# Patient Record
Sex: FEMALE | Race: WHITE | NOT HISPANIC OR LATINO | Employment: OTHER | URBAN - METROPOLITAN AREA
[De-identification: names, ages, dates, MRNs, and addresses within clinical notes are randomized per-mention and may not be internally consistent; named-entity substitution may affect disease eponyms.]

---

## 2016-12-01 LAB
ALT SERPL-CCNC: 13 U/L (ref 6–29)
AST SERPL-CCNC: 24 U/L (ref 10–35)
CHOLEST SERPL-MCNC: 237 MG/DL (ref 125–200)
CREAT SERPL-MCNC: 0.88 MG/DL (ref 0.5–0.99)
GFR SERPL CREATININE-BSD FRML MDRD: 68 ML/MIN/1.73M2
GLUCOSE SERPL-MCNC: 88 MG/DL (ref 65–99)
HDLC SERPL-MCNC: 55 MG/DL
LDLC SERPL CALC-MCNC: 164 MG/DL
NONHDLC SERPL-MCNC: 182 MG/DL
POTASSIUM SERPL-SCNC: 4.3 MMOL/L (ref 3.5–5.3)
TRIGL SERPL-MCNC: 89 MG/DL
TSH SERPL-ACNC: 1.63 MIU/L (ref 0.4–4.5)

## 2017-01-27 ENCOUNTER — TRANSFERRED RECORDS (OUTPATIENT)
Dept: HEALTH INFORMATION MANAGEMENT | Facility: CLINIC | Age: 67
End: 2017-01-27

## 2017-02-09 ENCOUNTER — TRANSFERRED RECORDS (OUTPATIENT)
Dept: HEALTH INFORMATION MANAGEMENT | Facility: CLINIC | Age: 67
End: 2017-02-09

## 2017-02-28 LAB
ALT SERPL-CCNC: 16 U/L (ref 6–29)
AST SERPL-CCNC: 23 U/L (ref 10–35)
CHOLEST SERPL-MCNC: 129 MG/DL (ref 125–200)
CREAT SERPL-MCNC: 0.9 MG/DL (ref 0.5–0.99)
GFR SERPL CREATININE-BSD FRML MDRD: 67 ML/MIN/1.73M2
GLUCOSE SERPL-MCNC: 90 MG/DL (ref 65–99)
HDLC SERPL-MCNC: 50 MG/DL
LDLC SERPL CALC-MCNC: 66 MG/DL
NONHDLC SERPL-MCNC: 79 MG/DL
POTASSIUM SERPL-SCNC: 4.4 MMOL/L (ref 3.5–5.3)
TRIGL SERPL-MCNC: 67 MG/DL

## 2017-10-19 ENCOUNTER — TRANSFERRED RECORDS (OUTPATIENT)
Dept: HEALTH INFORMATION MANAGEMENT | Facility: CLINIC | Age: 67
End: 2017-10-19

## 2017-11-01 ENCOUNTER — TRANSFERRED RECORDS (OUTPATIENT)
Dept: HEALTH INFORMATION MANAGEMENT | Facility: CLINIC | Age: 67
End: 2017-11-01

## 2017-12-14 LAB
ALT SERPL-CCNC: 14 U/L (ref 6–29)
AST SERPL-CCNC: 23 U/L (ref 10–35)
CHOLEST SERPL-MCNC: 149 MG/DL
CREAT SERPL-MCNC: 1.04 MG/DL (ref 0.5–0.99)
GFR SERPL CREATININE-BSD FRML MDRD: 56 ML/MIN/1.73M2
GLUCOSE SERPL-MCNC: 93 MG/DL (ref 65–99)
HDLC SERPL-MCNC: 49 MG/DL
LDLC SERPL CALC-MCNC: 83 MG/DL
NONHDLC SERPL-MCNC: 100 MG/DL
POTASSIUM SERPL-SCNC: 4.3 MMOL/L (ref 3.5–5.3)
TRIGL SERPL-MCNC: 82 MG/DL
TSH SERPL-ACNC: 1.67 MIU/L (ref 0.4–4.5)

## 2017-12-29 ENCOUNTER — TRANSFERRED RECORDS (OUTPATIENT)
Dept: HEALTH INFORMATION MANAGEMENT | Facility: CLINIC | Age: 67
End: 2017-12-29

## 2018-01-05 ENCOUNTER — TRANSFERRED RECORDS (OUTPATIENT)
Dept: HEALTH INFORMATION MANAGEMENT | Facility: CLINIC | Age: 68
End: 2018-01-05

## 2018-01-19 LAB
ALT SERPL-CCNC: 14 U/L (ref 6–29)
AST SERPL-CCNC: 22 U/L (ref 10–35)
CREAT SERPL-MCNC: 0.9 MG/DL (ref 0.5–0.99)
GFR SERPL CREATININE-BSD FRML MDRD: 66 ML/MIN/1.73M2
GLUCOSE SERPL-MCNC: 93 MG/DL (ref 65–99)
POTASSIUM SERPL-SCNC: 4.4 MMOL/L (ref 3.5–5.3)

## 2018-02-22 ENCOUNTER — TRANSFERRED RECORDS (OUTPATIENT)
Dept: HEALTH INFORMATION MANAGEMENT | Facility: CLINIC | Age: 68
End: 2018-02-22

## 2018-07-17 LAB
ALT SERPL-CCNC: 15 U/L (ref 6–29)
AST SERPL-CCNC: 25 U/L (ref 10–35)
CHOLEST SERPL-MCNC: 138 MG/DL
CREAT SERPL-MCNC: 0.84 MG/DL (ref 0.5–0.99)
GFR SERPL CREATININE-BSD FRML MDRD: 71 ML/MIN/1.73M2
GLUCOSE SERPL-MCNC: 90 MG/DL (ref 65–99)
HDLC SERPL-MCNC: 45 MG/DL
LDLC SERPL CALC-MCNC: 78 MG/DL
NONHDLC SERPL-MCNC: 93 MG/DL
POTASSIUM SERPL-SCNC: 4.5 MMOL/L (ref 3.5–5.3)
TRIGL SERPL-MCNC: 72 MG/DL

## 2018-09-04 ENCOUNTER — TRANSFERRED RECORDS (OUTPATIENT)
Dept: HEALTH INFORMATION MANAGEMENT | Facility: CLINIC | Age: 68
End: 2018-09-04

## 2019-01-08 ENCOUNTER — TRANSFERRED RECORDS (OUTPATIENT)
Dept: HEALTH INFORMATION MANAGEMENT | Facility: CLINIC | Age: 69
End: 2019-01-08

## 2019-04-18 ENCOUNTER — TRANSFERRED RECORDS (OUTPATIENT)
Dept: HEALTH INFORMATION MANAGEMENT | Facility: CLINIC | Age: 69
End: 2019-04-18

## 2019-05-14 ENCOUNTER — TRANSFERRED RECORDS (OUTPATIENT)
Dept: HEALTH INFORMATION MANAGEMENT | Facility: CLINIC | Age: 69
End: 2019-05-14

## 2019-12-20 LAB
ALT SERPL-CCNC: 18 U/L (ref 6–29)
AST SERPL-CCNC: 23 U/L (ref 10–35)
CHOLEST SERPL-MCNC: 128 MG/DL
CREAT SERPL-MCNC: 0.98 MG/DL (ref 0.5–0.99)
GFR SERPL CREATININE-BSD FRML MDRD: 59 ML/MIN/1.73M2
GLUCOSE SERPL-MCNC: 101 MG/DL (ref 65–99)
HDLC SERPL-MCNC: 48 MG/DL
LDLC SERPL CALC-MCNC: 66 MG/DL
NONHDLC SERPL-MCNC: 80 MG/DL
POTASSIUM SERPL-SCNC: 4 MMOL/L (ref 3.5–5.3)
TRIGL SERPL-MCNC: 67 MG/DL
TSH SERPL-ACNC: 1.4 MIU/L (ref 0.4–4.5)

## 2020-01-15 ENCOUNTER — TRANSFERRED RECORDS (OUTPATIENT)
Dept: HEALTH INFORMATION MANAGEMENT | Facility: CLINIC | Age: 70
End: 2020-01-15

## 2020-01-23 ENCOUNTER — TRANSFERRED RECORDS (OUTPATIENT)
Dept: HEALTH INFORMATION MANAGEMENT | Facility: CLINIC | Age: 70
End: 2020-01-23

## 2020-02-17 ENCOUNTER — TRANSFERRED RECORDS (OUTPATIENT)
Dept: HEALTH INFORMATION MANAGEMENT | Facility: CLINIC | Age: 70
End: 2020-02-17

## 2020-05-19 ENCOUNTER — TRANSFERRED RECORDS (OUTPATIENT)
Dept: HEALTH INFORMATION MANAGEMENT | Facility: CLINIC | Age: 70
End: 2020-05-19

## 2020-05-19 LAB
ALT SERPL-CCNC: 26 U/L (ref 6–60)
AST SERPL-CCNC: 27 U/L (ref 15–37)
CREAT SERPL-MCNC: 1.11 MG/DL (ref 0.5–1.4)
GFR SERPL CREATININE-BSD FRML MDRD: 50 ML/MIN/1.73M2
GLUCOSE SERPL-MCNC: 93 MG/DL (ref 60–99)
POTASSIUM SERPL-SCNC: 4.4 MMOL/L (ref 3.5–5.1)
TSH SERPL-ACNC: 1.87 MIU/L (ref 0.36–3.74)

## 2020-06-10 ENCOUNTER — TRANSFERRED RECORDS (OUTPATIENT)
Dept: HEALTH INFORMATION MANAGEMENT | Facility: CLINIC | Age: 70
End: 2020-06-10

## 2020-06-10 LAB — EJECTION FRACTION: >70 %

## 2020-09-17 ENCOUNTER — TRANSFERRED RECORDS (OUTPATIENT)
Dept: HEALTH INFORMATION MANAGEMENT | Facility: CLINIC | Age: 70
End: 2020-09-17

## 2020-10-15 ENCOUNTER — TRANSFERRED RECORDS (OUTPATIENT)
Dept: HEALTH INFORMATION MANAGEMENT | Facility: CLINIC | Age: 70
End: 2020-10-15

## 2020-10-20 ENCOUNTER — TRANSFERRED RECORDS (OUTPATIENT)
Dept: HEALTH INFORMATION MANAGEMENT | Facility: CLINIC | Age: 70
End: 2020-10-20

## 2020-12-18 ENCOUNTER — TRANSFERRED RECORDS (OUTPATIENT)
Dept: HEALTH INFORMATION MANAGEMENT | Facility: CLINIC | Age: 70
End: 2020-12-18

## 2020-12-28 ENCOUNTER — MEDICAL CORRESPONDENCE (OUTPATIENT)
Dept: HEALTH INFORMATION MANAGEMENT | Facility: CLINIC | Age: 70
End: 2020-12-28

## 2020-12-28 ENCOUNTER — TRANSFERRED RECORDS (OUTPATIENT)
Dept: HEALTH INFORMATION MANAGEMENT | Facility: CLINIC | Age: 70
End: 2020-12-28

## 2020-12-29 ENCOUNTER — TRANSCRIBE ORDERS (OUTPATIENT)
Dept: OTHER | Age: 70
End: 2020-12-29

## 2020-12-29 DIAGNOSIS — R49.0 HOARSENESS: Primary | ICD-10-CM

## 2020-12-30 ENCOUNTER — PATIENT OUTREACH (OUTPATIENT)
Dept: PULMONOLOGY | Facility: CLINIC | Age: 70
End: 2020-12-30

## 2020-12-30 NOTE — PROGRESS NOTES
"ILD New Patient Referral: Pre visit communication    Patient: Dionrah Thompson  Reason for Referral: Sarcoidosis  Referring Physician: Dr. Susan Davis  Referring Clinic/Contact: Phone#: [facility] in [place], [place]    Chest CT scan: YES. Date-several years ago. Location-[facility]  Biopsy: Unknown  PFT's:YES. Date-Jan2020. Location-[facility]  Additional testing:   Current symptoms: None  Current related prescriptions: YES. Qvar    Supplemental oxygen? No    In review of apparent records and conversation with patient; recommend first visit with ILD provider be conducted :  Testing needed: Full PFT's and walk only    Additional notes:   Patient was referred to Dr. Richmond or Dr. Perlman from [facility].  She has had a longstanding history of sarcoidosis that flares intermittently.  She has been on prednisone and methotrexate in the past but has not needed medication for \"about 5 years\".  Sarcoidosis was pulmonary sarcoid, typically followed with annual PFT's and CXR.  She was told to establish care in [place] (moved here two weeks ago).    "

## 2020-12-31 NOTE — TELEPHONE ENCOUNTER
FUTURE VISIT INFORMATION      FUTURE VISIT INFORMATION:    Date: 3/22/2021    Time: 7:30 AM    [place] [facility]  REFERRAL INFORMATION:    Referring provider:  Dr. Carlos A Bower    Referring providers clinic:  [facility]    Reason for visit/diagnosis: Hoarseness    RECORDS REQUESTED FROM:       Clinic name Comments Records Status Imaging Status   [facility] ([facility] 1/6/21, 12/18/20, 12/2/20 - SPEECH OV with TASIA Caberra  11/5/20 - ENT OV with Dr. Bower  10/15/18 - ENT OV with ARNOLDO Rodríguez Care Everywhere    [facility] 4/5/19 - OBGYN OV with Dr. Damico Care Everywhere    [facility] 6/18/18 - OP Note for BILATERAL MAXILLARY ANTROSTOMY, ETHMOIDECTOMY, SPHENOIDOTOMY, FRONTAL SINUSOTOMY, IMAGE GUIDANCE GENERAL ANES with Dr. Pond Care Everywhere    [facility]  Fax: [phone]  Phone: [phone] 10/15/20 - ALLERGY OV with Dr. Mchugh  10/14/20 - OV with Dr. Campbell 2/11 Sent to Scan            * 12/31/20 8:31 AM Faxed req to [facility] for recs to be faxed to the clinic. - Alem  * 1/14/21 7:29 AM Faxed 2nd Req to [facility] for records. - Alem  * 2/11/21 8:29 AM Faxed req to [facility] to actual fax number after calling. - Alem  * 2/11/21 11:23 AM Records received from [facility] and sent to HIM to be scanned into the chart. - Alem

## 2021-01-04 DIAGNOSIS — J84.9 ILD (INTERSTITIAL LUNG DISEASE) (H): Primary | ICD-10-CM

## 2021-01-15 ENCOUNTER — HEALTH MAINTENANCE LETTER (OUTPATIENT)
Age: 71
End: 2021-01-15

## 2021-01-18 ENCOUNTER — OFFICE VISIT (OUTPATIENT)
Dept: FAMILY MEDICINE | Facility: CLINIC | Age: 71
End: 2021-01-18
Payer: MEDICARE

## 2021-01-18 VITALS
HEART RATE: 68 BPM | SYSTOLIC BLOOD PRESSURE: 124 MMHG | HEIGHT: 62 IN | DIASTOLIC BLOOD PRESSURE: 75 MMHG | TEMPERATURE: 98.3 F | BODY MASS INDEX: 26.22 KG/M2 | OXYGEN SATURATION: 96 % | WEIGHT: 142.5 LBS

## 2021-01-18 DIAGNOSIS — M54.16 LUMBAR RADICULOPATHY, CHRONIC: ICD-10-CM

## 2021-01-18 DIAGNOSIS — M54.12 CERVICAL RADICULOPATHY, CHRONIC: ICD-10-CM

## 2021-01-18 DIAGNOSIS — D49.9 PRECANCEROUS LESION: ICD-10-CM

## 2021-01-18 DIAGNOSIS — Z76.89 ENCOUNTER TO ESTABLISH CARE WITH NEW DOCTOR: Primary | ICD-10-CM

## 2021-01-18 DIAGNOSIS — Z86.69 HISTORY OF RETINAL DETACHMENT: ICD-10-CM

## 2021-01-18 PROBLEM — N81.10 FEMALE BLADDER PROLAPSE: Status: ACTIVE | Noted: 2019-09-03

## 2021-01-18 PROBLEM — M47.816 SPONDYLOSIS WITHOUT MYELOPATHY OR RADICULOPATHY, LUMBAR REGION: Status: ACTIVE | Noted: 2020-07-08

## 2021-01-18 PROBLEM — M50.322 OTHER CERVICAL DISC DEGENERATION AT C5-C6 LEVEL: Status: ACTIVE | Noted: 2021-01-18

## 2021-01-18 PROBLEM — J38.7 LARYNGEAL DISORDER: Status: ACTIVE | Noted: 2021-01-18

## 2021-01-18 PROBLEM — M19.049 OSTEOARTHROSIS, HAND: Status: ACTIVE | Noted: 2021-01-18

## 2021-01-18 PROBLEM — N81.10 FEMALE BLADDER PROLAPSE: Status: RESOLVED | Noted: 2019-09-03 | Resolved: 2021-01-18

## 2021-01-18 PROBLEM — E78.5 HYPERLIPIDEMIA: Status: ACTIVE | Noted: 2018-06-13

## 2021-01-18 PROBLEM — M17.0 PRIMARY LOCALIZED OSTEOARTHRITIS OF KNEES, BILATERAL: Status: ACTIVE | Noted: 2020-07-08

## 2021-01-18 PROBLEM — G47.33 OSA TREATED WITH BIPAP: Status: ACTIVE | Noted: 2018-06-13

## 2021-01-18 PROBLEM — R49.0 HOARSENESS: Status: ACTIVE | Noted: 2020-12-18

## 2021-01-18 PROBLEM — G89.4 CHRONIC PAIN DISORDER: Status: ACTIVE | Noted: 2020-11-03

## 2021-01-18 PROBLEM — N81.9 PROLAPSE OF FEMALE PELVIC ORGANS: Status: ACTIVE | Noted: 2019-08-19

## 2021-01-18 PROBLEM — M51.16 INTERVERTEBRAL DISC DISORDERS WITH RADICULOPATHY, LUMBAR REGION: Status: ACTIVE | Noted: 2020-07-08

## 2021-01-18 PROBLEM — N81.9 PROLAPSE OF FEMALE PELVIC ORGANS: Status: RESOLVED | Noted: 2019-08-19 | Resolved: 2021-01-18

## 2021-01-18 PROCEDURE — 99214 OFFICE O/P EST MOD 30 MIN: CPT | Performed by: FAMILY MEDICINE

## 2021-01-18 RX ORDER — ALBUTEROL SULFATE 90 UG/1
2 AEROSOL, METERED RESPIRATORY (INHALATION) EVERY 4 HOURS PRN
COMMUNITY
End: 2021-11-30

## 2021-01-18 RX ORDER — ACETAMINOPHEN 500 MG
1000 TABLET ORAL 3 TIMES DAILY PRN
Status: ON HOLD | COMMUNITY
End: 2021-07-21

## 2021-01-18 RX ORDER — IBUPROFEN 200 MG
1 CAPSULE ORAL DAILY
Status: ON HOLD | COMMUNITY
End: 2021-07-21

## 2021-01-18 RX ORDER — MULTIVIT-MIN/IRON FUM/FOLIC AC 7.5 MG-4
1 TABLET ORAL
COMMUNITY
End: 2021-01-18

## 2021-01-18 RX ORDER — CELECOXIB 200 MG/1
200 CAPSULE ORAL 2 TIMES DAILY
COMMUNITY
Start: 2021-01-15 | End: 2022-01-18

## 2021-01-18 RX ORDER — ROSUVASTATIN CALCIUM 5 MG/1
5 TABLET, COATED ORAL EVERY OTHER DAY
COMMUNITY
End: 2022-01-18

## 2021-01-18 RX ORDER — DULOXETIN HYDROCHLORIDE 60 MG/1
60 CAPSULE, DELAYED RELEASE ORAL DAILY
COMMUNITY
End: 2021-09-13

## 2021-01-18 RX ORDER — TRAZODONE HYDROCHLORIDE 100 MG/1
100 TABLET ORAL
COMMUNITY
End: 2022-08-24

## 2021-01-18 SDOH — HEALTH STABILITY: MENTAL HEALTH: HOW OFTEN DO YOU HAVE 6 OR MORE DRINKS ON ONE OCCASION?: NOT ASKED

## 2021-01-18 SDOH — HEALTH STABILITY: MENTAL HEALTH: HOW OFTEN DO YOU HAVE A DRINK CONTAINING ALCOHOL?: MONTHLY OR LESS

## 2021-01-18 SDOH — HEALTH STABILITY: MENTAL HEALTH: HOW MANY STANDARD DRINKS CONTAINING ALCOHOL DO YOU HAVE ON A TYPICAL DAY?: 1 OR 2

## 2021-01-18 ASSESSMENT — MIFFLIN-ST. JEOR: SCORE: 1116.63

## 2021-01-18 NOTE — PROGRESS NOTES
Assessment & Plan     Encounter to establish care with new doctor  Reviewed problem list, medications, allergies, PMH, SH, Surgical history, FH.  Recommended patient schedule Preventative visit at convenience.    Cervical radiculopathy, chronic  Referral placed for evaluation, follow up as needed.  - PAIN MANAGEMENT REFERRAL; Future    Lumbar radiculopathy, chronic  Referral placed for evaluation, follow up as needed.  - PAIN MANAGEMENT REFERRAL; Future    Precancerous lesion  No concerns today, would like skin check, referral placed, follow up as needed.  - DERMATOLOGY ADULT REFERRAL; Future    History of retinal detachment  No concerns today, referral placed, follow up as needed.  - EYE ADULT REFERRAL; Future      28 minutes spent on the date of the encounter doing chart review, history and exam, documentation and further activities as noted above    See Patient Instructions    Return in about 3 months (around 4/18/2021) for Preventative Care Visit.    Jayna Aviles MD  [facility] [place]    Subjective     Dot is a 70 year old who presents to clinic today for the following health issues     History of Present Illness       She eats 2-3 servings of fruits and vegetables daily.She consumes 0 sweetened beverage(s) daily.She exercises with enough effort to increase her heart rate 10 to 19 minutes per day.  She exercises with enough effort to increase her heart rate 6 days per week.   She is taking medications regularly.         New Patient/Transfer of Care  Patient is here to establish care for medication management and she would like to discuss some specialist referrals.     Would like to get refills of medications as needed.  1.  Needs referrals for Dermatology (history of precancerous skin lesion), Ophthalmology (History of retinal detachment, ocular shingles).  2.  Bipap Supplies- will need in future  3.  Has Cervical and Lumbar spinal stenosis, epidural injections: Pain clinic  "Referral needed.      Review of Systems   Constitutional, HEENT, cardiovascular, pulmonary, gi and gu systems are negative, except as otherwise noted.      Objective    /75 (BP Location: Right arm, Patient Position: Chair, Cuff Size: Adult Regular)   Pulse 68   Temp 98.3  F (36.8  C) (Oral)   Ht 1.57 m (5' 1.81\")   Wt 64.6 kg (142 lb 8 oz)   SpO2 96%   BMI 26.22 kg/m    Body mass index is 26.22 kg/m .  Physical Exam   GENERAL: healthy, alert and no distress  EYES: Eyes grossly normal to inspection, PERRL and conjunctivae and sclerae normal  RESP: lungs clear to auscultation - no rales, rhonchi or wheezes  CV: regular rate and rhythm, normal S1 S2, no S3 or S4, no murmur, click or rub, no peripheral edema and peripheral pulses strong  SKIN: no suspicious lesions or rashes            "

## 2021-01-28 ENCOUNTER — VIRTUAL VISIT (OUTPATIENT)
Dept: FAMILY MEDICINE | Facility: CLINIC | Age: 71
End: 2021-01-28
Payer: MEDICARE

## 2021-01-28 DIAGNOSIS — Z13.220 SCREENING FOR HYPERLIPIDEMIA: ICD-10-CM

## 2021-01-28 DIAGNOSIS — Z11.59 NEED FOR HEPATITIS C SCREENING TEST: ICD-10-CM

## 2021-01-28 DIAGNOSIS — N30.00 ACUTE CYSTITIS WITHOUT HEMATURIA: Primary | ICD-10-CM

## 2021-01-28 PROCEDURE — 99442 PR PHYSICIAN TELEPHONE EVALUATION 11-20 MIN: CPT | Mod: 95 | Performed by: FAMILY MEDICINE

## 2021-01-28 RX ORDER — SULFAMETHOXAZOLE/TRIMETHOPRIM 800-160 MG
1 TABLET ORAL 2 TIMES DAILY
Qty: 6 TABLET | Refills: 1 | Status: SHIPPED | OUTPATIENT
Start: 2021-01-28 | End: 2021-01-31

## 2021-01-28 SDOH — ECONOMIC STABILITY: FOOD INSECURITY: WITHIN THE PAST 12 MONTHS, THE FOOD YOU BOUGHT JUST DIDN'T LAST AND YOU DIDN'T HAVE MONEY TO GET MORE.: NOT ASKED

## 2021-01-28 SDOH — ECONOMIC STABILITY: FOOD INSECURITY: WITHIN THE PAST 12 MONTHS, YOU WORRIED THAT YOUR FOOD WOULD RUN OUT BEFORE YOU GOT MONEY TO BUY MORE.: NOT ASKED

## 2021-01-28 SDOH — ECONOMIC STABILITY: TRANSPORTATION INSECURITY
IN THE PAST 12 MONTHS, HAS LACK OF TRANSPORTATION KEPT YOU FROM MEETINGS, WORK, OR FROM GETTING THINGS NEEDED FOR DAILY LIVING?: NOT ASKED

## 2021-01-28 SDOH — ECONOMIC STABILITY: INCOME INSECURITY: HOW HARD IS IT FOR YOU TO PAY FOR THE VERY BASICS LIKE FOOD, HOUSING, MEDICAL CARE, AND HEATING?: NOT ASKED

## 2021-01-28 SDOH — ECONOMIC STABILITY: TRANSPORTATION INSECURITY
IN THE PAST 12 MONTHS, HAS THE LACK OF TRANSPORTATION KEPT YOU FROM MEDICAL APPOINTMENTS OR FROM GETTING MEDICATIONS?: NOT ASKED

## 2021-01-28 NOTE — PROGRESS NOTES
"-Dot is a 70 year old who is being evaluated via a billable telephone visit.  -    What phone number would you like to be contacted at? See demographics  How would you like to obtain your AVS? MyChart  Assessment & Plan     Need for hepatitis C screening test    - Hepatitis C Screen Reflex to HCV RNA Quant and Genotype; Future    Screening for hyperlipidemia    - Lipid panel reflex to direct LDL Fasting; Future    Acute cystitis without hematuria  Handout on the above diagnosis was given to the patient and discussed    - sulfamethoxazole-trimethoprim (BACTRIM DS) 800-160 MG tablet; Take 1 tablet by mouth 2 times daily for 3 days        14 minutes spent on the date of the encounter doing chart review, review of outside records, patient visit and documentation          BMI:   Estimated body mass index is 26.22 kg/m  as calculated from the following:    Height as of 1/18/21: 1.57 m (5' 1.81\").    Weight as of 1/18/21: 64.6 kg (142 lb 8 oz).         She has appt in 2 weeks with Dr. Liang    Return in about 2 months (around 3/28/2021) for Physical Exam.    Terri Gamble MD  [facility]    Maya Chris is a 70 year old who presents to clinic today for the following health issues - she is pretty sure she has a UTI.   She does not have pain with urination.   She got dehydrated.   She does have some low back pain which is from moving and chronic spinal stenosis.  It has been several days.   She has urinary urgency and body aches.   She also feels like she has to go when she doesn't.   Her urine is cloudy and smells.     History of Present Illness       She eats 2-3 servings of fruits and vegetables daily.She consumes 0 sweetened beverage(s) daily.She exercises with enough effort to increase her heart rate 10 to 19 minutes per day.  She exercises with enough effort to increase her heart rate 6 days per week.   She is taking medications regularly.         Genitourinary - " Female  Onset/Duration: several days   Description:   Painful urination (Dysuria): no           Frequency: YES  Blood in urine (Hematuria): no  Delay in urine (Hesitency): no  Intensity: severe  Progression of Symptoms:  same  Accompanying Signs & Symptoms:  Fever/chills: no  Flank pain: no  Nausea and vomiting: no  Vaginal symptoms: odor  Abdominal/Pelvic Pain: YES  History:   History of frequent UTI s: no  History of kidney stones: no  Sexually Active: no  Possibility of pregnancy: No  Precipitating or alleviating factors: None  Therapies tried and outcome: Increase fluid intake      Past Medical History:   Diagnosis Date     Chronic osteoarthritis      Depressive disorder      Female bladder prolapse 9/3/2019     Prolapse of female pelvic organs 8/19/2019       Past Surgical History:   Procedure Laterality Date     APPENDECTOMY       diskectomy in toe       HYSTERECTOMY  08/2017    and bladder surgery     left rotator cuff surgery       TONSILLECTOMY  1955       MEDICATIONS:  Current Outpatient Medications   Medication     sulfamethoxazole-trimethoprim (BACTRIM DS) 800-160 MG tablet     acetaminophen (TYLENOL) 500 MG tablet     albuterol (PROAIR HFA/PROVENTIL HFA/VENTOLIN HFA) 108 (90 Base) MCG/ACT inhaler     calcium carbonate 500 mg, elemental, (OSCAL 500) 1250 (500 Ca) MG TABS tablet     Carboxymethylcellul-Glycerin 1-0.9 % GEL     celecoxib (CELEBREX) 200 MG capsule     DULoxetine (CYMBALTA) 60 MG capsule     NONFORMULARY     rosuvastatin (CRESTOR) 5 MG tablet     traZODone (DESYREL) 100 MG tablet     No current facility-administered medications for this visit.        SOCIAL HISTORY:  Social History     Tobacco Use     Smoking status: Never Smoker     Smokeless tobacco: Never Used   Substance Use Topics     Alcohol use: Yes     Frequency: Monthly or less     Drinks per session: 1 or 2       Family History   Problem Relation Age of Onset     Myocardial Infarction Father         late 50s     Ovarian Cancer  Sister      Cervical Cancer Sister      Lung Cancer Sister      Myocardial Infarction Paternal Uncle         late 50s           Review of Systems   Constitutional, HEENT, cardiovascular, pulmonary, gi and gu systems are negative, except as otherwise noted.      Objective           Vitals:  No vitals were obtained today due to virtual visit.    Physical Exam   healthy, alert and no distress  PSYCH: Alert and oriented times 3; coherent speech, normal   rate and volume, able to articulate logical thoughts, able   to abstract reason, no tangential thoughts, no hallucinations   or delusions  Her affect is normal, pleasant and full  RESP: No cough, no audible wheezing, able to talk in full sentences  Remainder of exam unable to be completed due to telephone visits    No results found for any previous visit.               Phone call duration: 13 minutes

## 2021-01-28 NOTE — PATIENT INSTRUCTIONS

## 2021-01-31 ENCOUNTER — MYC MEDICAL ADVICE (OUTPATIENT)
Dept: FAMILY MEDICINE | Facility: CLINIC | Age: 71
End: 2021-01-31

## 2021-02-09 ENCOUNTER — ANCILLARY PROCEDURE (OUTPATIENT)
Dept: GENERAL RADIOLOGY | Facility: CLINIC | Age: 71
End: 2021-02-09
Attending: FAMILY MEDICINE
Payer: MEDICARE

## 2021-02-09 ENCOUNTER — OFFICE VISIT (OUTPATIENT)
Dept: FAMILY MEDICINE | Facility: CLINIC | Age: 71
End: 2021-02-09
Payer: MEDICARE

## 2021-02-09 VITALS
BODY MASS INDEX: 26.5 KG/M2 | DIASTOLIC BLOOD PRESSURE: 70 MMHG | WEIGHT: 144 LBS | HEART RATE: 87 BPM | SYSTOLIC BLOOD PRESSURE: 115 MMHG | TEMPERATURE: 98.1 F | OXYGEN SATURATION: 99 %

## 2021-02-09 DIAGNOSIS — B00.1 COLD SORE: ICD-10-CM

## 2021-02-09 DIAGNOSIS — M25.552 HIP PAIN, LEFT: Primary | ICD-10-CM

## 2021-02-09 PROCEDURE — 99213 OFFICE O/P EST LOW 20 MIN: CPT | Performed by: FAMILY MEDICINE

## 2021-02-09 PROCEDURE — 73502 X-RAY EXAM HIP UNI 2-3 VIEWS: CPT | Mod: LT | Performed by: RADIOLOGY

## 2021-02-09 RX ORDER — CLONAZEPAM 0.5 MG/1
0.5 TABLET ORAL
COMMUNITY
Start: 2020-11-14 | End: 2022-10-21

## 2021-02-09 RX ORDER — VALACYCLOVIR HYDROCHLORIDE 1 G/1
TABLET, FILM COATED ORAL
COMMUNITY
Start: 2020-04-29 | End: 2021-02-09

## 2021-02-09 RX ORDER — DEXAMETHASONE 4 MG/1
TABLET ORAL
COMMUNITY
Start: 2020-04-30 | End: 2021-04-09

## 2021-02-09 RX ORDER — PROPRANOLOL HYDROCHLORIDE 10 MG/1
10 TABLET ORAL DAILY PRN
COMMUNITY
Start: 2020-11-24 | End: 2022-07-26

## 2021-02-09 RX ORDER — VALACYCLOVIR HYDROCHLORIDE 1 G/1
TABLET, FILM COATED ORAL
Qty: 30 TABLET | Refills: 1 | Status: SHIPPED | OUTPATIENT
Start: 2021-02-09 | End: 2022-12-01

## 2021-02-09 RX ORDER — BACLOFEN 10 MG/1
10 TABLET ORAL DAILY PRN
COMMUNITY
Start: 2020-11-14 | End: 2022-08-24

## 2021-02-09 ASSESSMENT — PATIENT HEALTH QUESTIONNAIRE - PHQ9
SUM OF ALL RESPONSES TO PHQ QUESTIONS 1-9: 5
10. IF YOU CHECKED OFF ANY PROBLEMS, HOW DIFFICULT HAVE THESE PROBLEMS MADE IT FOR YOU TO DO YOUR WORK, TAKE CARE OF THINGS AT HOME, OR GET ALONG WITH OTHER PEOPLE: SOMEWHAT DIFFICULT
SUM OF ALL RESPONSES TO PHQ QUESTIONS 1-9: 5

## 2021-02-09 ASSESSMENT — ANXIETY QUESTIONNAIRES
7. FEELING AFRAID AS IF SOMETHING AWFUL MIGHT HAPPEN: NOT AT ALL
1. FEELING NERVOUS, ANXIOUS, OR ON EDGE: SEVERAL DAYS
3. WORRYING TOO MUCH ABOUT DIFFERENT THINGS: SEVERAL DAYS
2. NOT BEING ABLE TO STOP OR CONTROL WORRYING: NOT AT ALL
4. TROUBLE RELAXING: NOT AT ALL
7. FEELING AFRAID AS IF SOMETHING AWFUL MIGHT HAPPEN: NOT AT ALL
GAD7 TOTAL SCORE: 3
GAD7 TOTAL SCORE: 3
6. BECOMING EASILY ANNOYED OR IRRITABLE: SEVERAL DAYS
5. BEING SO RESTLESS THAT IT IS HARD TO SIT STILL: NOT AT ALL
GAD7 TOTAL SCORE: 3

## 2021-02-09 NOTE — PROGRESS NOTES
Assessment & Plan     Hip pain, left  Left hip pain likely trochanteric bursitis.  Will order imaging given tenderness and history of fall, but suspicion for fracture minimal.  Will place ortho referral for injection, declined PT referral today.  Follow up as needed after evaluation.  Pt is to continue her celebrex and use ice application PRN.  Discussed activity modification to the best of her ability given recent move, etc.  - XR Pelvis and Hip Left 1 View  - Orthopedic & Spine  Referral; Future    Cold sore  Refill medicaiton.  - valACYclovir (VALTREX) 1000 mg tablet; Take two tablets twice per day for one day for flare ups.      18 minutes spent on the date of the encounter doing chart review, history and exam, documentation and further activities as noted above       See Patient Instructions    Return in about 3 months (around 5/9/2021), or if symptoms worsen or fail to improve, for Preventative Care Visit.    Jayna Aviles MD  [facility] [place]    Subjective   Dot is a 70 year old who presents for the following health issues     History of Present Illness       She eats 2-3 servings of fruits and vegetables daily.She consumes 0 sweetened beverage(s) daily.She exercises with enough effort to increase her heart rate 10 to 19 minutes per day.  She exercises with enough effort to increase her heart rate 5 days per week.   She is taking medications regularly.         Musculoskeletal problem/pain  Onset/Duration: Fell Sep 2020  Description  Location: hip - left  Joint Swelling: no  Redness: no  Pain: YES  Warmth: no  Intensity:  mild, moderate  Progression of Symptoms:  improving  Accompanying signs and symptoms:   Fevers: no  Numbness/tingling/weakness: no  History  Trauma to the area: YES- Fell  Recent illness:  no  Previous similar problem: no   Previous evaluation:  no  Precipitating or alleviating factors:  Aggravating factors include: standing, climbing stairs and  overuse  Therapies tried and outcome: heat, ice, massage and topical gel with short term help if any    Left hip pain, started in September, carrying some things and tripped over a box, landed on left hip with her foot and leg underneath her. Did not initially notice the pain right away because her knee was worse.  Over the last few months the hip has not improved.  Gets aggravated with climbing stairs, sleeping on left side.  Frequently tosses and turns due to discomfort.  Had tried ice, heat, voltaren gel, etc.  Thinks she just constantly aggravates it.  No numbness or tingling.  Does have chronic radiculopathy on right side but nothing on left.  On Celebrex, notices some improvement.  Has not had injections for bursitis at all.    Has also had two cold sores in the last month or so, thinks its stress.  Would like refill of Valtrex.    Review of Systems   Constitutional, HEENT, cardiovascular, pulmonary, gi and gu systems are negative, except as otherwise noted.      Objective    /70   Pulse 87   Temp 98.1  F (36.7  C) (Oral)   Wt 65.3 kg (144 lb)   SpO2 99%   BMI 26.50 kg/m    Body mass index is 26.5 kg/m .  Physical Exam   GENERAL: healthy, alert and no distress  EYES: Eyes grossly normal to inspection, PERRL and conjunctivae and sclerae normal  HENT: normal cephalic/atraumatic and nose and mouth without ulcers or lesions  MS: no gross musculoskeletal defects noted, no edema.  Tenderness to palpation of left greater trochanter, full ROM for flexion, extension, and abduction of left hip (to her abilities given her back pain)  SKIN: no suspicious lesions or rashes  PSYCH: mentation appears normal, affect normal/bright

## 2021-02-10 ASSESSMENT — ANXIETY QUESTIONNAIRES: GAD7 TOTAL SCORE: 3

## 2021-02-10 ASSESSMENT — PATIENT HEALTH QUESTIONNAIRE - PHQ9: SUM OF ALL RESPONSES TO PHQ QUESTIONS 1-9: 5

## 2021-02-15 ENCOUNTER — VIRTUAL VISIT (OUTPATIENT)
Dept: ANESTHESIOLOGY | Facility: CLINIC | Age: 71
End: 2021-02-15
Attending: FAMILY MEDICINE
Payer: MEDICARE

## 2021-02-15 DIAGNOSIS — M54.12 CERVICAL RADICULOPATHY: ICD-10-CM

## 2021-02-15 DIAGNOSIS — M54.12 CERVICAL RADICULOPATHY, CHRONIC: Primary | ICD-10-CM

## 2021-02-15 DIAGNOSIS — M54.16 LUMBAR RADICULOPATHY, CHRONIC: ICD-10-CM

## 2021-02-15 PROCEDURE — 99204 OFFICE O/P NEW MOD 45 MIN: CPT | Mod: 95 | Performed by: ANESTHESIOLOGY

## 2021-02-15 ASSESSMENT — PAIN SCALES - GENERAL: PAINLEVEL: MILD PAIN (3)

## 2021-02-15 NOTE — PATIENT INSTRUCTIONS
Referrals:    Acupuncture referral placed with the [facility]-   Call to schedule [phone].  -Please call your insurance provider to find out about acupuncture coverage, being that not all policies cover acupuncture services.    Treatment planning:    Call when you are interested in scheduling the recommended procedure:  Cervical and Lumbar Epidural Steroid Injection.       Recommended Follow up:      As needed for procedures with Dr. Santana.        Please call [phone], option #1 to schedule your clinic appointment if you don't already have an appointment scheduled.    To speak with a nurse, schedule/reschedule/cancel a clinic appointment, or request a medication refill call: [phone], option #1.    You can also reach us by [facility]: [email or web address]
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
## 2021-02-15 NOTE — PROGRESS NOTES
Dot is a 70 year old who is being evaluated via a billable video visit.      How would you like to obtain your AVS? MyChart  If the video visit is dropped, the invitation should be resent by: Send to e-mail at: [email or web address]  Will anyone else be joining your video visit? Carolina White CMA
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
## 2021-02-16 ENCOUNTER — MYC MEDICAL ADVICE (OUTPATIENT)
Dept: ANESTHESIOLOGY | Facility: CLINIC | Age: 71
End: 2021-02-16

## 2021-02-17 ENCOUNTER — OFFICE VISIT (OUTPATIENT)
Dept: ORTHOPEDICS | Facility: CLINIC | Age: 71
End: 2021-02-17
Payer: MEDICARE

## 2021-02-17 VITALS
HEIGHT: 62 IN | WEIGHT: 144 LBS | SYSTOLIC BLOOD PRESSURE: 128 MMHG | DIASTOLIC BLOOD PRESSURE: 60 MMHG | BODY MASS INDEX: 26.5 KG/M2

## 2021-02-17 DIAGNOSIS — M25.552 HIP PAIN, LEFT: ICD-10-CM

## 2021-02-17 DIAGNOSIS — M70.62 TROCHANTERIC BURSITIS OF LEFT HIP: Primary | ICD-10-CM

## 2021-02-17 PROCEDURE — 99203 OFFICE O/P NEW LOW 30 MIN: CPT | Performed by: FAMILY MEDICINE

## 2021-02-17 ASSESSMENT — MIFFLIN-ST. JEOR: SCORE: 1123.41

## 2021-02-17 NOTE — PATIENT INSTRUCTIONS
1. Trochanteric bursitis of left hip    2. Hip pain, left      -Patient has chronic left hip pain due to significant bursitis  -Patient was interested in a cortisone injection today but she is receiving the Covid vaccine later this week.  I do not recommend a cortisone injection so close to her receiving her vaccine due to risk of immunosuppression.  Patient may reschedule an appointment at least 2 weeks after her second vaccine for a left trochanteric bursa cortisone injection  -Patient may continue with Voltaren gel and over-the-counter pain medications as needed  -Call direct clinic number [[phone]] at any time with questions or concerns.    Albert Yeo MD [facility]  [facility] [place]

## 2021-02-17 NOTE — LETTER
2/17/2021         RE: Dinorah Thompson  [address] [address] [address]        Dear Colleague,    Thank you for referring your patient, Dinorah Thompson, to the [facility] [place]. Please see a copy of my visit note below.    ASSESSMENT & PLAN  Patient Instructions     1. Trochanteric bursitis of left hip    2. Hip pain, left      -Patient has chronic left hip pain due to significant bursitis  -Patient was interested in a cortisone injection today but she is receiving the Covid vaccine later this week.  I do not recommend a cortisone injection so close to her receiving her vaccine due to risk of immunosuppression.  Patient may reschedule an appointment at least 2 weeks after her second vaccine for a left trochanteric bursa cortisone injection  -Patient may continue with Voltaren gel and over-the-counter pain medications as needed  -Call direct clinic number [[phone]] at any time with questions or concerns.    Albert Yeo MD [facility]  [facility]          -----    SUBJECTIVE  Dinorah Thompson is a/an 70 year old female who is seen in consultation at the request of  Jayna Aviles M.D. for evaluation of left hip pain. The patient is seen by themselves.    Onset: 6 month(s) ago. Patient describes injury as fell in September. States was carrying things and tripped and landed on the left sided. States landed on the left knee and left hip. The knee pain has now gone away, but still having some left hip.   Location of Pain: left lateral hip pain  Rating of Pain at worst: 7/10  Rating of Pain Currently: 3/10  Worsened by: sitting, walking, stairs, anything that involves the joint. Sleeping.   Better with: rest,   Treatments tried: ice, heat, other medications: Voltaren gel and previous imaging (xray 2/09/21)  Quality: aching, sharp  Associated symptoms: has a very point tender spot on lateral hip.    Orthopedic history: YES - Date: lumbar and cervical stenosis, does get epidural injections into her spine, last one was about 6 months ago  Relevant surgical history: NO  Social history: social history: works as care taker for     Past Medical History:   Diagnosis Date     Chronic osteoarthritis      Depressive disorder      Female bladder prolapse 9/3/2019     Prolapse of female pelvic organs 8/19/2019     Social History     Socioeconomic History     Marital status:      Spouse name: Wilbert     Number of children: Not on file     Years of education: Not on file     Highest education level: Not on file   Occupational History     Not on file   Social Needs     Financial resource strain: Not on file     Food insecurity     Worry: Not on file     Inability: Not on file     Transportation needs     Medical: Not on file     Non-medical: Not on file   Tobacco Use     Smoking status: Never Smoker     Smokeless tobacco: Never Used   Substance and Sexual Activity     Alcohol use: Yes     Frequency: Monthly or less     Drinks per session: 1 or 2     Drug use: Never     Sexual activity: Not on file   Lifestyle     Physical activity     Days per week: Not on file     Minutes per session: Not on file     Stress: Not on file   Relationships     Social connections     Talks on phone: Not on file     Gets together: Not on file     Attends [employer] service: Not on file     Active member of club or organization: Not on file     Attends meetings of clubs or organizations: Not on file     Relationship status: Not on file     Intimate partner violence     Fear of current or ex partner: Not on file     Emotionally abused: Not on file     Physically abused: Not on file     Forced sexual activity: Not on file   Other Topics Concern     Not on file   Social History Narrative     Not on file         Patient's past medical, surgical, social, and family histories were reviewed today and no changes are noted.    REVIEW OF  "SYSTEMS:  10 point ROS is negative other than symptoms noted above in HPI, Past Medical History or as stated below  Constitutional: NEGATIVE for fever, chills, change in weight  Skin: NEGATIVE for worrisome rashes, moles or lesions  GI/: NEGATIVE for bowel or bladder changes  Neuro: NEGATIVE for weakness, dizziness or paresthesias    OBJECTIVE:  /60   Ht 1.57 m (5' 1.81\")   Wt 65.3 kg (144 lb)   BMI 26.50 kg/m     General: healthy, alert and in no distress  HEENT: no scleral icterus or conjunctival erythema  Skin: no suspicious lesions or rash. No jaundice.  CV: no pedal edema  Resp: normal respiratory effort without conversational dyspnea   Psych: normal mood and affect  Gait: normal steady gait with appropriate coordination and balance  Neuro: Normal light sensory exam of lower extremity  MSK:  LEFT HIP  Inspection:    No obvious deformity or asymmetry, level pelvis  Palpation:    Tender about the greater trochanteric region. Otherwise all other landmarks are nontender.  Active Range of Motion:     Flexion within normal limits, IR within normal limits, ER  within normal limits  Strength:    Flexion grossly intact, adduction grossly intact, abduction grossly intact  Special Tests:    Positive: none    Negative: Logroll, resisted gluteus medius provocation, JOHN, anterior impingement (FADIR), posterior impingement (EX/AB/ER)    Independent visualization of the below image:  No results found for this or any previous visit (from the past 24 hour(s)).   Examination:  XR PELVIS AND HIP LEFT 1 VIEW     Date:  2/9/2021 1:59 PM      Clinical Information: Fall left hip pain after a fall.     Comparison: none.                                                                      Impression:     1.  Pelvis and left hip negative for fracture or bone lesion. Normal  bilateral hip joint spacing and alignment. The pelvic ring is intact.  Mild degenerative arthritic changes at the SI joints. Moderate  degenerative disc " and facet changes in the lower lumbar spine.     JONATHAN P WILLIAMS, MD Albert Yeo MD [facility]        Again, thank you for allowing me to participate in the care of your patient.        Sincerely,        Albert Yeo, MD

## 2021-03-03 ENCOUNTER — TELEPHONE (OUTPATIENT)
Dept: ANESTHESIOLOGY | Facility: CLINIC | Age: 71
End: 2021-03-03

## 2021-03-03 ENCOUNTER — TELEPHONE (OUTPATIENT)
Dept: PULMONOLOGY | Facility: CLINIC | Age: 71
End: 2021-03-03

## 2021-03-03 ENCOUNTER — OFFICE VISIT (OUTPATIENT)
Dept: PULMONOLOGY | Facility: CLINIC | Age: 71
End: 2021-03-03
Attending: INTERNAL MEDICINE
Payer: MEDICARE

## 2021-03-03 ENCOUNTER — APPOINTMENT (OUTPATIENT)
Dept: LAB | Facility: CLINIC | Age: 71
End: 2021-03-03
Payer: MEDICARE

## 2021-03-03 ENCOUNTER — ANCILLARY PROCEDURE (OUTPATIENT)
Dept: CT IMAGING | Facility: CLINIC | Age: 71
End: 2021-03-03
Attending: INTERNAL MEDICINE
Payer: MEDICARE

## 2021-03-03 VITALS
SYSTOLIC BLOOD PRESSURE: 163 MMHG | WEIGHT: 138 LBS | RESPIRATION RATE: 18 BRPM | DIASTOLIC BLOOD PRESSURE: 83 MMHG | BODY MASS INDEX: 25.4 KG/M2 | HEART RATE: 64 BPM | OXYGEN SATURATION: 96 %

## 2021-03-03 DIAGNOSIS — D86.9 SARCOIDOSIS: ICD-10-CM

## 2021-03-03 DIAGNOSIS — Z13.220 SCREENING FOR HYPERLIPIDEMIA: ICD-10-CM

## 2021-03-03 DIAGNOSIS — E55.9 VITAMIN D DEFICIENCY, UNSPECIFIED: ICD-10-CM

## 2021-03-03 DIAGNOSIS — E83.50 UNSPECIFIED DISORDER OF CALCIUM METABOLISM: ICD-10-CM

## 2021-03-03 DIAGNOSIS — M54.16 LUMBAR RADICULOPATHY: ICD-10-CM

## 2021-03-03 DIAGNOSIS — D86.9 SARCOIDOSIS: Primary | ICD-10-CM

## 2021-03-03 DIAGNOSIS — J84.9 ILD (INTERSTITIAL LUNG DISEASE) (H): ICD-10-CM

## 2021-03-03 DIAGNOSIS — M54.12 CERVICAL RADICULOPATHY: Primary | ICD-10-CM

## 2021-03-03 DIAGNOSIS — R06.09 DYSPNEA ON EXERTION: ICD-10-CM

## 2021-03-03 DIAGNOSIS — G47.33 OBSTRUCTIVE SLEEP APNEA: ICD-10-CM

## 2021-03-03 DIAGNOSIS — Z11.59 NEED FOR HEPATITIS C SCREENING TEST: ICD-10-CM

## 2021-03-03 LAB
6 MIN WALK (FT): 1400 FT
6 MIN WALK (M): 427 M
ALBUMIN SERPL-MCNC: 3.9 G/DL (ref 3.4–5)
ALP SERPL-CCNC: 77 U/L (ref 40–150)
ALT SERPL W P-5'-P-CCNC: 26 U/L (ref 0–50)
ANION GAP SERPL CALCULATED.3IONS-SCNC: 4 MMOL/L (ref 3–14)
AST SERPL W P-5'-P-CCNC: 20 U/L (ref 0–45)
BASOPHILS # BLD AUTO: 0 10E9/L (ref 0–0.2)
BASOPHILS NFR BLD AUTO: 0.7 %
BILIRUB DIRECT SERPL-MCNC: 0.1 MG/DL (ref 0–0.2)
BILIRUB SERPL-MCNC: 0.5 MG/DL (ref 0.2–1.3)
BUN SERPL-MCNC: 20 MG/DL (ref 7–30)
CALCIUM SERPL-MCNC: 9.5 MG/DL (ref 8.5–10.1)
CHLORIDE SERPL-SCNC: 107 MMOL/L (ref 94–109)
CHOLEST SERPL-MCNC: 165 MG/DL
CO2 SERPL-SCNC: 27 MMOL/L (ref 20–32)
CREAT SERPL-MCNC: 0.84 MG/DL (ref 0.52–1.04)
DEPRECATED CALCIDIOL+CALCIFEROL SERPL-MC: 45 UG/L (ref 20–75)
DIFFERENTIAL METHOD BLD: NORMAL
DLCOUNC-%PRED-PRE: 106 %
DLCOUNC-PRE: 19.43 ML/MIN/MMHG
DLCOUNC-PRED: 18.17 ML/MIN/MMHG
EOSINOPHIL # BLD AUTO: 0.2 10E9/L (ref 0–0.7)
EOSINOPHIL NFR BLD AUTO: 3.3 %
ERV-%PRED-PRE: 64 %
ERV-PRE: 0.38 L
ERV-PRED: 0.59 L
ERYTHROCYTE [DISTWIDTH] IN BLOOD BY AUTOMATED COUNT: 13.2 % (ref 10–15)
EXPTIME-PRE: 7.3 SEC
FEF2575-%PRED-PRE: 145 %
FEF2575-PRE: 2.61 L/SEC
FEF2575-PRED: 1.79 L/SEC
FEFMAX-%PRED-PRE: 112 %
FEFMAX-PRE: 6 L/SEC
FEFMAX-PRED: 5.34 L/SEC
FEV1-%PRED-PRE: 132 %
FEV1-PRE: 2.73 L
FEV1FEV6-PRE: 80 %
FEV1FEV6-PRED: 79 %
FEV1FVC-PRE: 80 %
FEV1FVC-PRED: 78 %
FEV1SVC-PRE: 76 %
FEV1SVC-PRED: 74 %
FIFMAX-PRE: 5.83 L/SEC
FRCPLETH-%PRED-PRE: 80 %
FRCPLETH-PRE: 2.1 L
FRCPLETH-PRED: 2.6 L
FVC-%PRED-PRE: 129 %
FVC-PRE: 3.42 L
FVC-PRED: 2.65 L
GFR SERPL CREATININE-BSD FRML MDRD: 70 ML/MIN/{1.73_M2}
GLUCOSE SERPL-MCNC: 91 MG/DL (ref 70–99)
HCT VFR BLD AUTO: 42.6 % (ref 35–47)
HCV AB SERPL QL IA: NONREACTIVE
HDLC SERPL-MCNC: 60 MG/DL
HGB BLD-MCNC: 13.8 G/DL (ref 11.7–15.7)
IC-%PRED-PRE: 145 %
IC-PRE: 3.22 L
IC-PRED: 2.22 L
IMM GRANULOCYTES # BLD: 0 10E9/L (ref 0–0.4)
IMM GRANULOCYTES NFR BLD: 0.2 %
LDLC SERPL CALC-MCNC: 93 MG/DL
LYMPHOCYTES # BLD AUTO: 1.6 10E9/L (ref 0.8–5.3)
LYMPHOCYTES NFR BLD AUTO: 34.9 %
MCH RBC QN AUTO: 29.9 PG (ref 26.5–33)
MCHC RBC AUTO-ENTMCNC: 32.4 G/DL (ref 31.5–36.5)
MCV RBC AUTO: 92 FL (ref 78–100)
MONOCYTES # BLD AUTO: 0.5 10E9/L (ref 0–1.3)
MONOCYTES NFR BLD AUTO: 11.5 %
NEUTROPHILS # BLD AUTO: 2.3 10E9/L (ref 1.6–8.3)
NEUTROPHILS NFR BLD AUTO: 49.4 %
NONHDLC SERPL-MCNC: 104 MG/DL
NRBC # BLD AUTO: 0 10*3/UL
NRBC BLD AUTO-RTO: 0 /100
NT-PROBNP SERPL-MCNC: 23 PG/ML (ref 0–125)
PLATELET # BLD AUTO: 280 10E9/L (ref 150–450)
POTASSIUM SERPL-SCNC: 3.8 MMOL/L (ref 3.4–5.3)
PROT SERPL-MCNC: 7.1 G/DL (ref 6.8–8.8)
PTH-INTACT SERPL-MCNC: 109 PG/ML (ref 18–80)
RBC # BLD AUTO: 4.62 10E12/L (ref 3.8–5.2)
RVPLETH-%PRED-PRE: 87 %
RVPLETH-PRE: 1.72 L
RVPLETH-PRED: 1.97 L
SODIUM SERPL-SCNC: 138 MMOL/L (ref 133–144)
TLCPLETH-%PRED-PRE: 115 %
TLCPLETH-PRE: 5.31 L
TLCPLETH-PRED: 4.6 L
TRIGL SERPL-MCNC: 55 MG/DL
VA-%PRED-PRE: 113 %
VA-PRE: 4.91 L
VC-%PRED-PRE: 128 %
VC-PRE: 3.6 L
VC-PRED: 2.81 L
WBC # BLD AUTO: 4.6 10E9/L (ref 4–11)

## 2021-03-03 PROCEDURE — 86635 COCCIDIOIDES ANTIBODY: CPT | Mod: 90 | Performed by: PATHOLOGY

## 2021-03-03 PROCEDURE — 86803 HEPATITIS C AB TEST: CPT | Performed by: INTERNAL MEDICINE

## 2021-03-03 PROCEDURE — 71250 CT THORAX DX C-: CPT | Mod: MG | Performed by: RADIOLOGY

## 2021-03-03 PROCEDURE — 83970 ASSAY OF PARATHORMONE: CPT | Performed by: INTERNAL MEDICINE

## 2021-03-03 PROCEDURE — G1004 CDSM NDSC: HCPCS | Performed by: RADIOLOGY

## 2021-03-03 PROCEDURE — 80061 LIPID PANEL: CPT | Performed by: PATHOLOGY

## 2021-03-03 PROCEDURE — 94729 DIFFUSING CAPACITY: CPT | Performed by: INTERNAL MEDICINE

## 2021-03-03 PROCEDURE — 82784 ASSAY IGA/IGD/IGG/IGM EACH: CPT | Performed by: INTERNAL MEDICINE

## 2021-03-03 PROCEDURE — 82306 VITAMIN D 25 HYDROXY: CPT | Performed by: INTERNAL MEDICINE

## 2021-03-03 PROCEDURE — 99204 OFFICE O/P NEW MOD 45 MIN: CPT | Mod: 25 | Performed by: INTERNAL MEDICINE

## 2021-03-03 PROCEDURE — 86606 ASPERGILLUS ANTIBODY: CPT | Mod: 90 | Performed by: PATHOLOGY

## 2021-03-03 PROCEDURE — 86612 BLASTOMYCES ANTIBODY: CPT | Mod: 90 | Performed by: PATHOLOGY

## 2021-03-03 PROCEDURE — 86481 TB AG RESPONSE T-CELL SUSP: CPT | Performed by: INTERNAL MEDICINE

## 2021-03-03 PROCEDURE — 82164 ANGIOTENSIN I ENZYME TEST: CPT | Mod: 90 | Performed by: PATHOLOGY

## 2021-03-03 PROCEDURE — 36415 COLL VENOUS BLD VENIPUNCTURE: CPT | Performed by: PATHOLOGY

## 2021-03-03 PROCEDURE — 99000 SPECIMEN HANDLING OFFICE-LAB: CPT | Performed by: PATHOLOGY

## 2021-03-03 PROCEDURE — 82652 VIT D 1 25-DIHYDROXY: CPT | Performed by: INTERNAL MEDICINE

## 2021-03-03 PROCEDURE — 94726 PLETHYSMOGRAPHY LUNG VOLUMES: CPT | Performed by: INTERNAL MEDICINE

## 2021-03-03 PROCEDURE — G0463 HOSPITAL OUTPT CLINIC VISIT: HCPCS | Mod: 25

## 2021-03-03 PROCEDURE — 94618 PULMONARY STRESS TESTING: CPT | Performed by: INTERNAL MEDICINE

## 2021-03-03 PROCEDURE — 94375 RESPIRATORY FLOW VOLUME LOOP: CPT | Performed by: INTERNAL MEDICINE

## 2021-03-03 PROCEDURE — 86698 HISTOPLASMA ANTIBODY: CPT | Mod: 90 | Performed by: PATHOLOGY

## 2021-03-03 PROCEDURE — 82787 IGG 1 2 3 OR 4 EACH: CPT | Performed by: INTERNAL MEDICINE

## 2021-03-03 PROCEDURE — 80076 HEPATIC FUNCTION PANEL: CPT | Performed by: PATHOLOGY

## 2021-03-03 RX ORDER — LIDOCAINE 40 MG/G
CREAM TOPICAL
Status: CANCELLED | OUTPATIENT
Start: 2021-03-03

## 2021-03-03 ASSESSMENT — ENCOUNTER SYMPTOMS
POLYPHAGIA: 0
CHILLS: 0
DOUBLE VISION: 0
ALTERED TEMPERATURE REGULATION: 0
EYE WATERING: 0
DECREASED APPETITE: 0
FEVER: 0
FATIGUE: 1
NIGHT SWEATS: 0
EYE IRRITATION: 0
EYE PAIN: 0
INCREASED ENERGY: 0
WEIGHT GAIN: 0
WEIGHT LOSS: 0
HALLUCINATIONS: 0
EYE REDNESS: 0
POLYDIPSIA: 0

## 2021-03-03 ASSESSMENT — PAIN SCALES - GENERAL: PAINLEVEL: MODERATE PAIN (5)

## 2021-03-03 NOTE — PROGRESS NOTES
Reason for Visit  Dinorah Thompson is a 70 year old year old female who is being seen for sarcoidosis.  Pulmonary HPI    The patient was seen and examined by Kushal Richmond MD       Ms. Thompson comes to the clinic today for initial evaluation for sarcoidosis.  She is moving to [place] from [place] area and is establishing care.    She tells me that she was diagnosed with sarcoidosis in 2008 via transbronchial lung biopsy.  At that time, she had cardiac evaluation done for tachycardia.  There was a question of pericardial effusion.  A chest CT demonstrated abnormal lung findings which resulted in a transbronchial lung biopsy.  We have the results of this biopsy which was completed on 2/27/2008 where a transcranial needle biopsy, transbronchial lung biopsy and endobronchial biopsies were obtained. The transbronchial biopsies demonstrated nonnecrotizing granulomas. The lymph node demonstrated anthracotic pigment with calcification but no granulomas although lymphocytes were present.  At that time she was given prednisone for around 8 months.  She needed systemic anti-inflammatory therapy in 2013 and can for sarcoidosis where she was quite intolerant to prednisone.  Imuran was ineffective and she was treated with methotrexate.  Methotrexate was stopped after 3 to 6 months.  Since then she has not needed any systemic anti-inflammatory medications.  She was also on inhalers up until around a year or so.     Overall she reports no major symptoms related to sarcoidosis.  She does however have bradycardia and also describes abnormal EKG findings.  She had an MRI done in 2008 with no LGE was present but RV dilatation was noted.    In addition to sarcoidosis she has significant degenerative disc disease with neuropathies for which she is currently undergoing treatment.  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    Current  "medications were reviewed.  At present she is not on any inhaled medications.  She is on nasal steroids which she takes on a as needed basis.    She has already received the first dose of Moderna SARS-CoV-2 vaccine.  Due to concerns for limited protection if she gets epidural steroid injections she is deferring treatment of her degenerative disc disease.    She was also found to have laryngeal dysfunction for which she will see Dr. Altamirano.    The patient is a lifelong non-smoker.  She does have birds and has had birds intermittently.  She also has 2 dogs.  The patient cleans the cages of the birds herself.  In the past the patient enjoyed hiking and gardening but this has become more and more difficult over time.    She is northern  descent but there is no history of sarcoidosis and other family members.    Current Outpatient Medications   Medication     acetaminophen (TYLENOL) 500 MG tablet     albuterol (PROAIR HFA/PROVENTIL HFA/VENTOLIN HFA) 108 (90 Base) MCG/ACT inhaler     baclofen (LIORESAL) 10 MG tablet     calcium carbonate 500 mg, elemental, (OSCAL 500) 1250 (500 Ca) MG TABS tablet     Carboxymethylcellul-Glycerin 1-0.9 % GEL     celecoxib (CELEBREX) 200 MG capsule     clonazePAM (KLONOPIN) 0.5 MG tablet     DULoxetine (CYMBALTA) 60 MG capsule     FLOVENT  MCG/ACT inhaler     NONFORMULARY     propranolol (INDERAL) 10 MG tablet     rosuvastatin (CRESTOR) 5 MG tablet     traZODone (DESYREL) 100 MG tablet     valACYclovir (VALTREX) 1000 mg tablet     No current facility-administered medications for this visit.      Allergies   Allergen Reactions     Propoxyphene Other (See Comments)     Clarithromycin Other (See Comments)     Pt states, \"ototoxicity\". Balance problems  Pt states, \"ototoxicity\".       Past Medical History:   Diagnosis Date     Chronic osteoarthritis      Depressive disorder      Female bladder prolapse 9/3/2019     Prolapse of female pelvic organs 8/19/2019       Past Surgical " History:   Procedure Laterality Date     APPENDECTOMY       diskectomy in toe       HYSTERECTOMY  08/2017    and bladder surgery     left rotator cuff surgery       TONSILLECTOMY  1955       Social History     Socioeconomic History     Marital status:      Spouse name: Wilbert     Number of children: Not on file     Years of education: Not on file     Highest education level: Not on file   Occupational History     Not on file   Social Needs     Financial resource strain: Not on file     Food insecurity     Worry: Not on file     Inability: Not on file     Transportation needs     Medical: Not on file     Non-medical: Not on file   Tobacco Use     Smoking status: Never Smoker     Smokeless tobacco: Never Used   Substance and Sexual Activity     Alcohol use: Yes     Frequency: Monthly or less     Drinks per session: 1 or 2     Drug use: Never     Sexual activity: Not on file   Lifestyle     Physical activity     Days per week: Not on file     Minutes per session: Not on file     Stress: Not on file   Relationships     Social connections     Talks on phone: Not on file     Gets together: Not on file     Attends Mandaeism service: Not on file     Active member of club or organization: Not on file     Attends meetings of clubs or organizations: Not on file     Relationship status: Not on file     Intimate partner violence     Fear of current or ex partner: Not on file     Emotionally abused: Not on file     Physically abused: Not on file     Forced sexual activity: Not on file   Other Topics Concern     Not on file   Social History Narrative     Not on file       ROS Pulmonary    A complete ROS was otherwise negative except as noted in the HPI.  BP (!) 163/83   Pulse 64   Resp 18   Wt 62.6 kg (138 lb)   SpO2 96%   BMI 25.40 kg/m    Exam:   GENERAL APPEARANCE: Alert, and in no apparent distress.  EYES: PERRL, EOMI  MOUTH: Oral mucosa is moist, without any lesions, no tonsillar enlargement, no oropharyngeal  exudate.  NECK: supple, no masses, no thyromegaly.  LYMPHATICS: No significant axillary, cervical, or supraclavicular nodes.  RESP: normal percussion, good air flow throughout.  No crackles. No rhonchi. No wheezes.  CV: Normal S1, S2, regular rhythm, normal rate. No murmur.  No rub. No gallop. No LE edema.   MS: extremities normal. No clubbing. No cyanosis.  SKIN: no rash on limited exam  NEURO: Mentation intact, speech normal, normal strength and tone, normal gait and stance  Results:    CT chest 3/3/21: Calcified mediastinal lymph nodes. Few foci of right upper lobe micronodules which may indicate foci of inflammation/infection. Minimal air trapping.    PFTs done today were reviewed by me with the patient.  She also had a 6-minute walk test done with a 6-minute walk distance was in the normal range and no desaturation was noted.  FVC is 3.42 L (129%), FEV1 2.73 L (132%) and the ratio is 80.  Total lung capacity is 5.31 (115%).  Diffusion capacity not corrected for hemoglobin is 106% of predicted.  My depression is that spirometry, lung volumes and diffusion are all in the high normal range.    Assessment and plan: Ms. Thompson is a pleasant 70-year-old female of northern  descent with degenerative disc disease with concern for impingement.  She has sarcoidosis previously treated with ~6 months x 2 of anti-inflammatory medications. She has a  history of pericardial effusion.  She also has sleep disordered breathing likely mixed obstructive and central disorder.  1.  Pulmonary: Normal PFTs.  Chest CT also unremarkable except for mediastinal calcification.  We will continue to monitor.  2.  Extrapulmonary sarcoidosis: We will check labs on her today.  She follows with ophthalmology in the past and does not have any ocular involvement.  She reports abnormal EKG.  We will get MRI for evaluation of cardiac disease.  We will also check vitamin D, 125 hydroxy vitamin D and PTH.  We will also obtain LFTs and CBC.  3.   The patient has intermittently had  birds at her home.  There is a possibility that she could have histoplasmosis.  Rechecking fungal antibodies and histo urine antigen.  4.  Sleep disordered breathing: She will need evaluation in the sleep clinic.    Follow-up 3 months.  I will review lab findings with the patient via phone or my chart.    Addendum: Labs reviewed. 1,25 di OH Vit D is elevated but Ca is WNL. CMR pneding.    Answers for HPI/ROS submitted by the patient on 3/3/2021   General Symptoms: Yes  Skin Symptoms: No  HENT Symptoms: No  EYE SYMPTOMS: Yes  HEART SYMPTOMS: No  LUNG SYMPTOMS: Yes  INTESTINAL SYMPTOMS: No  URINARY SYMPTOMS: No  GYNECOLOGIC SYMPTOMS: No  BREAST SYMPTOMS: No  SKELETAL SYMPTOMS: Yes  BLOOD SYMPTOMS: No  NERVOUS SYSTEM SYMPTOMS: No  MENTAL HEALTH SYMPTOMS: No  Fever: No  Loss of appetite: No  Weight loss: No  Weight gain: No  Fatigue: Yes  Night sweats: No  Chills: No  Increased stress: Yes  Excessive hunger: No  Excessive thirst: No  Feeling hot or cold when others believe the temperature is normal: No  Loss of height: Yes  Post-operative complications: No  Surgical site pain: No  Hallucinations: No  Change in or Loss of Energy: No  Hyperactivity: No  Confusion: No  Eye pain: No  Vision loss: No  Dry eyes: Yes  Watery eyes: No  Eye bulging: No  Double vision: No  Flashing of lights: No  Spots: No  Floaters: Yes  Redness: No  Crossed eyes: No  Tunnel Vision: No  Yellowing of eyes: No  Eye irritation: No

## 2021-03-03 NOTE — TELEPHONE ENCOUNTER
I called and spoke with the pt and informed her that she should call her insurance about if accupuncture is covered and what locations she can go to.    Pt was also curious about if she has to wait 2 weeks after her Covid vaccine to get her procedure?    I informed her that it is our clinic policy to wait 2 weeks after her Covid vaccine before she can have her procedure on her low back.    If you have any questions or concerns you can call [phone].    Johanna White, CMA

## 2021-03-03 NOTE — TELEPHONE ENCOUNTER
LVM that FPFT which was initially scheduled on 6/2 would be cancelled as Dr. Richmond mentioned it was not needed. Informed her that the appt with Dr. Richmond would still occur that day, but she did not need to do testing. Requested she give me a call back to confirm she received my message (direct number provided)

## 2021-03-03 NOTE — LETTER
3/3/2021         RE: Dinorah Thompson  [address] [address]        Dear Colleague,    Thank you for referring your patient, Dinorah Thompson, to the [facility] [place]. Please see a copy of my visit note below.    Reason for Visit  Dinorah Thompson is a 70 year old year old female who is being seen for sarcoidosis.  Pulmonary HPI    The patient was seen and examined by Kushal Richmond MD       Ms. Thompson comes to the clinic today for initial evaluation for sarcoidosis.  She is moving to [place] from [place] area and is establishing care.    She tells me that she was diagnosed with sarcoidosis in 2008 via transbronchial lung biopsy.  At that time, she had cardiac evaluation done for tachycardia.  There was a question of pericardial effusion.  A chest CT demonstrated abnormal lung findings which resulted in a transbronchial lung biopsy.  We have the results of this biopsy which was completed on 2/27/2008 where a transcranial needle biopsy, transbronchial lung biopsy and endobronchial biopsies were obtained. The transbronchial biopsies demonstrated nonnecrotizing granulomas. The lymph node demonstrated anthracotic pigment with calcification but no granulomas although lymphocytes were present.  At that time she was given prednisone for around 8 months.  She needed systemic anti-inflammatory therapy in 2013 and can for sarcoidosis where she was quite intolerant to prednisone.  Imuran was ineffective and she was treated with methotrexate.  Methotrexate was stopped after 3 to 6 months.  Since then she has not needed any systemic anti-inflammatory medications.  She was also on inhalers up until around a year or so.     Overall she reports no major symptoms related to sarcoidosis.  She does however have bradycardia and also describes abnormal EKG findings.  She had an MRI done in 2008 with no LGE was present but RV dilatation was  noted.    In addition to sarcoidosis she has significant degenerative disc disease with neuropathies for which she is currently undergoing treatment.  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    Current medications were reviewed.  At present she is not on any inhaled medications.  She is on nasal steroids which she takes on a as needed basis.    She has already received the first dose of Moderna SARS-CoV-2 vaccine.  Due to concerns for limited protection if she gets epidural steroid injections she is deferring treatment of her degenerative disc disease.    She was also found to have laryngeal dysfunction for which she will see Dr. Altamirano.    The patient is a lifelong non-smoker.  She does have birds and has had birds intermittently.  She also has 2 dogs.  The patient cleans the cages of the birds herself.  In the past the patient enjoyed hiking and gardening but this has become more and more difficult over time.    She is northern  descent but there is no history of sarcoidosis and other family members.    Current Outpatient Medications   Medication     acetaminophen (TYLENOL) 500 MG tablet     albuterol (PROAIR HFA/PROVENTIL HFA/VENTOLIN HFA) 108 (90 Base) MCG/ACT inhaler     baclofen (LIORESAL) 10 MG tablet     calcium carbonate 500 mg, elemental, (OSCAL 500) 1250 (500 Ca) MG TABS tablet     Carboxymethylcellul-Glycerin 1-0.9 % GEL     celecoxib (CELEBREX) 200 MG capsule     clonazePAM (KLONOPIN) 0.5 MG tablet     DULoxetine (CYMBALTA) 60 MG capsule     FLOVENT  MCG/ACT inhaler     NONFORMULARY     propranolol (INDERAL) 10 MG tablet     rosuvastatin (CRESTOR) 5 MG tablet     traZODone (DESYREL) 100 MG tablet     valACYclovir (VALTREX) 1000 mg tablet     No current facility-administered medications for this visit.      Allergies   Allergen Reactions     Propoxyphene Other (See Comments)      "Clarithromycin Other (See Comments)     Pt states, \"ototoxicity\". Balance problems  Pt states, \"ototoxicity\".       Past Medical History:   Diagnosis Date     Chronic osteoarthritis      Depressive disorder      Female bladder prolapse 9/3/2019     Prolapse of female pelvic organs 8/19/2019       Past Surgical History:   Procedure Laterality Date     APPENDECTOMY       diskectomy in toe       HYSTERECTOMY  08/2017    and bladder surgery     left rotator cuff surgery       TONSILLECTOMY  1955       Social History     Socioeconomic History     Marital status:      Spouse name: Wilbert     Number of children: Not on file     Years of education: Not on file     Highest education level: Not on file   Occupational History     Not on file   Social Needs     Financial resource strain: Not on file     Food insecurity     Worry: Not on file     Inability: Not on file     Transportation needs     Medical: Not on file     Non-medical: Not on file   Tobacco Use     Smoking status: Never Smoker     Smokeless tobacco: Never Used   Substance and Sexual Activity     Alcohol use: Yes     Frequency: Monthly or less     Drinks per session: 1 or 2     Drug use: Never     Sexual activity: Not on file   Lifestyle     Physical activity     Days per week: Not on file     Minutes per session: Not on file     Stress: Not on file   Relationships     Social connections     Talks on phone: Not on file     Gets together: Not on file     Attends Jainism service: Not on file     Active member of club or organization: Not on file     Attends meetings of clubs or organizations: Not on file     Relationship status: Not on file     Intimate partner violence     Fear of current or ex partner: Not on file     Emotionally abused: Not on file     Physically abused: Not on file     Forced sexual activity: Not on file   Other Topics Concern     Not on file   Social History Narrative     Not on file       ROS Pulmonary    A complete ROS was otherwise " negative except as noted in the HPI.  BP (!) 163/83   Pulse 64   Resp 18   Wt 62.6 kg (138 lb)   SpO2 96%   BMI 25.40 kg/m    Exam:   GENERAL APPEARANCE: Alert, and in no apparent distress.  EYES: PERRL, EOMI  MOUTH: Oral mucosa is moist, without any lesions, no tonsillar enlargement, no oropharyngeal exudate.  NECK: supple, no masses, no thyromegaly.  LYMPHATICS: No significant axillary, cervical, or supraclavicular nodes.  RESP: normal percussion, good air flow throughout.  No crackles. No rhonchi. No wheezes.  CV: Normal S1, S2, regular rhythm, normal rate. No murmur.  No rub. No gallop. No LE edema.   MS: extremities normal. No clubbing. No cyanosis.  SKIN: no rash on limited exam  NEURO: Mentation intact, speech normal, normal strength and tone, normal gait and stance  Results:    CT chest 3/3/21: Calcified mediastinal lymph nodes. Few foci of right upper lobe micronodules which may indicate foci of inflammation/infection. Minimal air trapping.    PFTs done today were reviewed by me with the patient.  She also had a 6-minute walk test done with a 6-minute walk distance was in the normal range and no desaturation was noted.  FVC is 3.42 L (129%), FEV1 2.73 L (132%) and the ratio is 80.  Total lung capacity is 5.31 (115%).  Diffusion capacity not corrected for hemoglobin is 106% of predicted.  My depression is that spirometry, lung volumes and diffusion are all in the high normal range.    Assessment and plan: Ms. Thompson is a pleasant 70-year-old female of northern  descent with degenerative disc disease with concern for impingement.  She has sarcoidosis previously treated with ~6 months x 2 of anti-inflammatory medications. She has a  history of pericardial effusion.  She also has sleep disordered breathing likely mixed obstructive and central disorder.  1.  Pulmonary: Normal PFTs.  Chest CT also unremarkable except for mediastinal calcification.  We will continue to monitor.  2.  Extrapulmonary  sarcoidosis: We will check labs on her today.  She follows with ophthalmology in the past and does not have any ocular involvement.  She reports abnormal EKG.  We will get MRI for evaluation of cardiac disease.  We will also check vitamin D, 125 hydroxy vitamin D and PTH.  We will also obtain LFTs and CBC.  3.  The patient has intermittently had  birds at her home.  There is a possibility that she could have histoplasmosis.  Rechecking fungal antibodies and histo urine antigen.  4.  Sleep disordered breathing: She will need evaluation in the sleep clinic.    Follow-up 3 months.  I will review lab findings with the patient via phone or my chart.    Addendum: Labs reviewed. 1,25 di OH Vit D is elevated but Ca is WNL. CMR pneding.    Answers for HPI/ROS submitted by the patient on 3/3/2021   General Symptoms: Yes  Skin Symptoms: No  HENT Symptoms: No  EYE SYMPTOMS: Yes  HEART SYMPTOMS: No  LUNG SYMPTOMS: Yes  INTESTINAL SYMPTOMS: No  URINARY SYMPTOMS: No  GYNECOLOGIC SYMPTOMS: No  BREAST SYMPTOMS: No  SKELETAL SYMPTOMS: Yes  BLOOD SYMPTOMS: No  NERVOUS SYSTEM SYMPTOMS: No  MENTAL HEALTH SYMPTOMS: No  Fever: No  Loss of appetite: No  Weight loss: No  Weight gain: No  Fatigue: Yes  Night sweats: No  Chills: No  Increased stress: Yes  Excessive hunger: No  Excessive thirst: No  Feeling hot or cold when others believe the temperature is normal: No  Loss of height: Yes  Post-operative complications: No  Surgical site pain: No  Hallucinations: No  Change in or Loss of Energy: No  Hyperactivity: No  Confusion: No  Eye pain: No  Vision loss: No  Dry eyes: Yes  Watery eyes: No  Eye bulging: No  Double vision: No  Flashing of lights: No  Spots: No  Floaters: Yes  Redness: No  Crossed eyes: No  Tunnel Vision: No  Yellowing of eyes: No  Eye irritation: No        Again, thank you for allowing me to participate in the care of your patient.        Sincerely,        Kushal Richmond MD

## 2021-03-03 NOTE — TELEPHONE ENCOUNTER
[facility] Call Center    Phone Message    May a detailed message be left on voicemail: yes     Reason for Call: Other: Pt requesting call back, she stated that Dr. Santana recommended she get acupuncture, but she is not sure where she should go.      Action Taken: Message routed to:  [facility] ([facility]): pain

## 2021-03-03 NOTE — NURSING NOTE
Chief Complaint   Patient presents with     Interstitial Lung Disease (ILD)     Sarcoids      Medications reviewed and updated.  Vitals taken  Tressa Martinez CMA

## 2021-03-04 LAB
1,25(OH)2D SERPL-MCNC: 94.1 PG/ML (ref 19.9–79.3)
ACE SERPL-CCNC: 31 U/L (ref 9–67)
GAMMA INTERFERON BACKGROUND BLD IA-ACNC: 0.11 IU/ML
INTERPRETATION ECG - MUSE: NORMAL
M TB IFN-G CD4+ BCKGRND COR BLD-ACNC: 9.9 IU/ML
M TB TUBERC IFN-G BLD QL: NEGATIVE
MITOGEN IGNF BCKGRD COR BLD-ACNC: 0.05 IU/ML
MITOGEN IGNF BCKGRD COR BLD-ACNC: 0.11 IU/ML

## 2021-03-05 ENCOUNTER — OFFICE VISIT (OUTPATIENT)
Dept: DERMATOLOGY | Facility: CLINIC | Age: 71
End: 2021-03-05
Payer: MEDICARE

## 2021-03-05 ENCOUNTER — PATIENT OUTREACH (OUTPATIENT)
Dept: PULMONOLOGY | Facility: CLINIC | Age: 71
End: 2021-03-05

## 2021-03-05 VITALS — OXYGEN SATURATION: 96 % | HEART RATE: 72 BPM | SYSTOLIC BLOOD PRESSURE: 128 MMHG | DIASTOLIC BLOOD PRESSURE: 78 MMHG

## 2021-03-05 DIAGNOSIS — L81.4 LENTIGO: ICD-10-CM

## 2021-03-05 DIAGNOSIS — L71.0 PERIORAL DERMATITIS: Primary | ICD-10-CM

## 2021-03-05 DIAGNOSIS — D22.9 NEVUS: ICD-10-CM

## 2021-03-05 DIAGNOSIS — D18.01 ANGIOMA OF SKIN: ICD-10-CM

## 2021-03-05 DIAGNOSIS — L82.1 SEBORRHEIC KERATOSIS: ICD-10-CM

## 2021-03-05 DIAGNOSIS — L57.0 ACTINIC KERATOSIS: ICD-10-CM

## 2021-03-05 LAB
IGG SERPL-MCNC: 720 MG/DL (ref 610–1616)
IGG1 SER-MCNC: 456 MG/DL (ref 382–929)
IGG2 SER-MCNC: 193 MG/DL (ref 242–700)
IGG3 SER-MCNC: 50 MG/DL (ref 22–176)
IGG4 SER-MCNC: 12 MG/DL (ref 4–86)
LAB SCANNED RESULT: NORMAL
LAB SCANNED RESULT: NORMAL

## 2021-03-05 PROCEDURE — 99204 OFFICE O/P NEW MOD 45 MIN: CPT | Mod: 25 | Performed by: PHYSICIAN ASSISTANT

## 2021-03-05 PROCEDURE — 17000 DESTRUCT PREMALG LESION: CPT | Performed by: PHYSICIAN ASSISTANT

## 2021-03-05 PROCEDURE — 17003 DESTRUCT PREMALG LES 2-14: CPT | Performed by: PHYSICIAN ASSISTANT

## 2021-03-05 RX ORDER — TACROLIMUS 1 MG/G
OINTMENT TOPICAL
Qty: 30 G | Refills: 5 | Status: SHIPPED | OUTPATIENT
Start: 2021-03-05

## 2021-03-05 NOTE — PROGRESS NOTES
Contacted patient per Dr. Richmond's request regarding abnormal lab results.     Her PTH and 1,25 OH D2 are high. I dont think this is related to her history of sarcoidosis. She will need to follow this with her internist     Kushal     Patient states that she will contact pcp next week to schedule appointment.

## 2021-03-05 NOTE — LETTER
3/5/2021         RE: Dinorah Thompson  [id number] [facility] [address]        Dear Colleague,    Thank you for referring your patient, Dinorah Thompson, to the [facility]. Please see a copy of my visit note below.    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 Her  requires care due to a brain surgery for an AVM.   On review of systems, there are no further skin complaints, patient is feeling otherwise well.  See patient intake sheet.  ROS of the following were done and are negative: Constitutional, Eyes, Ears, Nose,   Mouth, Throat, Cardiovascular, Respiratory, GI, Genitourinary, Musculoskeletal,   Psychiatric, Endocrine, Allergic/Immunologic.    PHYSICAL EXAM:   /78   Pulse 72   SpO2 96%   Skin exam performed as follows: Type 2 skin. Mood appropriate  Alert and Oriented X 3. Well developed, well nourished in no distress.  General appearance: Normal  Head including face: Normal  Eyes: conjunctiva and lids: Normal  Mouth: Lips, teeth, gums: Normal  Neck: Normal  Chest-breast/axillae: Normal  Back: Normal  Spleen and liver: Normal  Cardiovascular: Exam of peripheral vascular system by observation for swelling, varicosities, edema: Normal  Genitalia: groin, buttocks: Normal  Extremities: digits/nails (clubbing): Normal  Eccrine and Apocrine glands: Normal  Right upper extremity: Normal  Left upper extremity:  Normal  Right lower extremity: Normal  Left lower extremity: Normal  Skin: Scalp and body hair: See below    Pt deferred exam of breasts, groin, buttocks: No    Other physical findings:  1. Multiple pigmented macules on extremities and trunk  2. Multiple pigmented macules on face, trunk and extremities  3. Multiple vascular papules on trunk, arms and legs  4. Multiple scattered keratotic plaques  5. Pink gritty papule on the left nasal bridge x 1, left lateral brow x 1       Except as noted above, no other signs of skin cancer or melanoma.     ASSESSMENT/PLAN:   Benign Full skin cancer screening today. . Patient with history of actinic keratoses, no skin CA  Advised on monthly self exams and 1 year  Patient Education: Appropriate brochures given.    1. Multiple benign appearing nevi on arms, legs and trunk. Discussed ABCDEs of melanoma and sunscreen.   2. Multiple lentigos on arms, legs and trunk. Advised benign, no treatment needed.  3. Multiple scattered angiomas. Advised benign, no treatment needed.   4. Seborrheic keratosis on arms, legs and trunk. Advised benign, no treatment needed.  5. Actinic keratosis on the left nasal bridge x 1, left lateral brow x 1. As precancerous, cryosurgery performed. Advised on blistering and post-op care. Advised if not resolved in 1-2 months to return for evaluation  6. History of recurrent perioral dermatitis - she uses Elidel for this as needed but is expensive for her. Discussed trial of tacrolimus or metrogel and she is amenable to this  --Start tacrolimus BID PRN            Follow-up: yearly FSE/PRN sooner    1.) Patient was asked about new and changing moles. YES  2.) Patient received a complete physical skin examination: YES  3.) Patient was counseled to perform a monthly self skin examination: YES  Scribed By: Gregoria Olmos, MS, PALETI          Again, thank you for allowing me to participate in the care of your patient.        Sincerely,        rGegoria Olmos PA-C

## 2021-03-05 NOTE — PROGRESS NOTES
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 Her  requires care due to a brain surgery for an AVM.   On review of systems, there are no further skin complaints, patient is feeling otherwise well.  See patient intake sheet.  ROS of the following were done and are negative: Constitutional, Eyes, Ears, Nose,   Mouth, Throat, Cardiovascular, Respiratory, GI, Genitourinary, Musculoskeletal,   Psychiatric, Endocrine, Allergic/Immunologic.    PHYSICAL EXAM:   /78   Pulse 72   SpO2 96%   Skin exam performed as follows: Type 2 skin. Mood appropriate  Alert and Oriented X 3. Well developed, well nourished in no distress.  General appearance: Normal  Head including face: Normal  Eyes: conjunctiva and lids: Normal  Mouth: Lips, teeth, gums: Normal  Neck: Normal  Chest-breast/axillae: Normal  Back: Normal  Spleen and liver: Normal  Cardiovascular: Exam of peripheral vascular system by observation for swelling, varicosities, edema: Normal  Genitalia: groin, buttocks: Normal  Extremities: digits/nails (clubbing): Normal  Eccrine and Apocrine glands: Normal  Right upper extremity: Normal  Left upper extremity: Normal  Right lower extremity: Normal  Left lower extremity: Normal  Skin: Scalp and body hair: See below    Pt deferred exam of breasts, groin, buttocks: No    Other physical findings:  1. Multiple pigmented macules on extremities and trunk  2. Multiple pigmented macules on  face, trunk and extremities  3. Multiple vascular papules on trunk, arms and legs  4. Multiple scattered keratotic plaques  5. Pink gritty papule on the left nasal bridge x 1, left lateral brow x 1       Except as noted above, no other signs of skin cancer or melanoma.     ASSESSMENT/PLAN:   Benign Full skin cancer screening today. . Patient with history of actinic keratoses, no skin CA  Advised on monthly self exams and 1 year  Patient Education: Appropriate brochures given.    1. Multiple benign appearing nevi on arms, legs and trunk. Discussed ABCDEs of melanoma and sunscreen.   2. Multiple lentigos on arms, legs and trunk. Advised benign, no treatment needed.  3. Multiple scattered angiomas. Advised benign, no treatment needed.   4. Seborrheic keratosis on arms, legs and trunk. Advised benign, no treatment needed.  5. Actinic keratosis on the left nasal bridge x 1, left lateral brow x 1. As precancerous, cryosurgery performed. Advised on blistering and post-op care. Advised if not resolved in 1-2 months to return for evaluation  6. History of recurrent perioral dermatitis - she uses Elidel for this as needed but is expensive for her. Discussed trial of tacrolimus or metrogel and she is amenable to this  --Start tacrolimus BID PRN            Follow-up: yearly FSE/PRN sooner    1.) Patient was asked about new and changing moles. YES  2.) Patient received a complete physical skin examination: YES  3.) Patient was counseled to perform a monthly self skin examination: YES  Scribed By: Gregoria Olmos, MS, PA-C

## 2021-03-06 LAB
ASPERGILLUS AB TITR SER CF: NORMAL {TITER}
B DERMAT AB SER-ACNC: 0.1 IV
COCCIDIOIDES AB TITR SER CF: NORMAL {TITER}
H CAPSUL MYC AB TITR SER CF: NORMAL {TITER}
H CAPSUL YST AB TITR SER CF: NORMAL {TITER}

## 2021-03-08 ENCOUNTER — TELEPHONE (OUTPATIENT)
Dept: FAMILY MEDICINE | Facility: CLINIC | Age: 71
End: 2021-03-08

## 2021-03-08 ENCOUNTER — VIRTUAL VISIT (OUTPATIENT)
Dept: FAMILY MEDICINE | Facility: CLINIC | Age: 71
End: 2021-03-08
Payer: MEDICARE

## 2021-03-08 DIAGNOSIS — R79.89 ELEVATED PARATHYROID HORMONE: Primary | ICD-10-CM

## 2021-03-08 DIAGNOSIS — Z53.9 ERRONEOUS ENCOUNTER--DISREGARD: ICD-10-CM

## 2021-03-08 NOTE — PROGRESS NOTES
"Dot is a 70 year old who is being evaluated via a billable video visit.      How would you like to obtain your AVS? MyChart  If the video visit is dropped, the invitation should be resent by: Text to cell phone: [phone]  Will anyone else be joining your video visit? No  {If patient encounters technical issues they should call [phone] :369458}    Video Start Time: {video visit start/end time for provider to select:240091}    {PROVIDER CHARTING PREFERENCE:977123}    Subjective   Dot is a 70 year old who presents for the following health issues {ACCOMPANIED BY STATEMENT (Optional):058889}    HPI       Concern - hyperparathyroidism     Saw pulmonologist and they said that she needed to be worked up for hyperparathyroidism  Onset: Many years  Accompanying Signs & Symptoms: fatigue, pain in joints, excessive thirst      {additonal problems for provider to add (Optional):266243}    Review of Systems   {ROS COMP (Optional):768656}      Objective           Vitals:  No vitals were obtained today due to virtual visit.    Physical Exam   {video visit exam brief selected:252544::\"GENERAL: Healthy, alert and no distress\",\"EYES: Eyes grossly normal to inspection.  No discharge or erythema, or obvious scleral/conjunctival abnormalities.\",\"RESP: No audible wheeze, cough, or visible cyanosis.  No visible retractions or increased work of breathing.  \",\"SKIN: Visible skin clear. No significant rash, abnormal pigmentation or lesions.\",\"NEURO: Cranial nerves grossly intact.  Mentation and speech appropriate for age.\",\"PSYCH: Mentation appears normal, affect normal/bright, judgement and insight intact, normal speech and appearance well-groomed.\"}    {Diagnostic Test Results (Optional):614513}    {AMBULATORY ATTESTATION (Optional):121052}        Video-Visit Details    Type of service:  Video Visit    Video End Time:{video visit start/end time for provider to select:800532}    Originating Location (pt. Location): {video visit " "patient location:755683::\"Home\"}    Distant Location (provider location):  [facility]     Platform used for Video Visit: {Virtual Visit Platforms:830013::\"GoTunes\"}  "

## 2021-03-08 NOTE — TELEPHONE ENCOUNTER
RN huddle with Haase, Susan Rachele APRN CNP     Patient on schedule this afternoon for treatment of elevated parathyroid hormone - this was originally ordered by pulmonology   Patient will need to see endocrinology for further work up   Haase, Susan Rachele APRN CNP placed referral for endocrinology My[facility]     RN placed call to patient and explained that further work up is needed by endocrinology and patient does not there fore need the visit with Haase, Susan Rachele APRN CNP today     Referral information below given to patient   [facility]:  [facility] [phone]    Nolvia Florence Registered Nurse, PAL (Patient Advocate Liason)   [facility]   [phone]

## 2021-03-10 ENCOUNTER — VIRTUAL VISIT (OUTPATIENT)
Dept: ENDOCRINOLOGY | Facility: CLINIC | Age: 71
End: 2021-03-10
Attending: NURSE PRACTITIONER
Payer: MEDICARE

## 2021-03-10 DIAGNOSIS — E21.3 HPTH (HYPERPARATHYROIDISM) (H): Primary | ICD-10-CM

## 2021-03-10 PROCEDURE — 99204 OFFICE O/P NEW MOD 45 MIN: CPT | Mod: 95 | Performed by: INTERNAL MEDICINE

## 2021-03-10 NOTE — LETTER
3/10/2021         RE: Dinorah Thompson  [address] [facility] [address]        Dear Colleague,    Thank you for referring your patient, Dinorah Thompson, to the [facility]. Please see a copy of my visit note below.    Dot is a 70 year old who is being evaluated via a billable video visit.      How would you like to obtain your AVS? MyChart  If the video visit is dropped, the invitation should be resent by:   Will anyone else be joining your video visit? No      Video Start Time: 1:00 PM    CC: HPTH.     HPI:   Patient presents for management of HPTH.   Reports being told in 2013 that she had an elevated PTH level.  Saw an Endocrinologist in [place] and that person moved and thus she did not follow up on this issue.     She was diagnosed with sarcoidosis in 2008.   Initially treated with prednisone.   Then imuran and methotrexate.   She has been on medication since 2014.     Prior finger fracture after jamming it.   No kidney stones.     Taking calcium carbonate 500 mg BID.     Chronic thirst. Not waking at night to urinate.   Chronic constipation.     Diagnosed with osteopenia in 2016.   DEXA in 2/2020 showed osteopenia with FRAX below threshold to treat.   The distal radius was not assessed.     Remarks she had early menopause, age 45. She did not have formal osteoporosis but was treated due to her age.   She was on fosamax and boniva at stopped around 2013.   In part stopped 2/2 GERD which has since resolved.     Chronic muscle pains which are worse since moving to [place].     ROS: 10 point ROS neg other than the symptoms noted above in the HPI.    PMH:   Patient Active Problem List   Diagnosis     Cervical radiculopathy, chronic     Chronic pain disorder     Intervertebral disc disorders with radiculopathy, lumbar region     Lumbar radiculopathy, chronic     FAHAD treated with BiPAP     Other cervical disc degeneration at C5-C6 level     Spondylosis without myelopathy or  radiculopathy, lumbar region     Osteoarthrosis, hand     Primary localized osteoarthritis of knees, bilateral     Hyperlipidemia     Depression     Sarcoidosis of lung (H)     Hoarseness     Laryngeal disorder     Precancerous lesion     History of retinal detachment     Meds:  Current Outpatient Medications   Medication     acetaminophen (TYLENOL) 500 MG tablet     albuterol (PROAIR HFA/PROVENTIL HFA/VENTOLIN HFA) 108 (90 Base) MCG/ACT inhaler     baclofen (LIORESAL) 10 MG tablet     calcium carbonate 500 mg, elemental, (OSCAL 500) 1250 (500 Ca) MG TABS tablet     Carboxymethylcellul-Glycerin 1-0.9 % GEL     celecoxib (CELEBREX) 200 MG capsule     clonazePAM (KLONOPIN) 0.5 MG tablet     DULoxetine (CYMBALTA) 60 MG capsule     FLOVENT  MCG/ACT inhaler     NONFORMULARY     propranolol (INDERAL) 10 MG tablet     rosuvastatin (CRESTOR) 5 MG tablet     tacrolimus (PROTOPIC) 0.1 % external ointment     traZODone (DESYREL) 100 MG tablet     valACYclovir (VALTREX) 1000 mg tablet     No current facility-administered medications for this visit.       FHX:   No known osteoporosis.     SHX:  Living at home with her . He was living in a memory care unit prior to [facility].     Exam:   GENERAL: Healthy, alert and no distress  EYES: Eyes grossly normal to inspection.  No discharge or erythema, or obvious scleral/conjunctival abnormalities.  HENT: Normal cephalic/atraumatic.  External ears, nose and mouth without ulcers or lesions.  No nasal drainage visible.  RESP: No audible wheeze, cough, or visible cyanosis.  No visible retractions or increased work of breathing.    MS: No gross musculoskeletal defects noted.  Normal range of motion.  No visible edema.  SKIN: Visible skin clear. No significant rash, abnormal pigmentation or lesions.  NEURO: Cranial nerves grossly intact.  Mentation and speech appropriate for age.  PSYCH: Mentation appears normal, affect normal/bright, judgement and insight intact, normal speech and  appearance well-groomed.     A/P:   HPTH - in the setting of a normal Ca, Cr, and vitamin D. She reports high calcium levels in the past but this was when her sarcoid was active. DEXA in 2/2020 showed osteopenia with FRAX below threshold to treat. The distal radius was not assessed.   - jug for urine collection.   If consistent with [facility], DEXA in 2022.    If consistent with primary HPTH, will send for DEXA including the distal radius.     James Toribio M.D    Video-Visit Details    Type of service:  Video Visit    Video End Time:1:24 PM    Originating Location (pt. Location): Home    Distant Location (provider location):  [facility]     Platform used for Video Visit: [facility]        Again, thank you for allowing me to participate in the care of your patient.        Sincerely,        James Toribio MD

## 2021-03-10 NOTE — PROGRESS NOTES
Dot is a 70 year old who is being evaluated via a billable video visit.      How would you like to obtain your AVS? MyChart  If the video visit is dropped, the invitation should be resent by:   Will anyone else be joining your video visit? No      Video Start Time: 1:00 PM    CC: HPTH.     HPI:   Patient presents for management of HPTH.   Reports being told in 2013 that she had an elevated PTH level.  Saw an Endocrinologist in [place] and that person moved and thus she did not follow up on this issue.     She was diagnosed with sarcoidosis in 2008.   Initially treated with prednisone.   Then imuran and methotrexate.   She has been on medication since 2014.     Prior finger fracture after jamming it.   No kidney stones.     Taking calcium carbonate 500 mg BID.     Chronic thirst. Not waking at night to urinate.   Chronic constipation.     Diagnosed with osteopenia in 2016.   DEXA in 2/2020 showed osteopenia with FRAX below threshold to treat.   The distal radius was not assessed.     Remarks she had early menopause, age 45. She did not have formal osteoporosis but was treated due to her age.   She was on fosamax and boniva at stopped around 2013.   In part stopped 2/2 GERD which has since resolved.     Chronic muscle pains which are worse since moving to [place].     ROS: 10 point ROS neg other than the symptoms noted above in the HPI.    PMH:   Patient Active Problem List   Diagnosis     Cervical radiculopathy, chronic     Chronic pain disorder     Intervertebral disc disorders with radiculopathy, lumbar region     Lumbar radiculopathy, chronic     FAHAD treated with BiPAP     Other cervical disc degeneration at C5-C6 level     Spondylosis without myelopathy or radiculopathy, lumbar region     Osteoarthrosis, hand     Primary localized osteoarthritis of knees, bilateral     Hyperlipidemia     Depression     Sarcoidosis of lung (H)     Hoarseness     Laryngeal disorder     Precancerous lesion     History of retinal  detachment     Meds:  Current Outpatient Medications   Medication     acetaminophen (TYLENOL) 500 MG tablet     albuterol (PROAIR HFA/PROVENTIL HFA/VENTOLIN HFA) 108 (90 Base) MCG/ACT inhaler     baclofen (LIORESAL) 10 MG tablet     calcium carbonate 500 mg, elemental, (OSCAL 500) 1250 (500 Ca) MG TABS tablet     Carboxymethylcellul-Glycerin 1-0.9 % GEL     celecoxib (CELEBREX) 200 MG capsule     clonazePAM (KLONOPIN) 0.5 MG tablet     DULoxetine (CYMBALTA) 60 MG capsule     FLOVENT  MCG/ACT inhaler     NONFORMULARY     propranolol (INDERAL) 10 MG tablet     rosuvastatin (CRESTOR) 5 MG tablet     tacrolimus (PROTOPIC) 0.1 % external ointment     traZODone (DESYREL) 100 MG tablet     valACYclovir (VALTREX) 1000 mg tablet     No current facility-administered medications for this visit.       FHX:   No known osteoporosis.     SHX:  Living at home with her . He was living in a memory care unit prior to [facility].     Exam:   GENERAL: Healthy, alert and no distress  EYES: Eyes grossly normal to inspection.  No discharge or erythema, or obvious scleral/conjunctival abnormalities.  HENT: Normal cephalic/atraumatic.  External ears, nose and mouth without ulcers or lesions.  No nasal drainage visible.  RESP: No audible wheeze, cough, or visible cyanosis.  No visible retractions or increased work of breathing.    MS: No gross musculoskeletal defects noted.  Normal range of motion.  No visible edema.  SKIN: Visible skin clear. No significant rash, abnormal pigmentation or lesions.  NEURO: Cranial nerves grossly intact.  Mentation and speech appropriate for age.  PSYCH: Mentation appears normal, affect normal/bright, judgement and insight intact, normal speech and appearance well-groomed.     A/P:   HPTH - in the setting of a normal Ca, Cr, and vitamin D. She reports high calcium levels in the past but this was when her sarcoid was active. DEXA in 2/2020 showed osteopenia with FRAX below threshold to treat. The distal  radius was not assessed.   - jug for urine collection.   If consistent with [facility], DEXA in 2022.    If consistent with primary [facility], will send for DEXA including the distal radius.     James Toribio M.D    Video-Visit Details    Type of service:  Video Visit    Video End Time:1:24 PM    Originating Location (pt. Location): Home    Distant Location (provider location):  [facility]     Platform used for Video Visit: [employer]

## 2021-03-18 DIAGNOSIS — E21.3 HPTH (HYPERPARATHYROIDISM) (H): ICD-10-CM

## 2021-03-18 LAB
CALCIUM 24H UR-MRATE: 0.34 G/24 H (ref 0.1–0.3)
CALCIUM UR-MCNC: 12.6 MG/DL
CALCIUM/CREAT UR: 0.33 G/G CR
COLLECT DURATION TIME UR: 24 H
CREAT 24H UR-MRATE: 1.03 G/(24.H) (ref 0.8–1.8)
CREAT UR-MCNC: 38 MG/DL
SPECIMEN VOL UR: 2710 ML

## 2021-03-18 PROCEDURE — 81050 URINALYSIS VOLUME MEASURE: CPT | Performed by: INTERNAL MEDICINE

## 2021-03-18 PROCEDURE — 82340 ASSAY OF CALCIUM IN URINE: CPT | Performed by: INTERNAL MEDICINE

## 2021-03-19 ENCOUNTER — TELEPHONE (OUTPATIENT)
Dept: OTOLARYNGOLOGY | Facility: CLINIC | Age: 71
End: 2021-03-19

## 2021-03-19 DIAGNOSIS — E21.3 HPTH (HYPERPARATHYROIDISM) (H): Primary | ICD-10-CM

## 2021-03-19 NOTE — TELEPHONE ENCOUNTER
Left vm for patient to switch Monday's appointment to an in person visit with Dr. Altamirano. Provided direct call back number to confirm.    Marilia Mehta RN on 3/19/2021 at 9:41 AM

## 2021-03-22 ENCOUNTER — OFFICE VISIT (OUTPATIENT)
Dept: OTOLARYNGOLOGY | Facility: CLINIC | Age: 71
End: 2021-03-22
Payer: MEDICARE

## 2021-03-22 ENCOUNTER — VIRTUAL VISIT (OUTPATIENT)
Dept: OTOLARYNGOLOGY | Facility: CLINIC | Age: 71
End: 2021-03-22
Payer: MEDICARE

## 2021-03-22 ENCOUNTER — PRE VISIT (OUTPATIENT)
Dept: OTOLARYNGOLOGY | Facility: CLINIC | Age: 71
End: 2021-03-22

## 2021-03-22 VITALS
WEIGHT: 146 LBS | TEMPERATURE: 96.6 F | BODY MASS INDEX: 25.87 KG/M2 | HEART RATE: 70 BPM | HEIGHT: 63 IN | OXYGEN SATURATION: 100 %

## 2021-03-22 DIAGNOSIS — D86.0 SARCOIDOSIS OF LUNG (H): Primary | ICD-10-CM

## 2021-03-22 DIAGNOSIS — J38.7 IRRITABLE LARYNX: ICD-10-CM

## 2021-03-22 DIAGNOSIS — R49.0 HOARSENESS: ICD-10-CM

## 2021-03-22 DIAGNOSIS — R49.0 MUSCLE TENSION DYSPHONIA: Primary | ICD-10-CM

## 2021-03-22 DIAGNOSIS — J31.0 CHRONIC RHINITIS: ICD-10-CM

## 2021-03-22 DIAGNOSIS — R05.9 COUGH: ICD-10-CM

## 2021-03-22 PROCEDURE — 99207 PR NO CHARGE LOS: CPT | Performed by: SPEECH-LANGUAGE PATHOLOGIST

## 2021-03-22 PROCEDURE — 99203 OFFICE O/P NEW LOW 30 MIN: CPT | Mod: 25 | Performed by: OTOLARYNGOLOGY

## 2021-03-22 PROCEDURE — 92524 BEHAVRAL QUALIT ANALYS VOICE: CPT | Mod: GN | Performed by: SPEECH-LANGUAGE PATHOLOGIST

## 2021-03-22 PROCEDURE — 31579 LARYNGOSCOPY TELESCOPIC: CPT | Performed by: OTOLARYNGOLOGY

## 2021-03-22 ASSESSMENT — PAIN SCALES - GENERAL: PAINLEVEL: NO PAIN (0)

## 2021-03-22 ASSESSMENT — MIFFLIN-ST. JEOR: SCORE: 1143.44
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
## 2021-03-22 NOTE — PATIENT INSTRUCTIONS
1.  You were seen in the ENT Clinic today by . If you have any questions or concerns after your appointment, please call [phone]. Press option #1 for scheduling related needs. Press option #3 for Nurse advice.    2.   has recommended the following:   -  Speech therapy    3.  Plan is to return to clinic as needed.      Kerrie Garcia LPN  [phone]  [facility] - Otolaryngology
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
## 2021-03-22 NOTE — NURSING NOTE
"Chief Complaint   Patient presents with     Consult     Hoarseness       Pulse 70, temperature 96.6  F (35.9  C), temperature source Temporal, height 1.588 m (5' 2.5\"), weight 66.2 kg (146 lb), SpO2 100 %.    Chely Stone, EMT  "
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
## 2021-03-22 NOTE — PATIENT INSTRUCTIONS
Roxana Lee M.M. (voice), M.A., CCC/SLP  Speech-Language Pathologist  [facility] Trained Vocologist  [facility]  [email or web address]  Pronouns: she/her          You have been referred for functional voice therapy    Why?    Therapy is very useful in treating all voice disorders, regardless of the type of disorder.  If you have:    ? Muscle tension dysphonia: Your voice disorder is caused by abnormal use of the muscles of the vocal mechanism, and functional voice therapy will teach your muscles how to work properly.  ? A growth on your vocal folds (such as nodules, polyps, or cysts): Your lesion may be caused by inappropriate use of the muscles, and therefore can only be cured by learning techniques for better muscle use.  Or, your lesion may cause the muscles to be used incorrectly, and relearning better technique is an important part of overall treatment.  If your lesion needs to be surgically removed, learning to use good muscle technique helps ensure an optimal surgical result.  ? A vocal fold paralysis: Learning to use your muscles to compensate can be very helpful, and may even eliminate the need for surgery.   About muscle tension dysphonia      One of the most common voice disorders we treat is muscle tension dysphonia (MTD).  Dysphonia simply means that the sound of the voice is insufficient for its intended purpose. MTD, however, may also refer to a voice that sounds normal, but causes pain, discomfort, or fatigue to the voice user.  MTD is considered a functional disorder; that is, the muscles do not function properly, causing poor sound, discomfort, or a sensation of increased effort.      There are many possible reasons why use of the vocal mechanism becomes abnormal.  Some general causes are very common:  ? prolonged illness  ? prolonged overuse  ? prolonged underuse (such as after a surgery)  ? trauma, such as an injury, chemical exposure, or emotionally traumatic event        These can lead to an  abnormal muscle response, causing a person to use more effort while talking.  Moreover, the pushing and squeezing can be very subtle, making the individual unaware of the extra effort.  The result may or may not be a stronger voice, but it usually starts a vicious cycle where more and more effort is required.  This cycle can continue for months or even years before the individual becomes aware that his or her voice is abnormal.    Usually, the only treatment available for muscle tension dysphonia is functional therapy.            Basics of functional voice therapy        There are some general things you should know about functional voice therapy.  Functional voice therapy . . .  ? is also known as voice therapy or behavioral voice therapy.  ? should only be done after a thorough evaluation by an ENT (ear, nose, and throat) physician.  ? should be done with a certified speech-language pathologist who specializes in voice disorders.  ? includes education about the use and care of the voice and how the voice works.  ? uses progressive exercises taught over a series of sessions.  ? varies in length from several sessions to many sessions over a few months, but some relief in symptoms is almost always gained within the first 4-6 sessions.  ? may, in the case of emotional stress, need to be accompanied by counseling or stress management.  Functional voice therapy at the [facility]    At the [facility], your functional therapy is done under the direction of Dr. FADY Ramirez or Roxana Lee.  After a voice evaluation, a treatment plan is discussed with you, and therapy sessions are scheduled.  The plan for therapy varies from person to person, but in general, sessions occur weekly for one hour at first.  Sessions gradually become more spaced apart as you learn more advanced techniques.  After a few sessions, you may need more time to practice and incorporate your techniques into everyday speech.    The first  few sessions generally include a thorough discussion of your vocal lifestyle - voice needs, activities, and habits.  We will teach you how to keep the voice healthy regardless of the level of voice activity.  You will also learn pertinent information about how the voice works, helping you understand the therapeutic process better.  Then, exercises are taught to keep the upper body relaxed while using the voice, and to ensure optimal breathing technique.    At this point, specific voice exercises are taught.  Certain sounds affirm that the muscles are used correctly.  You will learn exercises to achieve these sounds first.  Once these sounds are mastered, you will advance to words, sentences, and finally conversational speech.  During your therapy sessions, exercises will be tailored to your vocal strengths and weaknesses.  During therapy, you will probably find it helpful to record your therapy session on compact disc.  Recording the exercises makes it easier to practice at home.  We encourage you to bring a CD with you to therapy.    Health insurance coverage for functional voice therapy    A normal sounding voice, free from discomfort or fatigue, is considered a normal function.  If it is disordered, restoring it is considered medically necessary.  Most insurance companies recognize this, and therapy is covered under most policies.    Functional voice therapy is considered a form of speech therapy.  If your insurance policy covers rehabilitation services such as physical therapy and speech therapy, therapy for a voice disorder should be covered.  If you have any questions about coverage, or problems with coverage for your voice treatment, please talk to Dr. Ramirez or Ms. Lee.  They can offer you strategies for getting them resolved.    For more information on this topic, visit our web site at  [email or web address]                                                                                                                                                                                                                                         Irritable Larynx Syndrome    What is Irritable Larynx Syndrome?  Irritable Larynx Syndrome (ILS) is a cluster of symptoms not associated with a specific disease process. Individuals with ILS can have any combination of the following complaints:      ; Globus sensation (feeling of lump or some other sensation in the throat)  ; Throat irritation or burning sensation  ; Tightness of throat or neck  ; Chronic cough or throat clearing; sensation of need to clear throat  ; Effort or pain with swallowing  ; Paradoxical vocal fold motion (sensation of difficulty inhaling)  ; Laryngospasm (tightening of throat causing choking or difficulty inhaling)    What causes ILS?  ILS works in the same way as a mosquito bite: We might scratch the bite absentmindedly a couple of times, and suddenly we realize the bite really itches!  So we scratch it vigorously because that feels better for a few moments. In the long run though, scratching actually makes the itch worse.  In the same way, when individuals experience mild irritation in their throats for some reason, they might not realize that they've begun  scratching  the  itch,  (throat clearing) but over time the irritation worsens and becomes more noticeable.  This is how earlier, milder symptoms can be the precursors to more-severe symptoms. Chronic irritation of the mucosa in the laryngeal area can cause changes to the nerve pathways; they can become hyper-excitable, so that it only takes a small irritation to have a large sensory response (like a cough).  It's nice that the nervous system can learn, but in this case it has backfired!    Here are some possible irritants that can start the chain reaction (most individuals have  more than one):  ; Upper respiratory infection with cough  ; Acid Reflux  ; Post Nasal Drainage  ; Allergens (e.g. tree, mold, pollen, pet dander)  ; Cigarette smoke or other kinds of smoke  ; Odors (e.g. perfume, hairspray)  ; Food sensitivities  ; Strong Emotions (e.g. anxiety, stress)  ; Hyperfunction of the muscles of the vocal mechanism  ; Harsh chemicals/  ; Cold Air    Evaluation of ILS  At the [facility], an otolaryngologist and a speech-language pathologist work as a team to determine if ILS is a problem.  Medical evaluation and laryngeal examination rule out any other cause for the symptoms.  Some medical treatment may be advised.  Functional evaluation determines whether there are behavioral or lifestyle factors that are contributing to the symptoms.      Treatment of ILS  Treatment of ILS addresses the cause of irritation. This can include:   ; Treatment of Acid Reflux This may include: Medications (what your MD prescribes), Dietary Precautions (what you eat and what supplements you take), Lifestyle Precautions (when you eat, avoiding environmental irritants), and Mechanical Precautions (how much pressure you put on your lower esophageal sphincter).  ; Functional Speech Therapy with a Speech-Language Pathologist (SLP). Your SLP will educate you about the disorder, help you improve the environment of your mucosa to reduce irritation, improve the movement and function of your larynx, and help reduce muscle tension and restore muscle balance.  ; Further Medical treatment is sometimes used to restore the medical basis for normal function and sensation.  Your MD will discuss these possibilities with you if they are relevant.      Cough and Throat Clearing  The biological purpose of the larynx is to protect the airway (trachea and lungs). Coughing and throat clearing are mechanisms that are meant to assist in the protection of our airway. When we feel something such as liquid, food, saliva,  dust, etc. near our vocal folds, we will clear our throats and cough as a protective reflex. We also cough or throat clear if our airway is irritated.     Why can chronic coughing and throat clearing be harmful?  A cough is produced by squeezing the vocal folds together, building up pressure in the lungs, and then quickly forcing the vocal folds open to clear away whatever might be getting too close to our airway. This can be traumatic to the vocal folds, irritating them in the same way that our hands would be irritated if they were being slapped together over and over. Coughing for a long period of time can cause the mucosa of the larynx and vocal folds to become hypersensitive, making it feel like something is threatening the airway.  The vocal folds need to cough or throat clear even when there isn't an actual physical threat to the airway.  The chronic coughing or throat clearing results in even more coughing or throat clearing.      Strategies to Reduce Irritation  Your speech-language pathologist will teach you techniques to use muscles optimally, in order to reduce irritation.  In the meantime, you can start reducing the irritation, using the following strategies:    ; Identifying your unique trigger; take steps to avoid it  ; Improve both systemic and topical (surface) hydration (dry mucosa is more easily irritated)  ; Drink adequate fluids   ; Use a humidifier, especially in the bedroom at night  ; Guaifenesin (e.g. Mucinex) to thin viscous secretions  ; Sucking on candy, or cough drops without menthol, or chew gum, to increase salivary stimulation  ; Alternative Behaviors to coughing  ; Swallow hard  ; Stimulate your oral cavity:   ; Sip hot, cold, carbonated, or flavored water  ; Suck on ice chips  ; Bite your tongue or cheek (not too hard!)  ; Massage under your chin  ; Gargle  ; Gentle hums or vocal exercises taught to you by your SLP  ; Say  eh eh eh eh  silently (for a gentle, non-irritating  throat-clear)  ; Sniff or pursed lip inhale and exhale on  Shhh  like shushing a baby  ; Sniff or pursed lip inhale and exhale on  zzz  like a buzzing bee  ; Wait. While you are waiting, try the above behaviors instead        Muscle Tension Dysphonia    One of the most common voice disorders we treat at the [facility] is muscle tension dysphonia (MTD).  The root word phon means  sound .  Phonation refers to the sound made by the voice.  The term dysphonia means there is something wrong with the voice.  However, muscle tension dysphonia can also refer to a voice that sounds normal but causes pain, discomfort, or fatigue to the voice user.  MTD is known as a functional disorder; that is, there is nothing structurally wrong with the voice.  Rather, the muscles do not function properly, which causes poor sound, discomfort, or increased effort.    Symptoms of MTD    Different individuals may have very different symptoms of MTD.  Possible voice characteristics include      rough, hoarse, gravelly, raspy      weak, breathy, airy, leaky, backward, hollow      strained, pressed, squeezed, tight, tense, choked, effortful      jerky, shaky, halting,      suddenly cutting out, squeezing shut, breaking off, changing pitch, or fading away      giving out gradually, or becoming weaker or more tense as voice use continues      excessively high or low pitch      inability to produce a loud or clear voice      inability to sing notes that used to be easy    Possible sensations of MTD include      pain or discomfort anywhere in the throat  area associated with voice use      a tight choking sensation with voice use      fatigue or effort that increases with voice use      some area of the neck becoming tender to the touch      feeling a need to clear the throat frequently      a feeling of a lump in the throat    Causes of Muscle Tension Dysphonia  There are many specific, individual reasons why use of the vocal mechanism  becomes abnormal.  Some common causes are prolonged illness, prolonged voice overuse, prolonged voice underuse (such as after a surgery), and trauma, such as an injury, chemical exposure, or an emotionally traumatic event.  These lead to an abnormal vocal response, causing the individual to compensate by using extra effort while talking.    The onset of MTD can be very subtle.  The individual is usually unaware of the extra effort, but this extra effort typically recruits muscles that are not part of the larynx (also known as the voice box).  The result may or may not be a stronger voice, but a vicious cycle usually starts in which more and more effort is required.  This cycle may continue for months or even years before the individual becomes aware that the voice is abnormal.    Treatment of MTD  Functional voice therapy is usually the only treatment available for MTD.  The amount of time required to complete functional therapy varies from only a few sessions to many months, but generally some relief is gained in the first 4 to 6 sessions.  In cases of emotional stress, counseling or stress management may be helpful or even necessary.    Occasionally, medical or surgical treatments may be tried.  Botox injections may be useful in severe cases.  Surgery has been tried in very few cases but is not done at the [facility].  Muscle relaxants are NOT useful for muscle tension dysphonia.  The action of these drugs is not localized to the vocal mechanism, so in order to provide enough relaxation for the vocal mechanism, the individual is often unable to function for day-to-day living.    Types of Muscle Tension Dysphonia  Muscle tension in the vocal mechanism can be exhibited in many ways.  Each individual is different, but a few common patterns are:      Anterior-posterior constriction      Hyperabduction      Hyperadduction      Pharyngeal constriction      Ventricular phonation      Vocal fold bowing (explained  "in another brochure)     back           r  i  g  h  t       front   A cross-section of the larynx (voice box) as seen from above.  This diagram may be helpful for visualizing the descriptions below.    Anterior-Posterior Constriction  In anterior-posterior constriction, the arytenoid cartilages bend forward during voice use, and/or the epiglottis bends backwards, causing the larynx to squeeze from front to back.  As effort increases, squeezing continues until the vocal folds (also called vocal cords) cannot be seen and may be unable to vibrate.  In extreme cases, especially in children, the arytenoids may actually vibrate against the epiglottis. The sound of the voice for someone with anterior-posterior constriction could range from normal to extremely squeezed and tight.  The voice may sound rough if the squeezing causes irregular vibration of the vocal folds.  Some experience a \"froggy\" sound if the arytenoids and epiglottis vibrate.  Someone with anterior-posterior constriction may complain of discomfort, increasing pain during voice use that may remain during rest, or fatigue and decline of voice quality as the voice is used more and more.    Hyperabduction  Abduction is the term for the vocal folds coming apart during breathing.  When a person has hyperabduction, the vocal folds do not sufficiently come together to produce voice.  They may appear to be pulled apart as the person phonates.  An emotional component may be present with this type of MTD.    A person with hyperabduction may have a weak, breathy, airy, very soft, or hollow voice, sometimes with breaks in phonation.  He or she may experience effort and fatigue.  Often times, functional therapy is combined with psychotherapy to treat hyperabduction.  In very severe cases, Botox injections are also a useful treatment.    Hyperadduction  In hyperadduction, the vocal folds adduct (come together) excessively tightly, restricting airflow during phonation.  " The larynx may look normal in an exam, but the sound and sensation are not.    The sound of a voice with hyperadduction ranges from normal to extremely tight, pressed, squeezed, strangled, forced or effortful.  The muscle tension may be irregular, causing a stopping/starting or shaking effect.  A person with hyperadduction may experience pain, discomfort, and effort and fatigue, usually increasing with continued voice use.  In very severe cases, Botox injections are helpful    Pharyngeal Constriction  During pharyngeal constriction, muscles of the pharynx (throat) contract excessively during voice use, making the throat very constricted.  The sound of the voice ranges from normal to very tight or squeezed.  It may be tremulous or throaty sounding.  A person with pharyngeal constriction may experience discomfort, pain, fatigue, or declining voice quality with use.  Pain usually increases with voice use but may remain during rest.  Sometimes emotional stress can provoke pharyngeal constriction.    Ventricular Phonation  This type of MTD is also called plica ventricularis, ventricular dysphonia, or false cord phonation.  The ventricular folds come together and vibrate instead of, or along with, the vocal folds.  The ventricular folds, also known as the false vocal cords, are mounds of fleshy tissue just above the vocal folds.  Though the ventricular folds are not muscular, they can be brought together and vibrated.  However, they were not meant to vibrate, so they cannot produce high pitches or loud sounds.  Pressure from the ventricular folds is usually strong enough to keep the true vocal folds from vibrating.  Most often, ventricular phonation is caused by continued voice use while true vocal folds are impaired, though sometimes extreme strain in response to a trauma is the cause.    Ventricular phonation sounds very rough and strained, sometimes inhuman, limited in pitch and volume.  One who uses ventricular  phonation may experience fatigue (especially with attempts at loud voice use), pain or dryness with phonation.  However, sometimes no discomfort will be sensed at all.  In extreme cases, medical or surgical treatments may be tried to treat ventricular phonation, but only after functional therapy has failed.  In some cases, ventricular phonation is the best alternative for persons whose true vocal folds will always be too impaired to vibrate.
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
## 2021-03-22 NOTE — PROGRESS NOTES
Outpatient Speech Language Therapy Evaluation  PLAN OF TREATMENT FOR OUTPATIENT REHABILITATION  (COMPLETE FOR INITIAL CLAIMS ONLY)  Patient's Last Name, First Name, M.I.  YOB: 1950  Dinorah Thompson                        Provider's Name  Roxana Lee, SLP Medical Record No.  [id number]                               Onset Date:  3/22/21   Start of Care Date: 3/22/21     Type: Speech Language Therapy Medical Diagnosis: Dysphonia (R49.0)                        Therapy Diagnosis:  Dysphonia (R49.0)   Visits from SOC:  1   _________________________________________________________________________________  Plan of Treatment:   Speech therapy    DURATION/FREQUENCY OF TREATMENT  Six weekly, one-hour sessions, with two monthly one-hour follow-up sessions    PROGNOSIS  Good prognosis for voice improvement with speech therapy and regular practice of therapeutic activities.    BARRIERS TO LEARNING/TEACHING AND LEARNING NEEDS  None/Unremarkable    GOALS:  Patient goal:   To improve and maintain a healthy voice quality  To resolve the vocal fold lesions  To understand the problem and fix it as much as possible    Short-term goal(s): Within the first 4 sessions, Ms. Thompson:  will be able to demonstrate provided cough suppression and substitution strategies from memory independently with 90% accuracy  will be able to independently list key factors in maintenance of good vocal hygiene with 80% accuracy, and report on their use outside the therapy room.  will utilize silent inhalation with good low-respiratory engagement 75% of the time during therapy tasks with minimal clinician support  will demonstrate semi-occluded vocal tract (SOVT) exercises with at least 80% accuracy with no clinician support    Long-term goal(s): In 6 months, Ms. Thompson will:  Report a week with no more than 2 episodes of coughing, that do not last more than 2  seconds  Report resolution of dysphonia, and use of optimal voice quality to meet personal, social, and professional needs, 90% of the time during a typical week of vocal activities  _________________________________________________________________________________    I CERTIFY THE NEED FOR THESE SERVICES FURNISHED UNDER        THIS PLAN OF TREATMENT AND WHILE UNDER MY CARE     (Physician attestation of this document indicates review and certification of the therapy plan).     Certification date from: 3/22/21  Certification date to: 6/20/21    Referring Provider: Dr. Carlos A Bower

## 2021-03-23 NOTE — PROGRESS NOTES
Medicare Certification  Physician Attestation   I agree with the information in this note.    Radha Altamirano MD

## 2021-04-02 ENCOUNTER — OFFICE VISIT (OUTPATIENT)
Dept: OPHTHALMOLOGY | Facility: CLINIC | Age: 71
End: 2021-04-02
Payer: MEDICARE

## 2021-04-02 DIAGNOSIS — H52.222 REGULAR ASTIGMATISM OF LEFT EYE: ICD-10-CM

## 2021-04-02 DIAGNOSIS — Z96.1 PSEUDOPHAKIA: ICD-10-CM

## 2021-04-02 DIAGNOSIS — H04.123 DRY EYE SYNDROME, BILATERAL: ICD-10-CM

## 2021-04-02 DIAGNOSIS — D86.0 SARCOIDOSIS OF LUNG (H): ICD-10-CM

## 2021-04-02 DIAGNOSIS — Z98.890 S/P LASIK SURGERY: ICD-10-CM

## 2021-04-02 DIAGNOSIS — Z01.00 EXAMINATION OF EYES AND VISION: Primary | ICD-10-CM

## 2021-04-02 DIAGNOSIS — H52.4 PRESBYOPIA: ICD-10-CM

## 2021-04-02 DIAGNOSIS — H52.13 MYOPIA OF BOTH EYES: ICD-10-CM

## 2021-04-02 DIAGNOSIS — B02.9 HERPES ZOSTER WITHOUT COMPLICATION: ICD-10-CM

## 2021-04-02 PROCEDURE — 92004 COMPRE OPH EXAM NEW PT 1/>: CPT | Performed by: STUDENT IN AN ORGANIZED HEALTH CARE EDUCATION/TRAINING PROGRAM

## 2021-04-02 PROCEDURE — 92015 DETERMINE REFRACTIVE STATE: CPT | Mod: GY | Performed by: STUDENT IN AN ORGANIZED HEALTH CARE EDUCATION/TRAINING PROGRAM

## 2021-04-02 ASSESSMENT — REFRACTION_WEARINGRX
SPECS_TYPE: SVL
OS_SPHERE: -1.00
OS_AXIS: 165
OD_CYLINDER: +1.00
OD_SPHERE: -1.75
OS_CYLINDER: +1.00
OD_AXIS: 085

## 2021-04-02 ASSESSMENT — VISUAL ACUITY
OS_CC: 20/30+3
OS_SC: 20/30+2
OD_CC: 20/30
METHOD: SNELLEN - LINEAR
OD_PH_SC: J2
OD_SC: 20/40+2

## 2021-04-02 ASSESSMENT — TONOMETRY
OS_IOP_MMHG: 14
IOP_METHOD: APPLANATION
OD_IOP_MMHG: 13

## 2021-04-02 ASSESSMENT — REFRACTION_MANIFEST
OS_CYLINDER: +0.75
OD_SPHERE: -1.00
OD_ADD: +2.50
OS_SPHERE: -0.75
OD_CYLINDER: SPHERE
OS_AXIS: 007
OS_ADD: +2.50

## 2021-04-02 ASSESSMENT — CUP TO DISC RATIO
OD_RATIO: 0.4
OS_RATIO: 0.4

## 2021-04-02 ASSESSMENT — CONF VISUAL FIELD
OS_NORMAL: 1
OD_NORMAL: 1

## 2021-04-02 ASSESSMENT — SLIT LAMP EXAM - LIDS
COMMENTS: NORMAL
COMMENTS: NORMAL

## 2021-04-02 ASSESSMENT — EXTERNAL EXAM - RIGHT EYE: OD_EXAM: NORMAL

## 2021-04-02 ASSESSMENT — EXTERNAL EXAM - LEFT EYE: OS_EXAM: NORMAL

## 2021-04-02 NOTE — PROGRESS NOTES
Current Eye Medications:  Retaine eye every morning and Retaine oint. nightly     Subjective:  Comprehensive eye exam.    Pt states she sees pretty well, however her vision may not be as good as it used to be. She has distance glasses she rarely uses because does not like wearing glasses and otc readers for reading. Pt reports that a couple of weeks ago saw some flashing, followed by a floater the next day in her right eye.     She has hx of bilateral lasik with 2 enhancements. 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 S/p cataract surgery both eyes and again right eye corrected for monovison. Also has hx of shingles in her left eye and PVD she thinks her right eye. Pt also has sarcoidosis x about 13 years.     Objective:  See Ophthalmology Exam.       Assessment:  Dinorah Thompson is a 70 year old female who presents with:   Encounter Diagnoses   Name Primary?     Examination of eyes and vision      Myopia of both eyes      Regular astigmatism of left eye      Presbyopia        Pseudophakia - Both Eyes      S/P LASIK surgery - Both Eyes; enhancement x 2      Herpes zoster ophthalmicus - left side - without complication      Sarcoidosis of lung (H) No signs of ocular involvement at present.      Dry eye syndrome, bilateral        Plan:  Continue artificial tears up to four times a day as needed  Continue eye ointment at bedtime in both eyes     Glasses prescription given - optional to update    Corinne Elliott MD  [phone]

## 2021-04-02 NOTE — PATIENT INSTRUCTIONS
Continue artificial tears up to four times a day as needed  Continue eye ointment at bedtime in both eyes     Glasses prescription given - optional to update    Corinne Elliott MD  [phone]
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
## 2021-04-07 ENCOUNTER — ANCILLARY PROCEDURE (OUTPATIENT)
Dept: BONE DENSITY | Facility: CLINIC | Age: 71
End: 2021-04-07
Attending: INTERNAL MEDICINE
Payer: MEDICARE

## 2021-04-07 DIAGNOSIS — E21.3 HPTH (HYPERPARATHYROIDISM) (H): ICD-10-CM

## 2021-04-07 PROCEDURE — 77085 DXA BONE DENSITY AXL VRT FX: CPT | Performed by: INTERNAL MEDICINE

## 2021-04-09 ENCOUNTER — OFFICE VISIT (OUTPATIENT)
Dept: FAMILY MEDICINE | Facility: CLINIC | Age: 71
End: 2021-04-09
Payer: MEDICARE

## 2021-04-09 VITALS
TEMPERATURE: 98.3 F | WEIGHT: 148.06 LBS | HEIGHT: 62 IN | OXYGEN SATURATION: 98 % | SYSTOLIC BLOOD PRESSURE: 122 MMHG | DIASTOLIC BLOOD PRESSURE: 66 MMHG | HEART RATE: 81 BPM | BODY MASS INDEX: 27.25 KG/M2

## 2021-04-09 DIAGNOSIS — E78.5 HYPERLIPIDEMIA, UNSPECIFIED HYPERLIPIDEMIA TYPE: ICD-10-CM

## 2021-04-09 DIAGNOSIS — Z63.6 CAREGIVER BURDEN: ICD-10-CM

## 2021-04-09 DIAGNOSIS — G89.4 CHRONIC PAIN DISORDER: ICD-10-CM

## 2021-04-09 DIAGNOSIS — Z00.00 ENCOUNTER FOR MEDICARE ANNUAL WELLNESS EXAM: Primary | ICD-10-CM

## 2021-04-09 DIAGNOSIS — F32.89 OTHER DEPRESSION: ICD-10-CM

## 2021-04-09 DIAGNOSIS — R79.89 ELEVATED PARATHYROID HORMONE: ICD-10-CM

## 2021-04-09 PROCEDURE — 99213 OFFICE O/P EST LOW 20 MIN: CPT | Mod: 25 | Performed by: NURSE PRACTITIONER

## 2021-04-09 PROCEDURE — G0439 PPPS, SUBSEQ VISIT: HCPCS | Performed by: NURSE PRACTITIONER

## 2021-04-09 SDOH — SOCIAL STABILITY - SOCIAL INSECURITY: DEPENDENT RELATIVE NEEDING CARE AT HOME: Z63.6

## 2021-04-09 ASSESSMENT — ENCOUNTER SYMPTOMS
MYALGIAS: 1
ABDOMINAL PAIN: 0
WEAKNESS: 1
HEADACHES: 1
ARTHRALGIAS: 1
HEMATOCHEZIA: 0
PARESTHESIAS: 0
COUGH: 0
EYE PAIN: 0
DIARRHEA: 0
FREQUENCY: 0
HEMATURIA: 0
DYSURIA: 0
CHILLS: 0
HEARTBURN: 0
PALPITATIONS: 0
SORE THROAT: 0
CONSTIPATION: 0
FEVER: 0
SHORTNESS OF BREATH: 0
NAUSEA: 0
JOINT SWELLING: 1
NERVOUS/ANXIOUS: 0
DIZZINESS: 0
BREAST MASS: 0

## 2021-04-09 ASSESSMENT — PATIENT HEALTH QUESTIONNAIRE - PHQ9
10. IF YOU CHECKED OFF ANY PROBLEMS, HOW DIFFICULT HAVE THESE PROBLEMS MADE IT FOR YOU TO DO YOUR WORK, TAKE CARE OF THINGS AT HOME, OR GET ALONG WITH OTHER PEOPLE: NOT DIFFICULT AT ALL
SUM OF ALL RESPONSES TO PHQ QUESTIONS 1-9: 4
SUM OF ALL RESPONSES TO PHQ QUESTIONS 1-9: 4

## 2021-04-09 ASSESSMENT — ANXIETY QUESTIONNAIRES
1. FEELING NERVOUS, ANXIOUS, OR ON EDGE: SEVERAL DAYS
7. FEELING AFRAID AS IF SOMETHING AWFUL MIGHT HAPPEN: NOT AT ALL
5. BEING SO RESTLESS THAT IT IS HARD TO SIT STILL: NOT AT ALL
2. NOT BEING ABLE TO STOP OR CONTROL WORRYING: NOT AT ALL
GAD7 TOTAL SCORE: 2
4. TROUBLE RELAXING: NOT AT ALL
3. WORRYING TOO MUCH ABOUT DIFFERENT THINGS: NOT AT ALL
6. BECOMING EASILY ANNOYED OR IRRITABLE: SEVERAL DAYS
7. FEELING AFRAID AS IF SOMETHING AWFUL MIGHT HAPPEN: NOT AT ALL
GAD7 TOTAL SCORE: 2
GAD7 TOTAL SCORE: 2

## 2021-04-09 ASSESSMENT — MIFFLIN-ST. JEOR: SCORE: 1144.86

## 2021-04-09 ASSESSMENT — ACTIVITIES OF DAILY LIVING (ADL): CURRENT_FUNCTION: NO ASSISTANCE NEEDED

## 2021-04-09 NOTE — PATIENT INSTRUCTIONS
Patient Education   Personalized Prevention Plan  You are due for the preventive services outlined below.  Your care team is available to assist you in scheduling these services.  If you have already completed any of these items, please share that information with your care team to update in your medical record.  Health Maintenance Due   Topic Date Due     Discuss Advance Care Planning  Never done     Annual Wellness Visit  Never done     Flu Vaccine (1) 09/01/2020     COVID-19 Vaccine (2 - Moderna 2-dose series) 03/27/2021        Patient Education   Personalized Prevention Plan  You are due for the preventive services outlined below.  Your care team is available to assist you in scheduling these services.  If you have already completed any of these items, please share that information with your care team to update in your medical record.  Health Maintenance Due   Topic Date Due     Discuss Advance Care Planning  Never done     Annual Wellness Visit  Never done     Flu Vaccine (1) 09/01/2020     COVID-19 Vaccine (2 - Moderna 2-dose series) 03/27/2021

## 2021-04-09 NOTE — PROGRESS NOTES
"SUBJECTIVE:   Dinorah Thompson is a 70 year old female who presents for Preventive Visit.      Patient has been advised of split billing requirements and indicates understanding: Yes   Are you in the first 12 months of your Medicare coverage?  No    Healthy Habits:     In general, how would you rate your overall health?  Good    Frequency of exercise:  2-3 days/week    Duration of exercise:  Other    Do you usually eat at least 4 servings of fruit and vegetables a day, include whole grains    & fiber and avoid regularly eating high fat or \"junk\" foods?  Yes    Taking medications regularly:  Yes    Medication side effects:  None    Ability to successfully perform activities of daily living:  No assistance needed    Home Safety:  No safety concerns identified    Hearing Impairment:  Difficulty following a conversation in a noisy restaurant or crowded room, need to ask people to speak up or repeat themselves and difficulty understanding soft or whispered speech    In the past 6 months, have you been bothered by leaking of urine?  No    In general, how would you rate your overall mental or emotional health?  Good      PHQ-2 Total Score: 0    Additional concerns today:  No      Feeling fatigued and having higher stress. Recently moved back to [place] from [place] to be closer to family.  was in Memory Care Unit until COVID-19 when she brought him home. His care does weigh on patient. She is not able to express herself in their joint counseling as her  then feels guilty of his health.   Uses duloxetine for chronic back pain and mental health. Feels this has good benefits.   Son works as an Emergency Department MD and is strict about contact due to COVID-19. She will get to her his family for the first time since moving here this weekend.   Denies SI.       Do you feel safe in your environment? Yes    Have you ever done Advance Care Planning? (For example, a Health Directive, POLST, or a discussion with a medical " provider or your loved ones about your wishes): No, advance care planning information given to patient to review.  Patient plans to discuss their wishes with loved ones or provider.         Fall risk  Fallen 2 or more times in the past year?: Yes  Any fall with injury in the past year?: Yes    Cognitive Screening   1) Repeat 3 items (Leader, Season, Table)    2) Clock draw: NORMAL  3) 3 item recall: Recalls 3 objects  Results: 3 items recalled: COGNITIVE IMPAIRMENT LESS LIKELY    Mini-CogTM Copyright S Michelle. Licensed by the author for use in [facility]; reprinted with permission (odalys@[facility]). All rights reserved.      PHQ-9 score:    PHQ 4/9/2021   PHQ-9 Total Score 4   Q9: Thoughts of better off dead/self-harm past 2 weeks Not at all   PHQ-9 External Data -       ELDON-7 SCORE 2/9/2021 4/9/2021   Total Score 3 (minimal anxiety) 2 (minimal anxiety)   Total Score 3 2         Do you have sleep apnea, excessive snoring or daytime drowsiness?: yes pt has a bypap machine     Reviewed and updated as needed this visit by clinical staff  Tobacco  Allergies  Meds  Problems  Med Hx  Surg Hx  Fam Hx  Soc Hx          Reviewed and updated as needed this visit by Provider  Tobacco  Allergies  Meds  Problems  Med Hx  Surg Hx  Fam Hx         Social History     Tobacco Use     Smoking status: Never Smoker     Smokeless tobacco: Never Used   Substance Use Topics     Alcohol use: Yes     Frequency: Monthly or less     Drinks per session: 1 or 2     If you drink alcohol do you typically have >3 drinks per day or >7 drinks per week? No    Alcohol Use 4/9/2021   Prescreen: >3 drinks/day or >7 drinks/week? No   Prescreen: >3 drinks/day or >7 drinks/week? -       Current providers sharing in care for this patient include:   Patient Care Team:  Juju Chang APRN CNP as PCP - General (Family Medicine)  Jayna Ellis MD as Assigned PCP  Yeo, Albert, MD as Assigned Musculoskeletal  "Provider  Gregoria Olmos PA-C as Assigned Surgical Provider    The following health maintenance items are reviewed in Epic and correct as of today:  Health Maintenance Due   Topic Date Due     COVID-19 Vaccine (2 - Moderna 2-dose series) 03/27/2021     Lab work is in process  Labs reviewed in EPIC        Review of Systems   Constitutional: Negative for chills and fever.   HENT: Negative for congestion, ear pain, hearing loss and sore throat.    Eyes: Negative for pain and visual disturbance.   Respiratory: Negative for cough and shortness of breath.    Cardiovascular: Negative for chest pain, palpitations and peripheral edema.   Gastrointestinal: Negative for abdominal pain, constipation, diarrhea, heartburn, hematochezia and nausea.   Breasts:  Negative for tenderness, breast mass and discharge.   Genitourinary: Negative for dysuria, frequency, genital sores, hematuria, pelvic pain, urgency, vaginal bleeding and vaginal discharge.   Musculoskeletal: Positive for arthralgias, joint swelling and myalgias.   Skin: Negative for rash.   Neurological: Positive for weakness and headaches. Negative for dizziness and paresthesias.   Psychiatric/Behavioral: Negative for mood changes. The patient is not nervous/anxious.          OBJECTIVE:   /66 (BP Location: Right arm, Patient Position: Chair, Cuff Size: Adult Regular)   Pulse 81   Temp 98.3  F (36.8  C) (Oral)   Ht 1.575 m (5' 2\")   Wt 67.2 kg (148 lb 1 oz)   SpO2 98%   BMI 27.08 kg/m   Estimated body mass index is 27.08 kg/m  as calculated from the following:    Height as of this encounter: 1.575 m (5' 2\").    Weight as of this encounter: 67.2 kg (148 lb 1 oz).  Physical Exam  GENERAL: healthy, alert and no distress  EYES: Eyes grossly normal to inspection, PERRL and conjunctivae and sclerae normal  RESP: lungs clear to auscultation - no rales, rhonchi or wheezes  BREAST: normal without masses, tenderness or nipple discharge and no palpable axillary masses " "or adenopathy  CV: regular rate and rhythm, normal S1 S2, no S3 or S4, no murmur, click or rub, no peripheral edema and peripheral pulses strong  MS: no gross musculoskeletal defects noted, no edema  NEURO: Normal strength and tone, mentation intact and speech normal  PSYCH: mentation appears normal, affect normal/bright    Diagnostic Test Results:  Labs reviewed in Epic    ASSESSMENT / PLAN:   Dinorah was seen today for wellness visit.    Diagnoses and all orders for this visit:    Encounter for Medicare annual wellness exam    Caregiver burden  -     CARE COORDINATION REFERRAL  -     MENTAL HEALTH REFERRAL  - Adult; Outpatient Treatment; Individual/Couples/Family/Group Therapy/Health Psychology[place] [facility] [place] [facility] [phone]; We will contact you to schedule the appointment or please call with any questions  Discussed and agreed upon care coordination referral to better understand if patient would qualify for respite services with 's care. Will also plan for individual counseling for patient to express self.   Continue duloxetine.     Hyperlipidemia, unspecified hyperlipidemia type  Controlled. Continue rosuvastatin.     Other depression  As above.     Chronic pain disorder  Working with Pain Clinic. Currently working for better management of lumbar pain.     Elevated parathyroid hormone  Continue care with Endocrinology.       Patient has been advised of split billing requirements and indicates understanding: Yes  COUNSELING:  Reviewed preventive health counseling, as reflected in patient instructions    Estimated body mass index is 27.08 kg/m  as calculated from the following:    Height as of this encounter: 1.575 m (5' 2\").    Weight as of this encounter: 67.2 kg (148 lb 1 oz).    Weight management plan: Discussed healthy diet and exercise guidelines    She reports that she has never smoked. She has never used smokeless tobacco.      Appropriate preventive services were discussed with " this patient, including applicable screening as appropriate for cardiovascular disease, diabetes, osteopenia/osteoporosis, and glaucoma.  As appropriate for age/gender, discussed screening for colorectal cancer, prostate cancer, breast cancer, and cervical cancer. Checklist reviewing preventive services available has been given to the patient.    Reviewed patients plan of care and provided an AVS. The Intermediate Care Plan ( asthma action plan, low back pain action plan, and migraine action plan) for Dinorah meets the Care Plan requirement. This Care Plan has been established and reviewed with the Patient.    Counseling Resources:  ATP IV Guidelines  Pooled Cohorts Equation Calculator  Breast Cancer Risk Calculator  Breast Cancer: Medication to Reduce Risk  FRAX Risk Assessment  ICSI Preventive Guidelines  Dietary Guidelines for Americans, 2010  USDA's MyPlate  ASA Prophylaxis  Lung CA Screening    JASWANT [facility]    Identified Health Risks:

## 2021-04-10 ASSESSMENT — ANXIETY QUESTIONNAIRES: GAD7 TOTAL SCORE: 2

## 2021-04-12 ENCOUNTER — PATIENT OUTREACH (OUTPATIENT)
Dept: CARE COORDINATION | Facility: CLINIC | Age: 71
End: 2021-04-12

## 2021-04-12 ENCOUNTER — APPOINTMENT (OUTPATIENT)
Dept: NURSING | Facility: CLINIC | Age: 71
End: 2021-04-12
Payer: MEDICARE

## 2021-04-12 DIAGNOSIS — Z11.59 ENCOUNTER FOR SCREENING FOR OTHER VIRAL DISEASES: ICD-10-CM

## 2021-04-12 NOTE — PROGRESS NOTES
Clinic Care Coordination Contact  [facility]/Voicemail  Left Voicemail     Clinical Data: Care Coordinator Outreach  Outreach attempted x 1.  Left message on patient's voicemail with call back information and requested return call.    Plan: Care Coordinator will try to reach patient again in 1-2 business days.    RAMY Cid, B.S. [facility] Care Coordination  [facility] Clinics:  [place], [place] and [place]  Phone: [phone]

## 2021-04-12 NOTE — PROGRESS NOTES
Clinic Care Coordination Contact  Community Health Worker Initial Outreach  Spoke with patient     Chart Review: Referral from PCP for support and repiste care.   with memory issues, was in Memory Care. Brought  home during COVID-19. Recent move back to [place].     CHW Initial Information Gathering:  Referral Source: PCP  Preferred Hospital: [facility]  [phone]  Current living arrangement:: I live in a private home with spouse  Type of residence:: Town home  Community Resources: None  Supplies used at home:: None  Equipment Currently Used at Home: none  Informal Support system:: Family  No PCP office visit in Past Year: No  Transportation means:: Accessible car  CHW Additional Questions  If ED/Hospital discharge, follow-up appointment scheduled as recommended?: N/A  Medication changes made following ED/Hospital discharge?: N/A  MyChart active?: Yes  Patient sent Social Determinants of Health questionnaire?: Yes    CHW introduced self and role with CC  Patient states that she doesn't think she would qualify for [facility] assistance due to income.    Patient states that respite would be helpful, she was contacted by the [facility] counseling center today and scheduled for individual counseling.   Patient states that she is also interested in  looking for reasonably priced pool therapy for seniors, she is thinking about talking to her pain doctor at the  about this as well.   Patient explains that she could take  there too. Although this hasn't been [facility] . She states that in the past 6 months, moving from [place] to [place] really overworked body and arthritic areas.   CHW explains that CC can assist with respite care resources and CHW can begin to look into pool therapy options nearby. Patient agrees with plan.     Patient accepts CC: Yes. Patient scheduled for assessment with [facility] on 4/14 at 10:00am. Patient noted desire to discuss respite  care resources and other community resources.     RAMY Cid, B.S. [facility]  Clinic Care Coordination  [facility]:  [place], [place] and [place]  Phone: [phone]

## 2021-04-12 NOTE — PROGRESS NOTES
CHW received incoming call from patient on 4/12 at 3:00pm.     Patient states that she needs to reschedule visit with [facility] on 4/14. [facility]. Patient requests Latia to call at 9:00 on 4/14 instead.     FYI sent to [facility].    RAMY Cid, B.S. [facility] Care Coordination  [facility]:  [place], [place] and [place]  Phone: [phone]

## 2021-04-13 ENCOUNTER — OFFICE VISIT (OUTPATIENT)
Dept: PODIATRY | Facility: CLINIC | Age: 71
End: 2021-04-13
Payer: MEDICARE

## 2021-04-13 ENCOUNTER — ANCILLARY PROCEDURE (OUTPATIENT)
Dept: GENERAL RADIOLOGY | Facility: CLINIC | Age: 71
End: 2021-04-13
Attending: PODIATRIST
Payer: MEDICARE

## 2021-04-13 VITALS
WEIGHT: 148 LBS | DIASTOLIC BLOOD PRESSURE: 86 MMHG | BODY MASS INDEX: 27.23 KG/M2 | HEIGHT: 62 IN | SYSTOLIC BLOOD PRESSURE: 152 MMHG

## 2021-04-13 DIAGNOSIS — M20.5X1 HALLUX LIMITUS, RIGHT: ICD-10-CM

## 2021-04-13 DIAGNOSIS — M79.671 FOOT PAIN, BILATERAL: ICD-10-CM

## 2021-04-13 DIAGNOSIS — M79.672 FOOT PAIN, BILATERAL: Primary | ICD-10-CM

## 2021-04-13 DIAGNOSIS — L60.0 INGROWN NAIL OF SECOND TOE OF LEFT FOOT: ICD-10-CM

## 2021-04-13 DIAGNOSIS — M20.12 HAV (HALLUX ABDUCTO VALGUS), LEFT: ICD-10-CM

## 2021-04-13 DIAGNOSIS — M79.672 FOOT PAIN, BILATERAL: ICD-10-CM

## 2021-04-13 DIAGNOSIS — S93.602A FOOT SPRAIN, LEFT, INITIAL ENCOUNTER: ICD-10-CM

## 2021-04-13 DIAGNOSIS — M79.671 FOOT PAIN, BILATERAL: Primary | ICD-10-CM

## 2021-04-13 PROCEDURE — 73630 X-RAY EXAM OF FOOT: CPT | Mod: RT | Performed by: RADIOLOGY

## 2021-04-13 PROCEDURE — 20600 DRAIN/INJ JOINT/BURSA W/O US: CPT | Mod: T5 | Performed by: PODIATRIST

## 2021-04-13 PROCEDURE — 99203 OFFICE O/P NEW LOW 30 MIN: CPT | Mod: 25 | Performed by: PODIATRIST

## 2021-04-13 RX ORDER — BUPIVACAINE HYDROCHLORIDE 5 MG/ML
2 INJECTION, SOLUTION EPIDURAL; INTRACAUDAL
Status: DISCONTINUED | OUTPATIENT
Start: 2021-04-13 | End: 2021-07-21

## 2021-04-13 RX ORDER — TRIAMCINOLONE ACETONIDE 40 MG/ML
40 INJECTION, SUSPENSION INTRA-ARTICULAR; INTRAMUSCULAR
Status: DISCONTINUED | OUTPATIENT
Start: 2021-04-13 | End: 2021-07-21

## 2021-04-13 RX ADMIN — TRIAMCINOLONE ACETONIDE 40 MG: 40 INJECTION, SUSPENSION INTRA-ARTICULAR; INTRAMUSCULAR at 17:28

## 2021-04-13 RX ADMIN — BUPIVACAINE HYDROCHLORIDE 2 ML: 5 INJECTION, SOLUTION EPIDURAL; INTRACAUDAL at 17:28

## 2021-04-13 ASSESSMENT — MIFFLIN-ST. JEOR: SCORE: 1144.57

## 2021-04-13 NOTE — PROGRESS NOTES
PATIENT HISTORY:  Dinorah Thompson is a 70 year old female who presents to clinic for multiple issues.  She notes that she will usually get injections in her right great toe due to arthritis.  She has been having neck pain for 5 years.  She is wondering about an injection today.  Also notes that her second toe is starting to be sore right at the tip.  Worse in shoes and she is wondering if it is curling over.  She had a repair 14 years ago for hammertoe and had a diffuse and she is wondering if it is curling within.  Also fell 2 weeks ago and landed on the top of her foot.  She notes she is able to walk but she does have pain and is wondering if anything is possibly broke.  Pain is 6 out of 10.  Worse with increased activity or standing.  She has tried icing and supportive shoes as well as oral NSAIDs which has helped some.    Review of Systems:  Patient denies fever, chills, rash, wound,numbness, weakness, heart burn, blood in stool, chest pain with activity, calf pain when walking, shortness of breath with activity, chronic cough, easy bleeding/bruising, swelling of ankles, excessive thirst, fatigue, depression, anxiety.  Patient admits to limping at times, still.     PAST MEDICAL HISTORY:   Past Medical History:   Diagnosis Date     Chronic osteoarthritis      Depressive disorder      Female bladder prolapse 9/3/2019     Prolapse of female pelvic organs 8/19/2019        PAST SURGICAL HISTORY:   Past Surgical History:   Procedure Laterality Date     APPENDECTOMY       diskectomy in toe       HYSTERECTOMY  08/2017    and bladder surgery     left rotator cuff surgery       TONSILLECTOMY  1955        MEDICATIONS:   Current Outpatient Medications:      acetaminophen (TYLENOL) 500 MG tablet, Take 1,000 mg by mouth, Disp: , Rfl:      albuterol (PROAIR HFA/PROVENTIL HFA/VENTOLIN HFA) 108 (90 Base) MCG/ACT inhaler, Inhale 2 puffs into the lungs, Disp: , Rfl:      baclofen (LIORESAL) 10 MG tablet, , Disp: , Rfl:       "calcium carbonate 500 mg, elemental, (OSCAL 500) 1250 (500 Ca) MG TABS tablet, Take 1 tablet by mouth, Disp: , Rfl:      Carboxymethylcellul-Glycerin 1-0.9 % GEL, Apply 1 drop to eye, Disp: , Rfl:      celecoxib (CELEBREX) 200 MG capsule, , Disp: , Rfl:      clonazePAM (KLONOPIN) 0.5 MG tablet, , Disp: , Rfl:      DULoxetine (CYMBALTA) 60 MG capsule, , Disp: , Rfl:      NONFORMULARY, Vitamin Packet from [employer] including Multi-vitamin, Leutin, Calcium, Antioxidant, Fish oil, Glucosamine- Chondroitin: Take 1 packet by mouth daily, Disp: , Rfl:      propranolol (INDERAL) 10 MG tablet, , Disp: , Rfl:      rosuvastatin (CRESTOR) 5 MG tablet, Take 5 mg by mouth, Disp: , Rfl:      tacrolimus (PROTOPIC) 0.1 % external ointment, Apply to AA on the face BID PRN, Disp: 30 g, Rfl: 5     traZODone (DESYREL) 100 MG tablet, Take 100 mg by mouth, Disp: , Rfl:      valACYclovir (VALTREX) 1000 mg tablet, Take two tablets twice per day for one day for flare ups., Disp: 30 tablet, Rfl: 1     ALLERGIES:    Allergies   Allergen Reactions     Propoxyphene Other (See Comments)     Clarithromycin Other (See Comments)     Pt states, \"ototoxicity\". Balance problems  Pt states, \"ototoxicity\".          SOCIAL HISTORY:   Social History     Socioeconomic History     Marital status:      Spouse name: Wilbert     Number of children: Not on file     Years of education: Not on file     Highest education level: Bachelor's degree (e.g., BA, AB, BS)   Occupational History     Not on file   Social Needs     Financial resource strain: Not hard at all     Food insecurity     Worry: Never true     Inability: Never true     Transportation needs     Medical: No     Non-medical: No   Tobacco Use     Smoking status: Never Smoker     Smokeless tobacco: Never Used   Substance and Sexual Activity     Alcohol use: Yes     Frequency: Monthly or less     Drinks per session: 1 or 2     Binge frequency: Never     Drug use: Never     Sexual activity: Not " "Currently     Partners: Male   Lifestyle     Physical activity     Days per week: 2 days     Minutes per session: 10 min     Stress: To some extent   Relationships     Social connections     Talks on phone: More than three times a week     Gets together: Once a week     Attends Cheondo[employer] service: More than 4 times per year     Active member of club or organization: Yes     Attends meetings of clubs or organizations: More than 4 times per year     Relationship status:      Intimate partner violence     Fear of current or ex partner: Not on file     Emotionally abused: Not on file     Physically abused: Not on file     Forced sexual activity: Not on file   Other Topics Concern     Not on file   Social History Narrative     Not on file        FAMILY HISTORY:   Family History   Problem Relation Age of Onset     Myocardial Infarction Father         late 50s     Ovarian Cancer Sister      Cervical Cancer Sister      Lung Cancer Sister      Myocardial Infarction Paternal Uncle         late 50s        EXAM:Vitals: BP (!) 152/86   Ht 1.575 m (5' 2\")   Wt 67.1 kg (148 lb)   BMI 27.07 kg/m    BMI= Body mass index is 27.07 kg/m .    General appearance: Patient is alert and fully cooperative with history & exam.  No sign of distress is noted during the visit.     Psychiatric: Affect is pleasant & appropriate.  Patient appears motivated to improve health.     Respiratory: Breathing is regular & unlabored while sitting.     HEENT: Hearing is intact to spoken word.  Speech is clear.  No gross evidence of visual impairment that would impact ambulation.     Dermatologic: Medial border of left second toenail is incurvated.  Pain on palpation.  No redness or signs of acute infection noted.     Vascular: DP & PT pulses are intact & regular bilaterally.  No significant edema or varicosities noted.  CFT and skin temperature is normal to both lower extremities.     Neurologic: Lower extremity sensation is intact to light touch.  " No evidence of weakness or contracture in the lower extremities.  No evidence of neuropathy.     Musculoskeletal: Patient is ambulatory without assistive device or brace.  Decreased range of motion with pain to the right first metatarsal phalangeal joint.  Left second toe straight and does not bend due to previous fusion.  Minimal pain on palpation over the third and fourth metatarsal base.  Prominent first metatarsal head medially on the left foot.    Radiographs: Bilateral foot x-rays -  I personally reviewed the xrays -left foot shows increase in first and second intermetatarsal angle with tibial sesamoid position of 5.  Arthritic changes to the second metatarsal phalangeal joint with fusion across the proximal inner phalangeal joint.  No fractures are noted.    Right foot shows significant degenerative changes in the first metatarsal phalangeal joint.  No fractures are noted.     ASSESSMENT:    Foot pain, bilateral  Hallux limitus, right  Ingrown nail of second toe of left foot  Hav (hallux abducto valgus), left  Foot sprain, left, initial encounter       Medical Decision Making/Plan:  Review[facility] in [facility].  Reviewed and discussed causes of hallux limitus with patient.  Explained that it is a progressive arthritis meaning that over time, there is decrease in the joint space as well as bony spurring that occurs which leads to pain in the big toe especially with bending motions of the big toe joint.    Discussed treatment options with patient including rigid soled shoes or orthotics that are stiff under the big toe that help to prevent motion at that joint which is leading to pain and inflammation.  We discussed that sometimes cortisone injections can help with the pain or physical therapy treatments such as ultrasound.  Discussed that this is normally a structural issue in the foot and if conservative therapy doesn't work, surgery is considered.    We discussed that depending on the quality of the  cartilage and/or of the joint determines if patient will need a joint sparing or fusion procedure.  Discussed that this can usually not be determined by x-ray and is an intra- op position.  With joint sparing procedure, and implant is placed in the joint to try to help keep the range of motion at that the toe. Patient is normally minimally weight bearing in a cam boot for 3 weeks.  With fusion, patient is normally non weight bearing for 2 weeks, then minimal weight bearing for 4 weeks in boot.     She would like an injection today for this.  Please see procedure note below.    The potential causes and nature of an ingrown toenail were discussed with the patient.  We reviewed the natural history/prognosis of the condition and potential risks if no treatment is provided.      Treatment options discussed included conservative management (oral antibiotics, soaking of foot, adequate width shoes)  as well as surgical management (partial or total nail removal).  The pros and cons of both forms of treatment were reviewed.      After thorough discussion and answering all questions, the patient elected to have the side clipped out.  She is not interested in the procedure today.  This is under no charge.  She noted immediate relief afterwards.  Discussed that if pain continues recommend that she come back in for 30-minute procedure to remove the side of the nail.  All questions were answered to patient's satisfaction she will call further questions or concerns..       Procedure: Small Joint Injection/Arthrocentesis: R great MTP    Date/Time: 4/13/2021 5:28 PM  Performed by: Camelia Weeks DPM, Podiatry/Foot and Ankle Surgery  Authorized by: Camelia Weeks DPM, Podiatry/Foot and Ankle Surgery   Indications:  Pain  Needle Size:  25 G  Guidance: landmark     Approach:  Dorsal  Location:  Great toe    Site:  R great MTP                    Medications:  40 mg triamcinolone 40 MG/ML; 2 mL bupivacaine (PF) 0.5 %        Outcome:   Tolerated well, no immediate complications  Procedure discussed: discussed risks, benefits, and alternatives    Consent Given by:  Patient    Prep: patient was prepped and draped in usual sterile fashion            Patient risk factor: Patient is at medium risk for infection.        Camelia Weeks DPM, Podiatry/Foot and Ankle Surgery    Recommended to Dinorah Thompson to follow up with Primary Care provider regarding elevated blood pressure.

## 2021-04-13 NOTE — PATIENT INSTRUCTIONS
"Thank you for choosing [facility] Podiatry / Foot & Ankle Surgery!    DR. DAVE'S CLINIC:  [facility]   [address]   [address]   [place] [address]   [phone]  [phone]      SCHEDULE SURGERY: [phone]   BILLING QUESTIONS: [phone]   APPOINTMENTS: [phone]   CONSUMER PRICE LINE: [phone]      Follow up: as needed    Use over the counter voltaren gel to feet for pain.     Please read through the following handouts and if you have any questions, please feel free to call us or send a [facility] message!    DEGENERATIVE ARTHRITIS OF THE BIG TOE JOINT   (hallux limitus/hallux rigidus)   Arthritis of the joint at the base of the big toe (metatarsophalangeal joint) has several causes. Usually it results from repetitive trauma to the joint, secondary to abnormal foot mechanics. Often it is hereditary. However, a one-time traumatic event can lead to arthritis. The condition doesn't improve with time, and even with treatment, can worsen. The cartilage wears out, joint surfaces are no longer smooth, bone rubs on bone, inflammation occurs with pain, and eventually bone spurs and loose fragments might develop.   The joint is often painful with activity, worse with flimsy shoes or walking barefoot, and it slowly progresses over time. A person might notice the toe \"locking up\" with walking. There often is an obvious, and irritating, bony bump on top of the foot. Shoes might be uncomfortable. In some people the pain is so bothersome that recreational activities sometimes even normal daily activities are difficult to perform.   The pain from this arthritis is likely a combination of joint jamming, cartilage loss and inflammation, and irritation from shoes rubbing on the bump. Sometimes other parts of the foot, leg, or back hurt from altering one's walk to compensate for the painful joint.     Ways to help a person live with the discomfort include wearing a good, supportive shoe with a " rigid, rocker-type bottom. An example is a hiking boot. A rigid sole minimizes bending of the joint, and therefore, joint motion and pain. Shoes with a high toe box allow for less rubbing on the bump. Avoiding barefoot walking, sandals, flip-flops and slippers usually helps.   Sometimes an insert or orthotic provides symptom relief. This might make shoe fit more difficult. Pads over the bump and occasionally injections into the joint provide relief.   Surgery for this condition is aimed towards alleviating pain. It does not cure the arthritis nor does it guarantee better joint motion. Depending on the condition of the metatarsophalangeal joint, there are several surgiqal options:    1.  Cutting off the bony bump(s) and cleaning the joint    2.  Loosening the joint up by making cuts in the first metatarsal bone or the big toe bone and removing a small section of bone.    3.  Repositioning bone to minimize jamming of the joint.    4.  In severe cases, the joint is fused. By fusing the joint, it will never bend again. This resolves the pain, because it's the movement of a worn out joint that causes pain. Oftentimes the operation involves a combination of these procedures and. requires the use of screws, pins, and/or a small surgical plate.     Healing after surgery requires about six weeks of protection. This allows the bone to heal. Maximum recovery takes about one year. The scar tissue and joint structures require this amount of time to finish the healing process. Expect stiffness, swelling and numbness during that time frame.   Surgery for arthritis of the metatarsophalangeal joint does involve side effects. Some side effects are predictable and others are less common but do occur. A scar will be visible and could be irritated by shoes. The shoe may rub on the screw or internal pin requiring surgical removal of these fixation devices. The screw and pin would likely be left in place for a full year. The first toe may  remain stiff after surgery. The amount of stiffness is variable. Most people never regain normal motion of the first toe. This is due to scar tissue inherent to any surgery, in addition to the cumUlative effects of arthritis. Sometimes the big toe drifts to one side or the other. Joint fusion is one option to correct an unstable, drifting toe. This procedure straightens the toe, however, no motion remains.   All surgical procedures involve risk of infection, numbness, pain, delayed bone healing, osteotomy (bone cut) dislocation, blood clots, continued foot pain, etc. Arthritic joint surgery is quite complex and should not be taken lightly.    Any skin incision can lead to infection. Deep infection might involve the bone and thus repeat surgery and six weeks of IV antibiotics. Scar tissue can cause nerve pain or numbness. his is generally temporary but can be permanent. We do not have treatments that cure nerve problems. Second toe pain could be related to altered mechanics and pressure transferred to the second toe. Delayed bone healing would lengthen the healing time. Some bones simply do not heal. This requires repeat surgery, electronic bone stimulation and/or extended protection. Smokers have an approximate 20% chance of poor bone healing. This is double that of a non-smoker. The bone cut may displace. This may need to be repaired with a second operation. Displacement can cause joint malalignment. Immobility after surgery can cause a blood clot in the legs and lungs. This could result in death.   Foot pain is complex. Most feet hurt for more than one reason. Operating on the arthritic   big toe joint will not necessarily create a pain free foot. Appropriate shoes, healthy body weight, avoidance of bare foot walking and moderation of activity will always be   necessary to enjoy foot comfort. Arthritis is incurable even with surgery.     Surgery for this type of arthritis is nevertheless quite successful. Most  surgical patients are pleased with their foot following surgery. Many of the issues described above can be controlled by taking proper care of your foot during the healing process.   Cosmetic bump surgery is discouraged for the reasons listed above. A bump and joint that is comfortable when wearing appropriate shoes should simply be treated with appropriate shoes.   Your surgeon would be happy to fully describe any of the above issues. You should pursue a full understanding of the operation, recovery process and any potential problems that could develop.     INGROWN TOENAILS  When a toenail is ingrown, it is curved and grows into the skin, usually at the nail borders (the sides of the nail). This  digging in  of the nail irritates the skin, often creating pain, redness, swelling, and warmth in the toe.  If an ingrown nail causes a break in the skin, bacteria may enter and cause an infection in the area, which is often marked by drainage and a foul odor. However, even if the toe isn t painful, red, swollen, or warm, a nail that curves downward into the skin can progress to an infection.  CAUSES:  Heredity: In many people, the tendency for ingrown toenails is inherited.   Trauma: Sometimes an ingrown toenail is the result of trauma, such as stubbing your toe, having an object fall on your toe, or engaging in activities that involve repeated pressure on the toes, such as kicking or running.   Improper Trimming:  The most common cause of ingrown toenails is cutting your nails too short. This encourages the skin next to the nail to fold over the nail.   Improperly Sized Footwear: Ingrown toenails can result from wearing socks and shoes that are tight or short.   Nail Conditions: Ingrown toenails can be caused by nail problems, such as fungal infections or losing a nail due to trauma.   TREATMENT: Sometimes initial treatment for ingrown toenails can be safely performed at home. However, home treatment is strongly  discouraged if an infection is suspected, or for those who have medical conditions that put feet at high risk, such as diabetes, nerve damage in the foot, or poor circulation.  Home care: If you don t have an infection or any of the above medical conditions, you can soak your foot in room-temperature water (adding Epsom s salt may be recommended by your doctor), and gently massage the side of the nail fold to help reduce the inflammation.  Avoid attempting  bathroom surgery.  Repeated cutting of the nail can cause the condition to worsen over time. If your symptoms fail to improve, it s time to see a foot and ankle surgeon.  Physician care: After examining the toe, the foot and ankle surgeon will select the treatment best suited for you. If an infection is present, an oral antibiotic may be prescribed.  Sometimes a minor surgical procedure, often performed in the office, will ease the pain and remove the offending nail. After applying a local anesthetic, the doctor removes part of the nail s side border. Some nails may become ingrown again, requiring removal of the nail root.  Following the nail procedure, a light bandage will be applied. Most people experience very little pain after surgery and may resume normal activity the next day. If your surgeon has prescribed an oral antibiotic, be sure to take all the medication, even if your symptoms have improved.  PREVENTION:  Proper Trimming: Cut toenails in a fairly straight line, and don t cut them too short. You should be able to get your fingernail under the sides and end of the nail.   Well-fitting Footwear: Don t wear shoes that are short or tight in the toe area. Avoid shoes that are loose, because they too cause pressure on the toes, especially when running or walking briskly.     INGROWN TOENAIL REMOVAL AFTERCARE     Go directly home and elevate the affected foot on one or two pillows for the remainder of the day/evening if possible. Your toe may stay numb  anywhere from 2-8 hours.     Take Tylenol, ibuprofen or another anti-inflammatory as needed for pain.     Take antibiotic if that has been prescribed. Finish the entire prescribed antibiotic even if your symptoms have improved.     The evening of the procedure, soak/wash the affected area in warm water (you may add Epsom salt) for 5 to 10 minutes. Do this twice a day for 2-4 weeks (6-8 weeks if you had phenol) (you may count showering/bathing as one soak).  After soaks, pat the area dry and then allow to airdry for a few minutes. Apply antibiotic ointment to the area and cover with 2 X 2 gauze and paper tape or band-aid.    You may pursue everyday activities as tolerated with either an open toe shoe or cut-out shoe as needed or you may wear regular shoes if no pain is noted.    Watch for any signs and symptoms of infection such as: redness, red streaks going up the foot/leg, swelling, pus or foul odor. Those that have had the phenol procedure, the toe will drain longer and will look like it is infected because it is a chemical burn.     Please call with questions.          Dot to follow up with Primary Care provider regarding elevated blood pressure. (if equal or above 140/90)

## 2021-04-13 NOTE — LETTER
4/13/2021         RE: Dinorah Thompson  [address]  [place] [address]        Dear Colleague,    Thank you for referring your patient, Dinorah Thompson, to the [facility]. Please see a copy of my visit note below.    PATIENT HISTORY:  Dinorah Thompson is a 70 year old female who presents to clinic for multiple issues.  She notes that she will usually get injections in her right great toe due to arthritis.  She has been having neck pain for 5 years.  She is wondering about an injection today.  Also notes that her second toe is starting to be sore right at the tip.  Worse in shoes and she is wondering if it is curling over.  She had a repair 14 years ago for hammertoe and had a diffuse and she is wondering if it is curling within.  Also fell 2 weeks ago and landed on the top of her foot.  She notes she is able to walk but she does have pain and is wondering if anything is possibly broke.  Pain is 6 out of 10.  Worse with increased activity or standing.  She has tried icing and supportive shoes as well as oral NSAIDs which has helped some.    Review of Systems:  Patient denies fever, chills, rash, wound,numbness, weakness, heart burn, blood in stool, chest pain with activity, calf pain when walking, shortness of breath with activity, chronic cough, easy bleeding/bruising, swelling of ankles, excessive thirst, fatigue, depression, anxiety.  Patient admits to limping at times, still.     PAST MEDICAL HISTORY:   Past Medical History:   Diagnosis Date     Chronic osteoarthritis      Depressive disorder      Female bladder prolapse 9/3/2019     Prolapse of female pelvic organs 8/19/2019        PAST SURGICAL HISTORY:   Past Surgical History:   Procedure Laterality Date     APPENDECTOMY       diskectomy in toe       HYSTERECTOMY  08/2017    and bladder surgery     left rotator cuff surgery       TONSILLECTOMY  1955        MEDICATIONS:   Current Outpatient Medications:       "acetaminophen (TYLENOL) 500 MG tablet, Take 1,000 mg by mouth, Disp: , Rfl:      albuterol (PROAIR HFA/PROVENTIL HFA/VENTOLIN HFA) 108 (90 Base) MCG/ACT inhaler, Inhale 2 puffs into the lungs, Disp: , Rfl:      baclofen (LIORESAL) 10 MG tablet, , Disp: , Rfl:      calcium carbonate 500 mg, elemental, (OSCAL 500) 1250 (500 Ca) MG TABS tablet, Take 1 tablet by mouth, Disp: , Rfl:      Carboxymethylcellul-Glycerin 1-0.9 % GEL, Apply 1 drop to eye, Disp: , Rfl:      celecoxib (CELEBREX) 200 MG capsule, , Disp: , Rfl:      clonazePAM (KLONOPIN) 0.5 MG tablet, , Disp: , Rfl:      DULoxetine (CYMBALTA) 60 MG capsule, , Disp: , Rfl:      NONFORMULARY, Vitamin Packet from [employer] including Multi-vitamin, Leutin, Calcium, Antioxidant, Fish oil, Glucosamine- Chondroitin: Take 1 packet by mouth daily, Disp: , Rfl:      propranolol (INDERAL) 10 MG tablet, , Disp: , Rfl:      rosuvastatin (CRESTOR) 5 MG tablet, Take 5 mg by mouth, Disp: , Rfl:      tacrolimus (PROTOPIC) 0.1 % external ointment, Apply to AA on the face BID PRN, Disp: 30 g, Rfl: 5     traZODone (DESYREL) 100 MG tablet, Take 100 mg by mouth, Disp: , Rfl:      valACYclovir (VALTREX) 1000 mg tablet, Take two tablets twice per day for one day for flare ups., Disp: 30 tablet, Rfl: 1     ALLERGIES:    Allergies   Allergen Reactions     Propoxyphene Other (See Comments)     Clarithromycin Other (See Comments)     Pt states, \"ototoxicity\". Balance problems  Pt states, \"ototoxicity\".          SOCIAL HISTORY:   Social History     Socioeconomic History     Marital status:      Spouse name: Wilbert     Number of children: Not on file     Years of education: Not on file     Highest education level: Bachelor's degree (e.g., BA, AB, BS)   Occupational History     Not on file   Social Needs     Financial resource strain: Not hard at all     Food insecurity     Worry: Never true     Inability: Never true     Transportation needs     Medical: No     Non-medical: No   Tobacco " "Use     Smoking status: Never Smoker     Smokeless tobacco: Never Used   Substance and Sexual Activity     Alcohol use: Yes     Frequency: Monthly or less     Drinks per session: 1 or 2     Binge frequency: Never     Drug use: Never     Sexual activity: Not Currently     Partners: Male   Lifestyle     Physical activity     Days per week: 2 days     Minutes per session: 10 min     Stress: To some extent   Relationships     Social connections     Talks on phone: More than three times a week     Gets together: Once a week     Attends Yazidi service: More than 4 times per year     Active member of club or organization: Yes     Attends meetings of clubs or organizations: More than 4 times per year     Relationship status:      Intimate partner violence     Fear of current or ex partner: Not on file     Emotionally abused: Not on file     Physically abused: Not on file     Forced sexual activity: Not on file   Other Topics Concern     Not on file   Social History Narrative     Not on file        FAMILY HISTORY:   Family History   Problem Relation Age of Onset     Myocardial Infarction Father         late 50s     Ovarian Cancer Sister      Cervical Cancer Sister      Lung Cancer Sister      Myocardial Infarction Paternal Uncle         late 50s        EXAM:Vitals: BP (!) 152/86   Ht 1.575 m (5' 2\")   Wt 67.1 kg (148 lb)   BMI 27.07 kg/m    BMI= Body mass index is 27.07 kg/m .    General appearance: Patient is alert and fully cooperative with history & exam.  No sign of distress is noted during the visit.     Psychiatric: Affect is pleasant & appropriate.  Patient appears motivated to improve health.     Respiratory: Breathing is regular & unlabored while sitting.     HEENT: Hearing is intact to spoken word.  Speech is clear.  No gross evidence of visual impairment that would impact ambulation.     Dermatologic: Medial border of left second toenail is incurvated.  Pain on palpation.  No redness or signs of acute " infection noted.     Vascular: DP & PT pulses are intact & regular bilaterally.  No significant edema or varicosities noted.  CFT and skin temperature is normal to both lower extremities.     Neurologic: Lower extremity sensation is intact to light touch.  No evidence of weakness or contracture in the lower extremities.  No evidence of neuropathy.     Musculoskeletal: Patient is ambulatory without assistive device or brace.  Decreased range of motion with pain to the right first metatarsal phalangeal joint.  Left second toe straight and does not bend due to previous fusion.  Minimal pain on palpation over the third and fourth metatarsal base.  Prominent first metatarsal head medially on the left foot.    Radiographs: Bilateral foot x-rays -  I personally reviewed the xrays -left foot shows increase in first and second intermetatarsal angle with tibial sesamoid position of 5.  Arthritic changes to the second metatarsal phalangeal joint with fusion across the proximal inner phalangeal joint.  No fractures are noted.    Right foot shows significant degenerative changes in the first metatarsal phalangeal joint.  No fractures are noted.     ASSESSMENT:    Foot pain, bilateral  Hallux limitus, right  Ingrown nail of second toe of left foot  Hav (hallux abducto valgus), left  Foot sprain, left, initial encounter       Medical Decision Making/Plan:  Review[facility] in [facility].  Reviewed and discussed causes of hallux limitus with patient.  Explained that it is a progressive arthritis meaning that over time, there is decrease in the joint space as well as bony spurring that occurs which leads to pain in the big toe especially with bending motions of the big toe joint.    Discussed treatment options with patient including rigid soled shoes or orthotics that are stiff under the big toe that help to prevent motion at that joint which is leading to pain and inflammation.  We discussed that sometimes cortisone injections  can help with the pain or physical therapy treatments such as ultrasound.  Discussed that this is normally a structural issue in the foot and if conservative therapy doesn't work, surgery is considered.    We discussed that depending on the quality of the cartilage and/or of the joint determines if patient will need a joint sparing or fusion procedure.  Discussed that this can usually not be determined by x-ray and is an intra- op position.  With joint sparing procedure, and implant is placed in the joint to try to help keep the range of motion at that the toe. Patient is normally minimally weight bearing in a cam boot for 3 weeks.  With fusion, patient is normally non weight bearing for 2 weeks, then minimal weight bearing for 4 weeks in boot.     She would like an injection today for this.  Please see procedure note below.    The potential causes and nature of an ingrown toenail were discussed with the patient.  We reviewed the natural history/prognosis of the condition and potential risks if no treatment is provided.      Treatment options discussed included conservative management (oral antibiotics, soaking of foot, adequate width shoes)  as well as surgical management (partial or total nail removal).  The pros and cons of both forms of treatment were reviewed.      After thorough discussion and answering all questions, the patient elected to have the side clipped out.  She is not interested in the procedure today.  This is under no charge.  She noted immediate relief afterwards.  Discussed that if pain continues recommend that she come back in for 30-minute procedure to remove the side of the nail.  All questions were answered to patient's satisfaction she will call further questions or concerns..       Procedure: Small Joint Injection/Arthrocentesis: R great MTP    Date/Time: 4/13/2021 5:28 PM  Performed by: Camelia Weeks DPM, Podiatry/Foot and Ankle Surgery  Authorized by: Camelia Weeks DPM, Podiatry/Foot  and Ankle Surgery   Indications:  Pain  Needle Size:  25 G  Guidance: landmark     Approach:  Dorsal  Location:  Great toe    Site:  R great MTP                    Medications:  40 mg triamcinolone 40 MG/ML; 2 mL bupivacaine (PF) 0.5 %        Outcome:  Tolerated well, no immediate complications  Procedure discussed: discussed risks, benefits, and alternatives    Consent Given by:  Patient    Prep: patient was prepped and draped in usual sterile fashion            Patient risk factor: Patient is at medium risk for infection.        Camelia Weeks DPM, Podiatry/Foot and Ankle Surgery    Recommended to Dinorah Thompson to follow up with Primary Care provider regarding elevated blood pressure.          Again, thank you for allowing me to participate in the care of your patient.        Sincerely,        Camelia Weeks DPM, Podiatry/Foot and Ankle Surgery

## 2021-04-14 ENCOUNTER — PATIENT OUTREACH (OUTPATIENT)
Dept: NURSING | Facility: CLINIC | Age: 71
End: 2021-04-14
Attending: NURSE PRACTITIONER
Payer: MEDICARE

## 2021-04-14 ASSESSMENT — ACTIVITIES OF DAILY LIVING (ADL): DEPENDENT_IADLS:: INDEPENDENT

## 2021-04-14 NOTE — PROGRESS NOTES
"Dot is a 70 year old who is being evaluated via a billable video visit.      How would you like to obtain your AVS? MyChart  If the video visit is dropped, the invitation should be resent by: Send to e-mail at: [email or web address]  Will anyone else be joining your video visit? Carolina Coon MA    Video Start Time: 3:09PM     [facility]   Outpatient Sleep Medicine Consultation  Apr 15, 2021       Name: Dinorah Thompson MRN# [id number]   Age: 70 year old YOB: 1950     Date of Consultation: Apr 15, 2021   Consultation is requested by: Kushal Richmond MD  [address]  [place] [address] Kushal Richmond  Primary care provider: Juju Chang         Assessment and Plan:   1. Complex sleep apnea syndrome  Patient presents to clinic today to establish care of her previously diagnosed complex sleep apnea treated with BiPAP.  Her sleep apnea is very well treated on current BiPAP settings 14/9 cm H2O with residual AHI 0.5 events per hour.  Her compliance is excellent.  No indication to change pressure settings today.  Prescription sent to [facility] for new mask/supplies.  She has been previously using [facility] for DME but would like to transfer care to [facility].   - Comprehensive DME    Discussed that given her history of apnea diagnosis at ~9000 feet elevation, now that she is living in [place] may be a good idea to update sleep study and test for severity at our elevation, as she may no longer require BiPAP/central apneas may be improved/resolved.  Patient agrees though is not interested in completing this at this time.  Reports \"I'm exhausted with the move and taking care of my  and all our appointments\".  We have agreed to place orders today for a split-night polysomnogram, but will not plan to pursue this until patient is ready which will be most likely this fall.  Per patient preference, I placed orders for study to be completed in 6 months. " In the meantime will continue nightly BiPAP.   - Comprehensive Sleep Study; Future    Follow-up after repeat sleep study to discuss results, or sooner as needed.        Chief Complaint / Reason for Sleep Consult:     Chief Complaint   Patient presents with     Consult     Establish care, using bipap             History of Present Illness:     Dinorah Thompson is a 70 year old female who presents to the clinic to establish care of her mixed central/obstructive sleep apnea treated with BiPAP. Other past medical history significant for chronic pain with radiculopathy, sarcoidosis of lung, hyperlipidemia, osteoarthritis, depression, hyperparathyroidism.     Patient reports history of 6 previous sleep studies, 3 or 4 polysomnogram and 2 home sleep studies.  Reports originally diagnosed with sleep apnea in approximately 2010.  Repeat sleep studies were completed after various movements, one in [place] and remainder in [place] when moved from different elevations.  We did receive record of one previous sleep study completed at [facility] in [place] dated 11/13/2015 (130lbs) that showed AHI 23.6 events per hour, RDI 25.1, supine AHI 49.9, nonsupine AHI 13.4 (Respiratory events were mixed with 112 central apneas with periodic breathing, 3 obstructive apneas, 44 obstructive hypopneas and 10 RERA).  Average O2 saturation 93.1% with a low of 88%, 0 minutes less than or equal to 88%.  Patient was titrated with CPAP and then switched to BiPAP, BiPAP ST 14/9cmH2O with a backup rate of 12 breaths/min was found to be optimal. Has been on BiPAP ever since this study, was previously on CPAP.     Presents to clinic today to establish care as she recently moved back to [place] in January 2021 and needing local sleep provider. She does not have any large complaints today. Tolerates her BiPAP well and is a faithful user. Patient uses a nasal pillow mask. Mask is comfortable. Does report some skin  "irritation/dermatitis on face from strap at times. No snoring with the mask on.  No gasp arousals. Pressures feel comfortable. Occasional mouth leak resulting in dry mouth/throat but typically not a problem. Reviewed download with patient as follows:  ResMed BiPAP 14/9 cmH2O download (3/14/21-4/12/21):  30 total days of use. 0 nonuse days. 30 days with >4 hours use.  Average use 7 hours 44 minutes per day. Median Leak 1.5 L/min. 95%ile Leak 18.9 L/min. AHI 0.5.     Sleep schedule includes bedtime between 9:30-10:00PM and wakes at 6:00AM. Falls asleep in <30 minutes with trazodone 50mg. Generally does not have nighttime awakenings but may wake for a few seconds to change sleeping positions if in pain but \"not completely wake up really\".     No daytime sleepiness - \"I feel physically worn out but no not sleepy\". She had a total Florence Sleepiness Scale of 3, with scores of 10 or higher indicating abnormal levels of sleepiness. No daytime napping. Denies any history of falling asleep or dozing while driving. No accidents or near accidents.     Patient is a retired nurse practitioner, worked in labor and delivery, OBGYN, and oncology.     History of restless legs syndrome, no current symptoms. Reports restless legs syndrome diagnosis was associated with iron deficiency and very low ferritin, was on iron replacement and symptoms resolved.     Reports clenching/bruxism and has splint that she wears at night for this.     No dream enactment behavior. No somniloquy.  No somnambulism.  No sleep related eating. No recurring/frequent nightmares or night terrors. No kicking/leg movements.     SCALES       INSOMNIA:  Insomnia severity score: 4/28   absence of insomnia (0-7); sub-threshold insomnia (8-14); moderate insomnia (15-21); and severe insomnia (22-28)       SLEEPINESS: Florence sleepiness scale: 3/24 [normal < 11]          Medications:     Current Outpatient Medications   Medication Sig     acetaminophen (TYLENOL) 500 MG " "tablet Take 1,000 mg by mouth     albuterol (PROAIR HFA/PROVENTIL HFA/VENTOLIN HFA) 108 (90 Base) MCG/ACT inhaler Inhale 2 puffs into the lungs     baclofen (LIORESAL) 10 MG tablet      calcium carbonate 500 mg, elemental, (OSCAL 500) 1250 (500 Ca) MG TABS tablet Take 1 tablet by mouth     Carboxymethylcellul-Glycerin 1-0.9 % GEL Apply 1 drop to eye     celecoxib (CELEBREX) 200 MG capsule      clonazePAM (KLONOPIN) 0.5 MG tablet      DULoxetine (CYMBALTA) 60 MG capsule      NONFORMULARY Vitamin Packet from [employer] including Multi-vitamin, Leutin, Calcium, Antioxidant, Fish oil, Glucosamine- Chondroitin: Take 1 packet by mouth daily     propranolol (INDERAL) 10 MG tablet      rosuvastatin (CRESTOR) 5 MG tablet Take 5 mg by mouth     tacrolimus (PROTOPIC) 0.1 % external ointment Apply to AA on the face BID PRN     traZODone (DESYREL) 100 MG tablet Take 100 mg by mouth     valACYclovir (VALTREX) 1000 mg tablet Take two tablets twice per day for one day for flare ups.     Current Facility-Administered Medications   Medication     2 mL bupivacaine (MARCAINE) preservative free injection 0.5% (20 mL vial)     triamcinolone (KENALOG-40) injection 40 mg             Allergies:     Allergies   Allergen Reactions     Propoxyphene Other (See Comments)     Clarithromycin Other (See Comments)     Pt states, \"ototoxicity\". Balance problems  Pt states, \"ototoxicity\".              Past Medical History:     Past Medical History:   Diagnosis Date     Chronic osteoarthritis      Depressive disorder      Female bladder prolapse 9/3/2019     Prolapse of female pelvic organs 8/19/2019             Past Surgical History:    Previous upper airway surgery: tonsillectomy in childhood and sinus x2 in 2013 and 2018  Past Surgical History:   Procedure Laterality Date     APPENDECTOMY       diskectomy in toe       HYSTERECTOMY  08/2017    and bladder surgery     left rotator cuff surgery       SINUS SURGERY      x2 in 2013 and 2018     " "TONSILLECTOMY  1955          Social History:     Social History     Tobacco Use     Smoking status: Never Smoker     Smokeless tobacco: Never Used   Substance Use Topics     Alcohol use: Yes     Frequency: Monthly or less     Drinks per session: 1 or 2     Binge frequency: Never     Chemical History:  Alcohol use: 1-3 drinks per month     Tobacco use: Denies   Illicit substances: Denies   Caffeine intake: Drinks 1 sodas/day and does take caffeine tablet in AM. Last caffeine intake is usually before 2:30PM         Family History:     Family History   Problem Relation Age of Onset     Myocardial Infarction Father         late 50s     Ovarian Cancer Sister      Cervical Cancer Sister      Lung Cancer Sister      Myocardial Infarction Paternal Uncle         late 50s      Sleep Family Hx: Cousin with narcolepsy. Patient denies any known family history of sleep apnea, restless legs syndrome, or other sleep disorders.          Review of Systems:   A complete 10 point review of systems was negative other than HPI or as commented below:   Weight gain, fatigue, night sweats, increased stress, excessive thirst, change in or loss of energy, back pain, muscle aches, neck pain, swollen joints, joint pain, bone pain, muscle cramps, joint stiffness, headache, speech problems, weakness, numbness, nervous or anxious, trouble thinking or concentrating         Physical Examination:   There were no vitals taken for this visit.  Vitals - Patient Reported 4/14/2021   Height (Patient Reported) 5' 2\"   Weight (Patient Reported) 140 lb   BMI (Based on Pt Reported Ht/Wt) 25.61 kg/m2   General appearance: Awake, alert, cooperative. Well groomed. Sitting comfortably in chair. In no apparent distress.  HEENT: Head: Normocephalic, atraumatic. Eyes:Conjunctiva clear. Sclera normal. Nose: External appearance without deformity.   Neck: No visible thyroid enlargement.   Cardiovascular: No JVD  Pulmonary:  Able to speak easily in full sentences. No " cough or wheeze.   Skin:  No rashes or significant lesions on visible skin.   Neurologic: Alert, oriented x3.   Psychiatric: Mood euthymic. Affect congruent with full range and intensity.         Data: All pertinent previous laboratory data reviewed     No results found for: PH, PHARTERIAL, PO2, [id number], SAT, PCO2, HCO3, BASEEXCESS, CASSI, BEB  Lab Results   Component Value Date    TSH 1.870 05/19/2020    TSH 1.40 12/20/2019     Lab Results   Component Value Date    GLC 91 03/03/2021    GLC 93 05/19/2020     Lab Results   Component Value Date    HGB 13.8 03/03/2021     Lab Results   Component Value Date    BUN 20 03/03/2021    CR 0.84 03/03/2021    CR 1.11 05/19/2020     Lab Results   Component Value Date    AST 20 03/03/2021    AST 27 05/19/2020    ALT 26 03/03/2021    ALT 26 05/19/2020    ALKPHOS 77 03/03/2021    BILITOTAL 0.5 03/03/2021     No results found for: UAMP, UBARB, BENZODIAZEUR, UCANN, UCOC, OPIT, UPCP    Chest CT 3/3/21:  Chest CT without contrast high resolution     COMPARISON: None     INDICATION: Interstitial lung disease     FINDINGS: Included thyroid appears normal. Calcified mediastinal lymph  nodes. Mild thoracic aortic calcifications. Thoracic aorta and main  pulmonary artery there are normal sized. Heart size is normal. No  pleural or pericardial effusion. Upper abdomen shows abdominal aortic  and bilateral renal artery origin atherosclerosis. Coronary artery  atherosclerosis. CC view micronodular opacities in the lateral right  upper lobe inferiorly maintained. Foci of inflammation/infection. No  discrete suspicious nodules. Minimal air trapping on expiration.  Bones show surgical changes in the left humeral head. There are  degenerative changes in the thoracic spine.                                                                    IMPRESSION: Calcified mediastinal lymph nodes. Few foci of right upper  lobe micronodules which may indicate foci of inflammation/infection.  Minimal air  trapping.      PFT 3/3/21:   The FVC, FEV1, FEV1/FVC ratio and RZJ78-21% are within normal limits.  The inspiratory flow rates are within normal limits.  Lung volumes are within normal limits.  The diffusing capacity is normal.  However, the diffusing capacity was not corrected for the patient's hemoglobin. The results are within normal limits.   IMPRESSION:   Six minute walk distance is within normal limits.   There is no significant oxygen desaturation during the six minute walk on room air.   Normal Pulmonary Function   No previous studies available for comparison.       Copy to: Juju Chang PA-C  Apr 15, 2021     [facility]  [address] [place] [address]     [facility]  [address] [address]    Chart documentation was completed, in part, with [employer] voice-recognition software. Even though reviewed, some grammatical, spelling, and word errors may remain.      Video-Visit Details    Type of service:  Video Visit    Video End Time:3:53PM    Originating Location (pt. Location): Home    Distant Location (provider location):  [facility]     Platform used for Video Visit: Jagex     64 minutes spent on day of encounter doing chart review, history and exam, documentation, and further activities as noted above

## 2021-04-14 NOTE — LETTER
[facility]  Complex Care Plan  About Me:    Patient Name:  Dinorah Rolon    YOB: 1950  Age:         70 year old   [facility] MRN:    [id number] Telephone Information:  Home Phone [phone]   Mobile [phone]       Address:  [address] [address] [address] Email address:  [email or web address]      Emergency Contact(s)    Name Relationship Lgl Grd Work Phone Home Phone Mobile Phone   1. TEDDY ROLON Son   [phone]    2. REGI ROLON Son   [phone]    3. MAGO ROLON Daughter   [phone]            Primary language:  English     needed? No   [facility] [employer]:  [phone] op. 1  Other communication barriers: None  Preferred Method of Communication:     Current living arrangement: I live in a private home with spouse  Mobility Status/ Medical Equipment: Independent    Health Maintenance  Health Maintenance Reviewed: Due/Overdue   Health Maintenance Due   Topic Date Due     COVID-19 Vaccine (2 - Moderna 2-dose series) 03/27/2021       My Access Plan  Medical Emergency 911   Primary Clinic Line [facility] - [phone]   24 Hour Appointment Line [phone] or  [phone]) (toll-free)   24 Hour Nurse Line [phone] (toll-free)   Preferred Urgent Care     Preferred Hospital [facility]  [phone]   Preferred Pharmacy [id number] [address] AT [place] [address]     Behavioral Health Crisis Line The National Suicide Prevention Lifeline at 1-718.373.6214 or 911             My Care Team Members  Patient Care Team       Relationship Specialty Notifications Start End    Juju Chang APRN CNP PCP - General Family Medicine  4/9/21     Phone: [phone] Fax: [phone] [address] [facility] [address]    Coming Jayna Aviles MD Assigned PCP   2/14/21     Phone: [phone] Fax: [phone] [address]  [address] [facility] [address]    Yeo, Albert, MD Assigned Musculoskeletal Provider   2/21/21     Phone: [phone] Fax: [phone] [address] [place] [facility] [address]    Gregoria Olmos PA-C Assigned Surgical Provider   3/17/21     Phone: [phone] Fax: [phone] [address] [facility] [address]    Latia Mesa LSW Lead Care Coordinator Primary Care - CC  4/14/21     Phone: [phone]         Gudelia Abdi MA Clinic Care Coordinator   4/15/21             My Care Plans  Self Management and Treatment Plan  Goals and (Comments)  Goals        General    Mental Health Management (pt-stated)     Notes - Note created  4/15/2021  8:46 AM by Latia Mesa LSW    Goal Statement: In the next five months I will receive caregiver support.  Date Goal set: 4/15/21  Barriers: COVID 19 has impacted what is available.  Strengths: I currently receive [facility]  Date to Achieve By: 8/30/21  Patient expressed understanding of goal: Yes  Action steps to achieve this goal:  1. I will begin seeing a therapist for emotional support.  2. I will look in to private home care options for additional support and respite.  Care coordinator will email me a list of resources.  3. Care Coordination team and I will check in monthly to see how I am doing with this goal and will offer added resources and support as needed.                 Action Plans on File:                       Advance Care Plans/Directives Type:   Honoring Choices    Advance Care Planning and Health Care Directives  When it comes to decisions about your health care, it s important that your voice is heard. You may not always be able to speak for yourself.    We encourage you to have discussions with your loved ones, cultural or spiritual leaders and health care providers about your goals, values, beliefs and choices.    We are a part of [employer] [place] , supporting and promoting the benefits  of advance care planning conversations.    Our goals are to:    Help you make informed decisions about your healthcare choices and ensure that those choices are honored    Offer advance care planning discussions with trained staff    Ensure your choices are clearly defined, documented and available in your medical record    Translate your choices into medical orders as needed    Why is Advance Care Planning important?    Know what your health care choices are and decide what is right for you    An unexpected illness or injury could leave you unable to participate in important treatment decisions    When choices are left to others to decide that responsibility can be difficult and stressful    By discussing and outlining your choices, your voice is heard in the care you want to receive    How can I learn more?  We offer free classes at multiple locations, days and times. Our trained facilitators will provide information and guide you through a Health Care Directive document. They can also review, notarize and add your document to your medical record.    Call [facility] at [phone] or toll free at [phone] for assistance.       My Medical and Care Information  Problem List   Patient Active Problem List   Diagnosis     Cervical radiculopathy, chronic     Chronic pain disorder     Intervertebral disc disorders with radiculopathy, lumbar region     Lumbar radiculopathy, chronic     FAHAD treated with BiPAP     Other cervical disc degeneration at C5-C6 level     Spondylosis without myelopathy or radiculopathy, lumbar region     Osteoarthrosis, hand     Primary localized osteoarthritis of knees, bilateral     Hyperlipidemia     Depression     Sarcoidosis of lung (H)     Hoarseness     Laryngeal disorder     Precancerous lesion     History of retinal detachment     Lumbar radiculopathy      Current Medications and Allergies:  See printed Medication Report.    Care Coordination Start Date: 4/14/2021    Frequency of Care Coordination: monthly   Form Last Updated: 04/15/2021

## 2021-04-14 NOTE — LETTER
[facility] CARE COORDINATION  [facility]   April 15, 2021    Dinorah Thompson  [address] [address] [address]      Dear Dinorah,    I am a clinic care coordinator who works with JASWANT Johnson CNP at [facility]   I wanted to thank you for spending the time to talk with me.  Below is a description of clinic care coordination and how I can further assist you.      The clinic care coordination team is made up of a registered nurse,  and community health worker who understand the health care system. The goal of clinic care coordination is to help you manage your health and improve access to the health care system in the most efficient manner. The team can assist you in meeting your health care goals by providing education, coordinating services, strengthening the communication among your providers and supporting you with any resource needs.    Please feel free to contact me at [phone] with any questions or concerns. We are focused on providing you with the highest-quality healthcare experience possible and that all starts with you.     Sincerely,     CIARA Vaughn   Care Coordination Team  [phone]      Enclosed: I have enclosed a copy of the Complex Care Plan. This has helpful information and goals that we have talked about. Please keep this in an easy to access place to use as needed.

## 2021-04-15 ENCOUNTER — VIRTUAL VISIT (OUTPATIENT)
Dept: SLEEP MEDICINE | Facility: CLINIC | Age: 71
End: 2021-04-15
Attending: INTERNAL MEDICINE
Payer: MEDICARE

## 2021-04-15 DIAGNOSIS — Z20.822 COVID-19 RULED OUT: ICD-10-CM

## 2021-04-15 DIAGNOSIS — G47.39 COMPLEX SLEEP APNEA SYNDROME: Primary | ICD-10-CM

## 2021-04-15 PROCEDURE — 99215 OFFICE O/P EST HI 40 MIN: CPT | Mod: 95 | Performed by: PHYSICIAN ASSISTANT

## 2021-04-15 NOTE — PATIENT INSTRUCTIONS

## 2021-04-15 NOTE — PROGRESS NOTES
Clinic Care Coordination Contact    Clinic Care Coordination Contact  OUTREACH    Referral Information:  Referral Source: PCP    Primary Diagnosis: Psychosocial    Chief Complaint   Patient presents with     Clinic Care Coordination - Initial     Caregiving support        Universal Utilization: 2% Admission or ED Risk  Clinic Utilization  Difficulty keeping appointments:: No  Compliance Concerns: No  No-Show Concerns: No  No PCP office visit in Past Year: No  Utilization    Last refreshed: 4/15/2021  8:42 AM: Hospital Admissions 0           Last refreshed: 4/15/2021  8:42 AM: ED Visits 0           Last refreshed: 4/15/2021  8:42 AM: No Show Count (past year) 0              Current as of: 4/15/2021  8:42 AM              Clinical Concerns:  Current Medical Concerns:   Patient Active Problem List   Diagnosis     Cervical radiculopathy, chronic     Chronic pain disorder     Intervertebral disc disorders with radiculopathy, lumbar region     Lumbar radiculopathy, chronic     FAHAD treated with BiPAP     Other cervical disc degeneration at C5-C6 level     Spondylosis without myelopathy or radiculopathy, lumbar region     Osteoarthrosis, hand     Primary localized osteoarthritis of knees, bilateral     Hyperlipidemia     Depression     Sarcoidosis of lung (H)     Hoarseness     Laryngeal disorder     Precancerous lesion     History of retinal detachment     Lumbar radiculopathy     CC referral made to offer caregiver support and respite care.  with memory issues, was in Memory Care. Brought  home during COVID-19. Recent move back to [place].       SHAYY CHAUHAN spoke by phone with Dot.  She and her spouse live in a town home. [facility] three hours each day.  They assist with showering and other daily care needs. Dot is looking for respite as she is his main caregiver.  Discussed many resources including private pay home care agencies and caregiver respite and  support resources. She requested that this information be emailed to her.  Writer communicated the risks of unencrypted electronic communication and the patient and/or patient representative has agreed to accept the risks and receive unencrypted communication related to the information or resources we have discussed. We reviewed that no PHI will be included.  Email address verified with the patient.  Will include electronic communication form for patient/caregiver to complete.          Current Behavioral Concerns: NA    Education Provided to patient: CC role.  Discussed and emailed the following information to her:  Here are some caregiver resources:    [facility]  https://darts[employer]as[employer].org/caregiving/    [facility]  https://www.[facility].org/services        Below is information on four different Home Health Care agencies.   I ve also included links to their web-sites if you want to do some additional internet research.        [facility]       Home Health Aide - $37 per hour weekdays (3 hour minimum per shift);  $40 per hour weekends (4 hour minimum per shift)    Minimum 2 visits per week, at least 2 weeks of service    RN Med Management (includes 2 visits per month) - $300 per month     https://www.[employer]./senior-home-care-in-[facility]/        [facility]       Personal Care Services - $35+ per hour (3 hour minimum per shift)    Minimum of 9 hours per week    Medication Management - $600 per month ($150 one-time set up fee)    24/7 Live-in Care - starts at $444 per day     [email or web address]://[email or web address]/kai/home        [facility]       Home Care Services - $34 per hour weekdays (4 hour minimum per shift); $38 per hour weekends.      Specialized Services - $38 per hour weekdays; $40 per hour weekends    Minimum of 8 hours per week     24/7 Care - $624 per day (weekdays); $720 per day (weekends)      [email or web address]://[email or web address].Confer/505/about-us/service-area/senior-home-care-[facility]        [facility]       Home Health Aide - $39 per hour (4 hour minimum per shift)     No weekly minimums.  Only one shift per month required    24/7 Live-in Care - $410 per day    RN Case Manager (oversight and assessment) - $250 one-time fee    Additional RN Case Manager time - $40 per 15-minute increments     [email or web address]://www.[employer]/        Home Delivered Meals    Meals on Wheels  https://meals-onFieldbookwheels.com/    [employer]  https://www.[employer].org/    Moms Meals  https://www.[employer].Confer/      Lifeline Programs    https://www.[facility].org/overarching-care/home-care-and-hospice/lifeline    https://www.[facility].org/[facility]/services/lifeline      Medication Set Up  http://www.[employer]/userguides/Charlie/JIW466%20user%20guide.pdf    https://www.[employer].Confer/best-medication-reminders/      [employer]  https://vo[employer]oroc[employer].Krush/Transportation/Services/Metro-M[employer]-Home/Forms/Met[employer]-M[employer]-Application-Form.aspx           Pain  Pain (GOAL):: No  Health Maintenance Reviewed: Due/Overdue   Health Maintenance Due   Topic Date Due     COVID-19 Vaccine (2 - Moderna 2-dose series) 03/27/2021     Clinical Pathway: None    Medication Management:  Manages independently.     Functional Status:  Dependent ADLs:: Independent  Dependent IADLs:: Independent  Bed or wheelchair confined:: No  Mobility Status: Independent  Fallen 2 or more times in the past year?: No    Living Situation:  Current living arrangement:: I live in a private home with spouse  Type of residence:: Town home    Lifestyle & Psychosocial Needs:  Lifestyle     Physical activity     Days per week: 2 days     Minutes per session: 10 min     Stress: To some extent     Social Needs     Financial resource strain: Not hard at all     Food insecurity     Worry: Never true     Inability: Never true     Transportation needs     Medical: No      Non-medical: No     Inadequate nutrition (GOAL):: No  Tube Feeding: No  Inadequate activity/exercise (GOAL):: No  Significant changes in sleep pattern (GOAL): No  Transportation means:: Accessible car     Quaker or spiritual beliefs that impact treatment:: No  Mental health management concern (GOAL):: Yes  Chemical Dependency Status: No Current Concerns  Informal Support system:: Family   Socioeconomic History     Marital status:      Spouse name: Wilbert     Number of children: Not on file     Years of education: Not on file     Highest education level: Bachelor's degree (e.g., BA, AB, BS)   Relationships     Social connections     Talks on phone: More than three times a week     Gets together: Once a week     Attends Christianity service: More than 4 times per year     Active member of club or organization: Yes     Attends meetings of clubs or organizations: More than 4 times per year     Relationship status:      Intimate partner violence     Fear of current or ex partner: Not on file     Emotionally abused: Not on file     Physically abused: Not on file     Forced sexual activity: Not on file     Tobacco Use     Smoking status: Never Smoker     Smokeless tobacco: Never Used   Substance and Sexual Activity     Alcohol use: Yes     Frequency: Monthly or less     Drinks per session: 1 or 2     Binge frequency: Never     Drug use: Never     Sexual activity: Not Currently     Partners: Male             Resources and Interventions:  Current Resources:      Community Resources: None  Supplies used at home:: None  Equipment Currently Used at Home: none  Employment Status: retired)   )         Referrals Placed: Senior Linkage Line     Goals:   Goals        General    Mental Health Management (pt-stated)     Notes - Note created  4/15/2021  8:46 AM by Latia Mesa LSW    Goal Statement: In the next five months I will receive caregiver support.  Date Goal set: 4/15/21  Barriers: COVID 19 has impacted what  is available.  Strengths: [facility]  Date to Achieve By: 8/30/21  Patient expressed understanding of goal: Yes  Action steps to achieve this goal:  1. I will begin seeing a therapist for emotional support.  2. I will look in to private home care options for additional support and respite.  Care coordinator will email me a list of resources.  3. Care Coordination team and I will check in monthly to see how I am doing with this goal and will offer added resources and support as needed.                Patient/Caregiver understanding: Yes    Outreach Frequency: monthly  Future Appointments              Today Sherine Ramon PA-C [facility]    Tomorrow CRMA1 [facility], CR    In 4 days UU CV MR NURSE; UUMR4 [facility]    In 1 week CR PHARMACY LAB [facility], CR    In 2 weeks Roxana Lee, SLP [facility]    In 1 month Roxana Lee, SLP [facility]    In 1 month Kushal Richmond MD [facility]    In 1 month Roxana Lee, SLP [facility]          Plan: Dot will review the resources that were discussed and see what works for her.  CHW  Plans to research pool therapy resources for her.  CHW will outreach again in one month.

## 2021-04-16 ENCOUNTER — ANCILLARY PROCEDURE (OUTPATIENT)
Dept: MAMMOGRAPHY | Facility: CLINIC | Age: 71
End: 2021-04-16
Attending: FAMILY MEDICINE
Payer: MEDICARE

## 2021-04-16 DIAGNOSIS — Z12.31 VISIT FOR SCREENING MAMMOGRAM: ICD-10-CM

## 2021-04-16 PROCEDURE — 77067 SCR MAMMO BI INCL CAD: CPT | Mod: TC | Performed by: RADIOLOGY

## 2021-04-16 PROCEDURE — 77063 BREAST TOMOSYNTHESIS BI: CPT | Mod: TC | Performed by: RADIOLOGY

## 2021-04-19 ENCOUNTER — HOSPITAL ENCOUNTER (OUTPATIENT)
Dept: MRI IMAGING | Facility: CLINIC | Age: 71
Discharge: HOME | End: 2021-04-19
Attending: INTERNAL MEDICINE | Admitting: INTERNAL MEDICINE
Payer: MEDICARE

## 2021-04-19 DIAGNOSIS — D86.9 SARCOIDOSIS: ICD-10-CM

## 2021-04-19 PROCEDURE — G1004 CDSM NDSC: HCPCS | Performed by: RADIOLOGY

## 2021-04-19 PROCEDURE — 75561 CARDIAC MRI FOR MORPH W/DYE: CPT | Mod: ME

## 2021-04-19 PROCEDURE — 75565 CARD MRI VELOC FLOW MAPPING: CPT | Mod: 26 | Performed by: RADIOLOGY

## 2021-04-19 PROCEDURE — 75565 CARD MRI VELOC FLOW MAPPING: CPT | Mod: ME

## 2021-04-19 PROCEDURE — 255N000002 HC RX 255 OP 636: Performed by: INTERNAL MEDICINE

## 2021-04-19 PROCEDURE — A9585 GADOBUTROL INJECTION: HCPCS | Performed by: INTERNAL MEDICINE

## 2021-04-19 PROCEDURE — 75561 CARDIAC MRI FOR MORPH W/DYE: CPT | Mod: 26 | Performed by: RADIOLOGY

## 2021-04-19 RX ORDER — GADOBUTROL 604.72 MG/ML
7.5 INJECTION INTRAVENOUS ONCE
Status: COMPLETED | OUTPATIENT
Start: 2021-04-19 | End: 2021-04-19

## 2021-04-19 RX ADMIN — GADOBUTROL 7.5 ML: 604.72 INJECTION INTRAVENOUS at 09:49

## 2021-04-23 ENCOUNTER — TELEPHONE (OUTPATIENT)
Dept: PULMONOLOGY | Facility: CLINIC | Age: 71
End: 2021-04-23

## 2021-04-23 DIAGNOSIS — Z11.59 ENCOUNTER FOR SCREENING FOR OTHER VIRAL DISEASES: ICD-10-CM

## 2021-04-23 LAB
SARS-COV-2 RNA RESP QL NAA+PROBE: NORMAL
SPECIMEN SOURCE: NORMAL

## 2021-04-23 PROCEDURE — U0005 INFEC AGEN DETEC AMPLI PROBE: HCPCS | Performed by: ANESTHESIOLOGY

## 2021-04-23 PROCEDURE — U0003 INFECTIOUS AGENT DETECTION BY NUCLEIC ACID (DNA OR RNA); SEVERE ACUTE RESPIRATORY SYNDROME CORONAVIRUS 2 (SARS-COV-2) (CORONAVIRUS DISEASE [COVID-19]), AMPLIFIED PROBE TECHNIQUE, MAKING USE OF HIGH THROUGHPUT TECHNOLOGIES AS DESCRIBED BY CMS-2020-01-R: HCPCS | Performed by: ANESTHESIOLOGY

## 2021-04-23 NOTE — TELEPHONE ENCOUNTER
Contacted patient after reviewing cMRI with Dr. Richmond to inform patient that result was normal; no evidence of Cardiac Sarcoid noted on exam.   Patient appreciative of call. Denies further questions at this time. Offered direct RNCC phone number for follow up questions.     Ruth Dee, RN, BSN  ILD Nurse Care Coordinator  (P) [phone]

## 2021-04-24 LAB
LABORATORY COMMENT REPORT: NORMAL
SARS-COV-2 RNA RESP QL NAA+PROBE: NEGATIVE
SPECIMEN SOURCE: NORMAL

## 2021-04-26 ENCOUNTER — TELEPHONE (OUTPATIENT)
Dept: ANESTHESIOLOGY | Facility: CLINIC | Age: 71
End: 2021-04-26

## 2021-04-26 NOTE — TELEPHONE ENCOUNTER
RNCC called back pt. She reports that she developed cold symptoms (congestion and cough), onset 4/20/21. Denies fever, no loss of senses (smell, taste), no GI symptoms, no exposure to anyone positive COVID. COVID test as 4/23/21, results negative. She has been isolating since COVID test, she reports that her symptoms have mostly resolved outside of mild dry cough and mild nasal congestion.     RNCC updated procedure center supervisor, Jodi. It is okay for pt to proceed with injection 4/27/21.     Allison Carson, FAVIANN, RN

## 2021-04-26 NOTE — TELEPHONE ENCOUNTER
[facility] Call Center    Phone Message    May a detailed message be left on voicemail: yes     Reason for Call: Other: Pt is having a procedure done tomorrow with Dr. Santana, pt states she has a cold and a cough, should she still come?      Please call pt today    Action Taken: Message routed to:  [facility] ([facility] Pain    Travel Screening: Not Applicable

## 2021-04-27 ENCOUNTER — HOSPITAL ENCOUNTER (OUTPATIENT)
Facility: AMBULATORY SURGERY CENTER | Age: 71
Discharge: HOME | End: 2021-04-27
Attending: ANESTHESIOLOGY | Admitting: ANESTHESIOLOGY
Payer: MEDICARE

## 2021-04-27 ENCOUNTER — ANCILLARY PROCEDURE (OUTPATIENT)
Dept: RADIOLOGY | Facility: AMBULATORY SURGERY CENTER | Age: 71
End: 2021-04-27
Attending: ANESTHESIOLOGY
Payer: MEDICARE

## 2021-04-27 VITALS
HEIGHT: 62 IN | RESPIRATION RATE: 16 BRPM | BODY MASS INDEX: 27.23 KG/M2 | WEIGHT: 148 LBS | TEMPERATURE: 97 F | HEART RATE: 78 BPM | OXYGEN SATURATION: 98 % | SYSTOLIC BLOOD PRESSURE: 140 MMHG | DIASTOLIC BLOOD PRESSURE: 76 MMHG

## 2021-04-27 DIAGNOSIS — M54.16 LUMBAR RADICULOPATHY: ICD-10-CM

## 2021-04-27 PROCEDURE — 64483 NJX AA&/STRD TFRM EPI L/S 1: CPT | Mod: RT

## 2021-04-27 RX ORDER — LIDOCAINE HYDROCHLORIDE 10 MG/ML
INJECTION, SOLUTION EPIDURAL; INFILTRATION; INTRACAUDAL; PERINEURAL PRN
Status: DISCONTINUED | OUTPATIENT
Start: 2021-04-27 | End: 2021-04-27 | Stop reason: HOSPADM

## 2021-04-27 RX ORDER — DEXAMETHASONE SODIUM PHOSPHATE 10 MG/ML
INJECTION, SOLUTION INTRAMUSCULAR; INTRAVENOUS PRN
Status: DISCONTINUED | OUTPATIENT
Start: 2021-04-27 | End: 2021-04-27 | Stop reason: HOSPADM

## 2021-04-27 RX ORDER — IOPAMIDOL 408 MG/ML
INJECTION, SOLUTION INTRATHECAL PRN
Status: DISCONTINUED | OUTPATIENT
Start: 2021-04-27 | End: 2021-04-27 | Stop reason: HOSPADM

## 2021-04-27 RX ORDER — LIDOCAINE 40 MG/G
CREAM TOPICAL
Status: DISCONTINUED | OUTPATIENT
Start: 2021-04-27 | End: 2021-04-28 | Stop reason: HOSPADM

## 2021-04-27 RX ORDER — BUPIVACAINE HYDROCHLORIDE 2.5 MG/ML
INJECTION, SOLUTION EPIDURAL; INFILTRATION; INTRACAUDAL PRN
Status: DISCONTINUED | OUTPATIENT
Start: 2021-04-27 | End: 2021-04-27 | Stop reason: HOSPADM

## 2021-04-27 ASSESSMENT — MIFFLIN-ST. JEOR: SCORE: 1144.57

## 2021-04-27 NOTE — H&P
"Dinorah Thompson  [id number]  female  70 year old      Reason for procedure/surgery: back pain    Patient Active Problem List   Diagnosis     Cervical radiculopathy, chronic     Chronic pain disorder     Intervertebral disc disorders with radiculopathy, lumbar region     Lumbar radiculopathy, chronic     FAHAD treated with BiPAP     Other cervical disc degeneration at C5-C6 level     Spondylosis without myelopathy or radiculopathy, lumbar region     Osteoarthrosis, hand     Primary localized osteoarthritis of knees, bilateral     Hyperlipidemia     Depression     Sarcoidosis of lung (H)     Hoarseness     Laryngeal disorder     Precancerous lesion     History of retinal detachment     Lumbar radiculopathy       Past Surgical History:    Past Surgical History:   Procedure Laterality Date     APPENDECTOMY       diskectomy in toe       HYSTERECTOMY  08/2017    and bladder surgery     left rotator cuff surgery       SINUS SURGERY      x2 in 2013 and 2018     TONSILLECTOMY  1955       Past Medical History:   Past Medical History:   Diagnosis Date     Chronic osteoarthritis      Depressive disorder      Female bladder prolapse 9/3/2019     Prolapse of female pelvic organs 8/19/2019     Sleep apnea        Social History:   Social History     Tobacco Use     Smoking status: Never Smoker     Smokeless tobacco: Never Used   Substance Use Topics     Alcohol use: Not Currently     Frequency: Monthly or less     Drinks per session: 1 or 2     Binge frequency: Never       Family History:   Family History   Problem Relation Age of Onset     Myocardial Infarction Father         late 50s     Ovarian Cancer Sister      Cervical Cancer Sister      Lung Cancer Sister      Myocardial Infarction Paternal Uncle         late 50s       Allergies:   Allergies   Allergen Reactions     Propoxyphene Other (See Comments)     Clarithromycin Other (See Comments)     Pt states, \"ototoxicity\". Balance problems  Pt states, \"ototoxicity\".   " "      Active Medications:   Current Outpatient Medications   Medication Sig Dispense Refill     acetaminophen (TYLENOL) 500 MG tablet Take 1,000 mg by mouth       baclofen (LIORESAL) 10 MG tablet        calcium carbonate 500 mg, elemental, (OSCAL 500) 1250 (500 Ca) MG TABS tablet Take 1 tablet by mouth       celecoxib (CELEBREX) 200 MG capsule        DULoxetine (CYMBALTA) 60 MG capsule        NONFORMULARY Vitamin Packet from [employer] including Multi-vitamin, Leutin, Calcium, Antioxidant, Fish oil, Glucosamine- Chondroitin: Take 1 packet by mouth daily       rosuvastatin (CRESTOR) 5 MG tablet Take 5 mg by mouth       traZODone (DESYREL) 100 MG tablet Take 100 mg by mouth       albuterol (PROAIR HFA/PROVENTIL HFA/VENTOLIN HFA) 108 (90 Base) MCG/ACT inhaler Inhale 2 puffs into the lungs       Carboxymethylcellul-Glycerin 1-0.9 % GEL Apply 1 drop to eye       clonazePAM (KLONOPIN) 0.5 MG tablet        propranolol (INDERAL) 10 MG tablet        tacrolimus (PROTOPIC) 0.1 % external ointment Apply to AA on the face BID PRN 30 g 5     valACYclovir (VALTREX) 1000 mg tablet Take two tablets twice per day for one day for flare ups. 30 tablet 1       Systemic Review:   CONSTITUTIONAL: NEGATIVE for fever, chills, change in weight  ENT/MOUTH: NEGATIVE for ear, mouth and throat problems  RESP: NEGATIVE for significant cough or SOB  CV: NEGATIVE for chest pain, palpitations or peripheral edema    Physical Examination:   Vital Signs: /76   Pulse 77   Temp 96.8  F (36  C) (Temporal)   Resp 12   Ht 1.575 m (5' 2\")   Wt 67.1 kg (148 lb)   SpO2 99%   BMI 27.07 kg/m    GENERAL: healthy, alert and no distress  NECK: no adenopathy, no asymmetry, masses, or scars  RESP: lungs clear to auscultation - no rales, rhonchi or wheezes  CV: regular rate and rhythm, normal S1 S2, no S3 or S4, no murmur, click or rub, no peripheral edema and peripheral pulses strong  ABDOMEN: soft, nontender, no hepatosplenomegaly, no masses and bowel " sounds normal  MS: no gross musculoskeletal defects noted, no edema    Plan: Appropriate to proceed as scheduled.      Tiera Santana MD  4/27/2021

## 2021-04-27 NOTE — PROCEDURES
Transforaminal Lumbar Epidural Steroid Injection    Procedure:  1. Lumbar epidural steroid injection via the transforaminal approach at the right L5-S1  2. Fluoroscopy for needle guidance    Pre-operative Diagnosis:  1. Intervertebral disc disorders w radiculopathy, lumbar region  2. Other intervertebral disc degeneration, lumbar region    Post-operative Diagnosis:  1. Intervertebral disc disorders w radiculopathy, lumbar region  2. Other intervertebral disc degeneration, lumbar region    Anesthesia: Local    Medical Indications:  The patient presents to the clinic today for the treatment of persistent pain. We believe that the pain is spinally mediated. The patient has been exhibiting symptoms consistent with lumbar intraspinal inflammation and radiculopathy. Symptoms have been persistent, disabling and intermittently severe. The patient has been evaluated in the pain clinic and scheduled today for a spinal injection to aid in the diagnosis and treatment of presumed spinal pain. The risks, benefits, potential complications, and alternatives were discussed with the patient as per the signed consent form, and informed consent was obtained. The patient has received information about the procedure and its risks. The physician personally confirmed the procedure, side, and site to be injected with the patient and marked the site in the pre-procedure area.    Description of Procedure:  After informed consent, the patient was brought to the procedure room and placed onto the x-ray table in the prone position. The lumbar region was prepped and draped in the usual sterile fashion. I used AP fluoroscopy to visualize the lumbar spine and identify the right L5-S1 level. I then rotated the fluoroscope oblique to identify the right L5-S1 neural foramen and the right L5 pedicle. I then anesthetized the skin and subcutaneous tissue overlying the target foramen with lidocaine 1%. I then passed a 22g, 5 in. needle through the  anesthetized track and down the base of the L5 pedicle using AP and lateral views. I aspirated the needle and it was negative for blood and CSF. I next injected 3 mL Isovue 200 mg/mL using static and continuous fluoroscopy to obtain an epidurogram and a right L5 neurogram. I used fluoroscopy to verify that there was no vascular or intrathecal uptake of contrast. I injected a solution containing 10mg Dexamethasone 10 mg/mL mixed with 2mL bupivacaine 0.25%.    All needles were placed with minimal trauma and subsequently removed.    Complications:    No procedural or immediate post-procedural complications occurred and the patient was transferred to PACU in stable condition.    Procedure Images: saved to the chart.?  ?

## 2021-04-27 NOTE — DISCHARGE INSTRUCTIONS
"PAIN INJECTION HOME CARE INSTRUCTIONS  Activity  -You may resume most normal activity levels with the exception of strenuous activity. It may help to move in ways that hurt before the injection, to see if the pain is still there, but do not overdo it. Take it easy for the rest of the day.    -DO NOT remove bandaid for 24 hours  -DO NOT shower for 24 hours      Pain  -You may feel immediate pain relief and numbness for a period of time after the injection. This may indicate the medication has reached the right spot.  -Your pain may return after this short pain-free period, or may even be a little worse for a day or two. It may be caused by needle irritation or by the medication itself. The medications usually take two or three days to start working, but can take as long as a week.    -You may use an ice pack for 20 minutes every 2 hours for the first 24 hours  -You may use a heating pad after the first 24 hours  -You may use Tylenol (acetaminophen) every 4 hours or other pain medicines as directed by your physician      DID YOU RECEIVE STEROIDS TODAY?  {YES / NO:506577::\"Yes\"}    Common side effects of steroids:  Not everyone will experience corticosteroid side effects. If side effects are experienced, they will gradually subside in the 7-10 day period following an injection. Most common side effects include:  -Flushed face and/or chest  -Feeling of warmth, particularly in the face but could be an overall feeling of warmth  -Increased blood sugar in diabetic patients  -Menstrual irregularities my occur. If taking hormone-based birth control an alternate method of birth control is recommended  -Sleep disturbances and/or mood swings are possible  -Leg cramps    PLEASE KEEP TRACK OF YOUR SYMPTOMS AND NOTE ANY CHANGES FOR YOUR DOCTOR.       Please contact us if you have:  -Severe pain  -Fever more than 101.5 degrees Fahrenheit  -Signs of infection at the injection site (redness, swelling, or drainage)    If you have " questions, please contact the Pain Clinic at [phone] Option #1 between the hours of 7:00 am and 3:00 pm Monday through Friday. After office hours you can contact the on call provider by dialing [phone]. If you need immediate attention, we recommend that you go to a hospital emergency room or dial [phone].    For patients seen by the PM and R service, please call [phone].    If you have procedure scheduling questions please call [phone] Option #2

## 2021-05-03 ENCOUNTER — VIRTUAL VISIT (OUTPATIENT)
Dept: OTOLARYNGOLOGY | Facility: CLINIC | Age: 71
End: 2021-05-03
Payer: MEDICARE

## 2021-05-03 DIAGNOSIS — R49.0 MUSCLE TENSION DYSPHONIA: Primary | ICD-10-CM

## 2021-05-03 DIAGNOSIS — R05.9 COUGH: ICD-10-CM

## 2021-05-03 DIAGNOSIS — J38.7 IRRITABLE LARYNX: ICD-10-CM

## 2021-05-03 DIAGNOSIS — R49.0 HOARSENESS: ICD-10-CM

## 2021-05-03 PROCEDURE — 92507 TX SP LANG VOICE COMM INDIV: CPT | Mod: GN | Performed by: SPEECH-LANGUAGE PATHOLOGIST

## 2021-05-03 PROCEDURE — 99207 PR NO CHARGE LOS: CPT | Performed by: SPEECH-LANGUAGE PATHOLOGIST

## 2021-05-03 NOTE — PROGRESS NOTES
"Dinorah Thompson is a 70 year old female who is being cared for via a billable virtual visit.        The patient has been notified and verbally consented to the following statements:     This video visit will be conducted between you and your provider.    Patient has opted to conduct today's video visit vs an in-person appointment, and is not able to attend due to possible exposure to COVID-19.      If during the course of the call the provider feels a video visit is not appropriate, you will not be charged for this service.    Provider has received verbal consent for billable virtual visit from the patient? Yes    Preferred method for receiving information: email    Call initiated at: 8 AM  Platform used to conduct today's virtual appointment: AM Well Video  Location of provider: Residence  Location of patient: [facility]  THERAPY NOTE (CPT 50406)  Patient: Dinorah Thompson  Date of Service: 5/3/2021  Referring physician: Dr. Radha Altamirano  Impressions from most recent evaluation (3/22/2021):  \"IMPRESSIONS: Dinorah Thompson is a 70 year old retired female with a Hx of multiple personal stressors, presenting today with Dysphonia (R49.0), as evidenced by today's evaluation.   Remarkable findings and recommendations of the evaluation included:  1) begin a course of speech therapy\"    SUBJECTIVE:  Since the last appointment, Ms. Thompson reports the following:     Overall she reports that symptoms are somewhat improved    Little trouble on Saturday when her neighbors had a small campfire.  Came up the hill and into her part of the house.  Since her reaction to the wile     Successes: cough has improved.     Hurdles:  Suppressing cough and throat clearing.  Also has some post-nasal drainage left over from a cold.    Primary para-hyperthyroidism.     No allergies that she is aware of, but thought she had them for a long time.  They did not find any that were true allergies.     Certain perfumes are a " bit of a trigger.    Foods not really.     OBJECTIVE:  Ms. Thompson presents today with the following:  VOICE:  ? Roughness: Mild to moderate Intermittent  ? Breathiness: Mild Intermittent  ? Strain: Mild Intermittent  ? Loudness    Conversational speech:  WNL    Projected speech:  WNL  ? Pitch:    Conversational speech:  WNL    Pitch glide: neurologically normal  ? Resonance:    Conversational speech:  laryngeal pharyngeal resonance    COUGH/THROAT CLEARING:  ? Occasional  ? Dry    PATIENT REPORTED MEASURES:  Patient Supplied Answers To SLP QOL Questionnaire  No flowsheet data found.  Cough was 1-2/10.  Had a cold that started a couple weeks ago.  Did not have a lot of coughing with this course of illness    THERAPEUTIC ACTIVITIES  Exercises and techniques for optimal vocal hygiene including:    Systemic hydration, including strategies for increasing daily water intake    Topical hydration - Gargling (saline and plain water), saline nasal irrigation, humidification, steam, guaifenesin to reduce the thickened secretions / laryngeal irritation.    Awareness and reduction of phonotraumatic behaviors    Moderating voice use    Substituting non-voice alternative behaviors    Avoiding cough and throat clearing    Chronic cough / throat clearing reduction therapy    she is most bothered by: cough    Suppression and substitution strategies were instructed including    Swallowing substitution techniques    Breathing suppression techniques to reduce laryngeal tension    Low impact glottic coup and soft cough    Techniques to raise awareness of habitual throat clearing    Counseling and Education:    Asked many questions about the nature of her symptoms, and I answered all of these thoroughly.  o Education irritable larynx syndrome    A revised regimen for home practice was instructed.    I provided an AVS and handouts of today's therapeutic activities to facilitate practice.      ASSESSMENT/PLAN  PROGRESS TOWARD LONG TERM  GOALS:   Adequate progress; please see above    IMPRESSIONS: Dysphonia (R49.0). Ms. Thompson demonstrated good learning of the therapeutic activities for cough suppression today.  She will continue to practice on a regular basis until our next appointment.    PLAN: I will see Ms. Thompson in 3 weeks, at which point we will continue therapy.   For practice goals see AVS.     TOTAL SERVICE TIME:   Call Initiated at: 8 AM  Call Ended at: 9am           CPT Billing Codes:   TREATMENT (15496)  NO CHARGE FACILITY FEE (15748)    Roxana Lee M.M. (voice) MMendezA., CCC/SLP  Speech-Language Pathologist  [facility] Trained Vocologist  [facility]  [phone]  [email or web address]  Pronouns: she/her      *this report was created in part through the use of computerized dictation software, and though reviewed following completion, some typographic errors may persist.  If there is confusion regarding any of this notes contents, please contact me for clarification

## 2021-05-03 NOTE — LETTER
"5/3/2021       RE: Dinorah Thompson  [address]  [place] [address]     Dear Colleague,    Thank you for referring your patient, Dinorah Thompson, to the [facility] VOICE CLINIC [place] at [facility]. Please see a copy of my visit note below.    Dinorah Thompson is a 70 year old female who is being cared for via a billable virtual visit.        The patient has been notified and verbally consented to the following statements:     This video visit will be conducted between you and your provider.    Patient has opted to conduct today's video visit vs an in-person appointment, and is not able to attend due to possible exposure to COVID-19.      If during the course of the call the provider feels a video visit is not appropriate, you will not be charged for this service.    Provider has received verbal consent for billable virtual visit from the patient? Yes    Preferred method for receiving information: email    Call initiated at: 8 AM  Platform used to conduct today's virtual appointment: AM Well Video  Location of provider: Residence  Location of patient: [facility]  THERAPY NOTE (CPT 55945)  Patient: Dinorah Thompson  Date of Service: 5/3/2021  Referring physician: Dr. Radha Altamirano  Impressions from most recent evaluation (3/22/2021):  \"IMPRESSIONS: Dinorah Thompson is a 70 year old retired female with a Hx of multiple personal stressors, presenting today with Dysphonia (R49.0), as evidenced by today's evaluation.   Remarkable findings and recommendations of the evaluation included:  1) begin a course of speech therapy\"    SUBJECTIVE:  Since the last appointment, Ms. Thompson reports the following:     Overall she reports that symptoms are somewhat improved    Little trouble on Saturday when her neighbors had a small campfire.  Came up the hill and into her part of the house.  Since her reaction to the wile     Successes: cough " has improved.     Hurdles:  Suppressing cough and throat clearing.  Also has some post-nasal drainage left over from a cold.    Primary para-hyperthyroidism.     No allergies that she is aware of, but thought she had them for a long time.  They did not find any that were true allergies.     Certain perfumes are a bit of a trigger.    Foods not really.     OBJECTIVE:  Ms. Thompson presents today with the following:  VOICE:  ? Roughness: Mild to moderate Intermittent  ? Breathiness: Mild Intermittent  ? Strain: Mild Intermittent  ? Loudness    Conversational speech:  WNL    Projected speech:  WNL  ? Pitch:    Conversational speech:  WNL    Pitch glide: neurologically normal  ? Resonance:    Conversational speech:  laryngeal pharyngeal resonance    COUGH/THROAT CLEARING:  ? Occasional  ? Dry    PATIENT REPORTED MEASURES:  Patient Supplied Answers To SLP QOL Questionnaire  No flowsheet data found.  Cough was 1-2/10.  Had a cold that started a couple weeks ago.  Did not have a lot of coughing with this course of illness    THERAPEUTIC ACTIVITIES  Exercises and techniques for optimal vocal hygiene including:    Systemic hydration, including strategies for increasing daily water intake    Topical hydration - Gargling (saline and plain water), saline nasal irrigation, humidification, steam, guaifenesin to reduce the thickened secretions / laryngeal irritation.    Awareness and reduction of phonotraumatic behaviors    Moderating voice use    Substituting non-voice alternative behaviors    Avoiding cough and throat clearing    Chronic cough / throat clearing reduction therapy    she is most bothered by: cough    Suppression and substitution strategies were instructed including    Swallowing substitution techniques    Breathing suppression techniques to reduce laryngeal tension    Low impact glottic coup and soft cough    Techniques to raise awareness of habitual throat clearing    Counseling and Education:    Asked many  questions about the nature of her symptoms, and I answered all of these thoroughly.  o Education irritable larynx syndrome    A revised regimen for home practice was instructed.    I provided an AVS and handouts of today's therapeutic activities to facilitate practice.      ASSESSMENT/PLAN  PROGRESS TOWARD LONG TERM GOALS:   Adequate progress; please see above    IMPRESSIONS: Dysphonia (R49.0). Ms. Thompson demonstrated good learning of the therapeutic activities for cough suppression today.  She will continue to practice on a regular basis until our next appointment.    PLAN: I will see Ms. Thompson in 3 weeks, at which point we will continue therapy.   For practice goals see AVS.     TOTAL SERVICE TIME:   Call Initiated at: 8 AM  Call Ended at: 9am           CPT Billing Codes:   TREATMENT (83268)  NO CHARGE FACILITY FEE (10357)    Roxana Lee M.M. (voice), M.A., CCC/SLP  Speech-Language Pathologist  [facility] Trained Vocologist  [facility]  [phone]  [email or web address]  Pronouns: she/her      *this report was created in part through the use of computerized dictation software, and though reviewed following completion, some typographic errors may persist.  If there is confusion regarding any of this notes contents, please contact me for clarification      Again, thank you for allowing me to participate in the care of your patient.      Sincerely,    Roxana Lee, SLP

## 2021-05-03 NOTE — PATIENT INSTRUCTIONS
"After Visit Summary    Patient: Dot Thompson  Date of Visit: 5/3/2021    Order of today's appointment:    Irritable larynx Syndrome education    Cough suppression    Vocal Health Strategies:     Systemic Hydration (internal hydration of the entire body):  - Keep sipping liquids throughout the day, rather than consume a large volume of liquid at one time.      Topical Hydration (hydration for the surface mucosa of the larynx and vocal folds):    Please follow strategies learned on the \"Tips for Topical Hydration\" handout  o Nasal Irrigation/Nasal Spray    Sinus rinse bottle (nasal irrigation) - Morning and night  OR    Saline spray: 3 puffs in each nostril; sniff and then blow your nose  o Gargling    See gargle protocol attachment    Throat Irritation/Cough and Throat Clearing Suppression:     Trigger: Smoke, certain perfumes, certain foods    Keep in mind that it is important to use the strategies to provide preemptive, active, and recovery for your cough symptoms.    We want to reduce the frequency, duration, and severity of your cough.      Sip of water: Try temperature extremes (alternating hot and cold)      Swallow    Hum + swallow      Gargle    With a voice    Tilt your head side to side    [place] chirp -  kakakaaa kakakaaa       Breathe in through rounded lips + out with a repeated  sh   + swallow    Soft throat clear,  k[place]aka , or \"eh, eh, eh\"  +  swallow    Suck on a lozenge with Pectin (avoid mint or menthol) or a sugar-free candy, gum, \"wet\" snacks (apples, pineapple, grapes, etc).  Also consider Xylitol products, like the Spry brand of lozenges, gum, spray    Puppy Sniffs - 2-3 small quick sniffs through the nose and exhale with  sh     massage    Wait \"urge surf\"    * Focus on reducing the hypersensitivity that is ongoing for your throat.     Roxana Lee M.M. (voice), M.A., CCC/SLP  Speech-Language Pathologist  [facility] Trained Vocologist  [facility]  [email or web address]  she/her      "

## 2021-05-17 ENCOUNTER — VIRTUAL VISIT (OUTPATIENT)
Dept: OTOLARYNGOLOGY | Facility: CLINIC | Age: 71
End: 2021-05-17
Payer: MEDICARE

## 2021-05-17 DIAGNOSIS — J38.7 IRRITABLE LARYNX: ICD-10-CM

## 2021-05-17 DIAGNOSIS — R49.0 HOARSENESS: ICD-10-CM

## 2021-05-17 DIAGNOSIS — R49.0 MUSCLE TENSION DYSPHONIA: Primary | ICD-10-CM

## 2021-05-17 DIAGNOSIS — R05.9 COUGH: ICD-10-CM

## 2021-05-17 PROCEDURE — 92507 TX SP LANG VOICE COMM INDIV: CPT | Mod: GN | Performed by: SPEECH-LANGUAGE PATHOLOGIST

## 2021-05-17 PROCEDURE — 99207 PR NO CHARGE LOS: CPT | Performed by: SPEECH-LANGUAGE PATHOLOGIST

## 2021-05-17 NOTE — PATIENT INSTRUCTIONS
"After Visit Summary    Patient: Dot Thompson  Date of Visit: 2021    Order of today's appointment:    Resonant voice exercises    Breathing technique    \"G\" phrases    Instructions for life passage    Breathe in a steaming cup of water/tea while on the airplane  Silicone mask brace for easier breathing  Aim to do exercises 1x per day    Target pitch: Ab4    To Improve Resonance / Forward Focus:       N+ Vowels:  - Feel vibration at front of face with hand      M+ Vowels:  - A4  - Chant with words blended together on one pitch    To Improve Vocal Fold Adduction:    \"G\" phrases    Achieved better vocal fold closure    Ab4    Breathing:     In the morning and evening (twice daily) for 2-5 minutes:   o Breathe with a hand on the chest and a hand on the lower tummy. Take a breath in with rounded lips and exhale with a  f /\"s\"  - Keep steady  o Inhale  = Inflate; exhale = deflate  o 3x each: try breathin in/8 out, 5/10, 3/4  o Throughout the day (2-3x/day for just a couple minutes) check breathing while keeping shoulders relaxed (riding to and from school, etc.)      Breathing Tips:  ? Tongue behind the lower teeth  ? Tongue up when exposed to cold air  ? Keep shoulders down and chest relaxed  ? Don t overextend your neck    Lead from your chest    Keeping head upright     To Improve Coordination Between Breath Flow and Sound Production:         Instructions for Life:  - Sentences of increasing length 1-10  - Chant on one pitch then go back and speak  - Blend words together        Roxana Lee M.M. (voice), M.A., CCC/SLP  Speech-Language Pathologist  [facility] Trained Vocologist  [facility]  [email or web address]  She/her    Massage  Gargle  Blowing bubbles  Cough drops and ordered the spray   Today:   - M, N     "

## 2021-05-17 NOTE — LETTER
"5/17/2021       RE: Dinorah Thompson  [address]  [place] [address]     Dear Colleague,    Thank you for referring your patient, Dinorah Thompson, to the [facility]ICE CLINIC [place] at [facility]. Please see a copy of my visit note below.    Dinorah Thompson is a 71 year old female who is being cared for via a billable virtual visit.        The patient has been notified and verbally consented to the following statements:     This video visit will be conducted between you and your provider.    Patient has opted to conduct today's video visit vs an in-person appointment, and is not able to attend due to possible exposure to COVID-19.      If during the course of the call the provider feels a video visit is not appropriate, you will not be charged for this service.    Provider has received verbal consent for billable virtual visit from the patient? Yes    Preferred method for receiving information: MyChart/E-mail    Call initiated at: 8:11 AM  Platform used to conduct today's virtual appointment: AM Well Video  Location of provider: Residence  Location of patient: [facility]ICE [facility]  THERAPY NOTE (CPT 70503)  Patient: Dinorah Thompson  Date of Service: 5/17/2021  Referring physician: Dr. Radha Altamirano  Impressions from most recent evaluation (3/22/2021):  \"IMPRESSIONS: Dinorah Thompson is a 70 year old retired female with a Hx of multiple personal stressors, presenting today with Dysphonia (R49.0), as evidenced by today's evaluation.   Remarkable findings and recommendations of the evaluation included:  1) begin a course of speech therapy\"      SUBJECTIVE:  Since the last appointment, Ms. Thompson reports the following:     Been using massage, gargle, cough drops (ordered Spry), and blowing bubbles    Cough/throat clear has gotten a \"tiny bit\" worse the past couple of days because had not been doing exercises    Noticed a positive " difference when was doing exercises    Has a low voice that gets sore and strained while singing at [employer]    going on a trip to visit the youngest son - little frazzled today     Both are vaccinated.    Yesterday at [facility] she noticed a new symptom - she has a low voice that is hard on her if she sings or talks too low it becomes sore/ strained. Yesterday - there was a song where she would have to use the lower voice and it was helpful    OBJECTIVE:  Ms. Thompson presents today with the following:  Voice quality:    Moderate to severe breathiness and roughness on sustained vowels    Moderate instability on sustained vowels      COUGH/THROAT CLEARING:  o Observed 40 minutes into the session; resolved in seconds and was mild in severity      PATIENT REPORTED MEASURES:  Patient Supplied Answers To SLP QOL Questionnaire  No flowsheet data found.    THERAPEUTIC ACTIVITIES    Demonstrated previous exercises.  o demonstrated improved technique  o appropriate redirection provided  o instruction provided for increased level of complexity/difficulty      Resonant Voice Therapy (RVT) exercises to promote forward locus of resonance and optimized pattern of laryngeal adduction    Easy sustained pitch (Ab4) and descending glides on /m/ utilized in conjunction with relaxed jaw, tongue, and lightly closed lips to facilitate forward resonant sound    Syllable level using /m/ and /n/ in alternation with cardinal vowels on sustained pitches and speech inflection    Able to recognize improvement in quality and comfort      Exercises to promote optimal respiratory mechanics    I provided explanation of the anatomy and physiology of respiration for speech and singing; she found this to be helpful    She demonstrated excessive upper thoracic engagement during inhalation    she demonstrated clavicular/neck/shoulder involvement in inhalation    Demonstrated difficulty allowing abdominal relaxation for inhalation    Practiced in a seated  "position, with tactile cue of a hand on the lower neck and a hand on the lower abdominal area to facilitate awareness of low respiratory engagement.      With clinician support, patient was able to demonstrate improved abdominal relaxation and engagement on inhalation    Optimal exhalation using inward engagement of the abdominal wall with no corresponding collapse of the upper chest cavity was trained using the \"sh\"/\"s\" task    acceptable improvement in airflow and respiratory mechanics      [place] exercises for improved glottic closure.  o able to produce acceptable glottic coup  o Phrases loaded with /b/ did not improve voice quality  o Instructed with phrases loaded with /g/; this was helpful  o Good learning, but will need practice.      Instructed in techniques to improve length of utterance with reduced effort for optimal carryover.  o Instructed with reading sentences of increasing length    Developed a mental checklist of factors to help trouble shoot moments of difficulty during daily speaking tasks.      Counseling and Education:    Asked many questions about the nature of her symptoms, and I answered all of these thoroughly.    A revised regimen for home practice was instructed.    I provided an AVS and handouts of today's therapeutic activities to facilitate practice.      ASSESSMENT/PLAN  PROGRESS TOWARD LONG TERM GOALS:   Adequate progress; please see above    IMPRESSIONS: Dysphonia (R49.0). Ms. Thompson demonstrated good learning of breathing techniques, resonant voice exercises, and coordination of respiration with phonation.  She will continue to practice on a regular basis until our next appointment.    PLAN: I will see Ms. Thompson in 3 weeks, at which point we will continue therapy.   For practice goals see AVS.     TOTAL SERVICE TIME:   Call Initiated at: 10:00 AM  Call Ended at: 11:00 AM           CPT Billing Codes:   TREATMENT (20309)  NO CHARGE FACILITY FEE (59580)    Roxana Lee M.M. (voice), " M.A., CCC/SLP  Speech-Language Pathologist  [place] Trained Vocologist  [facility]  [phone]  [email or web address]  Pronouns: she/her      *this report was created in part through the use of computerized dictation software, and though reviewed following completion, some typographic errors may persist.  If there is confusion regarding any of this notes contents, please contact me for clarification            Again, thank you for allowing me to participate in the care of your patient.      Sincerely,    Roxana Lee, SLP

## 2021-05-18 ENCOUNTER — VIRTUAL VISIT (OUTPATIENT)
Dept: ENDOCRINOLOGY | Facility: CLINIC | Age: 71
End: 2021-05-18
Payer: MEDICARE

## 2021-05-18 DIAGNOSIS — M85.80 OSTEOPENIA, UNSPECIFIED LOCATION: ICD-10-CM

## 2021-05-18 DIAGNOSIS — E21.3 HPTH (HYPERPARATHYROIDISM) (H): Primary | ICD-10-CM

## 2021-05-18 DIAGNOSIS — R82.994 HYPERCALCIURIA: ICD-10-CM

## 2021-05-18 PROCEDURE — 99214 OFFICE O/P EST MOD 30 MIN: CPT | Mod: 95 | Performed by: INTERNAL MEDICINE

## 2021-05-18 NOTE — PROGRESS NOTES
Dot is a 71 year old who is being evaluated via a billable video visit.      How would you like to obtain your AVS? MyChart  If the video visit is dropped, the invitation should be resent by:   Will anyone else be joining your video visit? No      Video Start Time: 1:42 PM      S:   Patient presents for management of HPTH.   Reports being told in 2013 that she had an elevated PTH level.  Saw an Endocrinologist in [place] and that person moved and thus she did not follow up on this issue.     She was diagnosed with sarcoidosis in 2008.   Initially treated with prednisone.   Then imuran and methotrexate.   She has been on medication since 2014.     Prior finger fracture after jamming it.   No kidney stones.     Taking calcium carbonate 500 mg BID.     Chronic thirst. Not waking at night to urinate.   Chronic constipation.     Diagnosed with osteopenia in 2016.   DEXA in 2/2020 showed osteopenia with FRAX below threshold to treat.   The distal radius was not assessed.     Remarks she had early menopause, age 45. She did not have formal osteoporosis but was treated due to her age.   She was on fosamax and boniva at stopped around 2013.   In part stopped 2/2 GERD which has since resolved.     Chronic muscle pains which are worse since moving to [place].     Presents today to review work up to date.      ROS: 10 point ROS neg other than the symptoms noted above in the HPI.    Exam:   GENERAL: Healthy, alert and no distress  EYES: Eyes grossly normal to inspection.  No discharge or erythema, or obvious scleral/conjunctival abnormalities.  HENT: Normal cephalic/atraumatic.  External ears, nose and mouth without ulcers or lesions.  No nasal drainage visible.  RESP: No audible wheeze, cough, or visible cyanosis.  No visible retractions or increased work of breathing.    MS: No gross musculoskeletal defects noted.  Normal range of motion.  No visible edema.  SKIN: Visible skin clear. No significant rash, abnormal pigmentation or  lesions.  NEURO: Cranial nerves grossly intact.  Mentation and speech appropriate for age.  PSYCH: Mentation appears normal, affect normal/bright, judgement and insight intact, normal speech and appearance well-groomed.     A/P:   HPTH - in the setting of a normal Ca, Cr, and vitamin D. She reports high calcium levels in the past but this was when her sarcoid was active. DEXA in 2/2020 showed osteopenia with FRAX below threshold to treat. The distal radius was not assessed.   Urine calcium elevated in 3/2021.   DEXA from 4/7/2021 showed osteopenia with FRAX above threshold to treat.   Reviewed parathyroid surgery versus bisphosphonate use. Prior GI upset with oral bisphosphonates. Thus reclast reviewed.  She would like to get neck imaging and see if there is a clear target.    -US of the neck and parathyroid scan. If clear target identified, we will send you for a surgical referral.   Call [place] radiology scheduling for your procedure:  For scheduling in the [facility] ([facility]) call [phone]  or  [phone]      Hypercalciuria - as above.     Osteopenia - as above.     Video-Visit Details    Type of service:  Video Visit    Video End Time:2:03 PM    Originating Location (pt. Location): Home    Distant Location (provider location):  [facility]     Platform used for Video Visit: Casper Toribio MD on 5/18/2021 at 2:03 PM

## 2021-05-18 NOTE — LETTER
5/18/2021         RE: Dinorah Thompson  [address]  [place] [address]        Dear Colleague,    Thank you for referring your patient, Dinorah Thompson, to the [facility]. Please see a copy of my visit note below.    Dot is a 71 year old who is being evaluated via a billable video visit.      How would you like to obtain your AVS? MyChart  If the video visit is dropped, the invitation should be resent by:   Will anyone else be joining your video visit? No      Video Start Time: 1:42 PM      S:   Patient presents for management of HPTH.   Reports being told in 2013 that she had an elevated PTH level.  Saw an Endocrinologist in [place] and that person moved and thus she did not follow up on this issue.     She was diagnosed with sarcoidosis in 2008.   Initially treated with prednisone.   Then imuran and methotrexate.   She has been on medication since 2014.     Prior finger fracture after jamming it.   No kidney stones.     Taking calcium carbonate 500 mg BID.     Chronic thirst. Not waking at night to urinate.   Chronic constipation.     Diagnosed with osteopenia in 2016.   DEXA in 2/2020 showed osteopenia with FRAX below threshold to treat.   The distal radius was not assessed.     Remarks she had early menopause, age 45. She did not have formal osteoporosis but was treated due to her age.   She was on fosamax and boniva at stopped around 2013.   In part stopped 2/2 GERD which has since resolved.     Chronic muscle pains which are worse since moving to [place].     Presents today to review work up to date.      ROS: 10 point ROS neg other than the symptoms noted above in the HPI.    Exam:   GENERAL: Healthy, alert and no distress  EYES: Eyes grossly normal to inspection.  No discharge or erythema, or obvious scleral/conjunctival abnormalities.  HENT: Normal cephalic/atraumatic.  External ears, nose and mouth without ulcers or lesions.  No nasal drainage visible.  RESP: No audible  wheeze, cough, or visible cyanosis.  No visible retractions or increased work of breathing.    MS: No gross musculoskeletal defects noted.  Normal range of motion.  No visible edema.  SKIN: Visible skin clear. No significant rash, abnormal pigmentation or lesions.  NEURO: Cranial nerves grossly intact.  Mentation and speech appropriate for age.  PSYCH: Mentation appears normal, affect normal/bright, judgement and insight intact, normal speech and appearance well-groomed.     A/P:   HPTH - in the setting of a normal Ca, Cr, and vitamin D. She reports high calcium levels in the past but this was when her sarcoid was active. DEXA in 2/2020 showed osteopenia with FRAX below threshold to treat. The distal radius was not assessed.   Urine calcium elevated in 3/2021.   DEXA from 4/7/2021 showed osteopenia with FRAX above threshold to treat.   Reviewed parathyroid surgery versus bisphosphonate use. Prior GI upset with oral bisphosphonates. Thus reclast reviewed.  She would like to get neck imaging and see if there is a clear target.    -US of the neck and parathyroid scan. If clear target identified, we will send you for a surgical referral.   Call [place] radiology scheduling for your procedure:  For scheduling in the [facility] ([facility], [facility]) call [phone]  or  [phone]      Hypercalciuria - as above.     Osteopenia - as above.     Video-Visit Details    Type of service:  Video Visit    Video End Time:2:03 PM    Originating Location (pt. Location): Home    Distant Location (provider location):  [facility]     Platform used for Video Visit: [facility]    James Toribio MD on 5/18/2021 at 2:03 PM        Again, thank you for allowing me to participate in the care of your patient.        Sincerely,        James Toribio MD

## 2021-05-25 ENCOUNTER — PATIENT OUTREACH (OUTPATIENT)
Dept: NURSING | Facility: CLINIC | Age: 71
End: 2021-05-25
Payer: MEDICARE

## 2021-05-25 NOTE — PROGRESS NOTES
Clinic Care Coordination Contact  Community Health Worker Follow Up  Spoke with patient     Goals:   Goals Addressed as of 5/25/2021 at 11:54 AM                 Today      Medical (pt-stated)       Added 5/25/21 by Gudelia Abdi MA     Goal Statement: I will complete necessary appointments to manage my physical health and well being.   Date Goal set: 5/25/21  Barriers: I am often busy with many appointments and caring for my .  Strengths: I am motivated and knowledgeable about different specialty providers.   Date to Achieve By: 8/25/21  Patient expressed understanding of goal: Yes  Action steps to achieve this goal:  1. I will receive information from my CHW and schedule with orthopedics and assess my therapy options- PT, OT, I am interested in aquatic therapy. [email or web address]  2. I will attend appointments.   3. I will contact my CHW, Gudelia Abdi with any questions or concerns.         Mental Health Management (pt-stated)   20%    Added 4/15/21 by Latia Mesa LSW     Goal Statement: In the next five months I will receive caregiver support.  Date Goal set: 4/15/21  Barriers: COVID 19 has impacted what is available.  Strengths: [facility]  Date to Achieve By: 8/30/21  Patient expressed understanding of goal: Yes  Action steps to achieve this goal:  1. I will begin seeing a therapist for emotional support.  2. I will look in to private home care options for additional support and respite.  Care coordinator will email me a list of resources.  3. Care Coordination team and I will check in monthly to see how I am doing with this goal and will offer added resources and support as needed.   5/25/21 CHW:    Patient states that she met with a counselor over the phone, Wilbert Baker she also has a follow up scheduled. (CHW does not see in Epic, outside facility?) Patient states that this has helped with her mental health.      Patient states that she has decided to stick [facility]. This is working well and she is not interested in additional support. She still has email from [facility] with resources for future needs.           Discussed the Following: CHW inquired status of pool therapy, patient states that her and  joined the [facility], this is free with their health insurance. Going to the [place] location, they have a heated pool.   CHW found resource through [place] for Aquatic Therapy which is held at her [facility] location, inquires if she is interested in having an instructor to help with exercises. Patient agrees and states that she still needs to see orthopedics and they can recommend therapy and/or make the referral.   CHW reviewed referrals, patient still has ortho and therapy referrals from Feb.   Patient shares that she has been busy with other appts and husbands however these are starting to slow down.   She expresses appreciation for CC  follow up and help.    Intervention and Education during outreach: CC goal, new goal added. Patient will continue to work with therapist, will scheduled ortho and therapy appointment for an eval.     CHW Plan: I will send referral information to patients [place], we discussed next outreach in one month.     RAMY Cid, B.S. [facility] Care Coordination  [facility]:  [place], [place] and [place]  Phone: [phone]

## 2021-06-01 ENCOUNTER — PATIENT OUTREACH (OUTPATIENT)
Dept: CARE COORDINATION | Facility: CLINIC | Age: 71
End: 2021-06-01

## 2021-06-01 ASSESSMENT — ENCOUNTER SYMPTOMS
POLYPHAGIA: 0
MYALGIAS: 1
HALLUCINATIONS: 0
STIFFNESS: 1
ARTHRALGIAS: 1
WEIGHT LOSS: 0
BACK PAIN: 1
JOINT SWELLING: 1
MUSCLE WEAKNESS: 1
MUSCLE CRAMPS: 1
NECK PAIN: 1
FATIGUE: 1
ALTERED TEMPERATURE REGULATION: 0
POLYDIPSIA: 0
FEVER: 0
NIGHT SWEATS: 0
INCREASED ENERGY: 0
CHILLS: 0
WEIGHT GAIN: 1
DECREASED APPETITE: 0

## 2021-06-01 ASSESSMENT — ACTIVITIES OF DAILY LIVING (ADL): DEPENDENT_IADLS:: INDEPENDENT

## 2021-06-01 NOTE — PROGRESS NOTES
Clinic Care Coordination Contact  Care Coordination Clinician Chart Review  Situation: Patient chart reviewed by care coordinator.       Background: Care Coordination initial assessment and enrollment to Care Coordination was 4/14/21.   Patient centered goals were developed with participation from patient.  SHAYY CHAUHAN handed patient off to CHW for continued outreach every 30 days.        Assessment: Per chart review, patient outreach completed by CC CHW on 5/25/21.  Patient is actively working to accomplish goals.  Patient's goals remain appropriate and relevant at this time.   Patient is not due for updated Complex Care Plan.  Annual assessment will be due 4/14/22.      Goals        Patient Stated      Medical (pt-stated)      Goal Statement: I will complete necessary appointments to manage my physical health and well being.   Date Goal set: 5/25/21  Barriers: I am often busy with many appointments and caring for my .  Strengths: I am motivated and knowledgeable about different specialty providers.   Date to Achieve By: 8/25/21  Patient expressed understanding of goal: Yes  Action steps to achieve this goal:  1. I will receive information from my CHW and schedule with orthopedics and assess my therapy options- PT, OT, I am interested in aquatic therapy. [email or web address]  2. I will attend appointments.   3. I will contact my CHW, Gudelia Abdi with any questions or concerns.             Mental Health Management (pt-stated)      Goal Statement: In the next five months I will receive caregiver support.  Date Goal set: 4/15/21  Barriers: COVID 19 has impacted what is available.  Strengths: I [facility]  Date to Achieve By: 8/30/21  Patient expressed understanding of goal: Yes  Action steps to achieve this goal:  1. I will begin seeing a therapist for emotional support.  2. I will look in to private home care options for additional support and  respite.  Care coordinator will email me a list of resources.  3. Care Coordination team and I will check in monthly to see how I am doing with this goal and will offer added resources and support as needed.                    Plan/Recommendations: The patient will continue working with Care Coordination to achieve goals as above.  CHW will involve SW CC as needed or if patient is ready to move to maintenance. SW CC will continue to monitor progress to goals and CHW outreaches every 6 weeks.   Care Plan updated and mailed to patient: CIARA Christensen Care Coordination Team  [phone]

## 2021-06-02 ENCOUNTER — OFFICE VISIT (OUTPATIENT)
Dept: PULMONOLOGY | Facility: CLINIC | Age: 71
End: 2021-06-02
Attending: INTERNAL MEDICINE
Payer: MEDICARE

## 2021-06-02 VITALS
SYSTOLIC BLOOD PRESSURE: 123 MMHG | DIASTOLIC BLOOD PRESSURE: 80 MMHG | HEART RATE: 70 BPM | OXYGEN SATURATION: 98 % | BODY MASS INDEX: 27.33 KG/M2 | WEIGHT: 149.4 LBS

## 2021-06-02 DIAGNOSIS — D86.9 SARCOIDOSIS: Primary | ICD-10-CM

## 2021-06-02 PROCEDURE — 99214 OFFICE O/P EST MOD 30 MIN: CPT | Mod: 25 | Performed by: INTERNAL MEDICINE

## 2021-06-02 PROCEDURE — G0463 HOSPITAL OUTPT CLINIC VISIT: HCPCS

## 2021-06-02 NOTE — NURSING NOTE
Chief Complaint   Patient presents with     Interstitial Lung Disease (ILD)     3mo f/u     Vitals were taken and medications were reconciled.     SANDY Mar

## 2021-06-02 NOTE — LETTER
6/2/2021         RE: Dinorah Thompson  [address]  [place] [address]        Dear Colleague,    Thank you for referring your patient, Dinorah Thompson, to the [facility] [place]. Please see a copy of my visit note below.    Reason for Visit  Dinorah Thompson is a 71 year old year old female who is being seen for Sarcoidosis.  Pulmonary HPI    The patient was seen and examined by Kushal Richmond MD     Ms. Thompson comes in for a follow-up visit.  She was seen for initial evaluation on March 3, 2020.  At that time she was not on any systemic anti-inflammatory therapy, although she has received 6 months of treatment twice in the past.  There is a history of pericardial effusion in the past.  She also reported having birds intermittently.  She also reported abnormal EKG.  A cardiac MRI was done. There is no concern for myocardial involvement in the MRI.  She also was found to have elevated PTH and 125 dihydroxy vitamin D and she is followed up with endocrine and the plan is to pursue imaging to assess her parathyroid, with possibility of ressection.    The patient reports continued problems with back pain.  She had corticosteroid injection with minimal improvement.  She has had this in the past.  For spinal stenosis she is following up with Dr. Santana.    The patient denies any changes in symptoms.  From a pulmonary standpoint she is doing well.    Current Outpatient Medications   Medication     acetaminophen (TYLENOL) 500 MG tablet     albuterol (PROAIR HFA/PROVENTIL HFA/VENTOLIN HFA) 108 (90 Base) MCG/ACT inhaler     baclofen (LIORESAL) 10 MG tablet     calcium carbonate 500 mg, elemental, (OSCAL 500) 1250 (500 Ca) MG TABS tablet     Carboxymethylcellul-Glycerin 1-0.9 % GEL     celecoxib (CELEBREX) 200 MG capsule     clonazePAM (KLONOPIN) 0.5 MG tablet     DULoxetine (CYMBALTA) 60 MG capsule     NONFORMULARY     propranolol (INDERAL) 10 MG tablet      "rosuvastatin (CRESTOR) 5 MG tablet     tacrolimus (PROTOPIC) 0.1 % external ointment     traZODone (DESYREL) 100 MG tablet     valACYclovir (VALTREX) 1000 mg tablet     Current Facility-Administered Medications   Medication     2 mL bupivacaine (MARCAINE) preservative free injection 0.5% (20 mL vial)     triamcinolone (KENALOG-40) injection 40 mg     Allergies   Allergen Reactions     Propoxyphene Other (See Comments)     Clarithromycin Other (See Comments)     Pt states, \"ototoxicity\". Balance problems  Pt states, \"ototoxicity\".       Past Medical History:   Diagnosis Date     Chronic osteoarthritis      Depressive disorder      Female bladder prolapse 9/3/2019     Prolapse of female pelvic organs 8/19/2019     Sleep apnea        Past Surgical History:   Procedure Laterality Date     APPENDECTOMY       diskectomy in toe       HYSTERECTOMY  08/2017    and bladder surgery     INJECT EPIDURAL LUMBAR Right 4/27/2021    Procedure: Right Lumbar 5- Sacral 1 transforaminal epidural steroid injection with fluoroscopy and conscious sedation.;  Surgeon: Tiera Santana MD;  Location: [facility] OR     left rotator cuff surgery       SINUS SURGERY      x2 in 2013 and 2018     TONSILLECTOMY  1955       Social History     Socioeconomic History     Marital status:      Spouse name: Wilbert     Number of children: Not on file     Years of education: Not on file     Highest education level: Bachelor's degree (e.g., BA, AB, BS)   Occupational History     Not on file   Social Needs     Financial resource strain: Not hard at all     Food insecurity     Worry: Never true     Inability: Never true     Transportation needs     Medical: No     Non-medical: No   Tobacco Use     Smoking status: Never Smoker     Smokeless tobacco: Never Used   Substance and Sexual Activity     Alcohol use: Not Currently     Frequency: Monthly or less     Drinks per session: 1 or 2     Binge frequency: Never     Drug use: Never     Sexual activity: Not " Currently     Partners: Male   Lifestyle     Physical activity     Days per week: 2 days     Minutes per session: 10 min     Stress: To some extent   Relationships     Social connections     Talks on phone: More than three times a week     Gets together: Once a week     Attends Rastafarian service: More than 4 times per year     Active member of club or organization: Yes     Attends meetings of clubs or organizations: More than 4 times per year     Relationship status:      Intimate partner violence     Fear of current or ex partner: Not on file     Emotionally abused: Not on file     Physically abused: Not on file     Forced sexual activity: Not on file   Other Topics Concern     Parent/sibling w/ CABG, MI or angioplasty before 65F 55M? Not Asked   Social History Narrative     Not on file     ROS Pulmonary  A complete ROS was otherwise negative except as noted in the HPI.  /80   Pulse 70   Wt 67.8 kg (149 lb 6.4 oz)   SpO2 98%   BMI 27.33 kg/m    Exam:   GENERAL APPEARANCE: Alert, and in no apparent distress.  EYES: PERRL, EOMI  NECK: supple, no masses, no thyromegaly.  LYMPHATICS: No significant axillary, cervical, or supraclavicular nodes.  RESP: normal percussion, good air flow throughout.  No crackles. No rhonchi. No wheezes.  CV: Normal S1, S2, regular rhythm, normal rate. No murmur.  No rub. No gallop. No LE edema.   MS: extremities normal. No clubbing. No cyanosis.  SKIN: no rash on limited exam  NEURO: Mentation intact, speech normal, normal strength and tone, normal gait and stance    Results:  No results found for this or any previous visit (from the past 168 hour(s)).    Assessment and plan: Ms. Thompson is a pleasant 70-year-old female of northern  descent with degenerative disc disease with concern for impingement.  She has sarcoidosis previously treated with ~6 months x 2 of anti-inflammatory medications. 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  1.  Stable pulmonary symptoms.  We will continue to monitor.  2.  Extrapulmonary sarcoidosis: Cardiac MRI in 2021 with no LGE.  We will continue ophthalmology follow-up.  Will repeat electrolytes at the next visit.  3.  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  4.  Following up with endocrine for elevated PTH work-up in progress.  5.  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    This note consists of symbols derived from keyboarding, dictation and/or voice recognition software. As a result, there may be errors in the script that have gone undetected. Please consider this when interpreting information found in this chart            Answers for HPI/ROS submitted by the patient on 6/1/2021   General Symptoms: Yes  Skin Symptoms: No  HENT Symptoms: No  EYE SYMPTOMS: No  HEART SYMPTOMS: No  LUNG SYMPTOMS: No  INTESTINAL SYMPTOMS: No  URINARY SYMPTOMS: No  GYNECOLOGIC SYMPTOMS: No  BREAST SYMPTOMS: No  SKELETAL SYMPTOMS: Yes  BLOOD SYMPTOMS: No  NERVOUS SYSTEM SYMPTOMS: No  MENTAL HEALTH SYMPTOMS: No  Fever: No  Loss of appetite: No  Weight loss: No  Weight gain: Yes  Fatigue: Yes  Night sweats: No  Chills: No  Increased stress: No  Excessive hunger: No  Excessive thirst: No  Feeling hot or cold when others believe the temperature is normal: No  Loss of height: No  Post-operative complications: No  Surgical site pain: No  Hallucinations: No  Change in or Loss of Energy: No  Hyperactivity: No  Confusion: No  Back pain: Yes  Muscle aches: Yes  Neck pain: Yes  Swollen joints: Yes  Joint pain: Yes  Bone pain: No  Muscle cramps: Yes  Muscle weakness: Yes  Joint stiffness: Yes  Bone fracture: No        Again, thank you for allowing me to participate in the care of your patient.        Sincerely,        Kushal Richmond MD

## 2021-06-02 NOTE — PROGRESS NOTES
Reason for Visit  Dinorah Thompson is a 71 year old year old female who is being seen for Sarcoidosis.  Pulmonary HPI    The patient was seen and examined by Kushal Richmond MD     Ms. Thompson comes in for a follow-up visit.  She was seen for initial evaluation on March 3, 2020.  At that time she was not on any systemic anti-inflammatory therapy, although she has received 6 months of treatment twice in the past.  There is a history of pericardial effusion in the past.  She also reported having birds intermittently.  She also reported abnormal EKG.  A cardiac MRI was done. There is no concern for myocardial involvement in the MRI.  She also was found to have elevated PTH and 125 dihydroxy vitamin D and she is followed up with endocrine and the plan is to pursue imaging to assess her parathyroid, with possibility of ressection.    The patient reports continued problems with back pain.  She had corticosteroid injection with minimal improvement.  She has had this in the past.  For spinal stenosis she is following up with Dr. Santana.    The patient denies any changes in symptoms.  From a pulmonary standpoint she is doing well.    Current Outpatient Medications   Medication     acetaminophen (TYLENOL) 500 MG tablet     albuterol (PROAIR HFA/PROVENTIL HFA/VENTOLIN HFA) 108 (90 Base) MCG/ACT inhaler     baclofen (LIORESAL) 10 MG tablet     calcium carbonate 500 mg, elemental, (OSCAL 500) 1250 (500 Ca) MG TABS tablet     Carboxymethylcellul-Glycerin 1-0.9 % GEL     celecoxib (CELEBREX) 200 MG capsule     clonazePAM (KLONOPIN) 0.5 MG tablet     DULoxetine (CYMBALTA) 60 MG capsule     NONFORMULARY     propranolol (INDERAL) 10 MG tablet     rosuvastatin (CRESTOR) 5 MG tablet     tacrolimus (PROTOPIC) 0.1 % external ointment     traZODone (DESYREL) 100 MG tablet     valACYclovir (VALTREX) 1000 mg tablet     Current Facility-Administered Medications   Medication     2 mL bupivacaine (MARCAINE) preservative free injection 0.5% (20  "mL vial)     triamcinolone (KENALOG-40) injection 40 mg     Allergies   Allergen Reactions     Propoxyphene Other (See Comments)     Clarithromycin Other (See Comments)     Pt states, \"ototoxicity\". Balance problems  Pt states, \"ototoxicity\".       Past Medical History:   Diagnosis Date     Chronic osteoarthritis      Depressive disorder      Female bladder prolapse 9/3/2019     Prolapse of female pelvic organs 8/19/2019     Sleep apnea        Past Surgical History:   Procedure Laterality Date     APPENDECTOMY       diskectomy in toe       HYSTERECTOMY  08/2017    and bladder surgery     INJECT EPIDURAL LUMBAR Right 4/27/2021    Procedure: Right Lumbar 5- Sacral 1 transforaminal epidural steroid injection with fluoroscopy and conscious sedation.;  Surgeon: Tiera Santana MD;  Location: [facility] OR     left rotator cuff surgery       SINUS SURGERY      x2 in 2013 and 2018     TONSILLECTOMY  1955       Social History     Socioeconomic History     Marital status:      Spouse name: Wilbert     Number of children: Not on file     Years of education: Not on file     Highest education level: Bachelor's degree (e.g., BA, AB, BS)   Occupational History     Not on file   Social Needs     Financial resource strain: Not hard at all     Food insecurity     Worry: Never true     Inability: Never true     Transportation needs     Medical: No     Non-medical: No   Tobacco Use     Smoking status: Never Smoker     Smokeless tobacco: Never Used   Substance and Sexual Activity     Alcohol use: Not Currently     Frequency: Monthly or less     Drinks per session: 1 or 2     Binge frequency: Never     Drug use: Never     Sexual activity: Not Currently     Partners: Male   Lifestyle     Physical activity     Days per week: 2 days     Minutes per session: 10 min     Stress: To some extent   Relationships     Social connections     Talks on phone: More than three times a week     Gets together: Once a week     Attends Jain service: " More than 4 times per year     Active member of club or organization: Yes     Attends meetings of clubs or organizations: More than 4 times per year     Relationship status:      Intimate partner violence     Fear of current or ex partner: Not on file     Emotionally abused: Not on file     Physically abused: Not on file     Forced sexual activity: Not on file   Other Topics Concern     Parent/sibling w/ CABG, MI or angioplasty before 65F 55M? Not Asked   Social History Narrative     Not on file     ROS Pulmonary  A complete ROS was otherwise negative except as noted in the HPI.  /80   Pulse 70   Wt 67.8 kg (149 lb 6.4 oz)   SpO2 98%   BMI 27.33 kg/m    Exam:   GENERAL APPEARANCE: Alert, and in no apparent distress.  EYES: PERRL, EOMI  NECK: supple, no masses, no thyromegaly.  LYMPHATICS: No significant axillary, cervical, or supraclavicular nodes.  RESP: normal percussion, good air flow throughout.  No crackles. No rhonchi. No wheezes.  CV: Normal S1, S2, regular rhythm, normal rate. No murmur.  No rub. No gallop. No LE edema.   MS: extremities normal. No clubbing. No cyanosis.  SKIN: no rash on limited exam  NEURO: Mentation intact, speech normal, normal strength and tone, normal gait and stance    Results:  No results found for this or any previous visit (from the past 168 hour(s)).    Assessment and plan: Ms. Thompson is a pleasant 70-year-old female of northern  descent with degenerative disc disease with concern for impingement.  She has sarcoidosis previously treated with ~6 months x 2 of anti-inflammatory medications. 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  1.  Stable pulmonary symptoms.  We will continue to monitor.  2.  Extrapulmonary sarcoidosis: Cardiac MRI in 2021 with no LGE.  We will continue ophthalmology follow-up.  Will repeat electrolytes at the next visit.  3.  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  4.  Following up with endocrine for elevated PTH work-up in progress.  5.  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    This note consists of symbols derived from keyboarding, dictation and/or voice recognition software. As a result, there may be errors in the script that have gone undetected. Please consider this when interpreting information found in this chart            Answers for HPI/ROS submitted by the patient on 6/1/2021   General Symptoms: Yes  Skin Symptoms: No  HENT Symptoms: No  EYE SYMPTOMS: No  HEART SYMPTOMS: No  LUNG SYMPTOMS: No  INTESTINAL SYMPTOMS: No  URINARY SYMPTOMS: No  GYNECOLOGIC SYMPTOMS: No  BREAST SYMPTOMS: No  SKELETAL SYMPTOMS: Yes  BLOOD SYMPTOMS: No  NERVOUS SYSTEM SYMPTOMS: No  MENTAL HEALTH SYMPTOMS: No  Fever: No  Loss of appetite: No  Weight loss: No  Weight gain: Yes  Fatigue: Yes  Night sweats: No  Chills: No  Increased stress: No  Excessive hunger: No  Excessive thirst: No  Feeling hot or cold when others believe the temperature is normal: No  Loss of height: No  Post-operative complications: No  Surgical site pain: No  Hallucinations: No  Change in or Loss of Energy: No  Hyperactivity: No  Confusion: No  Back pain: Yes  Muscle aches: Yes  Neck pain: Yes  Swollen joints: Yes  Joint pain: Yes  Bone pain: No  Muscle cramps: Yes  Muscle weakness: Yes  Joint stiffness: Yes  Bone fracture: No

## 2021-06-09 ENCOUNTER — HOSPITAL ENCOUNTER (OUTPATIENT)
Dept: NUCLEAR MEDICINE | Facility: CLINIC | Age: 71
Setting detail: NUCLEAR MEDICINE
End: 2021-06-09
Attending: INTERNAL MEDICINE
Payer: MEDICARE

## 2021-06-09 ENCOUNTER — HOSPITAL ENCOUNTER (OUTPATIENT)
Dept: ULTRASOUND IMAGING | Facility: CLINIC | Age: 71
Discharge: HOME | End: 2021-06-09
Attending: INTERNAL MEDICINE | Admitting: INTERNAL MEDICINE
Payer: MEDICARE

## 2021-06-09 DIAGNOSIS — E21.3 HPTH (HYPERPARATHYROIDISM) (H): ICD-10-CM

## 2021-06-09 PROCEDURE — 343N000001 HC RX 343: Performed by: INTERNAL MEDICINE

## 2021-06-09 PROCEDURE — A9500 TC99M SESTAMIBI: HCPCS | Performed by: INTERNAL MEDICINE

## 2021-06-09 PROCEDURE — 78072 PARATHYRD PLANAR W/SPECT&CT: CPT | Mod: MG

## 2021-06-09 PROCEDURE — 76536 US EXAM OF HEAD AND NECK: CPT

## 2021-06-09 PROCEDURE — A9516 IODINE I-123 SOD IODIDE MIC: HCPCS | Performed by: INTERNAL MEDICINE

## 2021-06-09 RX ADMIN — Medication 27.6 MILLICURIE: at 13:06

## 2021-06-09 RX ADMIN — Medication 743 UCI.: at 11:35

## 2021-06-17 ENCOUNTER — TELEPHONE (OUTPATIENT)
Dept: SURGERY | Facility: CLINIC | Age: 71
End: 2021-06-17

## 2021-06-17 ENCOUNTER — OFFICE VISIT (OUTPATIENT)
Dept: SURGERY | Facility: CLINIC | Age: 71
End: 2021-06-17
Payer: MEDICARE

## 2021-06-17 ENCOUNTER — PREP FOR PROCEDURE (OUTPATIENT)
Dept: SURGERY | Facility: CLINIC | Age: 71
End: 2021-06-17

## 2021-06-17 VITALS
BODY MASS INDEX: 27.42 KG/M2 | HEIGHT: 62 IN | HEART RATE: 66 BPM | DIASTOLIC BLOOD PRESSURE: 76 MMHG | SYSTOLIC BLOOD PRESSURE: 120 MMHG | OXYGEN SATURATION: 99 % | WEIGHT: 149 LBS | RESPIRATION RATE: 16 BRPM

## 2021-06-17 DIAGNOSIS — E21.3 HYPERPARATHYROIDISM (H): Primary | ICD-10-CM

## 2021-06-17 PROCEDURE — 99204 OFFICE O/P NEW MOD 45 MIN: CPT | Performed by: SURGERY

## 2021-06-17 ASSESSMENT — MIFFLIN-ST. JEOR: SCORE: 1144.11

## 2021-06-17 NOTE — LETTER
"2021       Re: Dinorah Thompson - 1950    iDnorah Thompson is a 71 year old female who is sent by James Toribio MD for evaluation of hypercalcemia/hyperparathyroidism. She had an elevated calcium found on screening bloodwork.     She has a calcium level as high as 10.3 (, ), though values are mostly in the normal range. She reports that she had an elevated PTH level in  while living in [place], but did not receive follow up for this. PTH has been as high as 106 at the time when calcium was 9.5.     She has constitutional symptoms of fatigue, chronic thirst, chronic constipation, and muscle aches. She has no history of kidney stones.    Of note, the patient has sarcoidosis, managed with imuran and methotrexate.      There is no family history of parathyroid disease.  Dinorah has no prior neck surgery. She is not on thyroid medications.       She has not had and fragility fractures. She did have a fracture of one of her fingers after a high impact mechanism. A DEXA scan shows osteopenia in the hip and spine. She has taken fosamax, boniva, and prolia in the past, which improved her T-scores, though she has not taken any ostoporosis medications for the past few years due to GERD and insurance no longer covering prolia.      Smoking History:  Dinorah has never smoked.     Review of Systems:  10 point ROS is negative except as stated in the History of the Present Illness.     Physical Exam:  /76   Pulse 66   Resp 16   Ht 1.575 m (5' 2\")   Wt 67.6 kg (149 lb)   SpO2 99%   BMI 27.25 kg/m    Well developed, well nourished female in no apparent distress.  HEENT:  Normocephalic, atraumatic.  Neck:   Normal appearance and suitable neck creases seen.              Range of motion is normal.              No neck masses.  Thyroid:  Palpably normal.  Lymph:  No cervical adenopathy.  Respirations:  Unlabored.  Neurologic:  Alert.  Speech is clear.  Moves all extremities with good " strength.  Skin:  Warm, dry and no rash.  Psychologic:  Alert, appropriate range of emotions.     Labs:  Calcium-9.5  PTH-106  24 hour Urinary calcium-340 mg/24 hours     Imaging:  All imaging personally reviewed with Dinorah   Sestimibi Scan with SPECT-CT:   FINDINGS: Discordant focus of subtraction and washout phase radiotracer activity immediately posterior to the superior left thyroid, with a possible corresponding 0.4 x 0.7 cm nodule on CT in the left tracheoesophageal groove, suggesting site of   suspected parathyroid adenoma. Normal radiotracer uptake elsewhere in the head and neck with physiologic uptake in the thyroid and salivary glands. No additional ectopic focus of uptake. Additional findings on low-dose noncontrast CT: Calcified   mediastinal and hilar lymph nodes. Moderate coronary artery calcifications.     IMPRESSION:  Suspected parathyroid adenoma posterior to the superior left thyroid.     Ultrasound-  FINDINGS:  RIGHT lobe: 4.8 x 1.5 x 1.6 cm. 6 mm benign colloid cyst. Otherwise  normal echotexture.  Isthmus: 3 mm.  LEFT lobe: 3.7 x 0.9 x 1.0 cm. Homogeneous echotexture.     NECK: No cervical lymphadenopathy. No visible parathyroid adenoma.                                                                   IMPRESSION: Normal neck ultrasound. No visible parathyroid adenoma.     Assessment and Plan:  Dinorah has primary (mostly normocalcemic) hyperparathyroidism and is a candidate for surgery because of osteopenia and constitutional symptoms of  fatigue, chronic thirst, chronic constipation, and muscle aches.We discussed the possibility of single adenoma (85%) vs dual adenoma or hyperplasia (15%).  The imaging studies would suggest that there is a good probability of a single adenoma in the left superior location.  I would recommend a focused neck exploration with rapid PTH assay to assess the completeness of the procedure.  Dinorah is aware of the risks to the recurrent laryngeal nerve and  "the risk of hypocalcemia, particularly with treatment of  parathyroid hyperplasia.  She is also aware of the 1-2 % risk of \"failure to cure\".  She is anxious to proceed with surgery in the next few weeks.     Gregoria Wilcox MD    "

## 2021-06-17 NOTE — TELEPHONE ENCOUNTER
Type of surgery: PARATHYROIDECTOMY, POSSIBLE CERVICAL EXPLORATION      Location of surgery: [facility]  Date and time of surgery: 7/21/2021 @ 11:30 AM   Surgeon: Gregoria Wilcox MD   Pre-Op Appt Date: PATIENT TO SCHEDULE    Post-Op Appt Date: PATIENT TO SCHEDULE     Packet sent out: Yes  Pre-cert/Authorization completed:  Not Applicable  Date: 6/16/2021      PARATHYROIDECTOMY, POSSIBLE CERVICAL EXPLORATION    GENERAL PT INST TO HAVE H&P WITH DR GONSALEZ 180 MIN REQ PA ASSIST MGB NMS

## 2021-06-18 DIAGNOSIS — Z11.59 ENCOUNTER FOR SCREENING FOR OTHER VIRAL DISEASES: ICD-10-CM

## 2021-06-18 PROBLEM — E21.3 HYPERPARATHYROIDISM (H): Status: ACTIVE | Noted: 2021-06-18

## 2021-06-24 NOTE — PROGRESS NOTES
History of Present Illness:  Dinorah Thompson is a 71 year old female who is sent by James Toribio MD for evaluation of hypercalcemia/hyperparathyroidism. She had an elevated calcium found on screening bloodwork.    She has a calcium level as high as 10.3 (9/19, 6/18), though values are mostly in the normal range. She reports that she had an elevated PTH level in 2013 while living in [place], but did not receive follow up for this. PTH has been as high as 106 at the time when calcium was 9.5.     She has constitutional symptoms of fatigue, chronic thirst, chronic constipation, and muscle aches.  She has no history of kidney stones.    Of note, the patient has sarcoidosis, managed with imuran and methotrexate.     There is no family history of parathyroid disease.  Dinorah has no prior neck surgery. She is not on thyroid medications.       She has not had and fragility fractures. She did have a fracture of one of her fingers after a high impact mechanism. A DEXA scan shows osteopenia in the hip and spine. She has taken fosamax, boniva, and prolia in the past, which improved her T-scores, though she has not taken any ostoporosis medications for the past few years due to GERD and insurance no longer covering prolia.     Past Medical History:   Diagnosis Date     Chronic osteoarthritis      Depressive disorder      Female bladder prolapse 9/3/2019     Prolapse of female pelvic organs 8/19/2019     Sleep apnea      Past Surgical History:   Procedure Laterality Date     APPENDECTOMY       diskectomy in toe       HYSTERECTOMY  08/2017    and bladder surgery     INJECT EPIDURAL LUMBAR Right 4/27/2021    Procedure: Right Lumbar 5- Sacral 1 transforaminal epidural steroid injection with fluoroscopy and conscious sedation.;  Surgeon: Tiera Santana MD;  Location: [facility] OR     left rotator cuff surgery       SINUS SURGERY      x2 in 2013 and 2018     TONSILLECTOMY  1955     Allergies   Allergen Reactions     Propoxyphene  "Other (See Comments)     Clarithromycin Other (See Comments)     Pt states, \"ototoxicity\". Balance problems  Pt states, \"ototoxicity\".       Smoking History:  Dinorah has never smoked.    Review of Systems:  10 point ROS is negative except as stated in the History of the Present Illness.    Physical Exam:  /76   Pulse 66   Resp 16   Ht 1.575 m (5' 2\")   Wt 67.6 kg (149 lb)   SpO2 99%   BMI 27.25 kg/m    Well developed, well nourished female in no apparent distress.  HEENT:  Normocephalic, atraumatic.  Neck:   Normal appearance and suitable neck creases seen.              Range of motion is normal.              No neck masses.  Thyroid:  Palpably normal.  Lymph:  No cervical adenopathy.  Respirations:  Unlabored.  Neurologic:  Alert.  Speech is clear.  Moves all extremities with good strength.  Skin:  Warm, dry and no rash.  Psychologic:  Alert, appropriate range of emotions.    Labs:  Calcium-9.5  PTH-106  24 hour Urinary calcium-340 mg/24 hours    Imaging:  All imaging personally reviewed with Dinorah   Sestimibi Scan with SPECT-CT:   FINDINGS: Discordant focus of subtraction and washout phase radiotracer activity immediately posterior to the superior left thyroid, with a possible corresponding 0.4 x 0.7 cm nodule on CT in the left tracheoesophageal groove, suggesting site of   suspected parathyroid adenoma. Normal radiotracer uptake elsewhere in the head and neck with physiologic uptake in the thyroid and salivary glands. No additional ectopic focus of uptake. Additional findings on low-dose noncontrast CT: Calcified   mediastinal and hilar lymph nodes. Moderate coronary artery calcifications.     IMPRESSION:  Suspected parathyroid adenoma posterior to the superior left thyroid.    Ultrasound-  FINDINGS:  RIGHT lobe: 4.8 x 1.5 x 1.6 cm. 6 mm benign colloid cyst. Otherwise  normal echotexture.  Isthmus: 3 mm.  LEFT lobe: 3.7 x 0.9 x 1.0 cm. Homogeneous echotexture.     NECK: No cervical " "lymphadenopathy. No visible parathyroid adenoma.                                                                   IMPRESSION: Normal neck ultrasound. No visible parathyroid adenoma.    Assessment and Plan:  Dinorah has primary (mostly normocalcemic) hyperparathyroidism and is a candidate for surgery because of osteopenia and constitutional symptoms of  fatigue, chronic thirst, chronic constipation, and muscle aches.We discussed the possibility of single adenoma (85%) vs dual adenoma or hyperplasia (15%).  The imaging studies would suggest that there is a good probability of a single adenoma in the left superior location.  I would recommend a focused neck exploration with rapid PTH assay to assess the completeness of the procedure.  Dinorah is aware of the risks to the recurrent laryngeal nerve and the risk of hypocalcemia, particularly with treatment of  parathyroid hyperplasia.  She is also aware of the 1-2 % risk of \"failure to cure\".  She is anxious to proceed with surgery in the next few weeks.    Gregoria Wilcox MD    Please route or send letter to:  James Toribio MD    60 minutes spent with the patient, over 50% as counseling.    "

## 2021-06-28 ENCOUNTER — OFFICE VISIT (OUTPATIENT)
Dept: FAMILY MEDICINE | Facility: CLINIC | Age: 71
End: 2021-06-28
Payer: MEDICARE

## 2021-06-28 VITALS
HEART RATE: 86 BPM | OXYGEN SATURATION: 97 % | RESPIRATION RATE: 16 BRPM | WEIGHT: 148.6 LBS | TEMPERATURE: 98.6 F | SYSTOLIC BLOOD PRESSURE: 108 MMHG | BODY MASS INDEX: 27.34 KG/M2 | DIASTOLIC BLOOD PRESSURE: 58 MMHG | HEIGHT: 62 IN

## 2021-06-28 DIAGNOSIS — E21.3 HYPERPARATHYROIDISM (H): ICD-10-CM

## 2021-06-28 DIAGNOSIS — Z01.818 PREOP GENERAL PHYSICAL EXAM: Primary | ICD-10-CM

## 2021-06-28 DIAGNOSIS — D50.8 IRON DEFICIENCY ANEMIA SECONDARY TO INADEQUATE DIETARY IRON INTAKE: ICD-10-CM

## 2021-06-28 LAB
ERYTHROCYTE [DISTWIDTH] IN BLOOD BY AUTOMATED COUNT: 13.4 % (ref 10–15)
HCT VFR BLD AUTO: 40.8 % (ref 35–47)
HGB BLD-MCNC: 13.8 G/DL (ref 11.7–15.7)
MCH RBC QN AUTO: 30.3 PG (ref 26.5–33)
MCHC RBC AUTO-ENTMCNC: 33.8 G/DL (ref 31.5–36.5)
MCV RBC AUTO: 90 FL (ref 78–100)
PLATELET # BLD AUTO: 261 10E9/L (ref 150–450)
RBC # BLD AUTO: 4.56 10E12/L (ref 3.8–5.2)
WBC # BLD AUTO: 5.1 10E9/L (ref 4–11)

## 2021-06-28 PROCEDURE — 85027 COMPLETE CBC AUTOMATED: CPT | Performed by: NURSE PRACTITIONER

## 2021-06-28 PROCEDURE — 36415 COLL VENOUS BLD VENIPUNCTURE: CPT | Performed by: NURSE PRACTITIONER

## 2021-06-28 PROCEDURE — 99214 OFFICE O/P EST MOD 30 MIN: CPT | Performed by: NURSE PRACTITIONER

## 2021-06-28 PROCEDURE — 83540 ASSAY OF IRON: CPT | Performed by: NURSE PRACTITIONER

## 2021-06-28 PROCEDURE — 80053 COMPREHEN METABOLIC PANEL: CPT | Performed by: NURSE PRACTITIONER

## 2021-06-28 PROCEDURE — 82728 ASSAY OF FERRITIN: CPT | Performed by: NURSE PRACTITIONER

## 2021-06-28 PROCEDURE — 93000 ELECTROCARDIOGRAM COMPLETE: CPT | Performed by: NURSE PRACTITIONER

## 2021-06-28 PROCEDURE — 83550 IRON BINDING TEST: CPT | Performed by: NURSE PRACTITIONER

## 2021-06-28 ASSESSMENT — MIFFLIN-ST. JEOR: SCORE: 1139.3

## 2021-06-28 NOTE — PATIENT INSTRUCTIONS
Patient Education     Presurgery Checklist  You are scheduled to have surgery. The healthcare staff will try to make your stay comfortable. Use the guidelines below to remind yourself what to do before surgery. Be sure to follow any specific pre-op instructions from your surgeon or nurse.  Preparing for surgery    If you are having abdominal surgery, ask what you need to do to clear your bowel.    Tell your surgeon if you have allergies to any medicines, latex, or foods.    Ask your surgeon if you might need a blood transfusion during surgery and if so, how to prepare for it. In some cases, you can donate blood before surgery. If needed, this blood can be given back (transfused) to you during or after surgery.    Arrange for an adult family member or friend to drive you home after surgery. If possible, have someone ready to help you at home as you recover.    Call the surgeon if you get a cold, fever, sore throat, diarrhea, or other health problem just before surgery. Your surgeon can decide whether or not to postpone the surgery.  Medicines    Tell your surgeon about all medicines you take, including prescription and over-the-counter products such as herbal remedies and vitamins. Ask if you should continue taking them.    If you take ibuprofen, naproxen, or blood thinners (anticoagulants) such as aspirin, clopidogrel, or warfarin, ask your surgeon whether you should stop taking them and how long before surgery you should stop.    You may be told to take antibiotics just before surgery to prevent infection. If so, follow instructions carefully on how to take them.    If you are told to take blood thinners to help prevent blood clots after surgery, be sure to follow the instructions on how to take them.  Stop smoking  If you smoke, healing may take longer. So at least 2 week(s) before surgery, stop smoking.  Bathing or showering before surgery    If instructed, wash with antibacterial soap. Afterward, do not use  lotions, oils, or powders.    If you are having surgery on the head, you may be asked to shampoo with antibacterial soap. Follow instructions for doing so.  Do not remove hair from the surgery site  Do not shave hair from the incision site, unless you are given specific instructions to do so. Usually, if hair needs to be removed, it will be done at the hospital right before surgery.  Don t eat or drink    Follow any directions you are given for not eating or drinking before surgery. If you don't follow instructions about when to stop eating and drinking, your procedure may be postponed or rescheduled for another day. This is a safety issue.    You can brush your teeth and rinse your mouth, but don t swallow any water.  Day of surgery    Don't wear makeup. Don't use perfume, deodorant, or hairspray. Remove nail polish and artificial nails.    Leave jewelry (including rings), watches, and other valuables at home.    Be sure to bring health insurance cards or forms and a photo ID.    Bring a list of your medicines (include the name, dose, how often you take them, and the time last dose was taken).    Arrive on time at the hospital or surgery facility.  Date Last Reviewed: 12/1/2016 2000-2018 [employer]. [address] [place] [address], [place] [address]. All rights reserved. This information is not intended as a substitute for professional medical care. Always follow your healthcare professional's instructions.

## 2021-06-28 NOTE — PROGRESS NOTES
[facility]  [address] [facility] [address]  Phone: [phone]  Primary Provider: Juju Chang  Pre-op Performing Provider: JUJU CHANG      PREOPERATIVE EVALUATION:  Today's date: 6/28/2021    Dinorah Thompson is a 71 year old female who presents for a preoperative evaluation.    Surgical Information:  Surgery/Procedure: Parathyroidectomy  Surgery Location: [facility]  Surgeon: Gregoria Wilcox  Surgery Date: 7/21/2021  Time of Surgery: 07:30 AM  Where patient plans to recover: At home with family  Fax number for surgical facility: Note does not need to be faxed, will be available electronically in Epic.    Type of Anesthesia Anticipated: General    Assessment & Plan     The proposed surgical procedure is considered INTERMEDIATE risk.    Preop general physical exam  Hyperparathyroidism (H)  Clearance as below  - Comprehensive metabolic panel (BMP + Alb, Alk Phos, ALT, AST, Total. Bili, TP)  - EKG 12-lead complete w/read - Clinics    Iron deficiency anemia secondary to inadequate dietary iron intake  Update levels, no current supplements.   - CBC with platelets  - Ferritin  - Iron and iron binding capacity      Risks and Recommendations:  The patient has the following additional risks and recommendations for perioperative complications:  Obstructive Sleep Apnea:       Medication Instructions:   - celecoxib (Celebrex): HOLD 3 days before surgery. May continue without modification for management of severe pain.     RECOMMENDATION:  APPROVAL GIVEN to proceed with proposed procedure, without further diagnostic evaluation.        Subjective     HPI related to upcoming procedure: Parathyroidectomy for treatment of hyperparathyroidism.     Preop Questions 6/24/2021   1. Have you ever had a heart attack or stroke? No   2. Have you ever had surgery on your heart or blood vessels, such as a stent placement, a coronary artery bypass, or surgery on an artery  in your head, neck, heart, or legs? No   3. Do you have chest pain with activity? No   4. Do you have a history of  heart failure? No   5. Do you currently have a cold, bronchitis or symptoms of other infection? No   6. Do you have a cough, shortness of breath, or wheezing? YES - Stable Sarcoidosis   7. Do you or anyone in your family have previous history of blood clots? No   8. Do you or does anyone in your family have a serious bleeding problem such as prolonged bleeding following surgeries or cuts? YES - prolonged bleeding in appendectomy as a child, postpartum hemorrhage. Work up with no findings.   9. Have you ever had problems with anemia or been told to take iron pills? YES - has not needed iron supplementation x 1 year.   10. Have you had any abnormal blood loss such as black, tarry or bloody stools, or abnormal vaginal bleeding? No   11. Have you ever had a blood transfusion? YES - postpartum transfusion    11a. Have you ever had a transfusion reaction? No   12. Are you willing to have a blood transfusion if it is medically needed before, during, or after your surgery? Yes   13. Have you or any of your relatives ever had problems with anesthesia? YES - patient slow to wake.   14. Do you have sleep apnea, excessive snoring or daytime drowsiness? YES - FAHAD, BiPap/CPAP.   14a. Do you have a CPAP machine? Yes   15. Do you have any artifical heart valves or other implanted medical devices like a pacemaker, defibrillator, or continuous glucose monitor? No   16. Do you have artificial joints? No   17. Are you allergic to latex? No       Health Care Directive:  Patient does not have a Health Care Directive or Living Will: Discussed advance care planning with patient; however, patient declined at this time.    Preoperative Review of :   reviewed - no record of controlled substances prescribed.      Status of Chronic Conditions:  See problem list for active medical problems.  Problems all longstanding and  stable, except as noted/documented.  See ROS for pertinent symptoms related to these conditions.      Review of Systems  CONSTITUTIONAL: NEGATIVE for fever, chills, change in weight  INTEGUMENTARY/SKIN: NEGATIVE for worrisome rashes, moles or lesions  EYES: NEGATIVE for vision changes or irritation  ENT/MOUTH: NEGATIVE for ear, mouth and throat problems  RESP: NEGATIVE for significant cough or SOB  BREAST: NEGATIVE for masses, tenderness or discharge  CV: NEGATIVE for chest pain, palpitations or peripheral edema  GI: NEGATIVE for nausea, abdominal pain, heartburn, or change in bowel habits  : NEGATIVE for frequency, dysuria, or hematuria  MUSCULOSKELETAL: NEGATIVE for significant arthralgias or myalgia  NEURO: NEGATIVE for weakness, dizziness or paresthesias  ENDOCRINE: NEGATIVE for temperature intolerance, skin/hair changes  HEME: NEGATIVE for bleeding problems  PSYCHIATRIC: NEGATIVE for changes in mood or affect    Patient Active Problem List    Diagnosis Date Noted     Hyperparathyroidism (H) 06/18/2021     Priority: Medium     Added automatically from request for surgery [id number]       Lumbar radiculopathy 03/22/2021     Priority: Medium     Added automatically from request for surgery [id number]       Other cervical disc degeneration at C5-C6 level 01/18/2021     Priority: Medium     Osteoarthrosis, hand 01/18/2021     Priority: Medium     Laryngeal disorder 01/18/2021     Priority: Medium     Laryngeal dysfunction       Precancerous lesion 01/18/2021     Priority: Medium     History of retinal detachment 01/18/2021     Priority: Medium     Hoarseness 12/18/2020     Priority: Medium     Cervical radiculopathy, chronic 11/03/2020     Priority: Medium     Chronic pain disorder 11/03/2020     Priority: Medium     Lumbar radiculopathy, chronic 11/03/2020     Priority: Medium     Intervertebral disc disorders with radiculopathy, lumbar region 07/08/2020     Priority: Medium     Spondylosis without myelopathy or  radiculopathy, lumbar region 07/08/2020     Priority: Medium     Primary localized osteoarthritis of knees, bilateral 07/08/2020     Priority: Medium     FAHAD treated with BiPAP 06/13/2018     Priority: Medium     Hyperlipidemia 06/13/2018     Priority: Medium     Sarcoidosis of lung (H) 05/01/2008     Priority: Medium     No problems for 5 years (as of 2021)       Depression 03/01/2008     Priority: Medium      Past Medical History:   Diagnosis Date     Chronic osteoarthritis      Depressive disorder      Female bladder prolapse 9/3/2019     Prolapse of female pelvic organs 8/19/2019     Sleep apnea      Past Surgical History:   Procedure Laterality Date     APPENDECTOMY       diskectomy in toe       HYSTERECTOMY  08/2017    and bladder surgery     INJECT EPIDURAL LUMBAR Right 4/27/2021    Procedure: Right Lumbar 5- Sacral 1 transforaminal epidural steroid injection with fluoroscopy and conscious sedation.;  Surgeon: Tiera Santana MD;  Location: [facility] OR     left rotator cuff surgery       SINUS SURGERY      x2 in 2013 and 2018     TONSILLECTOMY  1955     Current Outpatient Medications   Medication Sig Dispense Refill     acetaminophen (TYLENOL) 500 MG tablet Take 1,000 mg by mouth       albuterol (PROAIR HFA/PROVENTIL HFA/VENTOLIN HFA) 108 (90 Base) MCG/ACT inhaler Inhale 2 puffs into the lungs       baclofen (LIORESAL) 10 MG tablet        calcium carbonate 500 mg, elemental, (OSCAL 500) 1250 (500 Ca) MG TABS tablet Take 1 tablet by mouth       Carboxymethylcellul-Glycerin 1-0.9 % GEL Apply 1 drop to eye       celecoxib (CELEBREX) 200 MG capsule        clonazePAM (KLONOPIN) 0.5 MG tablet        DULoxetine (CYMBALTA) 60 MG capsule        NONFORMULARY Vitamin Packet from [employer] including Multi-vitamin, Leutin, Calcium, Antioxidant, Fish oil, Glucosamine- Chondroitin: Take 1 packet by mouth daily       propranolol (INDERAL) 10 MG tablet        rosuvastatin (CRESTOR) 5 MG tablet Take 5 mg by mouth       tacrolimus  "(PROTOPIC) 0.1 % external ointment Apply to AA on the face BID PRN 30 g 5     traZODone (DESYREL) 100 MG tablet Take 100 mg by mouth       valACYclovir (VALTREX) 1000 mg tablet Take two tablets twice per day for one day for flare ups. 30 tablet 1       Allergies   Allergen Reactions     Propoxyphene Other (See Comments)     Clarithromycin Other (See Comments)     Pt states, \"ototoxicity\". Balance problems  Pt states, \"ototoxicity\".          Social History     Tobacco Use     Smoking status: Never Smoker     Smokeless tobacco: Never Used   Substance Use Topics     Alcohol use: Not Currently     Frequency: Monthly or less     Drinks per session: 1 or 2     Binge frequency: Never     Family History   Problem Relation Age of Onset     Myocardial Infarction Father         late 50s     Ovarian Cancer Sister      Cervical Cancer Sister      Lung Cancer Sister      Myocardial Infarction Paternal Uncle         late 50s     History   Drug Use Unknown         Objective     /58 (BP Location: Right arm, Patient Position: Sitting, Cuff Size: Adult Regular)   Pulse 86   Temp 98.6  F (37  C) (Oral)   Resp 16   Ht 1.57 m (5' 1.81\")   Wt 67.4 kg (148 lb 9.6 oz)   SpO2 97%   BMI 27.35 kg/m      Physical Exam    GENERAL APPEARANCE: healthy, alert and no distress     EYES: EOMI, PERRL     HENT: ear canals and TM's normal and nose and mouth without ulcers or lesions     NECK: no adenopathy, no asymmetry, masses, or scars and thyroid normal to palpation     RESP: lungs clear to auscultation - no rales, rhonchi or wheezes     CV: regular rates and rhythm, normal S1 S2, no S3 or S4 and no murmur, click or rub     ABDOMEN:  soft, nontender, no HSM or masses and bowel sounds normal     MS: extremities normal- no gross deformities noted, no evidence of inflammation in joints, FROM in all extremities.     SKIN: no suspicious lesions or rashes     NEURO: Normal strength and tone, sensory exam grossly normal, mentation intact and " speech normal     PSYCH: mentation appears normal. and affect normal/bright     LYMPHATICS: No cervical adenopathy    Recent Labs   Lab Test 03/03/21  1310 05/19/20   HGB 13.8  --      --      --    POTASSIUM 3.8 4.4   CR 0.84 1.11        Diagnostics:  Labs pending at this time.  Results will be reviewed when available.   EKG: appears normal, NSR, normal axis, normal intervals, no acute ST/T changes c/w ischemia, no LVH by voltage criteria    Revised Cardiac Risk Index (RCRI):  The patient has the following serious cardiovascular risks for perioperative complications:   - No serious cardiac risks = 0 points     RCRI Interpretation: 0 points: Class I (very low risk - 0.4% complication rate)           Signed Electronically by: JASWANT Johnson CNP  Copy of this evaluation report is provided to requesting physician.

## 2021-06-29 LAB
ALBUMIN SERPL-MCNC: 4 G/DL (ref 3.4–5)
ALP SERPL-CCNC: 77 U/L (ref 40–150)
ALT SERPL W P-5'-P-CCNC: 27 U/L (ref 0–50)
ANION GAP SERPL CALCULATED.3IONS-SCNC: 7 MMOL/L (ref 3–14)
AST SERPL W P-5'-P-CCNC: 25 U/L (ref 0–45)
BILIRUB SERPL-MCNC: 0.6 MG/DL (ref 0.2–1.3)
BUN SERPL-MCNC: 16 MG/DL (ref 7–30)
CALCIUM SERPL-MCNC: 10 MG/DL (ref 8.5–10.1)
CHLORIDE SERPL-SCNC: 102 MMOL/L (ref 94–109)
CO2 SERPL-SCNC: 25 MMOL/L (ref 20–32)
CREAT SERPL-MCNC: 0.91 MG/DL (ref 0.52–1.04)
FERRITIN SERPL-MCNC: 125 NG/ML (ref 8–252)
GFR SERPL CREATININE-BSD FRML MDRD: 63 ML/MIN/{1.73_M2}
GLUCOSE SERPL-MCNC: 98 MG/DL (ref 70–99)
IRON SATN MFR SERPL: 24 % (ref 15–46)
IRON SERPL-MCNC: 80 UG/DL (ref 35–180)
POTASSIUM SERPL-SCNC: 4 MMOL/L (ref 3.4–5.3)
PROT SERPL-MCNC: 6.8 G/DL (ref 6.8–8.8)
SODIUM SERPL-SCNC: 134 MMOL/L (ref 133–144)
TIBC SERPL-MCNC: 332 UG/DL (ref 240–430)

## 2021-07-06 ENCOUNTER — PATIENT OUTREACH (OUTPATIENT)
Dept: CARE COORDINATION | Facility: CLINIC | Age: 71
End: 2021-07-06

## 2021-07-06 NOTE — PROGRESS NOTES
Clinic Care Coordination Contact  [facility]/Voicemail  Left Voicemail     Clinical Data: Care Coordinator Outreach  Outreach attempted x 1.  Left message on patient's voicemail with call back information and requested return call.    Plan: Care Coordinator will try to reach patient again in 10 business days.    RAMY Cid, B.S. [facility] Care Coordination  [facility]:  [place], [place]  [phone]  [email or web address]

## 2021-07-14 ENCOUNTER — PATIENT OUTREACH (OUTPATIENT)
Dept: CARE COORDINATION | Facility: CLINIC | Age: 71
End: 2021-07-14

## 2021-07-14 ASSESSMENT — ACTIVITIES OF DAILY LIVING (ADL): DEPENDENT_IADLS:: INDEPENDENT

## 2021-07-14 NOTE — LETTER
[facility] CARE COORDINATION  [address]  [facility] [address]   July 14, 2021        Dinorah Rolon  [address] [address] [address]          Dear Cindy Copeland is an updated Complex Care Plan for your continued enrollment in Care Coordination. Please let us know if you have additional questions, concerns, or goals that we can assist with.    Sincerely,    CIARA Vaughn   Care Coordination Team  [phone]          [facility]  Complex Care Plan  About Me:    Patient Name:  Dinorah Rolon    YOB: 1950  Age:         71 year old   [place] MRN:    [id number] Telephone Information:  Home Phone [phone]   Mobile [phone]       Address:  [address] [address] [address] Email address:  [email or web address]      Emergency Contact(s)    Name Relationship Lgl Grd Work Phone Home Phone Mobile Phone   1. TEDDY ROLON Son   [phone]    2. LIBAN ALBERTS Brother   [phone]            Primary language:  English     needed? No   [place] [employer]:  [phone] op. 1  Other communication barriers: None  Preferred Method of Communication:     Current living arrangement: I live in a private home with spouse  Mobility Status/ Medical Equipment: Independent    Health Maintenance  Health Maintenance Reviewed:  There are no preventive care reminders to display for this patient.      My Access Plan  Medical Emergency 911   Primary Clinic Line [facility] - [phone]   24 Hour Appointment Line [phone] or  [phone]) (toll-free)   24 Hour Nurse Line [phone] (toll-free)   Preferred Urgent Care     Preferred Hospital [facility]  [phone]   Preferred Pharmacy [id number] - [place], [place] - [address] AT [place] [address]     Behavioral Health Crisis Line The National Suicide Prevention Lifeline  at 1-313.607.1371 or 910             My Care Team Members  Patient Care Team       Relationship Specialty Notifications Start End    Juju Chang APRN CNP PCP - General Family Medicine  4/9/21     Phone: [phone] Fax: [phone] [address] [facility] [address]    Latia Mesa, LSW Lead Care Coordinator Primary Care - CC  4/14/21     Phone: [phone]         Gudelia Abdi MA Community Health Worker   4/15/21     Camelia Weeks DPM, Podiatry/Foot and Ankle Surgery Assigned Musculoskeletal Provider   4/18/21     Phone: [phone] Pager: [phone] Fax: [phone]        [address] [facility] [address] [facility] [address]    Sherine Ramon PA-C Assigned Sleep Provider   4/25/21     Phone: [phone] Fax: [phone] [address] [facility] [address]    James Toribio MD Assigned PCP   5/27/21     Phone: [phone] Fax: [phone] [address] [facility] [address]    Gregoria Wilcox MD Assigned Surgical Provider   6/27/21     Phone: [phone] Pager: [phone] Fax: [phone]        SURGICAL CONSULTS, ARNOLDO [address] [facility] [address] [facility] [address]            My Care Plans  Self Management and Treatment Plan  Goals and (Comments)  Goals        General     Medical (pt-stated)      Notes - Note created  5/25/2021 11:54 AM by Gudelia Abdi MA     Goal Statement: I will complete necessary appointments to manage my physical health and well being.   Date Goal set: 5/25/21  Barriers: I am often busy with many appointments and caring for my .  Strengths: I am motivated and knowledgeable about different specialty providers.   Date to Achieve By: 8/25/21  Patient expressed understanding of goal: Yes  Action steps to achieve this goal:  1. I will receive information from my CHW and schedule with orthopedics and assess my therapy options- PT, OT, I am interested in aquatic therapy.  [email or web address]  2. I will attend appointments.   3. I will contact my CHW, Gudelia Abdi with any questions or concerns.            Mental Health Management (pt-stated)      Notes - Note created  4/15/2021  8:46 AM by Latia Mesa LSW     Goal Statement: In the next five months I will receive caregiver support.  Date Goal set: 4/15/21  Barriers: COVID 19 has impacted what is available.  Strengths: I currently receive [facility]  Date to Achieve By: 8/30/21  Patient expressed understanding of goal: Yes  Action steps to achieve this goal:  1. I will begin seeing a therapist for emotional support.  2. I will look in to private home care options for additional support and respite.  Care coordinator will email me a list of resources.  3. Care Coordination team and I will check in monthly to see how I am doing with this goal and will offer added resources and support as needed.                 Action Plans on File:                       Advance Care Plans/Directives Type:   Honoring Choices    Advance Care Planning and Health Care Directives  When it comes to decisions about your health care, it s important that your voice is heard. You may not always be able to speak for yourself.    We encourage you to have discussions with your loved ones, cultural or spiritual leaders and health care providers about your goals, values, beliefs and choices.    We are a part of [employer] [place] , supporting and promoting the benefits of advance care planning conversations.    Our goals are to:    Help you make informed decisions about your healthcare choices and ensure that those choices are honored    Offer advance care planning discussions with trained staff    Ensure your choices are clearly defined, documented and available in your medical record    Translate your choices into medical orders as needed    Why is Advance Care Planning important?    Know what your  health care choices are and decide what is right for you    An unexpected illness or injury could leave you unable to participate in important treatment decisions    When choices are left to others to decide that responsibility can be difficult and stressful    By discussing and outlining your choices, your voice is heard in the care you want to receive    How can I learn more?  We offer free classes at multiple locations, days and times. Our trained facilitators will provide information and guide you through a Health Care Directive document. They can also review, notarize and add your document to your medical record.    Call [employer] at [phone] or toll free at [phone] for assistance.       My Medical and Care Information  Problem List   Patient Active Problem List   Diagnosis     Cervical radiculopathy, chronic     Chronic pain disorder     Intervertebral disc disorders with radiculopathy, lumbar region     Lumbar radiculopathy, chronic     FAHAD treated with BiPAP     Other cervical disc degeneration at C5-C6 level     Spondylosis without myelopathy or radiculopathy, lumbar region     Osteoarthrosis, hand     Primary localized osteoarthritis of knees, bilateral     Hyperlipidemia     Depression     Sarcoidosis of lung (H)     Hoarseness     Laryngeal disorder     Precancerous lesion     History of retinal detachment     Lumbar radiculopathy     Hyperparathyroidism (H)      Current Medications and Allergies:  See printed Medication Report.    Care Coordination Start Date: 4/14/2021   Frequency of Care Coordination: monthly   Form Last Updated: 07/14/2021

## 2021-07-14 NOTE — PROGRESS NOTES
Clinic Care Coordination Contact  Care Coordination Clinician Chart Review  Situation: Patient chart reviewed by care coordinator.       Background: Care Coordination initial assessment and enrollment to Care Coordination was 4/14/21.   Patient centered goals were developed with participation from patient.  SHAYY CHAUHAN handed patient off to CHW for continued outreach every 30 days.        Assessment: Per chart review, patient outreach attempt made  by CC CHW on 7/6/21 and CHW was unable to reach her.  .    Patient is due for updated Complex Care Plan.  Annual assessment will be due 4/12/22.      Goals        Patient Stated       Medical (pt-stated)       Goal Statement: I will complete necessary appointments to manage my physical health and well being.   Date Goal set: 5/25/21  Barriers: I am often busy with many appointments and caring for my .  Strengths: I am motivated and knowledgeable about different specialty providers.   Date to Achieve By: 8/25/21  Patient expressed understanding of goal: Yes  Action steps to achieve this goal:  1. I will receive information from my CHW and schedule with orthopedics and assess my therapy options- PT, OT, I am interested in aquatic therapy. [email or web address]  2. I will attend appointments.   3. I will contact my CHW, Gudelia Abdi with any questions or concerns.              Mental Health Management (pt-stated)       Goal Statement: In the next five months I will receive caregiver support.  Date Goal set: 4/15/21  Barriers: COVID 19 has impacted what is available.  Strengths: I currently receive [facility]  Date to Achieve By: 8/30/21  Patient expressed understanding of goal: Yes  Action steps to achieve this goal:  1. I will begin seeing a therapist for emotional support.  2. I will look in to private home care options for additional support and respite.  Care coordinator will email me a list of resources.  3. Care  Coordination team and I will check in monthly to see how I am doing with this goal and will offer added resources and support as needed.                    Plan/Recommendations: CHW will make one more attempt to reach patient and if unable to reach her will  disenroll patient.  The patient will continue working with Care Coordination to achieve goals as above.  CHW will involve SW CC as needed or if patient is ready to move to maintenance.  SW CC will continue to monitor progress to goals and CHW outreaches every 6 weeks.   Care Plan updated and mailed to patient: Yes,     CIARA Vaughn Care Coordination Team  [phone]

## 2021-07-19 ENCOUNTER — LAB (OUTPATIENT)
Dept: LAB | Facility: CLINIC | Age: 71
End: 2021-07-19
Payer: MEDICARE

## 2021-07-19 DIAGNOSIS — Z11.59 ENCOUNTER FOR SCREENING FOR OTHER VIRAL DISEASES: ICD-10-CM

## 2021-07-19 PROCEDURE — U0003 INFECTIOUS AGENT DETECTION BY NUCLEIC ACID (DNA OR RNA); SEVERE ACUTE RESPIRATORY SYNDROME CORONAVIRUS 2 (SARS-COV-2) (CORONAVIRUS DISEASE [COVID-19]), AMPLIFIED PROBE TECHNIQUE, MAKING USE OF HIGH THROUGHPUT TECHNOLOGIES AS DESCRIBED BY CMS-2020-01-R: HCPCS

## 2021-07-19 PROCEDURE — U0005 INFEC AGEN DETEC AMPLI PROBE: HCPCS

## 2021-07-19 NOTE — PHARMACY-ADMISSION MEDICATION HISTORY
Admission medication history interview status for this patient is complete. See [facility] admission navigator for allergy information, prior to admission medications and immunization status.     PTA meds completed by pre-admitting nurse Mary Mendoza and reviewed by pharmacy       Prior to Admission medications    Medication Sig Last Dose Taking? Auth Provider   acetaminophen (TYLENOL) 500 MG tablet Take 1,000 mg by mouth 3 times daily as needed for pain   Yes Reported, Patient   albuterol (PROAIR HFA/PROVENTIL HFA/VENTOLIN HFA) 108 (90 Base) MCG/ACT inhaler Inhale 2 puffs into the lungs every 4 hours as needed   Yes Reported, Patient   baclofen (LIORESAL) 10 MG tablet Take 10 mg by mouth daily as needed   Yes Reported, Patient   calcium carbonate 500 mg, elemental, (OSCAL 500) 1250 (500 Ca) MG TABS tablet Take 1 tablet by mouth daily   Yes Reported, Patient   Carboxymethylcellul-Glycerin 1-0.9 % GEL Place 1 drop into both eyes daily   Yes Reported, Patient   celecoxib (CELEBREX) 200 MG capsule Take 200 mg by mouth 2 times daily   Yes Reported, Patient   clonazePAM (KLONOPIN) 0.5 MG tablet Take 0.5 mg by mouth nightly as needed   Yes Reported, Patient   DULoxetine (CYMBALTA) 60 MG capsule Take 60 mg by mouth daily   Yes Reported, Patient   NONFORMULARY Vitamin Packet from [employer] including Multi-vitamin, Leutin, Calcium, Antioxidant, Fish oil, Glucosamine- Chondroitin: Take 1 packet by mouth daily  Yes Reported, Patient   propranolol (INDERAL) 10 MG tablet Take 10 mg by mouth daily as needed   Yes Reported, Patient   rosuvastatin (CRESTOR) 5 MG tablet Take 5 mg by mouth every other day   Yes Reported, Patient   tacrolimus (PROTOPIC) 0.1 % external ointment Apply to AA on the face BID PRN  Yes Gregoria Olmos PA-C   valACYclovir (VALTREX) 1000 mg tablet Take two tablets twice per day for one day for flare ups.  Yes Coming Jayna Aviles MD   traZODone (DESYREL) 100 MG tablet Take 100 mg by mouth nightly as  needed    Reported, Patient

## 2021-07-20 LAB — SARS-COV-2 RNA RESP QL NAA+PROBE: NEGATIVE

## 2021-07-21 ENCOUNTER — ANESTHESIA (OUTPATIENT)
Dept: SURGERY | Facility: CLINIC | Age: 71
End: 2021-07-21
Payer: MEDICARE

## 2021-07-21 ENCOUNTER — ANESTHESIA EVENT (OUTPATIENT)
Dept: SURGERY | Facility: CLINIC | Age: 71
End: 2021-07-21
Payer: MEDICARE

## 2021-07-21 ENCOUNTER — HOSPITAL ENCOUNTER (OUTPATIENT)
Facility: CLINIC | Age: 71
Discharge: HOME | End: 2021-07-21
Attending: SURGERY | Admitting: SURGERY
Payer: MEDICARE

## 2021-07-21 ENCOUNTER — APPOINTMENT (OUTPATIENT)
Dept: SURGERY | Facility: PHYSICIAN GROUP | Age: 71
End: 2021-07-21
Payer: MEDICARE

## 2021-07-21 VITALS
HEART RATE: 80 BPM | BODY MASS INDEX: 27.94 KG/M2 | DIASTOLIC BLOOD PRESSURE: 75 MMHG | SYSTOLIC BLOOD PRESSURE: 138 MMHG | WEIGHT: 148 LBS | TEMPERATURE: 97.8 F | OXYGEN SATURATION: 98 % | RESPIRATION RATE: 14 BRPM | HEIGHT: 61 IN

## 2021-07-21 DIAGNOSIS — E21.3 HYPERPARATHYROIDISM (H): ICD-10-CM

## 2021-07-21 LAB
PTH-INTACT SERPL-MCNC: 24 PG/ML (ref 18–80)
PTH-INTACT SERPL-MCNC: 35 PG/ML (ref 18–80)
PTH-INTACT SERPL-MCNC: 82 PG/ML (ref 18–80)

## 2021-07-21 PROCEDURE — 370N000017 HC ANESTHESIA TECHNICAL FEE, PER MIN: Performed by: SURGERY

## 2021-07-21 PROCEDURE — 250N000009 HC RX 250: Performed by: ANESTHESIOLOGY

## 2021-07-21 PROCEDURE — 88331 PATH CONSLTJ SURG 1 BLK 1SPC: CPT | Mod: TC | Performed by: SURGERY

## 2021-07-21 PROCEDURE — 250N000009 HC RX 250: Performed by: NURSE ANESTHETIST, CERTIFIED REGISTERED

## 2021-07-21 PROCEDURE — 710N000009 HC RECOVERY PHASE 1, LEVEL 1, PER MIN: Performed by: SURGERY

## 2021-07-21 PROCEDURE — 250N000011 HC RX IP 250 OP 636: Performed by: NURSE ANESTHETIST, CERTIFIED REGISTERED

## 2021-07-21 PROCEDURE — 60500 EXPLORE PARATHYROID GLANDS: CPT | Mod: AS | Performed by: PHYSICIAN ASSISTANT

## 2021-07-21 PROCEDURE — 36415 COLL VENOUS BLD VENIPUNCTURE: CPT | Performed by: PHYSICIAN ASSISTANT

## 2021-07-21 PROCEDURE — 272N000001 HC OR GENERAL SUPPLY STERILE: Performed by: SURGERY

## 2021-07-21 PROCEDURE — 250N000013 HC RX MED GY IP 250 OP 250 PS 637: Performed by: ANESTHESIOLOGY

## 2021-07-21 PROCEDURE — 258N000003 HC RX IP 258 OP 636: Performed by: ANESTHESIOLOGY

## 2021-07-21 PROCEDURE — 250N000011 HC RX IP 250 OP 636: Performed by: PHYSICIAN ASSISTANT

## 2021-07-21 PROCEDURE — 710N000012 HC RECOVERY PHASE 2, PER MINUTE: Performed by: SURGERY

## 2021-07-21 PROCEDURE — 250N000011 HC RX IP 250 OP 636: Performed by: ANESTHESIOLOGY

## 2021-07-21 PROCEDURE — 360N000076 HC SURGERY LEVEL 3, PER MIN: Performed by: SURGERY

## 2021-07-21 PROCEDURE — 83970 ASSAY OF PARATHORMONE: CPT | Performed by: PHYSICIAN ASSISTANT

## 2021-07-21 PROCEDURE — 258N000003 HC RX IP 258 OP 636: Performed by: NURSE ANESTHETIST, CERTIFIED REGISTERED

## 2021-07-21 PROCEDURE — 83970 ASSAY OF PARATHORMONE: CPT | Mod: 91 | Performed by: SURGERY

## 2021-07-21 PROCEDURE — 60500 EXPLORE PARATHYROID GLANDS: CPT | Performed by: SURGERY

## 2021-07-21 PROCEDURE — 999N000141 HC STATISTIC PRE-PROCEDURE NURSING ASSESSMENT: Performed by: SURGERY

## 2021-07-21 RX ORDER — OXYCODONE HYDROCHLORIDE 5 MG/1
5-10 TABLET ORAL EVERY 4 HOURS PRN
Qty: 10 TABLET | Refills: 0 | Status: SHIPPED | OUTPATIENT
Start: 2021-07-21 | End: 2021-08-03

## 2021-07-21 RX ORDER — ONDANSETRON 4 MG/1
4 TABLET, ORALLY DISINTEGRATING ORAL
Status: DISCONTINUED | OUTPATIENT
Start: 2021-07-21 | End: 2021-07-21 | Stop reason: HOSPADM

## 2021-07-21 RX ORDER — CEFAZOLIN SODIUM 2 G/100ML
2 INJECTION, SOLUTION INTRAVENOUS SEE ADMIN INSTRUCTIONS
Status: DISCONTINUED | OUTPATIENT
Start: 2021-07-21 | End: 2021-07-21 | Stop reason: HOSPADM

## 2021-07-21 RX ORDER — EPHEDRINE SULFATE 50 MG/ML
INJECTION, SOLUTION INTRAMUSCULAR; INTRAVENOUS; SUBCUTANEOUS PRN
Status: DISCONTINUED | OUTPATIENT
Start: 2021-07-21 | End: 2021-07-21

## 2021-07-21 RX ORDER — FENTANYL CITRATE 50 UG/ML
50 INJECTION, SOLUTION INTRAMUSCULAR; INTRAVENOUS EVERY 5 MIN PRN
Status: DISCONTINUED | OUTPATIENT
Start: 2021-07-21 | End: 2021-07-21 | Stop reason: HOSPADM

## 2021-07-21 RX ORDER — ACETAMINOPHEN 325 MG/1
975 TABLET ORAL ONCE
Status: COMPLETED | OUTPATIENT
Start: 2021-07-21 | End: 2021-07-21

## 2021-07-21 RX ORDER — ACETAMINOPHEN 500 MG
1000 TABLET ORAL EVERY 6 HOURS PRN
COMMUNITY
Start: 2021-07-21 | End: 2021-07-30

## 2021-07-21 RX ORDER — OXYCODONE HYDROCHLORIDE 5 MG/1
5 TABLET ORAL EVERY 4 HOURS PRN
Status: DISCONTINUED | OUTPATIENT
Start: 2021-07-21 | End: 2021-07-21 | Stop reason: HOSPADM

## 2021-07-21 RX ORDER — LIDOCAINE 40 MG/G
CREAM TOPICAL
Status: DISCONTINUED | OUTPATIENT
Start: 2021-07-21 | End: 2021-07-21 | Stop reason: HOSPADM

## 2021-07-21 RX ORDER — PROPOFOL 10 MG/ML
INJECTION, EMULSION INTRAVENOUS PRN
Status: DISCONTINUED | OUTPATIENT
Start: 2021-07-21 | End: 2021-07-21

## 2021-07-21 RX ORDER — OXYCODONE HYDROCHLORIDE 5 MG/1
5 TABLET ORAL
Status: DISCONTINUED | OUTPATIENT
Start: 2021-07-21 | End: 2021-07-21 | Stop reason: HOSPADM

## 2021-07-21 RX ORDER — CEFAZOLIN SODIUM 2 G/100ML
2 INJECTION, SOLUTION INTRAVENOUS
Status: COMPLETED | OUTPATIENT
Start: 2021-07-21 | End: 2021-07-21

## 2021-07-21 RX ORDER — ONDANSETRON 4 MG/1
4 TABLET, ORALLY DISINTEGRATING ORAL EVERY 30 MIN PRN
Status: DISCONTINUED | OUTPATIENT
Start: 2021-07-21 | End: 2021-07-21 | Stop reason: HOSPADM

## 2021-07-21 RX ORDER — ACETAMINOPHEN 325 MG/1
650 TABLET ORAL
Status: DISCONTINUED | OUTPATIENT
Start: 2021-07-21 | End: 2021-07-21 | Stop reason: HOSPADM

## 2021-07-21 RX ORDER — ACETAMINOPHEN 500 MG
1000 TABLET ORAL EVERY 6 HOURS PRN
COMMUNITY
Start: 2021-07-21

## 2021-07-21 RX ORDER — ONDANSETRON 2 MG/ML
INJECTION INTRAMUSCULAR; INTRAVENOUS PRN
Status: DISCONTINUED | OUTPATIENT
Start: 2021-07-21 | End: 2021-07-21

## 2021-07-21 RX ORDER — SODIUM CHLORIDE, SODIUM LACTATE, POTASSIUM CHLORIDE, CALCIUM CHLORIDE 600; 310; 30; 20 MG/100ML; MG/100ML; MG/100ML; MG/100ML
INJECTION, SOLUTION INTRAVENOUS CONTINUOUS
Status: DISCONTINUED | OUTPATIENT
Start: 2021-07-21 | End: 2021-07-21 | Stop reason: HOSPADM

## 2021-07-21 RX ORDER — KETOROLAC TROMETHAMINE 15 MG/ML
15 INJECTION, SOLUTION INTRAMUSCULAR; INTRAVENOUS ONCE
Status: COMPLETED | OUTPATIENT
Start: 2021-07-21 | End: 2021-07-21

## 2021-07-21 RX ORDER — DEXAMETHASONE SODIUM PHOSPHATE 4 MG/ML
INJECTION, SOLUTION INTRA-ARTICULAR; INTRALESIONAL; INTRAMUSCULAR; INTRAVENOUS; SOFT TISSUE PRN
Status: DISCONTINUED | OUTPATIENT
Start: 2021-07-21 | End: 2021-07-21

## 2021-07-21 RX ORDER — ONDANSETRON 2 MG/ML
4 INJECTION INTRAMUSCULAR; INTRAVENOUS EVERY 30 MIN PRN
Status: DISCONTINUED | OUTPATIENT
Start: 2021-07-21 | End: 2021-07-21 | Stop reason: HOSPADM

## 2021-07-21 RX ORDER — HYDROMORPHONE HCL IN WATER/PF 6 MG/30 ML
0.2 PATIENT CONTROLLED ANALGESIA SYRINGE INTRAVENOUS EVERY 5 MIN PRN
Status: DISCONTINUED | OUTPATIENT
Start: 2021-07-21 | End: 2021-07-21 | Stop reason: HOSPADM

## 2021-07-21 RX ORDER — FENTANYL CITRATE 50 UG/ML
INJECTION, SOLUTION INTRAMUSCULAR; INTRAVENOUS PRN
Status: DISCONTINUED | OUTPATIENT
Start: 2021-07-21 | End: 2021-07-21

## 2021-07-21 RX ORDER — LABETALOL HYDROCHLORIDE 5 MG/ML
10 INJECTION, SOLUTION INTRAVENOUS EVERY 10 MIN PRN
Status: DISCONTINUED | OUTPATIENT
Start: 2021-07-21 | End: 2021-07-21 | Stop reason: HOSPADM

## 2021-07-21 RX ORDER — CALCIUM CARBONATE 500(1250)
TABLET ORAL
Qty: 42 TABLET | Refills: 0 | Status: SHIPPED | OUTPATIENT
Start: 2021-07-21 | End: 2021-08-04

## 2021-07-21 RX ORDER — MEPERIDINE HYDROCHLORIDE 25 MG/ML
12.5 INJECTION INTRAMUSCULAR; INTRAVENOUS; SUBCUTANEOUS
Status: DISCONTINUED | OUTPATIENT
Start: 2021-07-21 | End: 2021-07-21 | Stop reason: HOSPADM

## 2021-07-21 RX ADMIN — Medication 10 MG: at 08:00

## 2021-07-21 RX ADMIN — LIDOCAINE HYDROCHLORIDE 5 ML: 10 INJECTION, SOLUTION EPIDURAL; INFILTRATION; INTRACAUDAL; PERINEURAL at 07:46

## 2021-07-21 RX ADMIN — Medication 5 MG: at 08:44

## 2021-07-21 RX ADMIN — KETOROLAC TROMETHAMINE 15 MG: 15 INJECTION, SOLUTION INTRAMUSCULAR; INTRAVENOUS at 10:31

## 2021-07-21 RX ADMIN — FENTANYL CITRATE 50 MCG: 50 INJECTION, SOLUTION INTRAMUSCULAR; INTRAVENOUS at 09:42

## 2021-07-21 RX ADMIN — CEFAZOLIN SODIUM 2 G: 2 INJECTION, SOLUTION INTRAVENOUS at 07:35

## 2021-07-21 RX ADMIN — PROPOFOL 20 MG: 10 INJECTION, EMULSION INTRAVENOUS at 09:01

## 2021-07-21 RX ADMIN — FENTANYL CITRATE 100 MCG: 50 INJECTION, SOLUTION INTRAMUSCULAR; INTRAVENOUS at 07:55

## 2021-07-21 RX ADMIN — ACETAMINOPHEN 975 MG: 325 TABLET, FILM COATED ORAL at 10:13

## 2021-07-21 RX ADMIN — Medication 5 MG: at 08:50

## 2021-07-21 RX ADMIN — PHENYLEPHRINE HYDROCHLORIDE 50 MCG: 10 INJECTION INTRAVENOUS at 08:10

## 2021-07-21 RX ADMIN — HYDROMORPHONE HYDROCHLORIDE 0.2 MG: 0.2 INJECTION, SOLUTION INTRAMUSCULAR; INTRAVENOUS; SUBCUTANEOUS at 10:14

## 2021-07-21 RX ADMIN — ROCURONIUM BROMIDE 5 MG: 10 INJECTION INTRAVENOUS at 07:46

## 2021-07-21 RX ADMIN — PHENYLEPHRINE HYDROCHLORIDE 50 MCG: 10 INJECTION INTRAVENOUS at 08:12

## 2021-07-21 RX ADMIN — Medication 10 MG: at 08:03

## 2021-07-21 RX ADMIN — DEXAMETHASONE SODIUM PHOSPHATE 4 MG: 4 INJECTION, SOLUTION INTRA-ARTICULAR; INTRALESIONAL; INTRAMUSCULAR; INTRAVENOUS; SOFT TISSUE at 07:47

## 2021-07-21 RX ADMIN — FENTANYL CITRATE 50 MCG: 50 INJECTION, SOLUTION INTRAMUSCULAR; INTRAVENOUS at 09:53

## 2021-07-21 RX ADMIN — PHENYLEPHRINE HYDROCHLORIDE 50 MCG: 10 INJECTION INTRAVENOUS at 08:56

## 2021-07-21 RX ADMIN — ONDANSETRON 4 MG: 2 INJECTION INTRAMUSCULAR; INTRAVENOUS at 09:48

## 2021-07-21 RX ADMIN — Medication 5 MG: at 08:48

## 2021-07-21 RX ADMIN — PHENYLEPHRINE HYDROCHLORIDE 50 MCG: 10 INJECTION INTRAVENOUS at 08:50

## 2021-07-21 RX ADMIN — FENTANYL CITRATE 50 MCG: 50 INJECTION, SOLUTION INTRAMUSCULAR; INTRAVENOUS at 09:05

## 2021-07-21 RX ADMIN — PHENYLEPHRINE HYDROCHLORIDE 50 MCG: 10 INJECTION INTRAVENOUS at 08:44

## 2021-07-21 RX ADMIN — SODIUM CHLORIDE, POTASSIUM CHLORIDE, SODIUM LACTATE AND CALCIUM CHLORIDE: 600; 310; 30; 20 INJECTION, SOLUTION INTRAVENOUS at 08:50

## 2021-07-21 RX ADMIN — PROPOFOL 150 MG: 10 INJECTION, EMULSION INTRAVENOUS at 07:46

## 2021-07-21 RX ADMIN — PROPOFOL 20 MG: 10 INJECTION, EMULSION INTRAVENOUS at 09:03

## 2021-07-21 RX ADMIN — HYDROMORPHONE HYDROCHLORIDE 0.2 MG: 0.2 INJECTION, SOLUTION INTRAMUSCULAR; INTRAVENOUS; SUBCUTANEOUS at 09:46

## 2021-07-21 RX ADMIN — FENTANYL CITRATE 50 MCG: 50 INJECTION, SOLUTION INTRAMUSCULAR; INTRAVENOUS at 07:46

## 2021-07-21 RX ADMIN — PHENYLEPHRINE HYDROCHLORIDE 100 MCG: 10 INJECTION INTRAVENOUS at 08:39

## 2021-07-21 RX ADMIN — PHENYLEPHRINE HYDROCHLORIDE 50 MCG: 10 INJECTION INTRAVENOUS at 08:06

## 2021-07-21 RX ADMIN — Medication 60 MG: at 07:47

## 2021-07-21 RX ADMIN — SODIUM CHLORIDE, POTASSIUM CHLORIDE, SODIUM LACTATE AND CALCIUM CHLORIDE: 600; 310; 30; 20 INJECTION, SOLUTION INTRAVENOUS at 07:00

## 2021-07-21 RX ADMIN — Medication 5 MG: at 08:53

## 2021-07-21 RX ADMIN — PROPOFOL 10 MG: 10 INJECTION, EMULSION INTRAVENOUS at 08:59

## 2021-07-21 RX ADMIN — ONDANSETRON HYDROCHLORIDE 4 MG: 2 INJECTION, SOLUTION INTRAVENOUS at 09:18

## 2021-07-21 RX ADMIN — PROPOFOL 60 MCG/KG/MIN: 10 INJECTION, EMULSION INTRAVENOUS at 08:04

## 2021-07-21 RX ADMIN — PROPOFOL 10 MG: 10 INJECTION, EMULSION INTRAVENOUS at 09:19

## 2021-07-21 ASSESSMENT — MIFFLIN-ST. JEOR: SCORE: 1123.7

## 2021-07-21 NOTE — ANESTHESIA PROCEDURE NOTES
Airway       Patient location during procedure: OR       Procedure Start/Stop Times: 7/21/2021 7:51 AM  Staff -        CRNA: George Carrasquillo APRN CRNA       Performed By: CRNA  Consent for Airway        Urgency: elective  Indications and Patient Condition       Indications for airway management: jonn-procedural       Induction type:intravenous       Mask difficulty assessment: 1 - vent by mask    Final Airway Details       Final airway type: endotracheal airway       Successful airway: ETT - single, NIM and Oral  Endotracheal Airway Details        ETT size (mm): 7.0       Cuffed: yes       Successful intubation technique: video laryngoscopy       VL Blade Size: Glidescope 3       Grade View of Cords: 1       Adjucts: stylet       Position: Center       Bite block used: Soft    Post intubation assessment        Placement verified by: capnometry, equal breath sounds and chest rise        Number of attempts at approach: 1       Number of other approaches attempted: 0       Secured with: plastic tape       Ease of procedure: easy       Dentition: Intact and Unchanged

## 2021-07-21 NOTE — OP NOTE
General Surgery Operative Note    PREOPERATIVE DIAGNOSIS:  Primary hyperparathyroidism.    POSTOPERATIVE DIAGNOSIS:  Primary hyperparathyroidism.    PROCEDURE: Excision of left superior parathyroid adenoma, Unilateral neck exploration.    ANESTHESIA:  General.    PREOPERATIVE MEDICATIONS:  Ancef 2 gm.    SURGEON:  Gregoria Wilcox MD    ASSISTANT:  Yury Ward PA-C  - the physician assistant was medically necessary in providing adequate exposure in the operating field, maintaining hemostasis, cutting suture, clamping and ligating blood vessels, and visualization of the anatomic structures throughout the surgical procedure.     ESTIMATED BLOOD LOSS:  10 ml    INDICATIONS:  Dinorah Thompson is a 71 year old female who has primary hyperparathyroidism. She has osteoporosis.  She has been found to have an elevated calcium of 10.3.  She has a localizing sestamibi in the left superior neck.  She presents today for neck exploration, excision of parathyroid adenoma.  Her PTH preoperatively is 82.    DESCRIPTION OF PROCEDURE:  The patient was placed supine, head and neck in extension and a bump behind the scapulae.  A suitable transverse cervical neck crease was identified and marked. A time-out was performed verifying correct patient and procedure. A transverse incision was made sharply, then deepened to the level of the platysma with electrocautery. The platysma was divided and superior and inferior subplatysmal flaps were raised.  Midline fascia opened and reflected to the left.  An abnormal parathyroid was identified at the left superior location.  It was meticulously dissected from the surrounding tissue and submitted for frozen section which confirmed a 325 gram hypercellular parathyroid.  We then explored the left inferior neck.  During the exploration, I encountered the second parathyroid. It was not biopsied but appeared to represent normal parathyroid tissue. The site was irrigated and inspected for hemostasis.   The PTH assays were sent at 10 and 20 minutes post-excision and the first value came back at 35, signifying the completeness of the dissection.  The patient's incision site was then closed with interrupted 3-0 Vicryl for the midline fascia, running 3-0 vicryl for platysma and 4-0 subcuticular Monocryl for skin.  The incision was covered with skin glue and a steri-strip. The patient transferred to recovery in good condition.    INTRAOPERATIVE FINDINGS:  1.  A 325 milligram hypercellular left superior parathyroid correlated nicely with sestamibi scan.  2.  Second left-sided parathyroid appeared to be grossly normal.  3.  PTH assay diminished from 82 to 35 at 10 minutes post excision.  4.  Grossly normal thyroid.    Specimens:   ID Type Source Tests Collected by Time Destination   1 : left superior parathyroid Tissue Parathyroid, Superior, Left SURGICAL PATHOLOGY EXAM Gregoria Wilcox MD 7/21/2021  8:31 AM    A : intraoperative parathyroid hormone blood # *[id number] Blood Vein PARATHYROID HORMONE INTACT INTRAOPERATIVE Gregoria Wilcox MD 7/21/2021  8:40 AM    B : intraoperative parathyroid hormone blood Blood Vein PARATHYROID HORMONE INTACT INTRAOPERATIVE Gregoria Wilcox MD 7/21/2021  8:50 AM        Gregoria Wilcox MD

## 2021-07-21 NOTE — ANESTHESIA CARE TRANSFER NOTE
Patient: Dinorah Thompson    Procedure(s):  PARATHYROIDECTOMY    Diagnosis: Hyperparathyroidism (H) [E21.3]  Diagnosis Additional Information: No value filed.    Anesthesia Type:   General     Note:    Oropharynx: oropharynx clear of all foreign objects and spontaneously breathing  Level of Consciousness: awake  Oxygen Supplementation: face mask  Level of Supplemental Oxygen (L/min / FiO2): 8  Independent Airway: airway patency satisfactory and stable  Dentition: dentition unchanged  Vital Signs Stable: post-procedure vital signs reviewed and stable  Report to RN Given: handoff report given  Patient transferred to: PACU    Handoff Report: Identifed the Patient, Identified the Reponsible Provider, Reviewed the pertinent medical history, Discussed the surgical course, Reviewed Intra-OP anesthesia mangement and issues during anesthesia, Set expectations for post-procedure period and Allowed opportunity for questions and acknowledgement of understanding      Vitals:  Vitals Value Taken Time   BP     Temp     Pulse 82 07/21/21 0933   Resp 9 07/21/21 0933   SpO2 100 % 07/21/21 0933   Vitals shown include unvalidated device data.    Electronically Signed By: JASWANT Madden CRNA  July 21, 2021  9:33 AM   - - -

## 2021-07-21 NOTE — ANESTHESIA POSTPROCEDURE EVALUATION
Patient: Dinorah Thompson    Procedure(s):  PARATHYROIDECTOMY    Diagnosis:Hyperparathyroidism (H) [E21.3]  Diagnosis Additional Information: No value filed.    Anesthesia Type:  General    Note:     Postop Pain Control: Uneventful            Sign Out: Well controlled pain   PONV: No   Neuro/Psych: Uneventful            Sign Out: Acceptable/Baseline neuro status   Airway/Respiratory: Uneventful            Sign Out: Acceptable/Baseline resp. status   CV/Hemodynamics: Uneventful            Sign Out: Acceptable CV status   Other NRE: NONE   DID A NON-ROUTINE EVENT OCCUR? No           Last vitals:  Vitals Value Taken Time   /70 07/21/21 0945   Temp     Pulse 79 07/21/21 0946   Resp 9 07/21/21 0946   SpO2 100 % 07/21/21 0946   Vitals shown include unvalidated device data.    Electronically Signed By: Garo Mckeon MD  July 21, 2021  9:47 AM

## 2021-07-21 NOTE — ANESTHESIA PREPROCEDURE EVALUATION
"Anesthesia Pre-Procedure Evaluation    Patient: Dinorah Thompson   MRN: [id number] : 1950        Preoperative Diagnosis: Hyperparathyroidism (H) [E21.3]   Procedure : Procedure(s):  PARATHYROIDECTOMY, POSSIBLE CERVICAL EXPLORATION     Past Medical History:   Diagnosis Date     Chronic osteoarthritis      Complication of anesthesia     DIfficulty waking up     Depressive disorder      Female bladder prolapse 9/3/2019     Parathyroid abnormality (H)      Prolapse of female pelvic organs 2019     Sleep apnea     Uses a Bi-Pap.. Will have son bring it if necessary on DOS      Past Surgical History:   Procedure Laterality Date     APPENDECTOMY       diskectomy in toe       HYSTERECTOMY  2017    and bladder surgery     INJECT EPIDURAL LUMBAR Right 2021    Procedure: Right Lumbar 5- Sacral 1 transforaminal epidural steroid injection with fluoroscopy and conscious sedation.;  Surgeon: Tiera Santana MD;  Location: [facility] OR     left rotator cuff surgery       SINUS SURGERY      x2 in  and      TONSILLECTOMY        Allergies   Allergen Reactions     Propoxyphene Other (See Comments)     Clarithromycin Other (See Comments)     Pt states, \"ototoxicity\". Balance problems  Pt states, \"ototoxicity\".        Social History     Tobacco Use     Smoking status: Never Smoker     Smokeless tobacco: Never Used   Substance Use Topics     Alcohol use: Not Currently      Wt Readings from Last 1 Encounters:   21 67.1 kg (148 lb)        Anesthesia Evaluation   Pt has had prior anesthetic. Type: General.    No history of anesthetic complications       ROS/MED HX  ENT/Pulmonary:     (+) sleep apnea,     Neurologic:  - neg neurologic ROS     Cardiovascular:  - neg cardiovascular ROS     METS/Exercise Tolerance:     Hematologic:  - neg hematologic  ROS     Musculoskeletal:   (+) arthritis,     GI/Hepatic:  - neg GI/hepatic ROS     Renal/Genitourinary:  - neg Renal ROS     Endo: Comment: Parathyroid " pathology      Psychiatric/Substance Use:     (+) psychiatric history depression     Infectious Disease:  - neg infectious disease ROS     Malignancy:  - neg malignancy ROS     Other:  - neg other ROS          Physical Exam    Airway        Mallampati: II       Respiratory Devices and Support         Dental  no notable dental history         Cardiovascular   cardiovascular exam normal          Pulmonary   pulmonary exam normal                OUTSIDE LABS:  CBC:   Lab Results   Component Value Date    WBC 5.1 06/28/2021    WBC 4.6 03/03/2021    HGB 13.8 06/28/2021    HGB 13.8 03/03/2021    HCT 40.8 06/28/2021    HCT 42.6 03/03/2021     06/28/2021     03/03/2021     BMP:   Lab Results   Component Value Date     06/28/2021     03/03/2021    POTASSIUM 4.0 06/28/2021    POTASSIUM 3.8 03/03/2021    CHLORIDE 102 06/28/2021    CHLORIDE 107 03/03/2021    CO2 25 06/28/2021    CO2 27 03/03/2021    BUN 16 06/28/2021    BUN 20 03/03/2021    CR 0.91 06/28/2021    CR 0.84 03/03/2021    GLC 98 06/28/2021    GLC 91 03/03/2021     COAGS: No results found for: PTT, INR, FIBR  POC: No results found for: BGM, HCG, HCGS  HEPATIC:   Lab Results   Component Value Date    ALBUMIN 4.0 06/28/2021    PROTTOTAL 6.8 06/28/2021    ALT 27 06/28/2021    AST 25 06/28/2021    ALKPHOS 77 06/28/2021    BILITOTAL 0.6 06/28/2021     OTHER:   Lab Results   Component Value Date    CARMELLA 10.0 06/28/2021    TSH 1.870 05/19/2020       Anesthesia Plan    ASA Status:  2   NPO Status:  NPO Appropriate    Anesthesia Type: General.     - Airway: ETT   Induction: Intravenous, Propofol.   Maintenance: Balanced.        Consents    Anesthesia Plan(s) and associated risks, benefits, and realistic alternatives discussed. Questions answered and patient/representative(s) expressed understanding.     - Discussed with:  Patient         Postoperative Care    Pain management: IV analgesics, Oral pain medications, Multi-modal analgesia.   PONV  prophylaxis: Ondansetron (or other 5HT-3), Dexamethasone or Solumedrol     Comments:                Garo Mckeon MD

## 2021-07-21 NOTE — DISCHARGE INSTRUCTIONS
GENERAL ANESTHESIA OR SEDATION ADULT DISCHARGE INSTRUCTIONS   SPECIAL PRECAUTIONS FOR 24 HOURS AFTER SURGERY    IT IS NOT UNUSUAL TO FEEL LIGHT-HEADED OR FAINT, UP TO 24 HOURS AFTER SURGERY OR WHILE TAKING PAIN MEDICATION.  IF YOU HAVE THESE SYMPTOMS; SIT FOR A FEW MINUTES BEFORE STANDING AND HAVE SOMEONE ASSIST YOU WHEN YOU GET UP TO WALK OR USE THE BATHROOM.    YOU SHOULD REST AND RELAX FOR THE NEXT 24 HOURS AND YOU MUST MAKE ARRANGEMENTS TO HAVE SOMEONE STAY WITH YOU FOR AT LEAST 24 HOURS AFTER YOUR DISCHARGE.  AVOID HAZARDOUS AND STRENUOUS ACTIVITIES.  DO NOT MAKE IMPORTANT DECISIONS FOR 24 HOURS.    DO NOT DRIVE ANY VEHICLE OR OPERATE MECHANICAL EQUIPMENT FOR 24 HOURS FOLLOWING THE END OF YOUR SURGERY.  EVEN THOUGH YOU MAY FEEL NORMAL, YOUR REACTIONS MAY BE AFFECTED BY THE MEDICATION YOU HAVE RECEIVED.    DO NOT DRINK ALCOHOLIC BEVERAGES FOR 24 HOURS FOLLOWING YOUR SURGERY.    DRINK CLEAR LIQUIDS (APPLE JUICE, GINGER ALE, 7-UP, BROTH, ETC.).  PROGRESS TO YOUR REGULAR DIET AS YOU FEEL ABLE.    YOU MAY HAVE A DRY MOUTH, A SORE THROAT, MUSCLES ACHES OR TROUBLE SLEEPING.  THESE SHOULD GO AWAY AFTER 24 HOURS.    CALL YOUR DOCTOR FOR ANY OF THE FOLLOWING:  SIGNS OF INFECTION (FEVER, GROWING TENDERNESS AT THE SURGERY SITE, A LARGE AMOUNT OF DRAINAGE OR BLEEDING, SEVERE PAIN, FOUL-SMELLING DRAINAGE, REDNESS OR SWELLING.    IT HAS BEEN OVER 8 TO 10 HOURS SINCE SURGERY AND YOU ARE STILL NOT ABLE TO URINATE (PASS WATER).     Maximum acetaminophen (Tylenol) dose from all sources should not exceed 4 grams (4000 mg) per day.  You had 975mg of tylenol at 10:15; do not take tylenol products until after 4:15pm.    You received Toradol, an IV form of ibuprofen (Motrin) at 10:30am.  Do not take any ibuprofen products until after 4:30pm if ok with your physician.        HOME CARE FOLLOWING PARATHYROID SURGERY  NIEVES Nguyen & AYESHA Calvillo    Special instructions for Dinorah Thompson:  --Discharge medications: {DC  med list - thyroid/parathyroid:741244}  {DC Calcium Taper:779326}  --Follow up appointment with Dr. Park in 1-3 weeks, follow up with Endocrinologist in 4-6 weeks.     RESULTS:  Call the office regarding your final pathology report if you have not received your results after 2 full business days.     INCISIONAL CARE:    You may expect a small amount of drainage from your previous drain site.  Cover with bandage as needed.    After removing the dressing, you may shower.  Do not submerse the incision for 1 week.    Sutures will absorb and do not need to be removed.    Leave the steri-strip (white paper tape) in place until it falls off, or remove at 2 weeks after surgery.    A lump/ridge under the incision is normal and will gradually resolve.    ACTIVITY:  Light Activity -- you may immediately be up and about as tolerated.  Driving -- you may drive when comfortable and off narcotic pain medications.  Light Work -- resume when comfortable off pain medications.  (If you can drive, you probably can work.)  Strenuous Work/Activity -- limit lifting to less than 10 pounds for 1 week.  Progressively increase with time.  Active Sports (running, biking, etc.) -- resume when comfortable.    DIET:  No restrictions.  Increased fluid intake is recommended. While taking pain medications, increase dietary fiber or add a fiber supplementation like Metamucil or Citrucel to help prevent constipation - a possible side effect of pain medications.    DISCOMFORT:  Use pain medications as prescribed by your surgeon.  Take the pain medication with some food, when possible, to minimize side effects.  Intermittent use of ice packs may help during the first 48 hours.  Expect gradual improvement.    CALCIUM SUPPLEMENTATION:  Most patients are prescribed to take a calcium supplement after surgery.  Please take as prescribed.  Watch for symptoms of numbness or tingling around the mouth or in the fingers or toes.  This may be a sign of a low  calcium level.  If this happens, take an extra dose of your calcium supplement.  If the symptoms persist, please contact the office.  You may need to have your blood calcium level checked by doing a simple blood test.  We may also need to make further adjustments in your dose of calcium supplementation.  At times, an office visit is required.     RETURN APPOINTMENT:  Schedule a follow-up visit 1-3 weeks post-op.  Office Phone:  [phone]     CONTACT US IF THE FOLLOWING DEVELOPS:   1. A fever that is above 101     2. If there is a large amount of drainage, bleeding, or swelling.   3. Severe pain that is not relieved by your prescription.   4. Drainage that is thick, cloudy, yellow, green or white.   5. Any other questions not answered by  Frequently Asked Questions  sheet.

## 2021-07-23 LAB
PATH REPORT.COMMENTS IMP SPEC: NORMAL
PATH REPORT.FINAL DX SPEC: NORMAL
PATH REPORT.GROSS SPEC: NORMAL
PATH REPORT.INTRAOP OBS SPEC DOC: NORMAL
PATH REPORT.MICROSCOPIC SPEC OTHER STN: NORMAL
PATH REPORT.RELEVANT HX SPEC: NORMAL
PHOTO IMAGE: NORMAL

## 2021-07-23 PROCEDURE — 88305 TISSUE EXAM BY PATHOLOGIST: CPT | Mod: 26 | Performed by: PATHOLOGY

## 2021-07-23 PROCEDURE — 88331 PATH CONSLTJ SURG 1 BLK 1SPC: CPT | Mod: 26 | Performed by: PATHOLOGY

## 2021-07-26 ENCOUNTER — PATIENT OUTREACH (OUTPATIENT)
Dept: CARE COORDINATION | Facility: CLINIC | Age: 71
End: 2021-07-26

## 2021-07-26 NOTE — LETTER
[facility] CARE COORDINATION  [facility]  July 26, 2021    Dinorah Thompson  [address] [address]      Dear Dinorah,    I have been unsuccessful in reaching you since our last contact. At this time the Care Coordination team will make no further attempts to reach you, however this does not change your ability to continue receiving care from your providers at your primary care clinic. If you need additional support from a care coordinator in the future please contact me at [phone].    All of us at the [facility] are invested in your health and are here to assist you in meeting your goals.     Sincerely,    Gudelia Abdi, RAMY, B.S. [facility] Care Coordination  [facility]:  [place], [place] and [place]  [phone]  [email or web address]

## 2021-07-26 NOTE — PROGRESS NOTES
Clinic Care Coordination Contact  [facility]/Voicemail  Left Voicemail     Clinical Data: Care Coordinator Outreach  Outreach attempted x 2.  Left message on patient's voicemail with call back information and requested return call.    Plan: Care Coordinator will send disenrollment letter with care coordinator contact information via [employer]. Care Coordinator will do no further outreaches at this time.    RAMY Cid, B.S. [facility] Care Coordination  [facility] Clinics:  [place], [place] and [place]  [phone]  [email or web address]

## 2021-07-30 ENCOUNTER — OFFICE VISIT (OUTPATIENT)
Dept: URGENT CARE | Facility: URGENT CARE | Age: 71
End: 2021-07-30
Payer: MEDICARE

## 2021-07-30 ENCOUNTER — VIRTUAL VISIT (OUTPATIENT)
Dept: OTOLARYNGOLOGY | Facility: CLINIC | Age: 71
End: 2021-07-30
Payer: MEDICARE

## 2021-07-30 VITALS
TEMPERATURE: 98.7 F | DIASTOLIC BLOOD PRESSURE: 76 MMHG | OXYGEN SATURATION: 98 % | HEART RATE: 63 BPM | SYSTOLIC BLOOD PRESSURE: 142 MMHG

## 2021-07-30 DIAGNOSIS — J38.7 IRRITABLE LARYNX: ICD-10-CM

## 2021-07-30 DIAGNOSIS — R07.0 THROAT PAIN: ICD-10-CM

## 2021-07-30 DIAGNOSIS — G89.18 OTHER ACUTE POSTPROCEDURAL PAIN: Primary | ICD-10-CM

## 2021-07-30 DIAGNOSIS — R49.0 MUSCLE TENSION DYSPHONIA: Primary | ICD-10-CM

## 2021-07-30 DIAGNOSIS — R53.83 OTHER FATIGUE: ICD-10-CM

## 2021-07-30 DIAGNOSIS — R49.0 HOARSENESS: ICD-10-CM

## 2021-07-30 DIAGNOSIS — R05.9 COUGH: ICD-10-CM

## 2021-07-30 LAB
BASOPHILS # BLD AUTO: 0 10E3/UL (ref 0–0.2)
BASOPHILS NFR BLD AUTO: 1 %
DEPRECATED S PYO AG THROAT QL EIA: NEGATIVE
EOSINOPHIL # BLD AUTO: 0.3 10E3/UL (ref 0–0.7)
EOSINOPHIL NFR BLD AUTO: 5 %
ERYTHROCYTE [DISTWIDTH] IN BLOOD BY AUTOMATED COUNT: 13.1 % (ref 10–15)
GROUP A STREP BY PCR: NOT DETECTED
HCT VFR BLD AUTO: 40.7 % (ref 35–47)
HGB BLD-MCNC: 13.8 G/DL (ref 11.7–15.7)
LYMPHOCYTES # BLD AUTO: 1.9 10E3/UL (ref 0.8–5.3)
LYMPHOCYTES NFR BLD AUTO: 33 %
MCH RBC QN AUTO: 30 PG (ref 26.5–33)
MCHC RBC AUTO-ENTMCNC: 33.9 G/DL (ref 31.5–36.5)
MCV RBC AUTO: 89 FL (ref 78–100)
MONOCYTES # BLD AUTO: 0.7 10E3/UL (ref 0–1.3)
MONOCYTES NFR BLD AUTO: 13 %
NEUTROPHILS # BLD AUTO: 2.7 10E3/UL (ref 1.6–8.3)
NEUTROPHILS NFR BLD AUTO: 48 %
PLATELET # BLD AUTO: 284 10E3/UL (ref 150–450)
RBC # BLD AUTO: 4.6 10E6/UL (ref 3.8–5.2)
WBC # BLD AUTO: 5.7 10E3/UL (ref 4–11)

## 2021-07-30 PROCEDURE — 84443 ASSAY THYROID STIM HORMONE: CPT | Performed by: FAMILY MEDICINE

## 2021-07-30 PROCEDURE — 99214 OFFICE O/P EST MOD 30 MIN: CPT | Performed by: FAMILY MEDICINE

## 2021-07-30 PROCEDURE — 87651 STREP A DNA AMP PROBE: CPT | Performed by: FAMILY MEDICINE

## 2021-07-30 PROCEDURE — 80048 BASIC METABOLIC PNL TOTAL CA: CPT | Performed by: FAMILY MEDICINE

## 2021-07-30 PROCEDURE — 85025 COMPLETE CBC W/AUTO DIFF WBC: CPT | Performed by: FAMILY MEDICINE

## 2021-07-30 PROCEDURE — 92507 TX SP LANG VOICE COMM INDIV: CPT | Mod: GN | Performed by: SPEECH-LANGUAGE PATHOLOGIST

## 2021-07-30 PROCEDURE — 36415 COLL VENOUS BLD VENIPUNCTURE: CPT

## 2021-07-30 ASSESSMENT — ENCOUNTER SYMPTOMS
COUGH: 1
WHEEZING: 0
SORE THROAT: 1
SHORTNESS OF BREATH: 1
FATIGUE: 1
FEVER: 0
RHINORRHEA: 0
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
## 2021-07-30 NOTE — PROGRESS NOTES
Assessment & Plan     Other acute postprocedural pain  Patient has sore throat likely related to her recent parathyroidectomy.  Today, negative apid strep, no evidence of peritonsillar abscess, continue Tylenol ibuprofen as needed for pain.    Cough  Negative COVID-19 testing on 7-, had similar symptoms prior to her parathyroidectomy on 7-. No evidence of rhinosinusitis or lower tract infections as a pneumonia.  She has a history of laryngeal dysfunction could be a culprit, alternatively can also consider bronchitis.    Throat pain  Management as per problem #1.  - Streptococcus A Rapid Screen w/Reflex to PCR - Clinic Collect  - Group A Streptococcus PCR Throat Swab    Other fatigue  Given the associated exertional shortness of breath and fatigue I checked a CBC, no evidence of anemia or leukocytosis making systemic infection less likely..  She had a parathyroidectomy, check thyroid levels, TSH test pending.  Was encouraged for her to follows up with PCP for further evaluation and management of fatigue.  - CBC with platelets and differential  - TSH with free T4 reflex  - Basic metabolic panel  (Ca, Cl, CO2, Creat, Gluc, K, Na, BUN)  - CBC with platelets and differential  - TSH with free T4 reflex  - Basic metabolic panel  (Ca, Cl, CO2, Creat, Gluc, K, Na, BUN)      Return in about 1 week (around 8/6/2021) for follow up with PCP.    Olivier Tavares MD  [facility] is a 71 year old who presents for the following health issues     HPI     Nonproductive cough for 2 weeks, started prior to her recent surgery.  States symptoms of sore throat, fatigue, exertional shortness of breath.  No chest pain or fever.  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      Review of Systems   Constitutional: Positive for fatigue. Negative for fever.   HENT: Positive for sore throat.  Negative for rhinorrhea.    Respiratory: Positive for cough and shortness of breath. Negative for wheezing.    Cardiovascular: Negative for chest pain.            Objective    BP (!) 142/76   Pulse 63   Temp 98.7  F (37.1  C) (Tympanic)   SpO2 98%   There is no height or weight on file to calculate BMI.  Physical Exam  HENT:      Mouth/Throat:      Mouth: Mucous membranes are moist.      Pharynx: Oropharynx is clear. No posterior oropharyngeal erythema.   Neck:      Comments: Incision is clean dry and intact, no erythema  Cardiovascular:      Rate and Rhythm: Normal rate and regular rhythm.   Pulmonary:      Effort: Pulmonary effort is normal.      Breath sounds: Normal breath sounds.   Neurological:      Deep Tendon Reflexes: Reflexes normal.            Results for orders placed or performed in visit on 07/30/21 (from the past 24 hour(s))   Streptococcus A Rapid Screen w/Reflex to PCR - Clinic Collect    Specimen: Throat; Swab   Result Value Ref Range    Group A Strep antigen Negative Negative   CBC with platelets and differential    Narrative    The following orders were created for panel order CBC with platelets and differential.  Procedure                               Abnormality         Status                     ---------                               -----------         ------                     CBC with platelets and d...[[id number]]                      Final result                 Please view results for these tests on the individual orders.   CBC with platelets and differential   Result Value Ref Range    WBC Count 5.7 4.0 - 11.0 10e3/uL    RBC Count 4.60 3.80 - 5.20 10e6/uL    Hemoglobin 13.8 11.7 - 15.7 g/dL    Hematocrit 40.7 35.0 - 47.0 %    MCV 89 78 - 100 fL    MCH 30.0 26.5 - 33.0 pg    MCHC 33.9 31.5 - 36.5 g/dL    RDW 13.1 10.0 - 15.0 %    Platelet Count 284 150 - 450 10e3/uL    % Neutrophils 48 %    % Lymphocytes 33 %    % Monocytes 13 %    % Eosinophils 5 %    % Basophils 1 %    Absolute  Neutrophils 2.7 1.6 - 8.3 10e3/uL    Absolute Lymphocytes 1.9 0.8 - 5.3 10e3/uL    Absolute Monocytes 0.7 0.0 - 1.3 10e3/uL    Absolute Eosinophils 0.3 0.0 - 0.7 10e3/uL    Absolute Basophils 0.0 0.0 - 0.2 10e3/uL

## 2021-07-30 NOTE — PROGRESS NOTES
[facility]  David Charles Jr., M.D., F.A.C.S.  Radha Altamirano M.D., M.P.H.  Yanira Diamond M.D.  Renetta Ramirez, Ph.D., CCC-SLP  Chirag Cheung, Ph.D., [facility]-SLP  Roxana Lee M.M. (voice), M.A., CCC-SLP  Dany Bell M.M. (voice), M.A., CCC-SLP    LUZ MARIA Mantilla (voice), M.S., CCC-SLP    Date of Visit: 7/30/21    To: Alem Hidalgo, Clinical Massage Therapist - [employer]    Patient: Dinorah Thompson  Referring provider: Roxana Lee M.M. (voice), M.A., CCC-SLP    Dear Ms. Montez,    This letter is in support of accommodation for my patient, Dinorah Thompson, who  is under the care of  Dr. Altamirano and myself at [facility] in the Department of Otolaryngology.  Dinorah Thompson has been under our care since 3/22/21 for Dysphonia (R49.0) symptoms, and was diagnosed with muscle tension dysphoria.     To appropriately treat this condition I believe it is medically necessary that Dinorah Thompson :  Complete clinical massage therapy, including fascial release work to all muscles/ structures of the upper body and neck that would be involved in speaking, breathing, and irritable larynx symptoms.  Clinical massage therapy should initially include 4-6 (additional as warranted) sessions over the next 6 months.    Continue to receive rehabilitative speech therapy services (CPT Code 43265).  This course of treatment will focus on strategies to:  improve vocal efficiency   decrease laryngeal irritability  Learn strategies to improve coordination of respiratory mechanics and phonation  Learn strategies to resolve severe chronic cough symptoms.    If you have any questions, please do not hesitate to contact us at [phone].    Sincerely,   Roxana Lee M.M. (voice) MMARIA ELENA., CCC/SLP  Speech-Language Pathologist  [facility]  [phone]

## 2021-07-30 NOTE — PATIENT INSTRUCTIONS
"After Visit Summary    Patient: Dot Thompson  Date of Visit: 7/30/2021    These notes are also available in your MyChart. Please take a few moments to find them under \"Past Appointments\" in the [employer] system, as Roxana will start to phase out e-mail communications.    Order of today's appointment:  Vocal Health Strategies:   Systemic Hydration (internal hydration of the entire body):  Keep sipping liquids throughout the day, rather than consume a large volume of liquid at one time.    Topical Hydration (hydration for the surface mucosa of the larynx and vocal folds):  Please follow strategies learned on the \"Tips for Topical Hydration\" handout    Nasal Irrigation/Nasal Spray  Saline spray: 3 puffs in each nostril; sniff and then blow your nose  2-3x/day, and anytime you go out and come back into your home.  Gargling    Plain water      consider Ice and heat, as appropriate for your incision, and neck tension respectively.       5-way neck stretch  Stretches  Frequency of practice:      2nd Stretch:  Five Way Neck Stretch    1) Inhale and exhale bringing chin to chest.  2) Slowly roll to the right side.   3) Take left arm and place behind your back.  4) Relax your shoulder down feeling a stretch in your neck.  5) Inhale, feeling an added pull stretch in your neck and exhale on a 'shhhh', repeat.  6) Bring your nose to your shoulder; right ear by right shoulder, not moving anything else.  7) Inhale and exhale on a 'shhh'.  8) Release arm and bring chin to chest.  9) Slowly roll your neck to the left side.   10) Take right arm and place behind your back.   11) Inhale feeling an added pull stretch in your neck and exhale on a 'shhhh', repeat.  12) Again, bring your nose to your shoulder, left ear by left shoulder.  13) Inhale and exhale on a 'shhh'.  14) Release arm and bring your chin back to your chest.   15) Sit up really nice and tall, bringing your chin as close to your chest as you can.   16) Inhale and exhale with " a 'shhh', repeat.   17) Slowly roll head back up.  18) Do these stretches    per day.    Places we feel stretched:   Neck   Upper back   Sternocleidomastoid Muscle: assists in breathing; lifts rib cage.     Scalene Muscle: assists in breathing.    Laryngeal Massage    Tongue Base Massage             1-2  X per day  1) Using the knuckle of your index finger or thumb, begin massaging in small, gentle circles right under the chin.  You can also hold your chin with your index finger and use your thumb.  2) Work along the sides, middle, and tip of the bottom of the chin with a gentle pressure.    3) It is important to keep the chin level or down to avoid putting strain on the neck muscles.    Laryngeal Massage   19) Using the index finger, find the Dash's / Tianna's apple.  From there, slide the index finger and thumb along the side of the larynx until you find the thyrohyoid space. These are little pockets on each side of the larynx.  20) With a gentle pressure, massage in circles around that area.  21) After a few circles, with your fingers still in the thyrohyoid space, rock your thumb and index finger up and down alternately; much like a see-saw motion.    22) Next use your thumb and index finger to lift gently and hold this position for a moment, and then gently pull down and hold this position for a moment.  23) You may also move the main bulk of the larynx side to side.  You may experience a slight  crunchy  (a.k.a. crepitus) quality to this movement.  This should not be a cause for concern, and unless it is causing discomfort, you should be able to continue this massage.  24) Once things feel loosened up, give a slight squeeze in the thyrohyoid space, and pull down and forward in a V shape towards the front of the trachea.    Sternocleidomastoid Massage  1) Starting directly below the ears, use your hands to massage in circles. Work your way down the sides of the neck, and into the shoulders. Work back up the side  of the neck, and then work down the front of the neck.    2) You may use slightly more pressure for this massage, but stop if there is pain / throbbing.  3) Try turning your head to be better able to find these sternocleidomastoid muscles.  4) Use your hands to pull down along these muscles from below the ear to the shoulders, and from below the ear in a V shape towards the front.  5) Use your fingers to gently massage the  little twiggies  (a.k.a.: sternal origin of the Sternocleidomastoid Muscle that attaches to the medial end of the clavicle/collarbone) one at a time.    Cool/ Warm Compress              1-2x  X per day  After an especially vocally fatiguing day, alternate massages with a compress.      Tongue Exercises    Perform ________ repetitions of the following exercises ________ times per day.    Instructions:  1. Stick your tongue straight out as far as you can.  Hold for a 10 seconds.  2. Raise the tip of your tongue towards your nose.  Hold for a 10 seconds.  3. Try to touch your chin with the tip of your tongue.  Hold for a 10 seconds.  4. Stick your tongue out and move it to touch the right corner of your lips and then the left corner of your lips.  10x side to side. Do not let the jaw follow the tongue motion (use a hand if necessary).  5. Press your entire tongue on the roof of your mouth and hold it there as you slowly open your mouth.  6. Lick your top and bottom upper and lower front gums circular motion, then reverse.  7. Click your tongue on the roof of your mouth (release jaw and then the tongue).  8. Say bt-ax-qa-la-la, yo-nd-ti-ta-ta, and kz-ik-ay-ka-ka as quickly and as accurately as you can (without moving your jaw).  9. Say deborah deborah deborah deborah as quickly and accurately as you can. Cassidy, cassidy benjamin.  10. Speak the months of the year.  11. Read aloud with your tongue out.  At least touching the lower lips    - Skip if a particular exercise does not feel helpful.    Spacious Speech  "Phrases  Frequency of practice:    Start with an open-throated breath (like with a sniff of something wonderful or the beginning of a yawn or a surprise breath).  Let the breath do the work.  Be light, fluid, legato, and spacious.  Exaggerate inflection!  Glide over your entire pitch range.  These should feel effortless in your throat.  This is like a massage for your vocal folds, stretching and gillian the muscles, while vibrating in a spacious throat.    SPACIOUS SPEECH: DESCENDING GLIDES (LIKE M+VOWELS) - tongue touching lower teeth and you use a descending glide for the H+vowels below and alternate with \"bubbles\".   Heeeeeeeee   Haaaaaaaay   Hooooooooh   Huuuuuuuu   Haaaaaaaah     3-5-1 (A3)  Hi there   How are you?   Who are you?   Who is she?   Who is he?   Who are they?   Hello, hello, hello            [employer] & Alem Lee M.M. (voice), M.A., CCC/SLP  Speech-Language Pathologist  [facility] Trained Vocologist  [facility]  [email or web address]  she/her    "

## 2021-07-31 LAB
ANION GAP SERPL CALCULATED.3IONS-SCNC: 6 MMOL/L (ref 3–14)
BUN SERPL-MCNC: 11 MG/DL (ref 7–30)
CALCIUM SERPL-MCNC: 9.3 MG/DL (ref 8.5–10.1)
CHLORIDE BLD-SCNC: 105 MMOL/L (ref 94–109)
CO2 SERPL-SCNC: 26 MMOL/L (ref 20–32)
CREAT SERPL-MCNC: 0.93 MG/DL (ref 0.52–1.04)
GFR SERPL CREATININE-BSD FRML MDRD: 62 ML/MIN/1.73M2
GLUCOSE BLD-MCNC: 94 MG/DL (ref 70–99)
POTASSIUM BLD-SCNC: 3.8 MMOL/L (ref 3.4–5.3)
SODIUM SERPL-SCNC: 137 MMOL/L (ref 133–144)
TSH SERPL DL<=0.005 MIU/L-ACNC: 0.64 MU/L (ref 0.4–4)

## 2021-08-03 ENCOUNTER — OFFICE VISIT (OUTPATIENT)
Dept: SURGERY | Facility: CLINIC | Age: 71
End: 2021-08-03
Payer: MEDICARE

## 2021-08-03 VITALS
OXYGEN SATURATION: 97 % | DIASTOLIC BLOOD PRESSURE: 72 MMHG | HEART RATE: 74 BPM | BODY MASS INDEX: 27.23 KG/M2 | SYSTOLIC BLOOD PRESSURE: 126 MMHG | WEIGHT: 148 LBS | HEIGHT: 62 IN | RESPIRATION RATE: 16 BRPM

## 2021-08-03 DIAGNOSIS — Z09 SURGICAL FOLLOWUP VISIT: Primary | ICD-10-CM

## 2021-08-03 PROCEDURE — 99024 POSTOP FOLLOW-UP VISIT: CPT | Performed by: SURGERY

## 2021-08-03 ASSESSMENT — MIFFLIN-ST. JEOR: SCORE: 1135.6

## 2021-08-03 NOTE — PROGRESS NOTES
"Progress Note/Post-op Follow up:  Dinorah is here for follow up after excision of a parathyroid adenoma 2 weeks ago.  She reports good energy.  Denies numbness or tingling.  Incisional discomfort is nearly resolved.  She has been having an occasional cough, which she attributes to the recent poor air quality we've been experiencing. She has tested negative for Covid.     Physical Exam:  /72   Pulse 74   Resp 16   Ht 1.568 m (5' 1.75\")   Wt 67.1 kg (148 lb)   SpO2 97%   BMI 27.29 kg/m    General:  Appears well, in no acute distress  HEENT:  Chvostek's sign is negative.   Neck:  Incision site healing nicely, Healing ridge is minimal.             Range of motion is normal.  Neuro:  Voice is Normal.    Pathology:  Pathology was reviewed with the patient.   Parathyroid adenoma, 325 mg    Labs were checked last week during cough workup and were notable for a normal calcium of 9.3.     Assessment and Plan:  Dinorah is doing well after left superior parathyroidectomy.  I recommend a follow up with her endocrinologist, Dr. Toribio in the next few months for repeat labs to ensure a durable cure. I would be happy to see her if there are any ongoing issues, but currently, there are no signs of post-operative complications.    Gregoria Wilcox MD  Please route or send letter to:  James Toribio MD          "

## 2021-08-03 NOTE — LETTER
"August 3, 2021    RE: Dinorah Thompson, : 1950      Progress Note/Post-op Follow up:  Dinorah is here for follow up after excision of a parathyroid adenoma 2 weeks ago.  She reports good energy.  Denies numbness or tingling.  Incisional discomfort is nearly resolved.  She has been having an occasional cough, which she attributes to the recent poor air quality we've been experiencing. She has tested negative for Covid.      Physical Exam:  /72   Pulse 74   Resp 16   Ht 1.568 m (5' 1.75\")   Wt 67.1 kg (148 lb)   SpO2 97%   BMI 27.29 kg/m    General:  Appears well, in no acute distress  HEENT:  Chvostek's sign is negative.   Neck:  Incision site healing nicely, Healing ridge is minimal.             Range of motion is normal.  Neuro:  Voice is Normal.     Pathology:  Pathology was reviewed with the patient.   Parathyroid adenoma, 325 mg     Labs were checked last week during cough workup and were notable for a normal calcium of 9.3.      Assessment and Plan:  Dinorah is doing well after left superior parathyroidectomy.  I recommend a follow up with her endocrinologist, Dr. Toribio in the next few months for repeat labs to ensure a durable cure. I would be happy to see her if there are any ongoing issues, but currently, there are no signs of post-operative complications.       Gregoria Wilcox MD    "

## 2021-09-09 ENCOUNTER — TELEPHONE (OUTPATIENT)
Dept: ANESTHESIOLOGY | Facility: CLINIC | Age: 71
End: 2021-09-09

## 2021-09-09 NOTE — TELEPHONE ENCOUNTER
[facility] Call Center    Phone Message    May a detailed message be left on voicemail: yes     Reason for Call: Other: Patient calling requesting a return call to discuss questions she has about her low back pain, Patient states she is not sure if she needs another injection or if she just has to deal with low back pain. Please call to discuss thank you.      Action Taken: Message routed to:  [facility] ([facility]): pain     Travel Screening: Not Applicable

## 2021-09-09 NOTE — TELEPHONE ENCOUNTER
RN returned call to patient to discuss. Patient questioning if she should repeat previous procedure for her low back pain, but also states the pain is also in a different area than previously. Writer recommended patient make a follow up clinic appointment, as she hasn't been evaluated since 2/15/21 with Dr. Santana. Patient in agreement to schedule an appointment. Writer scheduled patient for 10/4 in clinic with Dr. Santana. Date and time verified with patient. Patient had no further questions at this time.    Bertha Weinberg RN

## 2021-09-11 ENCOUNTER — HEALTH MAINTENANCE LETTER (OUTPATIENT)
Age: 71
End: 2021-09-11

## 2021-09-20 ENCOUNTER — VIRTUAL VISIT (OUTPATIENT)
Dept: OTOLARYNGOLOGY | Facility: CLINIC | Age: 71
End: 2021-09-20
Payer: MEDICARE

## 2021-09-20 DIAGNOSIS — R49.0 HOARSENESS: Primary | ICD-10-CM

## 2021-09-20 PROCEDURE — 99207 PR NO CHARGE LOS: CPT | Performed by: SPEECH-LANGUAGE PATHOLOGIST

## 2021-09-20 NOTE — LETTER
9/20/2021       RE: Dinorah Thompson  [address]  [place] [address]     Dear Colleague,    Thank you for referring your patient, Dinorah Thompson, to the [facility] VOICE CLIN[facility] [place] [facility]. Please see a copy of my visit note below.    I was able to reach this patient, but unfortunately, she is in [place] visiting her son.  We agreed to add another appointment after her next two to make up for today.    Roxana Lee M.M. (voice), M.A., CCC/SLP  Speech-Language Pathologist  [place] Trained Vocologist  [facility] Voice [facility]  [email or web address] for fastest response  she/her  [email or web address]            Again, thank you for allowing me to participate in the care of your patient.      Sincerely,    Roxana Lee, SLP

## 2021-09-20 NOTE — PROGRESS NOTES
I was able to reach this patient, but unfortunately, she is in [place] visiting her son.  We agreed to add another appointment after her next two to make up for today.    Roxana Lee M.M. (voice), M.A., CCC/SLP  Speech-Language Pathologist  [facility] Trained Vocologist  [facility] Voice [facility]  [email or web address] for fastest response  she/her  [email or web address]-voice-[facility]

## 2021-09-22 NOTE — PROGRESS NOTES
"Dinorah Thompson is a 71 year old female who is being cared for via a billable virtual visit.        The patient has been notified and verbally consented to the following statements:     This video visit will be conducted between you and your provider.    Patient has opted to conduct today's video visit vs an in-person appointment, and is not able to attend due to possible exposure to COVID-19.      If during the course of the call the provider feels a video visit is not appropriate, you will not be charged for this service.    Provider has received verbal consent for billable virtual visit from the patient? Yes    Preferred method for receiving information: MyChart/E-mail    Call initiated at: 8:11 AM  Platform used to conduct today's virtual appointment: [facility]  Location of provider: Residence  Location of patient: [facility] VOICE CLINIC  THERAPY NOTE (CPT 32321)  Patient: Dinorah Thompson  Date of Service: 5/17/2021  Referring physician: Dr. Radha Altamirano  Impressions from most recent evaluation (3/22/2021):  \"IMPRESSIONS: Dinorah Thompson is a 70 year old retired female with a Hx of multiple personal stressors, presenting today with Dysphonia (R49.0), as evidenced by today's evaluation.   Remarkable findings and recommendations of the evaluation included:  1) begin a course of speech therapy\"      SUBJECTIVE:  Since the last appointment, Ms. Thompson reports the following:     Been using massage, gargle, cough drops (ordered Spry), and blowing bubbles    Cough/throat clear has gotten a \"tiny bit\" worse the past couple of days because had not been doing exercises    Noticed a positive difference when was doing exercises    Has a low voice that gets sore and strained while singing at [employer]    going on a trip to visit the youngest son - little frazzled today     Both are vaccinated.    Yesterday at [employer] she noticed a new symptom - she has a low voice that is hard on her if she sings or talks too " 3 seizure at NOC; pt reports increase of her normal left side weakness and numbness   low it becomes sore/ strained. Yesterday - there was a song where she would have to use the lower voice and it was helpful    OBJECTIVE:  Ms. Thompson presents today with the following:  Voice quality:    Moderate to severe breathiness and roughness on sustained vowels    Moderate instability on sustained vowels      COUGH/THROAT CLEARING:  o Observed 40 minutes into the session; resolved in seconds and was mild in severity      PATIENT REPORTED MEASURES:  Patient Supplied Answers To SLP QOL Questionnaire  No flowsheet data found.    THERAPEUTIC ACTIVITIES    Demonstrated previous exercises.  o demonstrated improved technique  o appropriate redirection provided  o instruction provided for increased level of complexity/difficulty      Resonant Voice Therapy (RVT) exercises to promote forward locus of resonance and optimized pattern of laryngeal adduction    Easy sustained pitch (Ab4) and descending glides on /m/ utilized in conjunction with relaxed jaw, tongue, and lightly closed lips to facilitate forward resonant sound    Syllable level using /m/ and /n/ in alternation with cardinal vowels on sustained pitches and speech inflection    Able to recognize improvement in quality and comfort      Exercises to promote optimal respiratory mechanics    I provided explanation of the anatomy and physiology of respiration for speech and singing; she found this to be helpful    She demonstrated excessive upper thoracic engagement during inhalation    she demonstrated clavicular/neck/shoulder involvement in inhalation    Demonstrated difficulty allowing abdominal relaxation for inhalation    Practiced in a seated position, with tactile cue of a hand on the lower neck and a hand on the lower abdominal area to facilitate awareness of low respiratory engagement.      With clinician support, patient was able to demonstrate improved abdominal relaxation and engagement on inhalation    Optimal exhalation using inward engagement of the  "abdominal wall with no corresponding collapse of the upper chest cavity was trained using the \"sh\"/\"s\" task    acceptable improvement in airflow and respiratory mechanics      [place] exercises for improved glottic closure.  o able to produce acceptable glottic coup  o Phrases loaded with /b/ did not improve voice quality  o Instructed with phrases loaded with /g/; this was helpful  o Good learning, but will need practice.      Instructed in techniques to improve length of utterance with reduced effort for optimal carryover.  o Instructed with reading sentences of increasing length    Developed a mental checklist of factors to help trouble shoot moments of difficulty during daily speaking tasks.      Counseling and Education:    Asked many questions about the nature of her symptoms, and I answered all of these thoroughly.    A revised regimen for home practice was instructed.    I provided an AVS and handouts of today's therapeutic activities to facilitate practice.      ASSESSMENT/PLAN  PROGRESS TOWARD LONG TERM GOALS:   Adequate progress; please see above    IMPRESSIONS: Dysphonia (R49.0). Ms. Thompson demonstrated good learning of breathing techniques, resonant voice exercises, and coordination of respiration with phonation.  She will continue to practice on a regular basis until our next appointment.    PLAN: I will see Ms. Thompson in 3 weeks, at which point we will continue therapy.   For practice goals see AVS.     TOTAL SERVICE TIME:   Call Initiated at: 10:00 AM  Call Ended at: 11:00 AM           CPT Billing Codes:   TREATMENT (82755)  NO CHARGE FACILITY FEE (57770)    Roxana Lee M.M. (voice) [email or web address]., CCC/SLP  Speech-Language Pathologist  [facility] Trained Vocologist  [facility]  [phone]  [email or web address]  Pronouns: she/her      *this report was created in part through the use of computerized dictation software, and though reviewed following completion, some typographic errors may persist. "  If there is confusion regarding any of this notes contents, please contact me for clarification

## 2021-10-01 ASSESSMENT — ENCOUNTER SYMPTOMS
NIGHT SWEATS: 1
EYE PAIN: 0
FATIGUE: 1
EYE REDNESS: 0
CHILLS: 0
MYALGIAS: 1
POLYPHAGIA: 0
WEIGHT LOSS: 0
DOUBLE VISION: 0
DECREASED APPETITE: 0
POLYDIPSIA: 0
EYE IRRITATION: 0
JOINT SWELLING: 1
INCREASED ENERGY: 0
STIFFNESS: 1
MUSCLE CRAMPS: 1
ARTHRALGIAS: 1
NECK PAIN: 1
ALTERED TEMPERATURE REGULATION: 0
FEVER: 0
HALLUCINATIONS: 0
WEIGHT GAIN: 0
MUSCLE WEAKNESS: 0
EYE WATERING: 0
BACK PAIN: 1

## 2021-10-01 ASSESSMENT — ANXIETY QUESTIONNAIRES
GAD7 TOTAL SCORE: 1
GAD7 TOTAL SCORE: 1
5. BEING SO RESTLESS THAT IT IS HARD TO SIT STILL: NOT AT ALL
1. FEELING NERVOUS, ANXIOUS, OR ON EDGE: SEVERAL DAYS
7. FEELING AFRAID AS IF SOMETHING AWFUL MIGHT HAPPEN: NOT AT ALL
3. WORRYING TOO MUCH ABOUT DIFFERENT THINGS: NOT AT ALL
GAD7 TOTAL SCORE: 1
6. BECOMING EASILY ANNOYED OR IRRITABLE: NOT AT ALL
7. FEELING AFRAID AS IF SOMETHING AWFUL MIGHT HAPPEN: NOT AT ALL
2. NOT BEING ABLE TO STOP OR CONTROL WORRYING: NOT AT ALL
4. TROUBLE RELAXING: NOT AT ALL
8. IF YOU CHECKED OFF ANY PROBLEMS, HOW DIFFICULT HAVE THESE MADE IT FOR YOU TO DO YOUR WORK, TAKE CARE OF THINGS AT HOME, OR GET ALONG WITH OTHER PEOPLE?: NOT DIFFICULT AT ALL

## 2021-10-02 ASSESSMENT — ANXIETY QUESTIONNAIRES: GAD7 TOTAL SCORE: 1

## 2021-10-04 ENCOUNTER — TELEPHONE (OUTPATIENT)
Dept: SLEEP MEDICINE | Facility: CLINIC | Age: 71
End: 2021-10-04

## 2021-10-04 ENCOUNTER — OFFICE VISIT (OUTPATIENT)
Dept: ANESTHESIOLOGY | Facility: CLINIC | Age: 71
End: 2021-10-04
Payer: MEDICARE

## 2021-10-04 ENCOUNTER — MYC MEDICAL ADVICE (OUTPATIENT)
Dept: SLEEP MEDICINE | Facility: CLINIC | Age: 71
End: 2021-10-04

## 2021-10-04 VITALS — HEART RATE: 74 BPM | OXYGEN SATURATION: 97 % | DIASTOLIC BLOOD PRESSURE: 67 MMHG | SYSTOLIC BLOOD PRESSURE: 119 MMHG

## 2021-10-04 DIAGNOSIS — M54.16 LUMBAR RADICULOPATHY: Primary | ICD-10-CM

## 2021-10-04 PROCEDURE — 99214 OFFICE O/P EST MOD 30 MIN: CPT | Mod: 24 | Performed by: ANESTHESIOLOGY

## 2021-10-04 ASSESSMENT — ENCOUNTER SYMPTOMS
MYALGIAS: 1
EYE PAIN: 0
FEVER: 0
WEIGHT LOSS: 0
POLYDIPSIA: 0
CHILLS: 0
BACK PAIN: 1
DOUBLE VISION: 0
EYE REDNESS: 0
NECK PAIN: 1
HALLUCINATIONS: 0

## 2021-10-04 ASSESSMENT — PAIN SCALES - GENERAL: PAINLEVEL: MILD PAIN (2)

## 2021-10-04 NOTE — NURSING NOTE
Patient presents with:  RECHECK: P RECHECK, RM 14, patient reports, 3/10 pain in her left lower lumbar      Mild Pain (2)     Pain Medications     Analgesics Other Refills Start End     acetaminophen (TYLENOL) 500 MG tablet     7/21/2021     Sig - Route: Take 2 tablets (1,000 mg) by mouth every 6 hours as needed for pain - Oral    Class: OTC          What medications are you using for pain?   Celebrex, volteren gel        (Return Patients only) What refills are you needing today? N/A      Patient would like to discuss pain managment    Artur Fallon, EMT

## 2021-10-04 NOTE — PROGRESS NOTES
Pain clinic follow up note    HPI:   Dinorah Thompson is a 71 year old year old female  who presents to the pain clinic with cervical pain and left lumbar radiculopathy    Patient Supplied Answers To the  Pain Questionnaire  [facility] -  Patient Entered Questionnaire/Answers 10/1/2021   What number best describes your pain right now:  0 = No pain  to  10 = Worst pain imaginable 2   How would you describe the pain? burning, dull, aching   Which of the following worsen your pain? standing, sitting   Which of the following improve or reduce your pain?  lying down   What number best describes your average pain for the past week:  0 = No pain  to  10 = Worst pain imaginable 4   What number best describes your LOWEST pain in past 24 hours:  0 = No pain  to  10 = Worst pain imaginable 1   What number best describes your WORST pain in past 24 hours:  0 = No pain  to  10 = Worst pain imaginable 5   When is your pain worst? -   What non-medicine treatments have you already had for your pain? pain clinic, physical therapy, acupuncture, TENS (electrical stimulator), spine injections (shots), exercise   Have you tried treating your pain with medication?  Yes   Are you currently taking medications for your pain? Yes             The patient presents today for follow up. She returned from a family reunion trip to [place]. The patient reports that she has intermittent severe pain. She also notices that her left knee medial aspect is painful is a different way sporadically. She describes a sensation of giving out in the left knee.     The patient feels that her neck pain improved drastically on its own. She finds the low back pain manageable at this time.     ROS:  Review of Systems   Constitutional: Negative for chills, fever and weight loss.   Eyes: Negative for double vision, pain and redness.   Musculoskeletal: Positive for back pain, myalgias and neck pain.   Endo/Heme/Allergies: Negative for polydipsia.    Psychiatric/Behavioral: Negative for hallucinations.   All other systems reviewed and are negative.    Answers for HPI/ROS submitted by the patient on 10/1/2021  ELDON 7 TOTAL SCORE: 1  General Symptoms: Yes  Skin Symptoms: No  HENT Symptoms: No  EYE SYMPTOMS: Yes  HEART SYMPTOMS: No  LUNG SYMPTOMS: No  INTESTINAL SYMPTOMS: No  URINARY SYMPTOMS: No  GYNECOLOGIC SYMPTOMS: No  BREAST SYMPTOMS: No  SKELETAL SYMPTOMS: Yes  BLOOD SYMPTOMS: No  NERVOUS SYSTEM SYMPTOMS: No  MENTAL HEALTH SYMPTOMS: No  Loss of appetite: No  Weight gain: No  Fatigue: Yes  Night sweats: Yes  Increased stress: No  Excessive hunger: No  Feeling hot or cold when others believe the temperature is normal: No  Loss of height: No  Post-operative complications: No  Surgical site pain: No  Change in or Loss of Energy: No  Hyperactivity: No  Confusion: No  Vision loss: No  Dry eyes: Yes  Watery eyes: No  Eye bulging: No  Flashing of lights: No  Spots: No  Floaters: Yes  Crossed eyes: No  Tunnel Vision: No  Yellowing of eyes: No  Eye irritation: No  Swollen joints: Yes  Joint pain: Yes  Bone pain: No  Muscle cramps: Yes  Muscle weakness: No  Joint stiffness: Yes  Bone fracture: No        Significant Medical History:   Past Medical History:   Diagnosis Date     Chronic osteoarthritis      Complication of anesthesia 1990's    DIfficulty waking up     Depressive disorder      Female bladder prolapse 9/3/2019     Parathyroid abnormality (H)      Prolapse of female pelvic organs 8/19/2019     Sleep apnea     Uses a Bi-Pap.. Will have son bring it if necessary on DOS          Past Surgical History:  Past Surgical History:   Procedure Laterality Date     APPENDECTOMY       diskectomy in toe       HYSTERECTOMY  08/2017    and bladder surgery     INJECT EPIDURAL LUMBAR Right 4/27/2021    Procedure: Right Lumbar 5- Sacral 1 transforaminal epidural steroid injection with fluoroscopy and conscious sedation.;  Surgeon: Tiera Santana MD;  Location: [facility] OR     left  rotator cuff surgery       PARATHYROIDECTOMY N/A 7/21/2021    Procedure: PARATHYROIDECTOMY;  Surgeon: Gregoria Wilcox MD;  Location: [facility] OR     SINUS SURGERY      x2 in 2013 and 2018     TONSILLECTOMY  1955          Family History:  Family History   Problem Relation Age of Onset     Myocardial Infarction Father         late 50s     Ovarian Cancer Sister      Cervical Cancer Sister      Lung Cancer Sister      Myocardial Infarction Paternal Uncle         late 50s          Social History:  Social History     Socioeconomic History     Marital status:      Spouse name: Wilbert     Number of children: Not on file     Years of education: Not on file     Highest education level: Bachelor's degree (e.g., BA, AB, BS)   Occupational History     Not on file   Tobacco Use     Smoking status: Never Smoker     Smokeless tobacco: Never Used   Substance and Sexual Activity     Alcohol use: Not Currently     Drug use: Never     Sexual activity: Not Currently     Partners: Male   Other Topics Concern     Parent/sibling w/ CABG, MI or angioplasty before 65F 55M? Not Asked   Social History Narrative     Not on file     Social Determinants of Health     Financial Resource Strain: Low Risk      Difficulty of Paying Living Expenses: Not hard at all   Food Insecurity: No Food Insecurity     Worried About Running Out of Food in the Last Year: Never true     Ran Out of Food in the Last Year: Never true   Transportation Needs: No Transportation Needs     Lack of Transportation (Medical): No     Lack of Transportation (Non-Medical): No   Physical Activity: Insufficiently Active     Days of Exercise per Week: 2 days     Minutes of Exercise per Session: 10 min   Stress: Stress Concern Present     Feeling of Stress : To some extent   Social Connections: Socially Integrated     Frequency of Communication with Friends and Family: More than three times a week     Frequency of Social Gatherings with Friends and Family: Once a week      "Attends Alevism Services: More than 4 times per year     Active Member of Clubs or Organizations: Yes     Attends Club or Organization Meetings: More than 4 times per year     Marital Status:    Intimate Partner Violence:      Fear of Current or Ex-Partner:      Emotionally Abused:      Physically Abused:      Sexually Abused:      Social History     Social History Narrative     Not on file          Allergies:  Allergies   Allergen Reactions     Propoxyphene Other (See Comments)     Clarithromycin Other (See Comments)     Pt states, \"ototoxicity\". Balance problems  Pt states, \"ototoxicity\".         Current Medications:   Current Outpatient Medications   Medication Sig Dispense Refill     acetaminophen (TYLENOL) 500 MG tablet Take 2 tablets (1,000 mg) by mouth every 6 hours as needed for pain       albuterol (PROAIR HFA/PROVENTIL HFA/VENTOLIN HFA) 108 (90 Base) MCG/ACT inhaler Inhale 2 puffs into the lungs every 4 hours as needed        baclofen (LIORESAL) 10 MG tablet Take 10 mg by mouth daily as needed        Carboxymethylcellul-Glycerin 1-0.9 % GEL Place 1 drop into both eyes daily        celecoxib (CELEBREX) 200 MG capsule Take 200 mg by mouth 2 times daily        clonazePAM (KLONOPIN) 0.5 MG tablet Take 0.5 mg by mouth nightly as needed        DULoxetine (CYMBALTA) 60 MG capsule Take 1 capsule (60 mg) by mouth daily 30 capsule 0     LYSINE PO        NONFORMULARY Vitamin Packet from [employer] including Multi-vitamin, Leutin, Calcium, Antioxidant, Fish oil, Glucosamine- Chondroitin: Take 1 packet by mouth daily       propranolol (INDERAL) 10 MG tablet Take 10 mg by mouth daily as needed        rosuvastatin (CRESTOR) 5 MG tablet Take 5 mg by mouth every other day        tacrolimus (PROTOPIC) 0.1 % external ointment Apply to AA on the face BID PRN 30 g 5     traZODone (DESYREL) 100 MG tablet Take 100 mg by mouth nightly as needed        valACYclovir (VALTREX) 1000 mg tablet Take two tablets twice per day " for one day for flare ups. 30 tablet 1        Physical Exam:     /67   Pulse 74   SpO2 97%     General Appearance: No distress, seated comfortably  Mood: Euthymic  HE ENT: Non constricted pupils  Respiratory: Non labored breathing  MS: Normal muscle bulk, no left knee joint line tenderness    ASSESSMENT AND PLAN:     Encounter Diagnosis:  Lumbar stenosis without neurogenic claudication  Lumbar radiculopathy  Cervical radiculopathy  Chronic back pain  Left knee mild DJD       Dinorah Thompson is a 71 year old year old female  who presents to the pain clinic with low back pain and left medial knee joint discomfort possible related to left L4 lumbar radicular pain. Xray left knee mild DJD.     RECOMMENDATIONS:     1. Referral placed for chiropractic care and physical therapy for lumbar radiculopathy and left knee pain    2. Procedure: We are recommending the patient for repeat LESI in the procedure suite as needed.       Follow up: as needed.

## 2021-10-04 NOTE — LETTER
10/4/2021       RE: Dinorah Thompson  [address]  [place] [address]     Dear Colleague,    Thank you for referring your patient, Dinorah Thompson, to the [facility] [place] at [facility]. Please see a copy of my visit note below.    Pain clinic follow up note    HPI:   Dinorah Thompson is a 71 year old year old female  who presents to the pain clinic with cervical pain and left lumbar radiculopathy    Patient Supplied Answers To the [facility] Questionnaire  [facility] -  Patient Entered Questionnaire/Answers 10/1/2021   What number best describes your pain right now:  0 = No pain  to  10 = Worst pain imaginable 2   How would you describe the pain? burning, dull, aching   Which of the following worsen your pain? standing, sitting   Which of the following improve or reduce your pain?  lying down   What number best describes your average pain for the past week:  0 = No pain  to  10 = Worst pain imaginable 4   What number best describes your LOWEST pain in past 24 hours:  0 = No pain  to  10 = Worst pain imaginable 1   What number best describes your WORST pain in past 24 hours:  0 = No pain  to  10 = Worst pain imaginable 5   When is your pain worst? -   What non-medicine treatments have you already had for your pain? pain clinic, physical therapy, acupuncture, TENS (electrical stimulator), spine injections (shots), exercise   Have you tried treating your pain with medication?  Yes   Are you currently taking medications for your pain? Yes             The patient presents today for follow up. She returned from a family reunion trip to [place]. The patient reports that she has intermittent severe pain. She also notices that her left knee medial aspect is painful is a different way sporadically. She describes a sensation of giving out in the left knee.     The patient feels that her neck pain improved drastically on  its own. She finds the low back pain manageable at this time.     ROS:  Review of Systems   Constitutional: Negative for chills, fever and weight loss.   Eyes: Negative for double vision, pain and redness.   Musculoskeletal: Positive for back pain, myalgias and neck pain.   Endo/Heme/Allergies: Negative for polydipsia.   Psychiatric/Behavioral: Negative for hallucinations.   All other systems reviewed and are negative.    Answers for HPI/ROS submitted by the patient on 10/1/2021  ELDON 7 TOTAL SCORE: 1  General Symptoms: Yes  Skin Symptoms: No  HENT Symptoms: No  EYE SYMPTOMS: Yes  HEART SYMPTOMS: No  LUNG SYMPTOMS: No  INTESTINAL SYMPTOMS: No  URINARY SYMPTOMS: No  GYNECOLOGIC SYMPTOMS: No  BREAST SYMPTOMS: No  SKELETAL SYMPTOMS: Yes  BLOOD SYMPTOMS: No  NERVOUS SYSTEM SYMPTOMS: No  MENTAL HEALTH SYMPTOMS: No  Loss of appetite: No  Weight gain: No  Fatigue: Yes  Night sweats: Yes  Increased stress: No  Excessive hunger: No  Feeling hot or cold when others believe the temperature is normal: No  Loss of height: No  Post-operative complications: No  Surgical site pain: No  Change in or Loss of Energy: No  Hyperactivity: No  Confusion: No  Vision loss: No  Dry eyes: Yes  Watery eyes: No  Eye bulging: No  Flashing of lights: No  Spots: No  Floaters: Yes  Crossed eyes: No  Tunnel Vision: No  Yellowing of eyes: No  Eye irritation: No  Swollen joints: Yes  Joint pain: Yes  Bone pain: No  Muscle cramps: Yes  Muscle weakness: No  Joint stiffness: Yes  Bone fracture: No        Significant Medical History:   Past Medical History:   Diagnosis Date     Chronic osteoarthritis      Complication of anesthesia 1990's    DIfficulty waking up     Depressive disorder      Female bladder prolapse 9/3/2019     Parathyroid abnormality (H)      Prolapse of female pelvic organs 8/19/2019     Sleep apnea     Uses a Bi-Pap.. Will have son bring it if necessary on DOS          Past Surgical History:  Past Surgical History:   Procedure Laterality  Date     APPENDECTOMY       diskectomy in toe       HYSTERECTOMY  08/2017    and bladder surgery     INJECT EPIDURAL LUMBAR Right 4/27/2021    Procedure: Right Lumbar 5- Sacral 1 transforaminal epidural steroid injection with fluoroscopy and conscious sedation.;  Surgeon: Tiera Santana MD;  Location: [facility] OR     left rotator cuff surgery       PARATHYROIDECTOMY N/A 7/21/2021    Procedure: PARATHYROIDECTOMY;  Surgeon: Gregoria Wilcox MD;  Location: [facility]     SINUS SURGERY      x2 in 2013 and 2018     TONSILLECTOMY  1955          Family History:  Family History   Problem Relation Age of Onset     Myocardial Infarction Father         late 50s     Ovarian Cancer Sister      Cervical Cancer Sister      Lung Cancer Sister      Myocardial Infarction Paternal Uncle         late 50s          Social History:  Social History     Socioeconomic History     Marital status:      Spouse name: Wilbert     Number of children: Not on file     Years of education: Not on file     Highest education level: Bachelor's degree (e.g., BA, AB, BS)   Occupational History     Not on file   Tobacco Use     Smoking status: Never Smoker     Smokeless tobacco: Never Used   Substance and Sexual Activity     Alcohol use: Not Currently     Drug use: Never     Sexual activity: Not Currently     Partners: Male   Other Topics Concern     Parent/sibling w/ CABG, MI or angioplasty before 65F 55M? Not Asked   Social History Narrative     Not on file     Social Determinants of Health     Financial Resource Strain: Low Risk      Difficulty of Paying Living Expenses: Not hard at all   Food Insecurity: No Food Insecurity     Worried About Running Out of Food in the Last Year: Never true     Ran Out of Food in the Last Year: Never true   Transportation Needs: No Transportation Needs     Lack of Transportation (Medical): No     Lack of Transportation (Non-Medical): No   Physical Activity: Insufficiently Active     Days of Exercise per Week: 2 days      "Minutes of Exercise per Session: 10 min   Stress: Stress Concern Present     Feeling of Stress : To some extent   Social Connections: Socially Integrated     Frequency of Communication with Friends and Family: More than three times a week     Frequency of Social Gatherings with Friends and Family: Once a week     Attends Spiritism Services: More than 4 times per year     Active Member of Clubs or Organizations: Yes     Attends Club or Organization Meetings: More than 4 times per year     Marital Status:    Intimate Partner Violence:      Fear of Current or Ex-Partner:      Emotionally Abused:      Physically Abused:      Sexually Abused:      Social History     Social History Narrative     Not on file          Allergies:  Allergies   Allergen Reactions     Propoxyphene Other (See Comments)     Clarithromycin Other (See Comments)     Pt states, \"ototoxicity\". Balance problems  Pt states, \"ototoxicity\".         Current Medications:   Current Outpatient Medications   Medication Sig Dispense Refill     acetaminophen (TYLENOL) 500 MG tablet Take 2 tablets (1,000 mg) by mouth every 6 hours as needed for pain       albuterol (PROAIR HFA/PROVENTIL HFA/VENTOLIN HFA) 108 (90 Base) MCG/ACT inhaler Inhale 2 puffs into the lungs every 4 hours as needed        baclofen (LIORESAL) 10 MG tablet Take 10 mg by mouth daily as needed        Carboxymethylcellul-Glycerin 1-0.9 % GEL Place 1 drop into both eyes daily        celecoxib (CELEBREX) 200 MG capsule Take 200 mg by mouth 2 times daily        clonazePAM (KLONOPIN) 0.5 MG tablet Take 0.5 mg by mouth nightly as needed        DULoxetine (CYMBALTA) 60 MG capsule Take 1 capsule (60 mg) by mouth daily 30 capsule 0     LYSINE PO        NONFORMULARY Vitamin Packet from [employer] including Multi-vitamin, Leutin, Calcium, Antioxidant, Fish oil, Glucosamine- Chondroitin: Take 1 packet by mouth daily       propranolol (INDERAL) 10 MG tablet Take 10 mg by mouth daily as needed        " rosuvastatin (CRESTOR) 5 MG tablet Take 5 mg by mouth every other day        tacrolimus (PROTOPIC) 0.1 % external ointment Apply to AA on the face BID PRN 30 g 5     traZODone (DESYREL) 100 MG tablet Take 100 mg by mouth nightly as needed        valACYclovir (VALTREX) 1000 mg tablet Take two tablets twice per day for one day for flare ups. 30 tablet 1        Physical Exam:     /67   Pulse 74   SpO2 97%     General Appearance: No distress, seated comfortably  Mood: Euthymic  HE ENT: Non constricted pupils  Respiratory: Non labored breathing  MS: Normal muscle bulk, no left knee joint line tenderness    ASSESSMENT AND PLAN:     Encounter Diagnosis:  Lumbar stenosis without neurogenic claudication  Lumbar radiculopathy  Cervical radiculopathy  Chronic back pain  Left knee mild DJD       Dinorah Thompson is a 71 year old year old female  who presents to the pain clinic with low back pain and left medial knee joint discomfort possible related to left L4 lumbar radicular pain. Xray left knee mild DJD.     RECOMMENDATIONS:     1. Referral placed for chiropractic care and physical therapy for lumbar radiculopathy and left knee pain    2. Procedure: We are recommending the patient for repeat LESI in the procedure suite as needed.       Follow up: as needed.       Again, thank you for allowing me to participate in the care of your patient.      Sincerely,    Tiera Santana MD

## 2021-10-04 NOTE — TELEPHONE ENCOUNTER
We do not have the diagnostic sleep study on file. I spoke to the patient and she said she does not know where the most recent diagnostic study was completed or when. She mentioned she was going to complete a new study while in [place] anyway. This study will need to be a split night to have insurance continue to cover BIPAP/CPAP supplies. She is going to contact her provider to get that scheduled.

## 2021-10-04 NOTE — PATIENT INSTRUCTIONS
Referrals:    Physical Therapy Referral placed-   If you have not heard from the scheduling office within 2 business days, please call [phone] for all locations    Chiropractic referral placed.  -Please call your insurance provider to find out about chiropractic coverage, being that not all policies cover acupuncture services.      Recommended Follow up:      As needed with the provider.        To speak with a nurse, schedule/reschedule/cancel a clinic appointment, or request a medication refill call: [phone], option #1.    You can also reach us by [email or web address]: [email or web address]

## 2021-10-08 ENCOUNTER — VIRTUAL VISIT (OUTPATIENT)
Dept: OTOLARYNGOLOGY | Facility: CLINIC | Age: 71
End: 2021-10-08
Payer: MEDICARE

## 2021-10-08 DIAGNOSIS — R05.9 COUGH: ICD-10-CM

## 2021-10-08 DIAGNOSIS — R49.0 HOARSENESS: ICD-10-CM

## 2021-10-08 DIAGNOSIS — R49.0 MUSCLE TENSION DYSPHONIA: Primary | ICD-10-CM

## 2021-10-08 DIAGNOSIS — J38.7 IRRITABLE LARYNX: ICD-10-CM

## 2021-10-08 PROCEDURE — 92507 TX SP LANG VOICE COMM INDIV: CPT | Mod: GN | Performed by: SPEECH-LANGUAGE PATHOLOGIST

## 2021-10-08 NOTE — LETTER
"10/8/2021       RE: Dinorah Thompson  [address]  [place] [address]     Dear Colleague,    Thank you for referring your patient, Dinorah Thompson, to the [facility] VOICE CLINIC [place] at [facility]. Please see a copy of my visit note below.    Dinorah Thompson is a 71 year old female who is being cared for via a billable virtual visit.        The patient has been notified and verbally consented to the following statements:     This video visit will be conducted between you and your provider.    Patient has opted to conduct today's video visit vs an in-person appointment, and is not able to attend due to possible exposure to COVID-19.      If during the course of the call the provider feels a video visit is not appropriate, you will not be charged for this service.    Provider has received verbal consent for billable virtual visit from the patient? Yes    Preferred method for receiving information: [email or web address]     Call initiated at: 9:02 AM  Platform used to conduct today's virtual appointment: AM Well Video  Location of provider: Residence  Location of patient: [facility] VOICE [facility]  THERAPY NOTE (CPT 91304)  Patient: Dinorah Thompson  Date of Service: 10/8/2021  Impressions from most recent evaluation (5/3/21):  \"Referring physician: Dr. Radha Altamirano  Impressions from most recent evaluation (3/22/2021):  \"IMPRESSIONS: Dinorah Thompson is a 70 year old retired female with a Hx of multiple personal stressors, presenting today with Dysphonia (R49.0), as evidenced by today's evaluation.   Remarkable findings and recommendations of the evaluation included:  1) begin a course of speech therapy\"      SUBJECTIVE:  Since the last appointment, Ms. Thompson reports the following:     Overall she reports that symptoms are improving    She is going to visit her aunt wo has some dementia.    Successes: she has been able to sing for the past month with " the [employer] at [facility]    Less exercises and more healing from last year.      Also the more humid climate    She also hydrated a lot     Gargle and bubbles remain helpful, as well as massage and pull down while swallowing to help suppress coughing.     Waiting to hear about whether she needs to keep using the bi-pap a lower elevation.  If not will get a small humidifier.       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    OBJECTIVE:  Ms. Thompson presents today with the following:  Voice quality:    Moderate to severe breathiness and roughness on sustained vowels    Moderate instability on sustained vowels       COUGH/THROAT CLEARING:  ? Observed 40 minutes into the session; resolved in seconds and was mild in severity    PATIENT REPORTED MEASURES:  Patient Supplied Answers To SLP QOL Questionnaire  No flowsheet data found.  Speech follow up as discussed with patient:  Dysponia SLP Goals 5/17/2021 7/30/2021 10/8/2021   How would you rate your speaking voice quality, if 0 is worst voice quality, and 10 is best voice? - - 7   How would you rate your singing voice quality, if 0 is worst voice quality, and 10 is best voice? - - 9   How much effort is it to speak, if 0 is no extra effort and 10 is maximum effort? - - 4   How much effort is it to sing, if 0 is no extra effort and 10 is maximum effort? - - -   How how severe is your [Throat Discomfort - or use patient specific description], if 0 is no [discomfort] at all and 10 is the worst [discomfort]? - - 2   How severe is your cough /throat clearing, if 0 is no cough at all and 10 is the worst cough? - - 1   How would you rate your breathing, if 0 is the worst and 10 is the best? - - 10   How much does your voice problem bother you? - Somewhat Somewhat   How much does your cough/throat-clearing problem bother you?            Quite a bit Quite a bit Not at all   How much does  "your breathing problem bother you?         - Quite a bit Not at all   How much does your throat discomfort bother you?     - A little bit Somewhat       THERAPEUTIC ACTIVITIES    Demonstrated previous exercises.  o demonstrated improved technique  o appropriate redirection provided  o instruction provided for increased level of complexity/difficulty  o I recommended that she use a washcloth and breathing steam from the cough in the morning to help reduce topical dryness on BiPAP use.  o I also encouraged her to pursue purchase a small personal humidifier for the side of her bed if she is tested and found to not need her BiPAP machine any longer.    Semi-Occluded Vocal Tract (SOVT) exercises instructed to reduce laryngeal tension, promote vocal fold pliability, and coordinate respiration and phonation    Both the lips (puffy, blow fish face or \"stress reducer\" )was found to be most facilitating     Sustained phonation, and voice vs. voiceless productions used to promote easy voicing and raise awareness of laryngeal tension    Ascending and descending glides utilized to promote vocal fold pliability    Instructed on the benefits of using these exercises for improved coordination of breath flow with phonation and tissue mobilization.    Instructed on the importance of using these exercises as a warm-up / cool down,  and to re-calibrate the voice throughout the day.    Resonant Voice Therapy (RVT) exercises to promote forward locus of resonance and optimized pattern of laryngeal adduction    Speech material that elicits a high, forward tongue position (/n/) was most facilitating    Easy descending glide on /n/ utilized in conjunction with relaxed jaw, tongue, and lightly closed lips to facilitate forward resonant sound    Syllable level using /n/ in alternation with cardinal vowels on sustained pitches and speech inflection    Word level exercises featuring nasal continuant loaded stimuli    Phrase level exercises " featuring nasal continuants in more complex phonemic contexts were employed    Instructed with and interval of a descending 5th, as well as an arpeggio pattern was facilitating, prior to progressing to comfortable speech using optimal breath flow.    Negative practice was employed and was facilitative    Able to recognize improvement in quality and comfort    Instructed in techniques to improve length of utterance with reduced effort for optimal carryover.  o Instructed with semiscripted conversation tasks, and encouraged to read something familiar aloud as if to an audience of 2-3 people for practice.    Developed a mental checklist of factors to help trouble shoot moments of difficulty during daily speaking tasks.    Counseling and Education:    Asked many questions about the nature of her symptoms, and I answered all of these thoroughly.    A revised regimen for home practice was instructed.    I provided an AVS, audio recording and handouts of today's therapeutic activities to facilitate practice.    ASSESSMENT/PLAN  PROGRESS TOWARD LONG TERM GOALS:   Adequate progress; please see above    IMPRESSIONS: Dysphonia (R49.0). Ms. Thompson demonstrated very good learning today of therapeutic activities to help reduce muscle tension dysphonia when reading aloud.  Throughout assessment we also agreed that she is doing very well with her cough suppression, and that the symptoms are minimally affecting her at this time.  She does continue to work with her cough suppression strategies on a daily basis, and believes that through additional time she will be able to reach a place where her cough is within normal limits.    PLAN: I will see Ms. Thompson in 2 weeks, at which point we will continue therapy.   For practice goals see AVS.     TOTAL SERVICE TIME:   Call Initiated at: 9:02 AM  Call Ended at: 10am           CPT Billing Codes:   TREATMENT (67413)  NO CHARGE FACILITY FEE (47238)    Roxana Lee M.M. (voice), M.A.,  CCC/SLP  Speech-Language Pathologist  [employer] Trained Vocologist  [facility] Voice [facility]  [phone]  [email or web address]  Pronouns: she/her      *this report was created in part through the use of computerized dictation software, and though reviewed following completion, some typographic errors may persist.  If there is confusion regarding any of this notes contents, please contact me for clarification      Again, thank you for allowing me to participate in the care of your patient.      Sincerely,    Roxana Lee, SLP

## 2021-10-08 NOTE — PROGRESS NOTES
Due to uncontrollable factors (clinic/patient availability) too few sessions were able to be completed prior to the end of the certification period to meet previously stated goals.  Given this fact formal reevaluation is deferred at this time so that time and resources can be better spent addressing presenting concerns. Prior goals are re-listed below for convenience.                                                                           Outpatient Speech Language Therapy - MEDICARE RE-CERTIFICATION    PLAN OF TREATMENT FOR OUTPATIENT REHABILITATION  (RE-CERTIFICATION)  Patient's Last Name, First Name, M.I.  YOB: 1950  Dinorah BRANCH Jay                        Provider's Name  RONNI Da Silva, MMendezMMendez (voice), M.A., CCC-SLP Medical Record No.  [id number]                              Onset Date:  03/22/2021   Start of Care Date: 03/22/2021     Type: Speech Language Therapy Medical Diagnosis: Dysphonia (R49.0)                        Therapy Diagnosis:  Dysphonia (R49.0)   Visits from SOC:  5   _________________________________________________________________________________  Plan of Treatment:   Speech therapy    DURATION/FREQUENCY OF TREATMENT  Six weekly, one-hour sessions, with two monthly one-hour follow-up sessions     PROGNOSIS  Good prognosis for voice improvement with speech therapy and regular practice of therapeutic activities.     BARRIERS TO LEARNING/TEACHING AND LEARNING NEEDS  None/Unremarkable     GOALS:  Patient goal:   To improve and maintain a healthy voice quality  To resolve the vocal fold lesions  To understand the problem and fix it as much as possible     Short-term goal(s): Within the first 4 sessions, Ms. Thompson:  will be able to demonstrate provided cough suppression and substitution strategies from memory independently with 90% accuracy  will be able to independently list key factors in maintenance of good vocal hygiene with 80% accuracy, and report on their use  outside the therapy room.  will utilize silent inhalation with good low-respiratory engagement 75% of the time during therapy tasks with minimal clinician support  will demonstrate semi-occluded vocal tract (SOVT) exercises with at least 80% accuracy with no clinician support     Long-term goal(s): In 6 months, Ms. Thompson will:  Report a week with no more than 2 episodes of coughing, that do not last more than 2 seconds  Report resolution of dysphonia, and use of optimal voice quality to meet personal, social, and professional needs, 90% of the time during a typical week of vocal activities.     Goals PROGRESSING. Patient is demonstrating improvement in symptoms and ability to utilize strategies, but ongoing intervention is warranted.       _________________________________________________________________________________    I CERTIFY THE NEED FOR THESE SERVICES FURNISHED UNDER THIS PLAN OF TREATMENT AND WHILE UNDER MY CARE       (Physician attestation of this document indicates review and certification of the therapy plan).     Certification date from: 09/20/2021   Certification date to: 12/19/2021    Referring Provider: Dr. Carlos A Bower        Agree with note  Radha Altamirano MD  Signature requested 7/8/2024     _________________________________________________________________________________  Dinorah Thompson is a 71 year old female who is being cared for via a billable virtual visit.        The patient has been notified and verbally consented to the following statements:   This video visit will be conducted between you and your provider.  Patient has opted to conduct today's video visit vs an in-person appointment, and is not able to attend due to possible exposure to COVID-19.    If during the course of the call the provider feels a video visit is not appropriate, you will not be charged for this service.    Provider has received verbal consent for billable virtual visit from the patient? Yes    Preferred method  "for receiving information: [employer]     Call initiated at: 9:02 AM  Platform used to conduct today's virtual appointment: AM Well Video  Location of provider: Residence  Location of patient: [facility] VOICE CLINIC  THERAPY NOTE (CPT 84515)  Patient: Dinorah Thompson  Date of Service: 10/8/2021  Impressions from most recent evaluation (5/3/21):  \"Referring physician: Dr. Radha Altamirano  Impressions from most recent evaluation (3/22/2021):  \"IMPRESSIONS: Dinorah Thompson is a 70 year old retired female with a Hx of multiple personal stressors, presenting today with Dysphonia (R49.0), as evidenced by today's evaluation.   Remarkable findings and recommendations of the evaluation included:  1) begin a course of speech therapy\"      SUBJECTIVE:  Since the last appointment, Ms. Thompson reports the following:   Overall she reports that symptoms are improving  She is going to visit her aunt wo has some dementia.  Successes: she has been able to sing for the past month with the [employer] [facility]  Less exercises and more healing from last year.    Also the more humid climate  She also hydrated a lot   Gargle and bubbles remain helpful, as well as massage and pull down while swallowing to help suppress coughing.   Waiting to hear about whether she needs to keep using the bi-pap a lower elevation.  If not will get a small humidifier.     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    OBJECTIVE:  Ms. Thompson presents today with the following:  Voice quality:  Moderate to severe breathiness and roughness on sustained vowels  Moderate instability on sustained vowels     COUGH/THROAT CLEARING:  Observed 40 minutes into the session; resolved in seconds and was mild in severity    PATIENT REPORTED MEASURES:  Patient Supplied Answers To SLP QOL Questionnaire  No flowsheet data found.  Speech follow up as discussed with " "patient:  Dysponia SLP Goals 5/17/2021 7/30/2021 10/8/2021   How would you rate your speaking voice quality, if 0 is worst voice quality, and 10 is best voice? - - 7   How would you rate your singing voice quality, if 0 is worst voice quality, and 10 is best voice? - - 9   How much effort is it to speak, if 0 is no extra effort and 10 is maximum effort? - - 4   How much effort is it to sing, if 0 is no extra effort and 10 is maximum effort? - - -   How how severe is your [Throat Discomfort - or use patient specific description], if 0 is no [discomfort] at all and 10 is the worst [discomfort]? - - 2   How severe is your cough /throat clearing, if 0 is no cough at all and 10 is the worst cough? - - 1   How would you rate your breathing, if 0 is the worst and 10 is the best? - - 10   How much does your voice problem bother you? - Somewhat Somewhat   How much does your cough/throat-clearing problem bother you?            Quite a bit Quite a bit Not at all   How much does your breathing problem bother you?         - Quite a bit Not at all   How much does your throat discomfort bother you?     - A little bit Somewhat       THERAPEUTIC ACTIVITIES  Demonstrated previous exercises.  demonstrated improved technique  appropriate redirection provided  instruction provided for increased level of complexity/difficulty  I recommended that she use a washcloth and breathing steam from the cough in the morning to help reduce topical dryness on BiPAP use.  I also encouraged her to pursue purchase a small personal humidifier for the side of her bed if she is tested and found to not need her BiPAP machine any longer.    Semi-Occluded Vocal Tract (SOVT) exercises instructed to reduce laryngeal tension, promote vocal fold pliability, and coordinate respiration and phonation  Both the lips (puffy, blow fish face or \"stress reducer\" )was found to be most facilitating   Sustained phonation, and voice vs. voiceless productions used to promote " easy voicing and raise awareness of laryngeal tension  Ascending and descending glides utilized to promote vocal fold pliability  Instructed on the benefits of using these exercises for improved coordination of breath flow with phonation and tissue mobilization.  Instructed on the importance of using these exercises as a warm-up / cool down,  and to re-calibrate the voice throughout the day.    Resonant Voice Therapy (RVT) exercises to promote forward locus of resonance and optimized pattern of laryngeal adduction  Speech material that elicits a high, forward tongue position (/n/) was most facilitating  Easy descending glide on /n/ utilized in conjunction with relaxed jaw, tongue, and lightly closed lips to facilitate forward resonant sound  Syllable level using /n/ in alternation with cardinal vowels on sustained pitches and speech inflection  Word level exercises featuring nasal continuant loaded stimuli  Phrase level exercises featuring nasal continuants in more complex phonemic contexts were employed  Instructed with and interval of a descending 5th, as well as an arpeggio pattern was facilitating, prior to progressing to comfortable speech using optimal breath flow.  Negative practice was employed and was facilitative  Able to recognize improvement in quality and comfort    Instructed in techniques to improve length of utterance with reduced effort for optimal carryover.  Instructed with semiscripted conversation tasks, and encouraged to read something familiar aloud as if to an audience of 2-3 people for practice.  Developed a mental checklist of factors to help trouble shoot moments of difficulty during daily speaking tasks.    Counseling and Education:  Asked many questions about the nature of her symptoms, and I answered all of these thoroughly.  A revised regimen for home practice was instructed.  I provided an AVS, audio recording and handouts of today's therapeutic activities to facilitate  practice.    ASSESSMENT/PLAN  PROGRESS TOWARD LONG TERM GOALS:   Adequate progress; please see above    IMPRESSIONS: Dysphonia (R49.0). Ms. Thompson demonstrated very good learning today of therapeutic activities to help reduce muscle tension dysphonia when reading aloud.  Throughout assessment we also agreed that she is doing very well with her cough suppression, and that the symptoms are minimally affecting her at this time.  She does continue to work with her cough suppression strategies on a daily basis, and believes that through additional time she will be able to reach a place where her cough is within normal limits.    PLAN: I will see Ms. Thompson in 2 weeks, at which point we will continue therapy.   For practice goals see AVS.     TOTAL SERVICE TIME:   Call Initiated at: 9:02 AM  Call Ended at: 10am           CPT Billing Codes:   TREATMENT (40551)  NO CHARGE FACILITY FEE (27867)    Roxana Lee M.M. (voice) MMARIA ELENA., CCC/SLP  Speech-Language Pathologist  [facility] Trained Vocologist  [facility]  [phone]  [email or web address]  Pronouns: she/her      *this report was created in part through the use of computerized dictation software, and though reviewed following completion, some typographic errors may persist.  If there is confusion regarding any of this notes contents, please contact me for clarification

## 2021-10-08 NOTE — PATIENT INSTRUCTIONS
" After Visit Summary    Patient: Dot Thompson  Date of Visit: 10/8/2021    These notes are also available in your MyChart. Please take a few moments to find them under \"Past Appointments\" in the [facility] system, as Roxana will start to phase out e-mail communications.    Order of today's appointment:    G3 3-5-1 buzzy blow fish (puffy cheeks like holding breath under water, then send sound through it).  o Also a good stress reliever.     \"hunnn\" before all the exercises below to keep it easy and full voiced:    N+vowels              Try reading aloud as if to an audience of 2-3 people 2-3x/day - keep the focus forward/ voice a bit twangy    Consider 6 using a warm washcloth and bringing it out and breathing in steam after BiPAP use in the morning.    Consider a small personal humidifier if you are tested and told that you no longer need to use her BiPAP.    Recording sent      Roxana Lee M.M. (voice), M.A., CCC/SLP  Speech-Language Pathologist  [facility] Certified Vocologist  [facility]  [email or web address]  she/her    "

## 2021-10-12 ENCOUNTER — THERAPY VISIT (OUTPATIENT)
Dept: PHYSICAL THERAPY | Facility: CLINIC | Age: 71
End: 2021-10-12
Attending: ANESTHESIOLOGY
Payer: MEDICARE

## 2021-10-12 DIAGNOSIS — M54.16 LUMBAR RADICULOPATHY: ICD-10-CM

## 2021-10-12 DIAGNOSIS — F32.89 OTHER DEPRESSION: ICD-10-CM

## 2021-10-12 PROCEDURE — 97110 THERAPEUTIC EXERCISES: CPT | Mod: GP | Performed by: PHYSICAL THERAPIST

## 2021-10-12 PROCEDURE — 97161 PT EVAL LOW COMPLEX 20 MIN: CPT | Mod: GP | Performed by: PHYSICAL THERAPIST

## 2021-10-12 PROCEDURE — 97112 NEUROMUSCULAR REEDUCATION: CPT | Mod: GP | Performed by: PHYSICAL THERAPIST

## 2021-10-12 NOTE — LETTER
DEPARTMENT OF HEALTH AND HUMAN SERVICES  CENTERS FOR MEDICARE & MEDICAID SERVICES    PLAN/UPDATED PLAN OF PROGRESS FOR OUTPATIENT REHABILITATION          PATIENTS NAME:  Dinorah Thompson   : 1950  PROVIDER NUMBER:    [id number]  HICN: [id number]  PROVIDER NAME: [facility]  MEDICAL RECORD NUMBER: [id number]   START OF CARE DATE: 10/12/21   TYPE:  PT  PRIMARY/TREATMENT DIAGNOSIS: Lumbar radiculopathy  VISITS FROM START OF CARE:  Rxs Used: 1     Physical Therapy Initial Evaluation  Subjective:  The history is provided by the patient. No  was used.   Patient Health History  Dinorah Thompson being seen for Spinal stenosis and worsening L knee pain..   Problem occurred: Gradual over time, degenerative   Pain is reported as 4/10 on pain scale.  General health as reported by patient is good.  Pertinent medical history includes: depression, hepatitis, migraines/headaches, numbness/tingling, osteoarthritis, osteoporosis, overweight and sleep disorder/apnea.   Red flags:  Progressive neurological deficits.  Medical allergies: other. Other medical allergies details: Darvon, clarithromycin.   Surgeries include:  Orthopedic surgery and other. Other surgery history details: L RCR, tonsils, appendix, hysterectomy,bladder mesh and parathyroidectomy.    Current medications:  Anti-depressants, anti-inflammatory and sleep medication.    Current occupation is Retired.              Therapist Generated HPI Evaluation  Problem details: Pt c/o LBP with radiation into L L/E to knee level.  Irritated L knee today in/out of car adding to L knee pain today.  Pt has appt for ortho consult L knee next week.  Today's treatment focus of LBP/radiculopahty. MD order date 10/4/2021..         Type of problem:  Lumbar.  This is a chronic condition.  Condition occurred with:  Insidious onset.  Where condition occurred: for unknown reasons.  Patient reports pain:  Mid lumbar spine,  lower lumbar spine, central lumbar spine, lumbar spine left, lumbar spine right, thoracic spine right, mid thoracic spine and central thoracic spine.  Pain is described as aching, sharp and stabbing and is constant.  Pain radiates to:  Gluteals left, thigh left, gluteals right and knee left. Pain is the same all the time.  Since onset symptoms are unchanged.  Associated symptoms:  Loss of balance, loss of motion, tingling and numbness. Symptoms are exacerbated by standing, twisting, lying down and lifting  and relieved by NSAID's, activity/movement and rest.  Special tests included:  X-ray (Lx spine DJD/DDD).                   Objective:  Gait:    Gait Type:  Antalgic   Assistive Devices:  None  Deviations:  Knee:  Knee flexion decr LGeneral Deviations:  Stride length decr and stance time decr  PATIENTS NAME:  Dinorah Thompson   : 1950          Lumbar/SI Evaluation  ROM:  Arom wnl lumbar: SKTC NE during increased ROM after.  AROM Lumbar:   Flexion:          Min loss Upper/mid back  Ext:                    Mod loss +   Side Bend:        Left:  Min loss +    Right:  Min loss +/-  Rotation:           Left:     Right:   Side Glide:        Left:     Right:   Strength: Fair core stab recruitment, cuing for TA firing and maintaing contraction  Lumbar Myotomes:  normal  Lumbar Dermtomes:  normal  Neural Tension/Mobility:  Lumbar:  Normal    Functional Tests:    Single Leg Bridge: Left:    Right:   % of Uninvolved:  + with bridge into L knee      Assessment/Plan:    Patient is a 71 year old female with lumbar complaints.    Patient has the following significant findings with corresponding treatment plan.                Diagnosis 1:  Lx radiculopathy  Pain -  hot/cold therapy, US, manual therapy, directional preference exercise and home program  Decreased ROM/flexibility - manual therapy and therapeutic exercise  Decreased strength - therapeutic exercise and therapeutic activities  Decreased proprioception - neuro  "re-education and therapeutic activities  Impaired gait - gait training  Impaired muscle performance - neuro re-education  Decreased function - therapeutic activities  Impaired posture - neuro re-education    Therapy Evaluation Codes:   Cumulative Therapy Evaluation is: Low complexity.    Previous and current functional limitations:  (See Goal Flow Sheet for this information)    Short term and Long term goals: (See Goal Flow Sheet for this information)     Communication ability:  Patient appears to be able to clearly communicate and understand verbal and written communication and follow directions correctly.  Treatment Explanation - The following has been discussed with the patient:   RX ordered/plan of care  Anticipated outcomes  Possible risks and side effects  This patient would benefit from PT intervention to resume normal activities.   Rehab potential is good.    Frequency:  1 X week, once daily  Duration:  for 8 weeks  Discharge Plan:  Achieve all LTG.  Independent in home treatment program.  Reach maximal therapeutic benefit.    Caregiver Signature/Credentials _____________________________ Date ________       Treating Provider:  Baudilio Johnson [facility] [[email or web address]]    PATIENTS NAME:  Dinorah Thompson   : 1950          I have reviewed and certified the need for these services and plan of treatment while under my care.        PHYSICIAN'S SIGNATURE:   _________________________________________  Date___________   Tiera Santana MD    Certification period:  Beginning of Cert date period: 10/12/21 to  End of Cert period date: 22     Functional Level Progress Report: Please see attached \"Goal Flow sheet for Functional level.\"    ____X____ Continue Services or       ________ DC Services                Service dates: From  SOC Date: 10/12/21 date to present                         "

## 2021-10-12 NOTE — PROGRESS NOTES
Physical Therapy Initial Evaluation  Subjective:  The history is provided by the patient. No  was used.   Patient Health History  Dinorah Thompson being seen for Spinal stenosis and worsening L knee pain..       Problem occurred: Gradual over time, degenerative   Pain is reported as 4/10 on pain scale.  General health as reported by patient is good.  Pertinent medical history includes: depression, hepatitis, migraines/headaches, numbness/tingling, osteoarthritis, osteoporosis, overweight and sleep disorder/apnea.   Red flags:  Progressive neurological deficits.  Medical allergies: other. Other medical allergies details: Darvon, clarithromycin.   Surgeries include:  Orthopedic surgery and other. Other surgery history details: L RCR, tonsils, appendix, hysterectomy,bladder mesh and parathyroidectomy.    Current medications:  Anti-depressants, anti-inflammatory and sleep medication.    Current occupation is Retired.                     Therapist Generated HPI Evaluation  Problem details: Pt c/o LBP with radiation into L L/E to knee level.  Irritated L knee today in/out of car adding to L knee pain today.  Pt has appt for ortho consult L knee next week.  Today's treatment focus of LBP/radiculopahty. MD order date 10/4/2021..         Type of problem:  Lumbar.    This is a chronic condition.  Condition occurred with:  Insidious onset.  Where condition occurred: for unknown reasons.  Patient reports pain:  Mid lumbar spine, lower lumbar spine, central lumbar spine, lumbar spine left, lumbar spine right, thoracic spine right, mid thoracic spine and central thoracic spine.  Pain is described as aching, sharp and stabbing and is constant.  Pain radiates to:  Gluteals left, thigh left, gluteals right and knee left. Pain is the same all the time.  Since onset symptoms are unchanged.  Associated symptoms:  Loss of balance, loss of motion, tingling and numbness. Symptoms are exacerbated by standing, twisting,  lying down and lifting  and relieved by NSAID's, activity/movement and rest.  Special tests included:  X-ray (Lx spine DJD/DDD).                            Objective:    Gait:    Gait Type:  Antalgic   Assistive Devices:  None  Deviations:  Knee:  Knee flexion decr LGeneral Deviations:  Stride length decr and stance time decr               Lumbar/SI Evaluation  ROM:  Arom wnl lumbar: SKTC NE during increased ROM after.  AROM Lumbar:   Flexion:          Min loss Upper/mid back  Ext:                    Mod loss +   Side Bend:        Left:  Min loss +    Right:  Min loss +/-  Rotation:           Left:     Right:   Side Glide:        Left:     Right:         Strength: Fair core stab recruitment, cuing for TA firing and maintaing contraction  Lumbar Myotomes:  normal                Lumbar Dermtomes:  normal                Neural Tension/Mobility:  Lumbar:  Normal          Functional Tests:      Single Leg Bridge: Left:    Right:   % of Uninvolved:  + with bridge into L knee                                                     General     ROS    Assessment/Plan:    Patient is a 71 year old female with lumbar complaints.    Patient has the following significant findings with corresponding treatment plan.                Diagnosis 1:  Lx radiculopathy  Pain -  hot/cold therapy, US, manual therapy, directional preference exercise and home program  Decreased ROM/flexibility - manual therapy and therapeutic exercise  Decreased strength - therapeutic exercise and therapeutic activities  Decreased proprioception - neuro re-education and therapeutic activities  Impaired gait - gait training  Impaired muscle performance - neuro re-education  Decreased function - therapeutic activities  Impaired posture - neuro re-education    Therapy Evaluation Codes:   Cumulative Therapy Evaluation is: Low complexity.    Previous and current functional limitations:  (See Goal Flow Sheet for this information)    Short term and Long term goals: (See  Goal Flow Sheet for this information)     Communication ability:  Patient appears to be able to clearly communicate and understand verbal and written communication and follow directions correctly.  Treatment Explanation - The following has been discussed with the patient:   RX ordered/plan of care  Anticipated outcomes  Possible risks and side effects  This patient would benefit from PT intervention to resume normal activities.   Rehab potential is good.    Frequency:  1 X week, once daily  Duration:  for 8 weeks  Discharge Plan:  Achieve all LTG.  Independent in home treatment program.  Reach maximal therapeutic benefit.    Please refer to the daily flowsheet for treatment today, total treatment time and time spent performing 1:1 timed codes.

## 2021-10-13 ENCOUNTER — MYC MEDICAL ADVICE (OUTPATIENT)
Dept: FAMILY MEDICINE | Facility: CLINIC | Age: 71
End: 2021-10-13

## 2021-10-13 RX ORDER — DULOXETIN HYDROCHLORIDE 60 MG/1
60 CAPSULE, DELAYED RELEASE ORAL DAILY
Qty: 90 CAPSULE | Refills: 0 | Status: SHIPPED | OUTPATIENT
Start: 2021-10-13 | End: 2021-12-31

## 2021-10-14 ENCOUNTER — IMMUNIZATION (OUTPATIENT)
Dept: FAMILY MEDICINE | Facility: CLINIC | Age: 71
End: 2021-10-14
Payer: MEDICARE

## 2021-10-14 DIAGNOSIS — M54.16 LUMBAR RADICULOPATHY, CHRONIC: Primary | ICD-10-CM

## 2021-10-18 NOTE — PROGRESS NOTES
ASSESSMENT & PLAN  Patient Instructions     1. Chronic pain of left knee    2. Primary osteoarthritis of left knee      -Patient has chronic knee pain that has recently been worsening due to arthritis and possible acute meniscus tear  -Patient will start formal physical therapy and home exercise program  -Patient may continue with Celebrex as needed.  Patient will start 1000 mg of exercise Tylenol every 6 hours as needed for breakthrough pain  -Patient may continue with compression sleeve brace as needed  -We will follow up in 3 to 4 weeks.  If no improvement in pain, to consider possible cortisone injection.  -We also discussed if all conservative treatment options fail, she would need an MRI to assess potential cartilage and meniscus injuries to determine appropriate surgical options which may include minimally invasive arthroscopic surgery versus total knee replacement  -Call direct clinic number [[phone]] at any time with questions or concerns.    Albert Yeo MD [facility]  [facility]          -----    SUBJECTIVE  Dinorah Thompson is a/an 71 year old female who is seen as a self referral for evaluation of left knee pain. The patient is seen by themselves. States she sees Dr. Santana regarding spinal stenosis, and at first this physician thought her knee pain was related to this. She notes though that she gets sharp shooting pain, but wearing brace and icing has been helping, wants to be evaluated for some DJD.     Onset: 8 years(s) ago waxing and waning pain, but most recently over past 1 month. Reports insidious onset without acute precipitating event.  Location of Pain: right knee, medial knee, now lateral knee as well.   Rating of Pain at worst: 7/10  Rating of Pain Currently: 2/10  Worsened by: twisting and pivoting, stairs, walking for extended periods of time   Better with: rest, activity avoidance, OTC compression sleeve  Treatments tried: rest,  activity avoidance, OTC compression sleeve, physical therapy for spinal stenosis   Associated symptoms: weakness weakness of leg  Orthopedic history: YES - h/o spinal stenosis, 1980's twisted and fell on left knee on concrete.   Relevant surgical history: NO  Social history: social history: retired    Past Medical History:   Diagnosis Date     Chronic osteoarthritis      Complication of anesthesia 1990's    DIfficulty waking up     Depressive disorder      Female bladder prolapse 9/3/2019     Parathyroid abnormality (H)      Prolapse of female pelvic organs 8/19/2019     Sleep apnea     Uses a Bi-Pap.. Will have son bring it if necessary on DOS     Social History     Socioeconomic History     Marital status:      Spouse name: Wilbert     Number of children: Not on file     Years of education: Not on file     Highest education level: Bachelor's degree (e.g., BA, AB, BS)   Occupational History     Not on file   Tobacco Use     Smoking status: Never Smoker     Smokeless tobacco: Never Used   Substance and Sexual Activity     Alcohol use: Not Currently     Drug use: Never     Sexual activity: Not Currently     Partners: Male   Other Topics Concern     Parent/sibling w/ CABG, MI or angioplasty before 65F 55M? Not Asked   Social History Narrative     Not on file     Social Determinants of Health     Financial Resource Strain: Low Risk      Difficulty of Paying Living Expenses: Not hard at all   Food Insecurity: No Food Insecurity     Worried About Running Out of Food in the Last Year: Never true     Ran Out of Food in the Last Year: Never true   Transportation Needs: No Transportation Needs     Lack of Transportation (Medical): No     Lack of Transportation (Non-Medical): No   Physical Activity: Insufficiently Active     Days of Exercise per Week: 2 days     Minutes of Exercise per Session: 10 min   Stress: Stress Concern Present     Feeling of Stress : To some extent   Social Connections: Socially Integrated      "Frequency of Communication with Friends and Family: More than three times a week     Frequency of Social Gatherings with Friends and Family: Once a week     Attends [employer] Services: More than 4 times per year     Active Member of Clubs or Organizations: Yes     Attends Club or Organization Meetings: More than 4 times per year     Marital Status:    Intimate Partner Violence:      Fear of Current or Ex-Partner:      Emotionally Abused:      Physically Abused:      Sexually Abused:          Patient's past medical, surgical, social, and family histories were reviewed today and no changes are noted.    REVIEW OF SYSTEMS:  10 point ROS is negative other than symptoms noted above in HPI, Past Medical History or as stated below  Constitutional: NEGATIVE for fever, chills, change in weight  Skin: NEGATIVE for worrisome rashes, moles or lesions  GI/: NEGATIVE for bowel or bladder changes  Neuro: NEGATIVE for weakness, dizziness or paresthesias    OBJECTIVE:  /77   Ht 1.568 m (5' 1.75\")   Wt 68.1 kg (150 lb 3.2 oz)   BMI 27.69 kg/m     General: healthy, alert and in no distress  HEENT: no scleral icterus or conjunctival erythema  Skin: no suspicious lesions or rash. No jaundice.  CV: no pedal edema  Resp: normal respiratory effort without conversational dyspnea   Psych: normal mood and affect  Gait: normal steady gait with appropriate coordination and balance  Neuro: Normal light sensory exam of lower extremity  MSK:  LEFT KNEE  Inspection:    normal alignment  Palpation:    Tender about the lateral patellar facet, medial patellar facet and medial joint line. Remainder of bony and ligamentous landmarks are nontender.    Trace effusion is present    Patellofemoral crepitus is Present  Range of Motion:     50 extension to 1200 flexion  Strength:    Quadriceps grossly intact    Extensor mechanism intact  Special Tests:    Positive: none    Negative: MCL/valgus stress (0 & 30 deg), LCL/varus stress (0 & 30 " deg), Lachman's, anterior drawer, posterior drawer, Sang's    Independent visualization of the below image:  Recent Results (from the past 24 hour(s))   XR Knee Standing AP Bilat [place] Bilat Lat Left    Narrative    Severe medial patellofemoral joint space narrowing, lateral patellar   osseous loose body.  No acute fracture or dislocation.           Albert Yeo MD [facility]

## 2021-10-21 ENCOUNTER — THERAPY VISIT (OUTPATIENT)
Dept: PHYSICAL THERAPY | Facility: CLINIC | Age: 71
End: 2021-10-21
Payer: MEDICARE

## 2021-10-21 DIAGNOSIS — M54.16 LUMBAR RADICULOPATHY: ICD-10-CM

## 2021-10-21 PROCEDURE — 97110 THERAPEUTIC EXERCISES: CPT | Mod: GP | Performed by: PHYSICAL THERAPIST

## 2021-10-21 PROCEDURE — 97112 NEUROMUSCULAR REEDUCATION: CPT | Mod: GP | Performed by: PHYSICAL THERAPIST

## 2021-10-25 ENCOUNTER — VIRTUAL VISIT (OUTPATIENT)
Dept: OTOLARYNGOLOGY | Facility: CLINIC | Age: 71
End: 2021-10-25
Payer: MEDICARE

## 2021-10-25 ENCOUNTER — ANCILLARY PROCEDURE (OUTPATIENT)
Dept: GENERAL RADIOLOGY | Facility: CLINIC | Age: 71
End: 2021-10-25
Attending: FAMILY MEDICINE
Payer: MEDICARE

## 2021-10-25 ENCOUNTER — OFFICE VISIT (OUTPATIENT)
Dept: ORTHOPEDICS | Facility: CLINIC | Age: 71
End: 2021-10-25
Payer: MEDICARE

## 2021-10-25 VITALS
WEIGHT: 150.2 LBS | BODY MASS INDEX: 27.64 KG/M2 | SYSTOLIC BLOOD PRESSURE: 120 MMHG | HEIGHT: 62 IN | DIASTOLIC BLOOD PRESSURE: 77 MMHG

## 2021-10-25 DIAGNOSIS — J38.7 IRRITABLE LARYNX: ICD-10-CM

## 2021-10-25 DIAGNOSIS — M25.562 LEFT KNEE PAIN: ICD-10-CM

## 2021-10-25 DIAGNOSIS — G89.29 CHRONIC PAIN OF LEFT KNEE: Primary | ICD-10-CM

## 2021-10-25 DIAGNOSIS — R49.0 HOARSENESS: ICD-10-CM

## 2021-10-25 DIAGNOSIS — M25.562 CHRONIC PAIN OF LEFT KNEE: Primary | ICD-10-CM

## 2021-10-25 DIAGNOSIS — R05.9 COUGH: ICD-10-CM

## 2021-10-25 DIAGNOSIS — M17.12 PRIMARY OSTEOARTHRITIS OF LEFT KNEE: ICD-10-CM

## 2021-10-25 DIAGNOSIS — R49.0 MUSCLE TENSION DYSPHONIA: Primary | ICD-10-CM

## 2021-10-25 PROCEDURE — 99214 OFFICE O/P EST MOD 30 MIN: CPT | Performed by: FAMILY MEDICINE

## 2021-10-25 PROCEDURE — 92507 TX SP LANG VOICE COMM INDIV: CPT | Mod: GN | Performed by: SPEECH-LANGUAGE PATHOLOGIST

## 2021-10-25 PROCEDURE — 73562 X-RAY EXAM OF KNEE 3: CPT | Performed by: FAMILY MEDICINE

## 2021-10-25 ASSESSMENT — MIFFLIN-ST. JEOR: SCORE: 1145.58

## 2021-10-25 NOTE — PROGRESS NOTES
"Dinorah Thompson is a 71 year old female who is being cared for via a billable virtual visit.        The patient has been notified and verbally consented to the following statements:     This video visit will be conducted between you and your provider.    Patient has opted to conduct today's video visit vs an in-person appointment, and is not able to attend due to possible exposure to COVID-19.      If during the course of the call the provider feels a video visit is not appropriate, you will not be charged for this service.    Provider has received verbal consent for billable virtual visit from the patient? Yes    Preferred method for receiving information: [email or web address]     Call initiated at: 10:05 AM  Platform used to conduct today's virtual appointment: [employer] Video  Location of provider: Residence  Location of patient: [facility]  THERAPY NOTE (CPT 08426)  Patient: Dinorah Thompson  Date of Service: 10/25/2021  \"Referring physician: Dr. Radha Altamirano  Impressions from most recent evaluation (3/22/2021):  \"IMPRESSIONS: Dinorah Thompson is a 70 year old retired female with a Hx of multiple personal stressors, presenting today with Dysphonia (R49.0), as evidenced by today's evaluation.   Remarkable findings and recommendations of the evaluation included:  1) begin a course of speech therapy    SUBJECTIVE:  Since the last appointment, Ms. Thompson reports the following:     Overall she reports that symptoms are improving    Aunt and uncle are not doing well and are out in the country.  She intermittently knows how to answer her phone.     They were getting better until they turned the furnace on and it make the house dry.     OBJECTIVE:  Ms. Thompson presents today with the following:  Voice quality:    Mild breathiness and roughness on sustained vowels    Mild to moderate instability on sustained vowels       COUGH/THROAT CLEARING:  ? Observed 50 minutes into the session; resolved in seconds and was " "mild in severity      PATIENT REPORTED MEASURES:  Patient Supplied Answers To SLP QOL Questionnaire  No flowsheet data found.  Speech follow up as discussed with patient:  Dysponia SLP Goals 5/17/2021 7/30/2021 10/8/2021   How would you rate your speaking voice quality, if 0 is worst voice quality, and 10 is best voice? - - 7   How would you rate your singing voice quality, if 0 is worst voice quality, and 10 is best voice? - - 9   How much effort is it to speak, if 0 is no extra effort and 10 is maximum effort? - - 4   How much effort is it to sing, if 0 is no extra effort and 10 is maximum effort? - - -   How how severe is your [Throat Discomfort - or use patient specific description], if 0 is no [discomfort] at all and 10 is the worst [discomfort]? - - 2   How severe is your cough /throat clearing, if 0 is no cough at all and 10 is the worst cough? - - 1   How would you rate your breathing, if 0 is the worst and 10 is the best? - - 10   How much does your voice problem bother you? - Somewhat Somewhat   How much does your cough/throat-clearing problem bother you?            Quite a bit Quite a bit Not at all   How much does your breathing problem bother you?         - Quite a bit Not at all   How much does your throat discomfort bother you?     - A little bit Somewhat         THERAPEUTIC ACTIVITIES    Demonstrated previous exercises.  o demonstrated improved technique  o appropriate redirection provided  o instruction provided for increased level of complexity/difficulty    Resonant Voice Therapy (RVT) exercises to promote forward locus of resonance and optimized pattern of laryngeal adduction    Speech material that elicits a high, forward tongue position (/n/) was most facilitating    Easy descending glide on /n/ utilized in conjunction with relaxed jaw, tongue, and lightly closed lips to facilitate forward resonant sound    Use of the carrier phrase \"mmhmm\" instructed to promote generalization to everyday " speech    Syllable level using /n/ in alternation with cardinal vowels on sustained pitches and speech inflection    Counseling and Education:    Asked many questions about the nature of her symptoms, and I answered all of these thoroughly.    A revised regimen for home practice was instructed.    I provided AVS of today's therapeutic activities to facilitate practice.      ASSESSMENT/PLAN  PROGRESS TOWARD LONG TERM GOALS:   Good progress; please see report above for objective measures    IMPRESSIONS: Dysphonia (R49.0). She is confident that she will be able to continue with activities to improve her voice quality and comfort for speaking activities at this time, as well as reduce laryngeal irritation.     PLAN: Ms. Thompson will continue independently at this time. I will see her as needed.  For practice goals see AVS.     TOTAL SERVICE TIME:   Call Initiated at: 10:05 AM  Call Ended at: 11am           CPT Billing Codes:   TREATMENT (51711)  NO CHARGE FACILITY FEE (78757)    Roxana Lee M.M. (voice), M.A., CCC/SLP  Speech-Language Pathologist  [facility] Trained Vocologist  [facility]  [phone]  [email or web address]  Pronouns: she/her      *this report was created in part through the use of computerized dictation software, and though reviewed following completion, some typographic errors may persist.  If there is confusion regarding any of this notes contents, please contact me for clarification

## 2021-10-25 NOTE — PATIENT INSTRUCTIONS
1. Chronic pain of left knee    2. Primary osteoarthritis of left knee      -Patient has chronic knee pain that has recently been worsening due to arthritis and possible acute meniscus tear  -Patient will start formal physical therapy and home exercise program  -Patient may continue with Celebrex as needed.  Patient will start 1000 mg of exercise Tylenol every 6 hours as needed for breakthrough pain  -Patient may continue with compression sleeve brace as needed  -We will follow up in 3 to 4 weeks.  If no improvement in pain, to consider possible cortisone injection.  -We also discussed if all conservative treatment options fail, she would need an MRI to assess potential cartilage and meniscus injuries to determine appropriate surgical options which may include minimally invasive arthroscopic surgery versus total knee replacement  -Call direct clinic number [[phone]] at any time with questions or concerns.    Albert Yeo MD [facility]  [facility] [place]

## 2021-10-25 NOTE — PATIENT INSTRUCTIONS
"After Visit Summary    Patient: Dot Thompson  Date of Visit: 10/25/2021    These notes are also available in your MyChart. Please take a few moments to find them under \"Past Appointments\" in the [employer] system, as Roxana will start to phase out e-mail communications.    Order of today's appointment:    Consider:   Crane humidifier      G3 3-5-1 buzzy blow fish (puffy cheeks like holding breath under water, then send sound through it).  ? Also a good stress reliever.      \"hunnn\" before all the exercises below to keep it easy and full voiced:    N+vowels               Continue to read aloud as if to an audience of 2-3 people 2-3x/day - keep the focus forward/ voice a bit twangy     Roxana Lee M.M. (voice), M.A., CCC/SLP  Speech-Language Pathologist  [facility] Certified Vocologist  [facility]  [email or web address]  she/her            "

## 2021-10-25 NOTE — LETTER
10/25/2021         RE: Dinorah Thompson  [address]  [place] [address]        Dear Colleague,    Thank you for referring your patient, Dinorah Thompson, to the [facility]. Please see a copy of my visit note below.    ASSESSMENT & PLAN  Patient Instructions     1. Chronic pain of left knee    2. Primary osteoarthritis of left knee      -Patient has chronic knee pain that has recently been worsening due to arthritis and possible acute meniscus tear  -Patient will start formal physical therapy and home exercise program  -Patient may continue with Celebrex as needed.  Patient will start 1000 mg of exercise Tylenol every 6 hours as needed for breakthrough pain  -Patient may continue with compression sleeve brace as needed  -We will follow up in 3 to 4 weeks.  If no improvement in pain, to consider possible cortisone injection.  -We also discussed if all conservative treatment options fail, she would need an MRI to assess potential cartilage and meniscus injuries to determine appropriate surgical options which may include minimally invasive arthroscopic surgery versus total knee replacement  -Call direct clinic number [[phone]] at any time with questions or concerns.    Albert Yeo MD [facility]  [facility]          -----    SUBJECTIVE  Dinorah Thompson is a/an 71 year old female who is seen as a self referral for evaluation of left knee pain. The patient is seen by themselves. States she sees Dr. Santana regarding spinal stenosis, and at first this physician thought her knee pain was related to this. She notes though that she gets sharp shooting pain, but wearing brace and icing has been helping, wants to be evaluated for some DJD.     Onset: 8 years(s) ago waxing and waning pain, but most recently over past 1 month. Reports insidious onset without acute precipitating event.  Location of Pain: right  knee, medial knee, now lateral knee as well.   Rating of Pain at worst: 7/10  Rating of Pain Currently: 2/10  Worsened by: twisting and pivoting, stairs, walking for extended periods of time   Better with: rest, activity avoidance, OTC compression sleeve  Treatments tried: rest, activity avoidance, OTC compression sleeve, physical therapy for spinal stenosis   Associated symptoms: weakness weakness of leg  Orthopedic history: YES - h/o spinal stenosis, 1980's twisted and fell on left knee on concrete.   Relevant surgical history: NO  Social history: social history: retired    Past Medical History:   Diagnosis Date     Chronic osteoarthritis      Complication of anesthesia 1990's    DIfficulty waking up     Depressive disorder      Female bladder prolapse 9/3/2019     Parathyroid abnormality (H)      Prolapse of female pelvic organs 8/19/2019     Sleep apnea     Uses a Bi-Pap.. Will have son bring it if necessary on DOS     Social History     Socioeconomic History     Marital status:      Spouse name: Wilbert     Number of children: Not on file     Years of education: Not on file     Highest education level: Bachelor's degree (e.g., BA, AB, BS)   Occupational History     Not on file   Tobacco Use     Smoking status: Never Smoker     Smokeless tobacco: Never Used   Substance and Sexual Activity     Alcohol use: Not Currently     Drug use: Never     Sexual activity: Not Currently     Partners: Male   Other Topics Concern     Parent/sibling w/ CABG, MI or angioplasty before 65F 55M? Not Asked   Social History Narrative     Not on file     Social Determinants of Health     Financial Resource Strain: Low Risk      Difficulty of Paying Living Expenses: Not hard at all   Food Insecurity: No Food Insecurity     Worried About Running Out of Food in the Last Year: Never true     Ran Out of Food in the Last Year: Never true   Transportation Needs: No Transportation Needs     Lack of Transportation (Medical): No     Lack of  "Transportation (Non-Medical): No   Physical Activity: Insufficiently Active     Days of Exercise per Week: 2 days     Minutes of Exercise per Session: 10 min   Stress: Stress Concern Present     Feeling of Stress : To some extent   Social Connections: Socially Integrated     Frequency of Communication with Friends and Family: More than three times a week     Frequency of Social Gatherings with Friends and Family: Once a week     Attends Protestant Services: More than 4 times per year     Active Member of Clubs or Organizations: Yes     Attends Club or Organization Meetings: More than 4 times per year     Marital Status:    Intimate Partner Violence:      Fear of Current or Ex-Partner:      Emotionally Abused:      Physically Abused:      Sexually Abused:          Patient's past medical, surgical, social, and family histories were reviewed today and no changes are noted.    REVIEW OF SYSTEMS:  10 point ROS is negative other than symptoms noted above in HPI, Past Medical History or as stated below  Constitutional: NEGATIVE for fever, chills, change in weight  Skin: NEGATIVE for worrisome rashes, moles or lesions  GI/: NEGATIVE for bowel or bladder changes  Neuro: NEGATIVE for weakness, dizziness or paresthesias    OBJECTIVE:  /77   Ht 1.568 m (5' 1.75\")   Wt 68.1 kg (150 lb 3.2 oz)   BMI 27.69 kg/m     General: healthy, alert and in no distress  HEENT: no scleral icterus or conjunctival erythema  Skin: no suspicious lesions or rash. No jaundice.  CV: no pedal edema  Resp: normal respiratory effort without conversational dyspnea   Psych: normal mood and affect  Gait: normal steady gait with appropriate coordination and balance  Neuro: Normal light sensory exam of lower extremity  MSK:  LEFT KNEE  Inspection:    normal alignment  Palpation:    Tender about the lateral patellar facet, medial patellar facet and medial joint line. Remainder of bony and ligamentous landmarks are nontender.    Trace effusion " is present    Patellofemoral crepitus is Present  Range of Motion:     50 extension to 1200 flexion  Strength:    Quadriceps grossly intact    Extensor mechanism intact  Special Tests:    Positive: none    Negative: MCL/valgus stress (0 & 30 deg), LCL/varus stress (0 & 30 deg), Lachman's, anterior drawer, posterior drawer, Sang's    Independent visualization of the below image:  Recent Results (from the past 24 hour(s))   XR Knee Standing AP Bilat [place] Bilat Lat Left    Narrative    Severe medial patellofemoral joint space narrowing, lateral patellar   osseous loose body.  No acute fracture or dislocation.           Albert Yeo MD [facility]        Again, thank you for allowing me to participate in the care of your patient.        Sincerely,        Albert Yeo, MD

## 2021-10-25 NOTE — LETTER
"10/25/2021       RE: Dinorah Thompson  [address]  [place] [address]     Dear Colleague,    Thank you for referring your patient, Dinorah Thompson, to the [facility]ICE [facility]IN[facility] [place] at [facility]. Please see a copy of my visit note below.    Dinorah Thompson is a 71 year old female who is being cared for via a billable virtual visit.        The patient has been notified and verbally consented to the following statements:     This video visit will be conducted between you and your provider.    Patient has opted to conduct today's video visit vs an in-person appointment, and is not able to attend due to possible exposure to COVID-19.      If during the course of the call the provider feels a video visit is not appropriate, you will not be charged for this service.    Provider has received verbal consent for billable virtual visit from the patient? Yes    Preferred method for receiving information: ZQGamet     Call initiated at: 10:05 AM  Platform used to conduct today's virtual appointment: AM Well Video  Location of provider: Residence  Location of patient: [place] VOICE [facility]  THERAPY NOTE (CPT 25944)  Patient: Dinorah Thompson  Date of Service: 10/25/2021  \"Referring physician: Dr. Radha Altamirano  Impressions from most recent evaluation (3/22/2021):  \"IMPRESSIONS: Dinorah Thompson is a 70 year old retired female with a Hx of multiple personal stressors, presenting today with Dysphonia (R49.0), as evidenced by today's evaluation.   Remarkable findings and recommendations of the evaluation included:  1) begin a course of speech therapy    SUBJECTIVE:  Since the last appointment, Ms. Thompson reports the following:     Overall she reports that symptoms are improving    Aunt and uncle are not doing well and are out in the country.  She intermittently knows how to answer her phone.     They were getting better until they turned " the furnace on and it make the house dry.     OBJECTIVE:  Ms. Thompson presents today with the following:  Voice quality:    Mild breathiness and roughness on sustained vowels    Mild to moderate instability on sustained vowels       COUGH/THROAT CLEARING:  ? Observed 50 minutes into the session; resolved in seconds and was mild in severity      PATIENT REPORTED MEASURES:  Patient Supplied Answers To SLP QOL Questionnaire  No flowsheet data found.  Speech follow up as discussed with patient:  Dysponia SLP Goals 5/17/2021 7/30/2021 10/8/2021   How would you rate your speaking voice quality, if 0 is worst voice quality, and 10 is best voice? - - 7   How would you rate your singing voice quality, if 0 is worst voice quality, and 10 is best voice? - - 9   How much effort is it to speak, if 0 is no extra effort and 10 is maximum effort? - - 4   How much effort is it to sing, if 0 is no extra effort and 10 is maximum effort? - - -   How how severe is your [Throat Discomfort - or use patient specific description], if 0 is no [discomfort] at all and 10 is the worst [discomfort]? - - 2   How severe is your cough /throat clearing, if 0 is no cough at all and 10 is the worst cough? - - 1   How would you rate your breathing, if 0 is the worst and 10 is the best? - - 10   How much does your voice problem bother you? - Somewhat Somewhat   How much does your cough/throat-clearing problem bother you?            Quite a bit Quite a bit Not at all   How much does your breathing problem bother you?         - Quite a bit Not at all   How much does your throat discomfort bother you?     - A little bit Somewhat         THERAPEUTIC ACTIVITIES    Demonstrated previous exercises.  o demonstrated improved technique  o appropriate redirection provided  o instruction provided for increased level of complexity/difficulty    Resonant Voice Therapy (RVT) exercises to promote forward locus of resonance and optimized pattern of laryngeal  "adduction    Speech material that elicits a high, forward tongue position (/n/) was most facilitating    Easy descending glide on /n/ utilized in conjunction with relaxed jaw, tongue, and lightly closed lips to facilitate forward resonant sound    Use of the carrier phrase \"mmhmm\" instructed to promote generalization to everyday speech    Syllable level using /n/ in alternation with cardinal vowels on sustained pitches and speech inflection    Counseling and Education:    Asked many questions about the nature of her symptoms, and I answered all of these thoroughly.    A revised regimen for home practice was instructed.    I provided AVS of today's therapeutic activities to facilitate practice.      ASSESSMENT/PLAN  PROGRESS TOWARD LONG TERM GOALS:   Good progress; please see report above for objective measures    IMPRESSIONS: Dysphonia (R49.0). She is confident that she will be able to continue with activities to improve her voice quality and comfort for speaking activities at this time, as well as reduce laryngeal irritation.     PLAN: Ms. Thompson will continue independently at this time. I will see her as needed.  For practice goals see AVS.     TOTAL SERVICE TIME:   Call Initiated at: 10:05 AM  Call Ended at: 11am           CPT Billing Codes:   TREATMENT (73330)  NO CHARGE FACILITY FEE (21073)    Roxana Lee M.M. (voice) MNATE, CCC/SLP  Speech-Language Pathologist  [facility] Trained Vocologist  [facility]  [phone]  [email or web address]  Pronouns: she/her      *this report was created in part through the use of computerized dictation software, and though reviewed following completion, some typographic errors may persist.  If there is confusion regarding any of this notes contents, please contact me for clarification            Again, thank you for allowing me to participate in the care of your patient.      Sincerely,    Roxana Lee, SLP      "

## 2021-10-26 ENCOUNTER — THERAPY VISIT (OUTPATIENT)
Dept: PHYSICAL THERAPY | Facility: CLINIC | Age: 71
End: 2021-10-26
Payer: MEDICARE

## 2021-10-26 DIAGNOSIS — M54.16 LUMBAR RADICULOPATHY: ICD-10-CM

## 2021-10-26 PROCEDURE — 97035 APP MDLTY 1+ULTRASOUND EA 15: CPT | Mod: GP | Performed by: PHYSICAL THERAPIST

## 2021-10-26 PROCEDURE — 97112 NEUROMUSCULAR REEDUCATION: CPT | Mod: GP | Performed by: PHYSICAL THERAPIST

## 2021-10-26 PROCEDURE — 97110 THERAPEUTIC EXERCISES: CPT | Mod: GP | Performed by: PHYSICAL THERAPIST

## 2021-10-27 DIAGNOSIS — Z11.59 ENCOUNTER FOR SCREENING FOR OTHER VIRAL DISEASES: ICD-10-CM

## 2021-11-08 ENCOUNTER — VIRTUAL VISIT (OUTPATIENT)
Dept: OTOLARYNGOLOGY | Facility: CLINIC | Age: 71
End: 2021-11-08
Payer: MEDICARE

## 2021-11-08 ENCOUNTER — HOSPITAL ENCOUNTER (EMERGENCY)
Facility: CLINIC | Age: 71
Discharge: HOME | End: 2021-11-08
Admitting: EMERGENCY MEDICINE
Payer: MEDICARE

## 2021-11-08 DIAGNOSIS — R49.0 MUSCLE TENSION DYSPHONIA: Primary | ICD-10-CM

## 2021-11-08 DIAGNOSIS — R49.0 HOARSENESS: ICD-10-CM

## 2021-11-08 DIAGNOSIS — J38.7 IRRITABLE LARYNX: ICD-10-CM

## 2021-11-08 DIAGNOSIS — R05.9 COUGH: ICD-10-CM

## 2021-11-08 LAB — SARS-COV-2 RNA RESP QL NAA+PROBE: NEGATIVE

## 2021-11-08 PROCEDURE — 92507 TX SP LANG VOICE COMM INDIV: CPT | Mod: GN | Performed by: SPEECH-LANGUAGE PATHOLOGIST

## 2021-11-08 PROCEDURE — C9803 HOPD COVID-19 SPEC COLLECT: HCPCS | Performed by: EMERGENCY MEDICINE

## 2021-11-08 PROCEDURE — 999N000104 HC STATISTIC NO CHARGE: Performed by: EMERGENCY MEDICINE

## 2021-11-08 PROCEDURE — U0005 INFEC AGEN DETEC AMPLI PROBE: HCPCS | Performed by: EMERGENCY MEDICINE

## 2021-11-08 NOTE — ED TRIAGE NOTES
Patient here for a covid swab for pre-procedure that she was scheduled at 2pm today but was here supporting a patient that needed to be seen in the ED.

## 2021-11-08 NOTE — PROGRESS NOTES
"Dinorah Thompson is a 71 year old female who is being cared for via a billable virtual visit.        The patient has been notified and verbally consented to the following statements:     This video visit will be conducted between you and your provider.    Patient has opted to conduct today's video visit vs an in-person appointment, and is not able to attend due to possible exposure to COVID-19.      If during the course of the call the provider feels a video visit is not appropriate, you will not be charged for this service.    Provider has received verbal consent for billable virtual visit from the patient? Yes    Preferred method for receiving information: [email or web address] (Wayin)hart     Call initiated at: 8:07 AM  Platform used to conduct today's virtual appointment: AM Well Video  Location of provider: Residence  Location of patient: [facility] VOICE CLINIC  THERAPY NOTE (CPT 82008)  Patient: Dinorah Thompson  Date of Service: 11/8/2021  Referring physician: Dr. Radha Altamirano  Impressions from most recent evaluation (3/22/2021):  \"IMPRESSIONS: Dinorah Thompson is a 70 year old retired female with a Hx of multiple personal stressors, presenting today with Dysphonia (R49.0), as evidenced by today's evaluation.   Remarkable findings and recommendations of the evaluation included:  1) begin a course of speech therapy     SUBJECTIVE:  Since the last appointment, Ms. Thompson reports the following:   ? Overall she reports that symptoms are improving  ? Aunt and uncle are not doing well and are out in the country.  She intermittently knows how to answer her phone.   ? They were getting better until they turned the furnace on and it make the house dry.    SUBJECTIVE:  Since the last appointment, Ms. Thompson reports the following:     Overall she reports that symptoms are improving    Now also singing to her birds.     Warm wet washcloth is also helpful for her sinuses.     Overall things are going better.    Her 's cold is " trying to get her.     Procedure on Thursday.     Reading aloud seems to make her throat     OBJECTIVE:  Ms. Thompson presents today with the following:  Voice quality:    Mild breathiness and roughness on sustained vowels    Mild to moderate instability on sustained vowels       COUGH/THROAT CLEARING:  ? Observed 50 minutes into the session; resolved in seconds and was mild in severity     PATIENT REPORTED MEASURES:  Patient Supplied Answers To SLP QOL Questionnaire  No flowsheet data found.  Speech follow up as discussed with patient:  Dysponia SLP Goals 7/30/2021 10/8/2021 11/8/2021   How would you rate your speaking voice quality, if 0 is worst voice quality, and 10 is best voice? - 7 8   How would you rate your singing voice quality, if 0 is worst voice quality, and 10 is best voice? - 9 -   How much effort is it to speak, if 0 is no extra effort and 10 is maximum effort? - 4 -   How much effort is it to sing, if 0 is no extra effort and 10 is maximum effort? - - -   How how severe is your [Throat Discomfort - or use patient specific description], if 0 is no [discomfort] at all and 10 is the worst [discomfort]? - 2 -   How severe is your cough /throat clearing, if 0 is no cough at all and 10 is the worst cough? - 1 -   How would you rate your breathing, if 0 is the worst and 10 is the best? - 10 -   How much does your voice problem bother you? Somewhat Somewhat A little bit   How much does your cough/throat-clearing problem bother you?            Quite a bit Not at all -   How much does your breathing problem bother you?         Quite a bit Not at all -   How much does your throat discomfort bother you?     A little bit Somewhat Somewhat       THERAPEUTIC ACTIVITIES    Demonstrated previous exercises.  o demonstrated improved technique  o appropriate redirection provided  o instruction provided for increased level of complexity/difficulty    Semi-Occluded Vocal Tract (SOVT) exercises instructed to reduce laryngeal  "tension, promote vocal fold pliability, and coordinate respiration and phonation    Sustained /n/ was found to be most facilitating     Sustained phonation, and voice vs. voiceless productions used to promote easy voicing and raise awareness of laryngeal tension    Ascending and descending glides utilized to promote vocal fold pliability    \"Messa di voce\", gradual crescendo and decrescendo to vary medial compression was also utilized to promote vocal fold pliability.    Instructed on the benefits of using these exercises for improved coordination of breath flow with phonation and tissue mobilization.    Instructed on the importance of using these exercises as a warm-up / cool down,  and to re-calibrate the voice throughout the day.    Exercises in techniques for improved airflow during phonation    Progressed to easy onset/ flow and blending phrases    Instructed with a yawn and sigh pattern; this was helpful.    Woodson techniques to reduce glottal easley and improve breath flow; negative practice improved awareness today.    Instructed in techniques to improve length of utterance with reduced effort for optimal carryover.  o Instructed with paragraphs of increasing length, before transitioning to semi-scripted conversation tasks.    Developed a mental checklist of factors to help trouble shoot moments of difficulty during daily speaking tasks.    Counseling and Education:    Asked many questions about the nature of her symptoms, and I answered all of these thoroughly.    A revised regimen for home practice was instructed.    I provided an AVS of today's therapeutic activities to facilitate practice.    ASSESSMENT/PLAN  PROGRESS TOWARD LONG TERM GOALS:   Adequate progress; please see above    IMPRESSIONS: Dysphonia (R49.0). She is confident that she will be able to continue with activities to improve her voice quality and comfort for speaking activities at this time, as well as reduce laryngeal irritation.      PLAN: " Ms. Thompson will continue independently until our next appointment in 4-6 weeks.  For practice goals see AVS.     TOTAL SERVICE TIME:   Call Initiated at: 8:07 AM  Call Ended at: 9am           CPT Billing Codes:   TREATMENT (52040)  NO CHARGE FACILITY FEE (04961)    Roxana Lee M.M. (voice), M.A., CCC/SLP  Speech-Language Pathologist  [place] Trained Vocologist  [facility]  [phone]  [email or web address]  Pronouns: she/her      *this report was created in part through the use of computerized dictation software, and though reviewed following completion, some typographic errors may persist.  If there is confusion regarding any of this notes contents, please contact me for clarification

## 2021-11-08 NOTE — LETTER
"11/8/2021       RE: Dinorah Thompson  [address]  [place] [address]     Dear Colleague,    Thank you for referring your patient, Dinorah Thompson, to the [facility] [place] at [facility]. Please see a copy of my visit note below.    Dinorah Thompson is a 71 year old female who is being cared for via a billable virtual visit.        The patient has been notified and verbally consented to the following statements:     This video visit will be conducted between you and your provider.    Patient has opted to conduct today's video visit vs an in-person appointment, and is not able to attend due to possible exposure to COVID-19.      If during the course of the call the provider feels a video visit is not appropriate, you will not be charged for this service.    Provider has received verbal consent for billable virtual visit from the patient? Yes    Preferred method for receiving information: Shoptagrhart     Call initiated at: 8:07 AM  Platform used to conduct today's virtual appointment: [employer] Well Video  Location of provider: Residence  Location of patient: [facility]  THERAPY NOTE (CPT 65463)  Patient: Dinorah Thompson  Date of Service: 11/8/2021  Referring physician: Dr. Radha Altamirano  Impressions from most recent evaluation (3/22/2021):  \"IMPRESSIONS: Dinorah Thompson is a 70 year old retired female with a Hx of multiple personal stressors, presenting today with Dysphonia (R49.0), as evidenced by today's evaluation.   Remarkable findings and recommendations of the evaluation included:  1) begin a course of speech therapy     SUBJECTIVE:  Since the last appointment, Ms. Thompson reports the following:   ? Overall she reports that symptoms are improving  ? Aunt and uncle are not doing well and are out in the country.  She intermittently knows how to answer her phone.   ? They were getting better until they turned the " furnace on and it make the house dry.    SUBJECTIVE:  Since the last appointment, Ms. Thompson reports the following:     Overall she reports that symptoms are improving    Now also singing to her birds.     Warm wet washcloth is also helpful for her sinuses.     Overall things are going better.    Her 's cold is trying to get her.     Procedure on Thursday.     Reading aloud seems to make her throat     OBJECTIVE:  Ms. Thompson presents today with the following:  Voice quality:    Mild breathiness and roughness on sustained vowels    Mild to moderate instability on sustained vowels       COUGH/THROAT CLEARING:  ? Observed 50 minutes into the session; resolved in seconds and was mild in severity     PATIENT REPORTED MEASURES:  Patient Supplied Answers To SLP QOL Questionnaire  No flowsheet data found.  Speech follow up as discussed with patient:  Dysponia SLP Goals 7/30/2021 10/8/2021 11/8/2021   How would you rate your speaking voice quality, if 0 is worst voice quality, and 10 is best voice? - 7 8   How would you rate your singing voice quality, if 0 is worst voice quality, and 10 is best voice? - 9 -   How much effort is it to speak, if 0 is no extra effort and 10 is maximum effort? - 4 -   How much effort is it to sing, if 0 is no extra effort and 10 is maximum effort? - - -   How how severe is your [Throat Discomfort - or use patient specific description], if 0 is no [discomfort] at all and 10 is the worst [discomfort]? - 2 -   How severe is your cough /throat clearing, if 0 is no cough at all and 10 is the worst cough? - 1 -   How would you rate your breathing, if 0 is the worst and 10 is the best? - 10 -   How much does your voice problem bother you? Somewhat Somewhat A little bit   How much does your cough/throat-clearing problem bother you?            Quite a bit Not at all -   How much does your breathing problem bother you?         Quite a bit Not at all -   How much does your throat discomfort bother  "you?     A little bit Somewhat Somewhat       THERAPEUTIC ACTIVITIES    Demonstrated previous exercises.  o demonstrated improved technique  o appropriate redirection provided  o instruction provided for increased level of complexity/difficulty    Semi-Occluded Vocal Tract (SOVT) exercises instructed to reduce laryngeal tension, promote vocal fold pliability, and coordinate respiration and phonation    Sustained /n/ was found to be most facilitating     Sustained phonation, and voice vs. voiceless productions used to promote easy voicing and raise awareness of laryngeal tension    Ascending and descending glides utilized to promote vocal fold pliability    \"Messa di voce\", gradual crescendo and decrescendo to vary medial compression was also utilized to promote vocal fold pliability.    Instructed on the benefits of using these exercises for improved coordination of breath flow with phonation and tissue mobilization.    Instructed on the importance of using these exercises as a warm-up / cool down,  and to re-calibrate the voice throughout the day.    Exercises in techniques for improved airflow during phonation    Progressed to easy onset/ flow and blending phrases    Instructed with a yawn and sigh pattern; this was helpful.    [place] techniques to reduce glottal easley and improve breath flow; negative practice improved awareness today.    Instructed in techniques to improve length of utterance with reduced effort for optimal carryover.  o Instructed with paragraphs of increasing length, before transitioning to semi-scripted conversation tasks.    Developed a mental checklist of factors to help trouble shoot moments of difficulty during daily speaking tasks.    Counseling and Education:    Asked many questions about the nature of her symptoms, and I answered all of these thoroughly.    A revised regimen for home practice was instructed.    I provided an AVS of today's therapeutic activities to facilitate " practice.    ASSESSMENT/PLAN  PROGRESS TOWARD LONG TERM GOALS:   Adequate progress; please see above    IMPRESSIONS: Dysphonia (R49.0). She is confident that she will be able to continue with activities to improve her voice quality and comfort for speaking activities at this time, as well as reduce laryngeal irritation.      PLAN: Ms. Thompson will continue independently until our next appointment in 4-6 weeks.  For practice goals see AVS.     TOTAL SERVICE TIME:   Call Initiated at: 8:07 AM  Call Ended at: 9am           CPT Billing Codes:   TREATMENT (59077)  NO CHARGE FACILITY FEE (56916)    Roxana Lee M.M. (voice), M.A., CCC/SLP  Speech-Language Pathologist  [facility] Trained Vocologist  [facility] Voice [facility]  [phone]  [email or web address]  Pronouns: she/her      *this report was created in part through the use of computerized dictation software, and though reviewed following completion, some typographic errors may persist.  If there is confusion regarding any of this notes contents, please contact me for clarification

## 2021-11-10 ENCOUNTER — THERAPY VISIT (OUTPATIENT)
Dept: PHYSICAL THERAPY | Facility: CLINIC | Age: 71
End: 2021-11-10
Payer: MEDICARE

## 2021-11-10 DIAGNOSIS — M17.12 PRIMARY OSTEOARTHRITIS OF LEFT KNEE: ICD-10-CM

## 2021-11-10 DIAGNOSIS — G89.29 CHRONIC PAIN OF LEFT KNEE: ICD-10-CM

## 2021-11-10 DIAGNOSIS — M25.562 CHRONIC PAIN OF LEFT KNEE: ICD-10-CM

## 2021-11-10 PROCEDURE — 97110 THERAPEUTIC EXERCISES: CPT | Mod: GP | Performed by: PHYSICAL THERAPIST

## 2021-11-10 PROCEDURE — 97161 PT EVAL LOW COMPLEX 20 MIN: CPT | Mod: GP | Performed by: PHYSICAL THERAPIST

## 2021-11-10 ASSESSMENT — ACTIVITIES OF DAILY LIVING (ADL)
LIMPING: THE SYMPTOM AFFECTS MY ACTIVITY SLIGHTLY
WALK: ACTIVITY IS SOMEWHAT DIFFICULT
KNEEL ON THE FRONT OF YOUR KNEE: ACTIVITY IS FAIRLY DIFFICULT
WEAKNESS: THE SYMPTOM AFFECTS MY ACTIVITY SLIGHTLY
STIFFNESS: THE SYMPTOM AFFECTS MY ACTIVITY SLIGHTLY
GIVING WAY, BUCKLING OR SHIFTING OF KNEE: THE SYMPTOM AFFECTS MY ACTIVITY SLIGHTLY
RISE FROM A CHAIR: ACTIVITY IS SOMEWHAT DIFFICULT
KNEE_ACTIVITY_OF_DAILY_LIVING_SUM: 39
SQUAT: ACTIVITY IS FAIRLY DIFFICULT
STAND: ACTIVITY IS FAIRLY DIFFICULT
GO DOWN STAIRS: ACTIVITY IS FAIRLY DIFFICULT
PAIN: THE SYMPTOM AFFECTS MY ACTIVITY MODERATELY
RAW_SCORE: 39
AS_A_RESULT_OF_YOUR_KNEE_INJURY,_HOW_WOULD_YOU_RATE_YOUR_CURRENT_LEVEL_OF_DAILY_ACTIVITY?: ABNORMAL
SWELLING: I DO NOT HAVE THE SYMPTOM
HOW_WOULD_YOU_RATE_THE_OVERALL_FUNCTION_OF_YOUR_KNEE_DURING_YOUR_USUAL_DAILY_ACTIVITIES?: ABNORMAL
KNEE_ACTIVITY_OF_DAILY_LIVING_SCORE: 55.71
SIT WITH YOUR KNEE BENT: ACTIVITY IS MINIMALLY DIFFICULT
HOW_WOULD_YOU_RATE_THE_CURRENT_FUNCTION_OF_YOUR_KNEE_DURING_YOUR_USUAL_DAILY_ACTIVITIES_ON_A_SCALE_FROM_0_TO_100_WITH_100_BEING_YOUR_LEVEL_OF_KNEE_FUNCTION_PRIOR_TO_YOUR_INJURY_AND_0_BEING_THE_INABILITY_TO_PERFORM_ANY_OF_YOUR_USUAL_DAILY_ACTIVITIES?: 75
GO UP STAIRS: ACTIVITY IS FAIRLY DIFFICULT

## 2021-11-10 NOTE — PROGRESS NOTES
[facility]    OUTPATIENT Physical Therapy ORTHOPEDIC EVALUATION  PLAN OF TREATMENT FOR OUTPATIENT REHABILITATION  (COMPLETE FOR INITIAL CLAIMS ONLY)  Patient's Last Name, First Name, M.I.  YOB: 1950  Dinorah Thompson    Provider s Name:  [facility]   Medical Record No.  [id number]   Start of Care Date:  11/10/21   Onset Date:   10/25/21   Type:     _X__PT   ___OT Medical Diagnosis:    Encounter Diagnoses   Name Primary?     Chronic pain of left knee      Primary osteoarthritis of left knee         Treatment Diagnosis:           Goals:       11/10/21 0500   Body Part   Goals listed below are for L knee pain   Goal #2   Goal #2 squatting/kneeling   Previous Functional Level No restrictions   Current Functional Level Can do a partial squat   Performance Level 6/10 pain   STG Target Performance Do a partial squat   Performance Level 3/10 pain   Rationale for proper body mechanics while performing housework;for proper body mechanics while performing yardwork and home maintenance;for proper body mechanics when lifting, personal hygiene, dressing   Due date 11/24/21   LTG Target Performance Do a full squat   Performance Level 1/10 pain   Rationale for proper body mechanics while performing housework;for proper body mechanics while performing yardwork and home maintenance;for proper body mechanics when lifting, personal hygiene, dressing   Due date 01/05/22     Therapy Frequency:  1x/wk  Predicted Duration of Therapy Intervention:  8 weeks    Baudilio Johnson, PT                 I CERTIFY THE NEED FOR THESE SERVICES FURNISHED UNDER        THIS PLAN OF TREATMENT AND WHILE UNDER MY CARE     (Physician attestation of this document indicates review and certification of the therapy plan).                       Certification Date From:  11/10/21   Certification Date To:   01/08/22    Referring Provider:  Albert Yeo    Initial Assessment        See Epic Evaluation SOC Date: 11/10/21

## 2021-11-10 NOTE — PROGRESS NOTES
Physical Therapy Initial Evaluation  Subjective:  The history is provided by the patient. No  was used.   Patient Health History  Dinorah Thompson being seen for Knee pain.     Date of Onset: chronic x years worse past 1+ month.   Problem occurred: unknown   Pain is reported as 4/10 on pain scale.  General health as reported by patient is fair.  Pertinent medical history includes: depression, menopausal, numbness/tingling, overweight, osteoarthritis, pain at night/rest and sleep disorder/apnea.        Surgeries include:  Orthopedic surgery.    Current medications:  Anti-depressants, anti-inflammatory, sleep medication and other. Other medications details: Cholesterol.    Current occupation is Retired NP.                     Therapist Generated HPI Evaluation  Problem details: Pt c/o L knee pain of chronic nature (years) worsening over past month.  MD order date 10/25/2021.  Pt currently in PT for LBP aslo and feels each area may be effecting/worsening the other.  Pt is scheduled to have injection Lx spine tomorrow.To recheck with MD in 4-6 week with possible L knee injection if not improving..         Type of problem:  Left knee.    This is a chronic condition.  Condition occurred with:  Degenerative joint disease.  Where condition occurred: for unknown reasons.  Patient reports pain:  Anterior, medial and lateral.  Pain is described as aching, sharp, shooting and stabbing and is constant.  Pain is the same all the time.  Since onset symptoms are unchanged.  Associated symptoms:  Loss of motion/stiffness, loss of strength and buckling/giving out. Symptoms are exacerbated by ascending stairs, bending/squatting, transfers, standing, descending stairs, kneeling, sitting and walking  and relieved by rest, bracing/immobilizing and ice.  Special tests included:  X-ray (DJD).    Barriers include:  None as reported by patient.                        Objective:    Gait:    Gait Type:  Antalgic    Assistive Devices:  None  Deviations:  Lumbar:  Trunk flexionKnee:  Knee extension decr L                                                      Knee Evaluation:  ROM:  Strength wnl knee: B glute med 4/5.  AROM      Extension: Left: 4    Right:   Flexion: Left: 120   Right:  PROM      Extension: Left: 1   Right:   Flexion: Left: 130 +   Right:       Strength:     Extension:  Left: 4+/5    Pain:+/-      Right:/5  Strong/pain free  Pain:  Flexion:  Left: 5-/5    Pain:+/-      Right:/5  Strong/pain free  Pain:    Quad Set Left:  Fair and delayed    Pain: +/-   Quad Set Right: WNL    Pain:    Special Tests:   Left knee positive for the following special tests:  Patellar Compression and Patellar Tracking-Abduction Lateral (mild)          Functional Testing:          Quad:    Single Leg Squat:  Left:      Unable   Right:      LBP limited   Bilateral Leg Squat:   Mild loss of control      Proprioception:   Stork Balance Test:  Left:  3sec +  Right:  10sec  % of Uninvolved:           General     ROS    Assessment/Plan:    Patient is a 71 year old female with left side knee complaints.    Patient has the following significant findings with corresponding treatment plan.                Diagnosis 1:  L knee pain  Pain -  hot/cold therapy, US and home program  Decreased ROM/flexibility - manual therapy and therapeutic exercise  Decreased strength - therapeutic exercise and therapeutic activities  Decreased proprioception - neuro re-education and therapeutic activities  Impaired gait - gait training  Impaired muscle performance - neuro re-education  Decreased function - therapeutic activities    Therapy Evaluation Codes:   Cumulative Therapy Evaluation is: Low complexity.    Previous and current functional limitations:  (See Goal Flow Sheet for this information)    Short term and Long term goals: (See Goal Flow Sheet for this information)     Communication ability:  Patient appears to be able to clearly communicate and understand  verbal and written communication and follow directions correctly.  Treatment Explanation - The following has been discussed with the patient:   RX ordered/plan of care  Anticipated outcomes  Possible risks and side effects  This patient would benefit from PT intervention to resume normal activities.   Rehab potential is good.    Frequency:  1 X week, once daily  Duration:  for 8 weeks  Discharge Plan:  Achieve all LTG.  Independent in home treatment program.  Reach maximal therapeutic benefit.    Please refer to the daily flowsheet for treatment today, total treatment time and time spent performing 1:1 timed codes.

## 2021-11-11 ENCOUNTER — HOSPITAL ENCOUNTER (OUTPATIENT)
Facility: AMBULATORY SURGERY CENTER | Age: 71
Discharge: HOME | End: 2021-11-11
Attending: ANESTHESIOLOGY | Admitting: ANESTHESIOLOGY
Payer: MEDICARE

## 2021-11-11 ENCOUNTER — ANCILLARY PROCEDURE (OUTPATIENT)
Dept: RADIOLOGY | Facility: AMBULATORY SURGERY CENTER | Age: 71
End: 2021-11-11
Attending: ANESTHESIOLOGY
Payer: MEDICARE

## 2021-11-11 VITALS
WEIGHT: 145 LBS | RESPIRATION RATE: 16 BRPM | HEART RATE: 78 BPM | OXYGEN SATURATION: 97 % | TEMPERATURE: 97.7 F | SYSTOLIC BLOOD PRESSURE: 130 MMHG | DIASTOLIC BLOOD PRESSURE: 84 MMHG | HEIGHT: 62 IN | BODY MASS INDEX: 26.68 KG/M2

## 2021-11-11 DIAGNOSIS — M54.16 LUMBAR RADICULOPATHY, CHRONIC: ICD-10-CM

## 2021-11-11 PROCEDURE — 62323 NJX INTERLAMINAR LMBR/SAC: CPT

## 2021-11-11 PROCEDURE — 62323 NJX INTERLAMINAR LMBR/SAC: CPT | Mod: GC | Performed by: ANESTHESIOLOGY

## 2021-11-11 RX ORDER — BUPIVACAINE HYDROCHLORIDE 2.5 MG/ML
INJECTION, SOLUTION EPIDURAL; INFILTRATION; INTRACAUDAL PRN
Status: DISCONTINUED | OUTPATIENT
Start: 2021-11-11 | End: 2021-11-11 | Stop reason: HOSPADM

## 2021-11-11 RX ORDER — LIDOCAINE HYDROCHLORIDE 10 MG/ML
INJECTION, SOLUTION EPIDURAL; INFILTRATION; INTRACAUDAL; PERINEURAL PRN
Status: DISCONTINUED | OUTPATIENT
Start: 2021-11-11 | End: 2021-11-11 | Stop reason: HOSPADM

## 2021-11-11 RX ORDER — IOPAMIDOL 408 MG/ML
INJECTION, SOLUTION INTRATHECAL PRN
Status: DISCONTINUED | OUTPATIENT
Start: 2021-11-11 | End: 2021-11-11 | Stop reason: HOSPADM

## 2021-11-11 ASSESSMENT — MIFFLIN-ST. JEOR: SCORE: 1121.72

## 2021-11-11 NOTE — DISCHARGE INSTRUCTIONS
Home Care Instructions after an Epidural Steroid Pain Injection    A lumbar epidural steroid injection delivers steroid medication directly into the area that may be causing your lower back pain and/or leg pain. A cervical or thoracic epidural steroid injection delivers steroids into the epidural space surrounding spinal nerve roots to help relieve pain in the upper spine/neck.    Activity  -Rest today  -Do not work today  -Resume normal activity tomorrow  -DO NOT shower for 24 hours  -DO NOT remove bandaid for 24 hours    Pain  -You may experience soreness at the injection site for one or two days  -You may use an ice pack for 20 minutes every 2 hours for the first 24 hours  -You may use a heating pad after the first 24 hours  -You may use Tylenol (acetaminophen) every 4 hours or other pain medicines as     directed by your physician    You may experience numbness radiating into your legs or arms (depending on the procedure location). This numbness may last several hours. Until sensation returns to normal; please use caution in walking, climbing stairs, and stepping out of your vehicle, etc.      Common side effects of steroids:  Not everyone will experience corticosteroid side effects. If side effects are experienced, they will gradually subside in the 7-10 day period following an injection. Most common side effects include:  -Flushed face and/or chest  -Feeling of warmth, particularly in the face but could be an overall feeling of warmth  -Increased blood sugar in diabetic patients  -Menstrual irregularities my occur. If taking hormone-based birth control an alternate method of birth control is recommended  -Sleep disturbances and/or mood swings are possible  -Leg cramps    Please contact us if you have:  -Severe pain  -Fever more than 101.5 degrees Fahrenheit  -Signs of infection at the injection site (redness, swelling, or drainage)    If you have questions, please contact our office at [phone] between the  hours of 7:00 am and 3:00 pm Monday through Friday. After office hours you can contact the on call provider by dialing [phone]. If you need immediate attention, we recommend that you go to a hospital emergency room or dial [phone].

## 2021-11-11 NOTE — OP NOTE
Lumbar Epidural Steroid Injection    Procedure:  1. Lumbar epidural steroid injection at left L5-S1  2. Fluoroscopy for needle guidance    Pre-operative Diagnosis:  1. Intervertebral disc disorders w radiculopathy, lumbosacral region  2. Other intervertebral disc degeneration, lumbosacral region    Post-Operative Diagnosis:  1. Intervertebral disc disorders w radiculopathy, lumbosacral region  2. Other intervertebral disc degeneration, lumbosacral region    Anesthesia:  Local anesthesia    Staff: NAYE Sandoval  Fellow: Mk Sexton DO    Medical Indications:  The patient presents to the ambulatory surgery center today for the treatment of persistent pain. We believe that the pain is spinally mediated. The patient has been exhibiting symptoms consistent with lumbar intraspinal inflammation and radiculopathy. Symptoms have been persistent, disabling and intermittently severe. The patient has been evaluated in the pain clinic and scheduled today for a spinal injection to aid in the diagnosis and treatment of presumed spinal pain. The risks, benefits, potential complications, and alternatives were discussed with the patient as per the signed consent form, and informed consent was obtained. The patient has received information about the procedure and its risks. The physician personally confirmed the procedure, side, and site to be injected with the patient and marked the site in the pre-procedure area.    Description of Procedure:  An additional intraoperative timeout, specifically to confirm accurate localization, was conducted by the attending physician. The patient remained awake and alert throughout the procedure. The patient was placed in the prone position. The skin was prepped with chlorhexidine and sterile drapes were applied. The skin and soft tissues were anesthetized with lidocaine 1%. A 22 gauge 3.5 inch touhy needle epidural needle was inserted percutaneously and advanced under fluoroscopic  guidance using AP, and lateral projections. Using loss of resistance to air and a left sided interlaminar approach, the L5/S1 posterior epidural space was entered after the lamina was contacted with the epidural needle. No paraesthesias occurred and aspiration was negative. Epidurography and radiographic interpretation: Epidurography was performed by injection of Isovue 200 under live fluoroscopy. Multiple Xray images were obtained. Radiologic examination confirmed proper spread of the contrast medium within the epidural space. There was no evidence of intrathecal or intravascular runoff. The needle was injected with 40mg methylprednisolone mixed with 3 ml of 0.25% bupivacaine. The needle was removed after flushing with 1% lidocaine. A sterile bandage was applied. The patient did not experience any hemodynamic or neurologic sequelae. The patient was transferred to the recovery area. Post operative instructions were explained and given to the patient.    I was present for the entire case and helped the fellow at all times.     Complications:  No procedural or immediate post-procedural complications occurred and the patient was transferred to PACU in stable condition.  Procedure Images:    Post-Operative Plan:  The patient will monitor pain relief from today's procedure. They will call the clinic for any problems related to today's procedure. A copy of our written post-procedure instructions was provided. They will return to clinic as scheduled.

## 2021-11-11 NOTE — H&P
Dinorah Thompson  [id number]  female  71 year old      Reason for procedure/surgery: lumbar radiculopathy      Patient Active Problem List   Diagnosis     Cervical radiculopathy, chronic     Chronic pain disorder     Intervertebral disc disorders with radiculopathy, lumbar region     Lumbar radiculopathy, chronic     FAHAD treated with BiPAP     Other cervical disc degeneration at C5-C6 level     Spondylosis without myelopathy or radiculopathy, lumbar region     Osteoarthrosis, hand     Primary localized osteoarthritis of knees, bilateral     Hyperlipidemia     Depression     Sarcoidosis of lung (H)     Hoarseness     Laryngeal disorder     Precancerous lesion     History of retinal detachment     Lumbar radiculopathy     Hyperparathyroidism (H)     Chronic pain of left knee     Primary osteoarthritis of left knee       Past Surgical History:    Past Surgical History:   Procedure Laterality Date     APPENDECTOMY       diskectomy in toe       HYSTERECTOMY  08/2017    and bladder surgery     INJECT EPIDURAL LUMBAR Right 4/27/2021    Procedure: Right Lumbar 5- Sacral 1 transforaminal epidural steroid injection with fluoroscopy and conscious sedation.;  Surgeon: Tiera Santana MD;  Location: [facility] OR     left rotator cuff surgery       PARATHYROIDECTOMY N/A 7/21/2021    Procedure: PARATHYROIDECTOMY;  Surgeon: Gregoria Wilcox MD;  Location: [facility] OR     SINUS SURGERY      x2 in 2013 and 2018     TONSILLECTOMY  1955       Past Medical History:   Past Medical History:   Diagnosis Date     Chronic osteoarthritis      Complication of anesthesia 1990's    DIfficulty waking up     Depressive disorder      Female bladder prolapse 9/3/2019     Parathyroid abnormality (H)      Prolapse of female pelvic organs 8/19/2019     Sleep apnea     Uses a Bi-Pap.. Will have son bring it if necessary on DOS       Social History:   Social History     Tobacco Use     Smoking status: Never Smoker     Smokeless tobacco: Never Used   Substance  "Use Topics     Alcohol use: Not Currently       Family History:   Family History   Problem Relation Age of Onset     Myocardial Infarction Father         late 50s     Ovarian Cancer Sister      Cervical Cancer Sister      Lung Cancer Sister      Myocardial Infarction Paternal Uncle         late 50s       Allergies:   Allergies   Allergen Reactions     Propoxyphene Other (See Comments)     Clarithromycin Other (See Comments)     Pt states, \"ototoxicity\". Balance problems  Pt states, \"ototoxicity\".         Active Medications:   Current Outpatient Medications   Medication Sig Dispense Refill     acetaminophen (TYLENOL) 500 MG tablet Take 2 tablets (1,000 mg) by mouth every 6 hours as needed for pain       albuterol (PROAIR HFA/PROVENTIL HFA/VENTOLIN HFA) 108 (90 Base) MCG/ACT inhaler Inhale 2 puffs into the lungs every 4 hours as needed        baclofen (LIORESAL) 10 MG tablet Take 10 mg by mouth daily as needed        Carboxymethylcellul-Glycerin 1-0.9 % GEL Place 1 drop into both eyes daily        celecoxib (CELEBREX) 200 MG capsule Take 200 mg by mouth 2 times daily        clonazePAM (KLONOPIN) 0.5 MG tablet Take 0.5 mg by mouth nightly as needed        DULoxetine (CYMBALTA) 60 MG capsule Take 1 capsule (60 mg) by mouth daily 90 capsule 0     LYSINE PO        NONFORMULARY Vitamin Packet from [employer] including Multi-vitamin, Leutin, Calcium, Antioxidant, Fish oil, Glucosamine- Chondroitin: Take 1 packet by mouth daily       propranolol (INDERAL) 10 MG tablet Take 10 mg by mouth daily as needed        rosuvastatin (CRESTOR) 5 MG tablet Take 5 mg by mouth every other day        tacrolimus (PROTOPIC) 0.1 % external ointment Apply to AA on the face BID PRN 30 g 5     traZODone (DESYREL) 100 MG tablet Take 100 mg by mouth nightly as needed        valACYclovir (VALTREX) 1000 mg tablet Take two tablets twice per day for one day for flare ups. 30 tablet 1       Systemic Review:   CONSTITUTIONAL: NEGATIVE for fever, chills, " "change in weight  ENT/MOUTH: NEGATIVE for ear, mouth and throat problems  RESP: NEGATIVE for significant cough or SOB  CV: NEGATIVE for chest pain, palpitations or peripheral edema    Physical Examination:   Vital Signs: /78   Pulse 82   Temp 97.7  F (36.5  C) (Temporal)   Resp 16   Ht 1.568 m (5' 1.73\")   Wt 65.8 kg (145 lb)   SpO2 95%   BMI 26.75 kg/m    GENERAL: healthy, alert and no distress  NECK: no adenopathy, no asymmetry, masses, or scars  RESP: lungs clear to auscultation - no rales, rhonchi or wheezes  CV: regular rate and rhythm, normal S1 S2, no S3 or S4, no murmur, click or rub, no peripheral edema and peripheral pulses strong  ABDOMEN: soft, nontender, no hepatosplenomegaly, no masses and bowel sounds normal  MS: no gross musculoskeletal defects noted, no edema    Plan: Appropriate to proceed as scheduled.      Tiera Santana MD  11/11/2021          "

## 2021-11-15 ENCOUNTER — E-VISIT (OUTPATIENT)
Dept: FAMILY MEDICINE | Facility: CLINIC | Age: 71
End: 2021-11-15

## 2021-11-15 ENCOUNTER — E-VISIT (OUTPATIENT)
Dept: FAMILY MEDICINE | Facility: CLINIC | Age: 71
End: 2021-11-15
Payer: MEDICARE

## 2021-11-15 ENCOUNTER — THERAPY VISIT (OUTPATIENT)
Dept: PHYSICAL THERAPY | Facility: CLINIC | Age: 71
End: 2021-11-15
Payer: MEDICARE

## 2021-11-15 DIAGNOSIS — M54.16 LUMBAR RADICULOPATHY: ICD-10-CM

## 2021-11-15 DIAGNOSIS — M25.562 CHRONIC PAIN OF LEFT KNEE: ICD-10-CM

## 2021-11-15 DIAGNOSIS — Z20.822 CLOSE EXPOSURE TO 2019 NOVEL CORONAVIRUS: Primary | ICD-10-CM

## 2021-11-15 DIAGNOSIS — Z91.199 FAILURE TO ATTEND APPOINTMENT: Primary | ICD-10-CM

## 2021-11-15 DIAGNOSIS — M17.12 PRIMARY OSTEOARTHRITIS OF LEFT KNEE: ICD-10-CM

## 2021-11-15 DIAGNOSIS — G89.29 CHRONIC PAIN OF LEFT KNEE: ICD-10-CM

## 2021-11-15 PROCEDURE — 99421 OL DIG E/M SVC 5-10 MIN: CPT | Performed by: NURSE PRACTITIONER

## 2021-11-15 PROCEDURE — 97112 NEUROMUSCULAR REEDUCATION: CPT | Mod: GP | Performed by: PHYSICAL THERAPIST

## 2021-11-15 PROCEDURE — 97110 THERAPEUTIC EXERCISES: CPT | Mod: GP | Performed by: PHYSICAL THERAPIST

## 2021-11-15 NOTE — PATIENT INSTRUCTIONS
"  Dear Dinorah Thompson,    Based on your exposure to COVID-19 (coronavirus), we would like to test you for this virus. I have placed an order for this test.The best time for testing is 5-7 days after the exposure.    How to schedule:  Go to your [employer] home page and scroll down to the section that says  You have an appointment that needs to be scheduled  and click the large green button that says  Schedule Now  and follow the steps to find the next available opening.     If you are unable to complete these [employer] scheduling steps, please call [phone] to schedule your testing.     Return to work/school/ guidance:   For people with high risk exposures outside the home    Please let your workplace manager and staffing office know when your quarantine ends.     We can not give you an exact date as it depends on the information below. You can calculate this on your own or work with your manager/staffing office to calculate this. (For example if you were exposed on 10/4, you would have to quarantine for 14 full days. That would be through 10/18. You could return on 10/19.)    Quarantine Guidelines:  Patients (\"contacts\") who have been in close prolonged contact of an infected person(s) (within six feet for at least 15 minutes within a 24 hour period), and remain asymptomatic should enter quarantine based on the following options:    14-day quarantine period (this remains the CDC recommendation for the greatest protection against spread of COVID-19) OR    Minimum 7-day quarantine with negative RT-PCR test collected on day 5 or later OR    10-day quarantine with no test  Quarantine Guideline exceptions are as follows:    People who have been fully vaccinated do not need to quarantine if the exposure was at least 2 weeks after the last vaccination. This includes vaccinated health care workers.    Not fully vaccinated and unvaccinated Individuals who work in health care, congregate care, or congregate living " should be off work for 14 days from their last date of exposure. Community activities for this group can be resumed based on options above. Fully vaccinated individuals in this group do not need to quarantine from work after exposure.    Not fully vaccinated and unvaccinated people whose high-risk exposure was a household member should always quarantine for 14 days from their last date of exposure. Fully vaccinated people in this category do not need to quarantine.    Not fully vaccinated or unvaccinated residents of congregate care and congregate living settings should always quarantine for 14 days from their last date of exposure. Fully vaccinated residents do not need to quarantine.  Note: If you have ongoing exposure to the covid positive person, this quarantine period may be more than 14 days. (For example, if you are continued to be exposed to your child who tested positive and cannot isolate from them, then the quarantine of 7-14 days can't start until your child is no longer contagious. This is typically 10 days from onset of the child's symptoms. So the total duration may be 17-24 days in this case.)    You should continue symptom monitoring until day 14 post-exposure. If you develop signs or symptoms of COVID-19, isolate and get tested (even if you have been tested already).    How to quarantine:   Stay home and away from others. Don't go to school or anywhere else. Generally quarantine means staying home from work but there are some exceptions to this. Please contact your workplace.  No hugging, kissing or shaking hands.  Don't let anyone visit.  Cover your mouth and nose with a mask, tissue or washcloth to avoid spreading germs.  Wash your hands and face often. Use soap and water.    What are the symptoms of COVID-19?  The most common symptoms are cough, fever and trouble breathing. Less common symptoms include headache, body aches, fatigue (feeling very tired), chills, sore throat, stuffy or runny nose,  diarrhea (loose poop), loss of taste or smell, belly pain, and nausea or vomiting (feeling sick to your stomach or throwing up).  After 14 days, if you have still don't have symptoms, you likely don't have this virus.  If you develop symptoms, follow these guidelines.  If you're normally healthy: Please start another eVisit.  If you have a serious health problem (like cancer, heart failure, an organ transplant or kidney disease): Call your specialty clinic. Let them know that you might have COVID-19.    Where can I get more information?  [facility] - About COVID-19: [email or web address]  CDC - What to Do If You're Sick: www.cdc.gov/coronavirus/2019-ncov/about/steps-when-sick.html  CDC - Ending Home Isolation: www.cdc.gov/coronavirus/2019-ncov/hcp/disposition-in-home-patients.html  CDC - Caring for Someone: www.cdc.gov/coronavirus/2019-ncov/if-you-are-sick/care-for-someone.html  [facility] clinical trials (COVID-19 research studies): [email or web address]-clinical-trials  Below are the COVID-19 hotlines at the [place] [facility] ([facility]). Interpreters are available.  For health questions: Call 582-644-7965 or 1-678.262.3112 (7 a.m. to 7 p.m.)  For questions about schools and childcare: Call 934-177-6803 or 1-100.225.7128 (7 a.m. to 7 p.m.)

## 2021-11-17 ENCOUNTER — LAB (OUTPATIENT)
Dept: LAB | Facility: CLINIC | Age: 71
End: 2021-11-17
Attending: NURSE PRACTITIONER
Payer: MEDICARE

## 2021-11-17 DIAGNOSIS — Z20.822 CLOSE EXPOSURE TO 2019 NOVEL CORONAVIRUS: ICD-10-CM

## 2021-11-17 PROCEDURE — U0005 INFEC AGEN DETEC AMPLI PROBE: HCPCS

## 2021-11-17 PROCEDURE — U0003 INFECTIOUS AGENT DETECTION BY NUCLEIC ACID (DNA OR RNA); SEVERE ACUTE RESPIRATORY SYNDROME CORONAVIRUS 2 (SARS-COV-2) (CORONAVIRUS DISEASE [COVID-19]), AMPLIFIED PROBE TECHNIQUE, MAKING USE OF HIGH THROUGHPUT TECHNOLOGIES AS DESCRIBED BY CMS-2020-01-R: HCPCS

## 2021-11-18 LAB — SARS-COV-2 RNA RESP QL NAA+PROBE: NEGATIVE

## 2021-11-30 ENCOUNTER — OFFICE VISIT (OUTPATIENT)
Dept: FAMILY MEDICINE | Facility: CLINIC | Age: 71
End: 2021-11-30
Payer: MEDICARE

## 2021-11-30 VITALS
DIASTOLIC BLOOD PRESSURE: 73 MMHG | WEIGHT: 151.3 LBS | BODY MASS INDEX: 27.84 KG/M2 | TEMPERATURE: 99 F | OXYGEN SATURATION: 96 % | SYSTOLIC BLOOD PRESSURE: 126 MMHG | HEART RATE: 83 BPM | RESPIRATION RATE: 16 BRPM | HEIGHT: 62 IN

## 2021-11-30 DIAGNOSIS — F32.89 OTHER DEPRESSION: ICD-10-CM

## 2021-11-30 DIAGNOSIS — R53.83 OTHER FATIGUE: ICD-10-CM

## 2021-11-30 DIAGNOSIS — Z23 HIGH PRIORITY FOR 2019-NCOV VACCINE: ICD-10-CM

## 2021-11-30 DIAGNOSIS — D50.8 IRON DEFICIENCY ANEMIA SECONDARY TO INADEQUATE DIETARY IRON INTAKE: ICD-10-CM

## 2021-11-30 DIAGNOSIS — M79.672 BILATERAL FOOT PAIN: ICD-10-CM

## 2021-11-30 DIAGNOSIS — E21.3 HYPERPARATHYROIDISM (H): Primary | ICD-10-CM

## 2021-11-30 DIAGNOSIS — F43.21 GRIEF: ICD-10-CM

## 2021-11-30 DIAGNOSIS — F33.1 MODERATE EPISODE OF RECURRENT MAJOR DEPRESSIVE DISORDER (H): ICD-10-CM

## 2021-11-30 DIAGNOSIS — M79.671 BILATERAL FOOT PAIN: ICD-10-CM

## 2021-11-30 DIAGNOSIS — E78.5 HYPERLIPIDEMIA, UNSPECIFIED HYPERLIPIDEMIA TYPE: ICD-10-CM

## 2021-11-30 LAB
ERYTHROCYTE [DISTWIDTH] IN BLOOD BY AUTOMATED COUNT: 14 % (ref 10–15)
HCT VFR BLD AUTO: 40.5 % (ref 35–47)
HGB BLD-MCNC: 13.6 G/DL (ref 11.7–15.7)
MCH RBC QN AUTO: 30.3 PG (ref 26.5–33)
MCHC RBC AUTO-ENTMCNC: 33.6 G/DL (ref 31.5–36.5)
MCV RBC AUTO: 90 FL (ref 78–100)
PLATELET # BLD AUTO: 269 10E3/UL (ref 150–450)
RBC # BLD AUTO: 4.49 10E6/UL (ref 3.8–5.2)
WBC # BLD AUTO: 6.2 10E3/UL (ref 4–11)

## 2021-11-30 PROCEDURE — 80048 BASIC METABOLIC PNL TOTAL CA: CPT | Performed by: NURSE PRACTITIONER

## 2021-11-30 PROCEDURE — 91306 COVID-19,PF,MODERNA (18+ YRS BOOSTER .25ML): CPT | Performed by: NURSE PRACTITIONER

## 2021-11-30 PROCEDURE — 99215 OFFICE O/P EST HI 40 MIN: CPT | Performed by: NURSE PRACTITIONER

## 2021-11-30 PROCEDURE — 82728 ASSAY OF FERRITIN: CPT | Performed by: NURSE PRACTITIONER

## 2021-11-30 PROCEDURE — 0064A COVID-19,PF,MODERNA (18+ YRS BOOSTER .25ML): CPT | Performed by: NURSE PRACTITIONER

## 2021-11-30 PROCEDURE — 85027 COMPLETE CBC AUTOMATED: CPT | Performed by: NURSE PRACTITIONER

## 2021-11-30 PROCEDURE — 36415 COLL VENOUS BLD VENIPUNCTURE: CPT | Performed by: NURSE PRACTITIONER

## 2021-11-30 PROCEDURE — 83970 ASSAY OF PARATHORMONE: CPT | Performed by: NURSE PRACTITIONER

## 2021-11-30 PROCEDURE — 80061 LIPID PANEL: CPT | Performed by: NURSE PRACTITIONER

## 2021-11-30 PROCEDURE — 83550 IRON BINDING TEST: CPT | Performed by: NURSE PRACTITIONER

## 2021-11-30 ASSESSMENT — ANXIETY QUESTIONNAIRES
GAD7 TOTAL SCORE: 7
6. BECOMING EASILY ANNOYED OR IRRITABLE: SEVERAL DAYS
GAD7 TOTAL SCORE: 7
7. FEELING AFRAID AS IF SOMETHING AWFUL MIGHT HAPPEN: SEVERAL DAYS
4. TROUBLE RELAXING: SEVERAL DAYS
8. IF YOU CHECKED OFF ANY PROBLEMS, HOW DIFFICULT HAVE THESE MADE IT FOR YOU TO DO YOUR WORK, TAKE CARE OF THINGS AT HOME, OR GET ALONG WITH OTHER PEOPLE?: VERY DIFFICULT
1. FEELING NERVOUS, ANXIOUS, OR ON EDGE: MORE THAN HALF THE DAYS
2. NOT BEING ABLE TO STOP OR CONTROL WORRYING: SEVERAL DAYS
5. BEING SO RESTLESS THAT IT IS HARD TO SIT STILL: NOT AT ALL
3. WORRYING TOO MUCH ABOUT DIFFERENT THINGS: SEVERAL DAYS
GAD7 TOTAL SCORE: 7
7. FEELING AFRAID AS IF SOMETHING AWFUL MIGHT HAPPEN: SEVERAL DAYS

## 2021-11-30 ASSESSMENT — PATIENT HEALTH QUESTIONNAIRE - PHQ9
SUM OF ALL RESPONSES TO PHQ QUESTIONS 1-9: 13
10. IF YOU CHECKED OFF ANY PROBLEMS, HOW DIFFICULT HAVE THESE PROBLEMS MADE IT FOR YOU TO DO YOUR WORK, TAKE CARE OF THINGS AT HOME, OR GET ALONG WITH OTHER PEOPLE: SOMEWHAT DIFFICULT
SUM OF ALL RESPONSES TO PHQ QUESTIONS 1-9: 13

## 2021-11-30 ASSESSMENT — MIFFLIN-ST. JEOR: SCORE: 1154.54

## 2021-11-30 NOTE — PROGRESS NOTES
Assessment & Plan     Hyperparathyroidism (H)  Will update levels.   - Parathyroid Hormone Intact  - Basic metabolic panel  (Ca, Cl, CO2, Creat, Gluc, K, Na, BUN)  - Parathyroid Hormone Intact  - Basic metabolic panel  (Ca, Cl, CO2, Creat, Gluc, K, Na, BUN)    Moderate episode of recurrent major depressive disorder (H)  Other depression  Grief  fatigue  Patient with recent loss of sister who served as patient's closest family member and support system. Passing is relatively recent in past two weeks. Patient feels acute grief and is notably impacting daily life. Offered and discussed a temporary plan for low dose sertraline added to duloxetine. Patient will plan to hold this at this time and reconsider after the holiday's.    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   Will plan to get patient set up with [facility] and counseling.   Discussed suspicion of fatigue 2/2 to stressors as above.   - MENTAL HEALTH REFERRAL  - Adult; Outpatient Treatment; Individual/Couples/Family/Group Therapy/Health Psychology; [facility] [phone]; We will contact you to schedule the appointment or please call with any questions    Iron deficiency anemia secondary to inadequate dietary iron intake  Update leves  - CBC with platelets  - Ferritin  - Iron and iron binding capacity  - CBC with platelets  - Ferritin  - Iron and iron binding capacity    Hyperlipidemia, unspecified hyperlipidemia type  Update levls  - Lipid panel reflex to direct LDL Non-fasting  - Lipid panel reflex to direct LDL Non-fasting    Bilateral foot pain  Update Podiatrist  - Orthopedic  Referral    High priority for 2019-nCoV vaccine    - COVID-19,PF,MODERNA (18+ Yrs BOOSTER .25mL)        I spent a total of 40 minutes on the day of the visit.   Time spent doing chart review, history and exam, documentation and further activities per the note       BMI:       Return in about 2 months (around 1/30/2022) for  Depression/Anxiety.    JASWANT Johnson CNP  [facility]    Subjective   Dot is a 71 year old who presents for the following health issues     History of Present Illness       Mental Health Follow-up:  Patient presents to follow-up on Depression & Anxiety.Patient's depression since last visit has been:  Worse  The patient is not having other symptoms associated with depression.  Patient's anxiety since last visit has been:  Worse  The patient is not having other symptoms associated with anxiety.  Any significant life events: grief or loss  Patient is not feeling anxious or having panic attacks.  Patient has no concerns about alcohol or drug use.     Social History  Tobacco Use    Smoking status: Never Smoker    Smokeless tobacco: Never Used  Alcohol use: Not Currently  Drug use: Never      Today's PHQ-9         PHQ-9 Total Score:     (P) 13   PHQ-9 Q9 Thoughts of better off dead/self-harm past 2 weeks :   (P) Not at all   Thoughts of suicide or self harm:      Self-harm Plan:        Self-harm Action:          Safety concerns for self or others:             Recent passing of sister from cancer and complications of treatment. Was patient's best friend and support.   Patient continues to be care giver for  with Alzheimer.   Increased fatigue.   Denies SI/HI.    Would like to consult new Podiatrist, did not connect with previous.     Has not had PTH check since surgery.     PHQ 2/9/2021 4/9/2021 11/30/2021   PHQ-9 Total Score 5 4 13   Q9: Thoughts of better off dead/self-harm past 2 weeks Not at all Not at all Not at all   PHQ-9 External Data - - -     ELDON-7 SCORE 4/9/2021 10/1/2021 11/30/2021   Total Score 2 (minimal anxiety) 1 (minimal anxiety) 7 (mild anxiety)   Total Score 2 1 7       Review of Systems         Objective    /73 (BP Location: Right arm, Patient Position: Chair, Cuff Size: Adult Large)   Pulse 83   Temp 99  F (37.2  C) (Oral)   Resp 16   Ht 1.575 m (5'  "2\")   Wt 68.6 kg (151 lb 4.8 oz)   SpO2 96%   BMI 27.67 kg/m    Body mass index is 27.67 kg/m .  Physical Exam   GENERAL: healthy, alert and no distress  RESP: regular rate and effort  PSYCH: mentation appears normal, affect normal/bright                "

## 2021-12-01 PROBLEM — F43.21 GRIEF: Status: ACTIVE | Noted: 2021-12-01

## 2021-12-01 PROBLEM — F33.1 MODERATE EPISODE OF RECURRENT MAJOR DEPRESSIVE DISORDER (H): Status: ACTIVE | Noted: 2021-12-01

## 2021-12-01 LAB — PTH-INTACT SERPL-MCNC: 49 PG/ML (ref 18–80)

## 2021-12-01 ASSESSMENT — ANXIETY QUESTIONNAIRES: GAD7 TOTAL SCORE: 7

## 2021-12-01 ASSESSMENT — PATIENT HEALTH QUESTIONNAIRE - PHQ9: SUM OF ALL RESPONSES TO PHQ QUESTIONS 1-9: 13

## 2021-12-02 LAB
ANION GAP SERPL CALCULATED.3IONS-SCNC: 6 MMOL/L (ref 3–14)
BUN SERPL-MCNC: 24 MG/DL (ref 7–30)
CALCIUM SERPL-MCNC: 9.5 MG/DL (ref 8.5–10.1)
CHLORIDE BLD-SCNC: 106 MMOL/L (ref 94–109)
CHOLEST SERPL-MCNC: 157 MG/DL
CO2 SERPL-SCNC: 29 MMOL/L (ref 20–32)
CREAT SERPL-MCNC: 1.03 MG/DL (ref 0.52–1.04)
FASTING STATUS PATIENT QL REPORTED: NO
FERRITIN SERPL-MCNC: 125 NG/ML (ref 8–252)
GFR SERPL CREATININE-BSD FRML MDRD: 55 ML/MIN/1.73M2
GLUCOSE BLD-MCNC: 97 MG/DL (ref 70–99)
HDLC SERPL-MCNC: 46 MG/DL
IRON SATN MFR SERPL: 26 % (ref 15–46)
IRON SERPL-MCNC: 87 UG/DL (ref 35–180)
LDLC SERPL CALC-MCNC: 76 MG/DL
NONHDLC SERPL-MCNC: 111 MG/DL
POTASSIUM BLD-SCNC: 4 MMOL/L (ref 3.4–5.3)
SODIUM SERPL-SCNC: 141 MMOL/L (ref 133–144)
TIBC SERPL-MCNC: 338 UG/DL (ref 240–430)
TRIGL SERPL-MCNC: 175 MG/DL

## 2021-12-03 ENCOUNTER — TELEPHONE (OUTPATIENT)
Dept: SLEEP MEDICINE | Facility: CLINIC | Age: 71
End: 2021-12-03
Payer: MEDICARE

## 2021-12-03 NOTE — TELEPHONE ENCOUNTER
Reason for call:  Other   Patient called regarding (reason for call): call back  Additional comments: Patient is requesting a call back to discuss a sleep study Next steps     Phone number to reach patient:  Cell number on file:    Telephone Information:   Mobile [phone]       Best Time:  Anytime    Can we leave a detailed message on this number?  YES    Travel screening: Not Applicable

## 2021-12-04 ASSESSMENT — ENCOUNTER SYMPTOMS
POLYDIPSIA: 0
DECREASED APPETITE: 0
PANIC: 0
FEVER: 0
CHILLS: 0
INCREASED ENERGY: 1
MUSCLE WEAKNESS: 0
ARTHRALGIAS: 1
NIGHT SWEATS: 1
STIFFNESS: 1
MUSCLE CRAMPS: 1
ALTERED TEMPERATURE REGULATION: 0
DEPRESSION: 1
INSOMNIA: 1
WEIGHT GAIN: 1
POLYPHAGIA: 0
DECREASED CONCENTRATION: 1
MYALGIAS: 1
WEIGHT LOSS: 0
BACK PAIN: 1
FATIGUE: 1
HALLUCINATIONS: 0
NERVOUS/ANXIOUS: 1
JOINT SWELLING: 0
NECK PAIN: 0

## 2021-12-07 ENCOUNTER — VIRTUAL VISIT (OUTPATIENT)
Dept: BEHAVIORAL HEALTH | Facility: CLINIC | Age: 71
End: 2021-12-07
Payer: MEDICARE

## 2021-12-07 DIAGNOSIS — F33.1 MAJOR DEPRESSIVE DISORDER, RECURRENT EPISODE, MODERATE (H): Primary | ICD-10-CM

## 2021-12-07 PROCEDURE — 90791 PSYCH DIAGNOSTIC EVALUATION: CPT | Mod: 95 | Performed by: SOCIAL WORKER

## 2021-12-07 ASSESSMENT — PATIENT HEALTH QUESTIONNAIRE - PHQ9
SUM OF ALL RESPONSES TO PHQ QUESTIONS 1-9: 16
10. IF YOU CHECKED OFF ANY PROBLEMS, HOW DIFFICULT HAVE THESE PROBLEMS MADE IT FOR YOU TO DO YOUR WORK, TAKE CARE OF THINGS AT HOME, OR GET ALONG WITH OTHER PEOPLE: VERY DIFFICULT
SUM OF ALL RESPONSES TO PHQ QUESTIONS 1-9: 16

## 2021-12-07 ASSESSMENT — COLUMBIA-SUICIDE SEVERITY RATING SCALE - C-SSRS
TOTAL  NUMBER OF INTERRUPTED ATTEMPTS LIFETIME: NO
ATTEMPT LIFETIME: NO
TOTAL  NUMBER OF ABORTED OR SELF INTERRUPTED ATTEMPTS PAST LIFETIME: NO
5. HAVE YOU STARTED TO WORK OUT OR WORKED OUT THE DETAILS OF HOW TO KILL YOURSELF? DO YOU INTEND TO CARRY OUT THIS PLAN?: NO
1. IN THE PAST MONTH, HAVE YOU WISHED YOU WERE DEAD OR WISHED YOU COULD GO TO SLEEP AND NOT WAKE UP?: NO
2. HAVE YOU ACTUALLY HAD ANY THOUGHTS OF KILLING YOURSELF?: NO
5. HAVE YOU STARTED TO WORK OUT OR WORKED OUT THE DETAILS OF HOW TO KILL YOURSELF? DO YOU INTEND TO CARRY OUT THIS PLAN?: NO
TOTAL  NUMBER OF ABORTED OR SELF INTERRUPTED ATTEMPTS PAST 3 MONTHS: NO
3. HAVE YOU BEEN THINKING ABOUT HOW YOU MIGHT KILL YOURSELF?: NO
1. IN THE PAST MONTH, HAVE YOU WISHED YOU WERE DEAD OR WISHED YOU COULD GO TO SLEEP AND NOT WAKE UP?: NO
6. HAVE YOU EVER DONE ANYTHING, STARTED TO DO ANYTHING, OR PREPARED TO DO ANYTHING TO END YOUR LIFE?: NO
4. HAVE YOU HAD THESE THOUGHTS AND HAD SOME INTENTION OF ACTING ON THEM?: NO
4. HAVE YOU HAD THESE THOUGHTS AND HAD SOME INTENTION OF ACTING ON THEM?: NO
6. HAVE YOU EVER DONE ANYTHING, STARTED TO DO ANYTHING, OR PREPARED TO DO ANYTHING TO END YOUR LIFE?: NO
ATTEMPT PAST THREE MONTHS: NO
TOTAL  NUMBER OF INTERRUPTED ATTEMPTS PAST 3 MONTHS: NO
2. HAVE YOU ACTUALLY HAD ANY THOUGHTS OF KILLING YOURSELF LIFETIME?: NO

## 2021-12-07 NOTE — Clinical Note
Juju Blount.  I saw this pt for first time today and completed the DA.  Wondering if you agree with a plan of having her utilize CPPS?  If so, it would require you to put an order in her chart.  Let me know your thoughts.  Thanks!  Tiara

## 2021-12-07 NOTE — PROGRESS NOTES
"    [facility] Primary Care: Integrated Behavioral Health  Provider Name:  ABHIJIT Fair, [facility]  Behavioral Health Clinician    PATIENT'S NAME: Dinorah Thompson  PREFERRED NAME: Dot  PRONOUNS: she/her  MRN: [id number]  : 1950  ADDRESS: [address] [address]  ACCT. NUMBER:  [id number]  DATE OF SERVICE: 21  START TIME: 10:01 a.m.  END TIME: 11:00 a.m.  PREFERRED PHONE: [phone]  May we leave a program related message: Yes  SERVICE MODALITY:  Video Visit:      Provider verified identity through the following two step process.  Patient provided:  Patient  and Patient address    Telemedicine Visit: The patient's condition can be safely assessed and treated via synchronous audio and visual telemedicine encounter.      Reason for Telemedicine Visit: Services only offered telehealth    Originating Site (Patient Location): Patient's home    Distant Site (Provider Location): [facility] MENTAL HEALTH & ADDI[facility]    Consent:  The patient/guardian has verbally consented to: the potential risks and benefits of telemedicine (video visit) versus in person care; bill my insurance or make self-payment for services provided; and responsibility for payment of non-covered services.     Patient would like the video invitation sent by:  My Chart    Mode of Communication:  Video Conference via [employer]    As the provider I attest to compliance with applicable laws and regulations related to telemedicine.    UNIVERSAL ADULT Mental Health DIAGNOSTIC ASSESSMENT    Identifying Information:  Patient is a 71 year old,  .  The pronoun use throughout this assessment reflects the patient's chosen pronoun.  Patient was referred for an assessment by primary care clinic.  Patient attended the session alone.    Chief Complaint:   The reason for seeking services at this time is: \"Grief/ stress depression\".   Pt's physical health has " "been a main stressor as of late. Her , who was living in memory care unit due to a brain syndrome after having brain surgery. Pt has been caring for him since Covid began 1.5 years ago.  Because of his medication and injury, pt cares for  Him as he doesn't \"do much of anything.\" The couple moved back to [place] last year after their son moved to [place], where they were all living.  Pt's sister, for whom she was caring, just passed away on Nov. 19; they had a very close relationship. Her sister was the person pt talked to about life. The problem(s) began 10/18/21.    Patient has attempted to resolve these concerns in the past through therapy.    Social/Family History:  Patient reported they grew up in  [place] and [facility] [place]  They were raised by biological parents and had two siblings- her brother (he and wife winter in [place]) and sister who she just lost.  Parents were always together.  Patient reported that their childhood was      FILL IN NEXT SESSION.  Patient described their current relationships with family of origin as not close with brother; his wife is threatened by the closeness of their family and unsupportive of their having a close relationship.       The patient describes their cultural background as .  Cultural influences and impact on patient's life structure, values, norms, and healthcare: Farming, Jain. Strong suzan in [facility]..  Contextual influences on patient's health include: Family Factors she is caretaker in the family.    These factors will be addressed in the Preliminary Treatment plan. Patient identified their preferred language to be English. Patient reported they does not need the assistance of an  or other support involved in therapy.     Patient reported had no significant delays in developmental tasks.   Patient's highest education level was graduate school- Nurse Practitioner program- certificate from [employer]; pt is now retired.  She was " "on disability when she first was ill, but by the time she was doing better, her 's behaviors began and she couldn't go back to work.  Patient identified the following learning problems: none reported.  Modifications will not be used to assist communication in therapy.  Patient reports they are  able to understand written materials.    Patient reported the following relationship history:  She had a boyfriend in high school  Patient's current relationship status is  for 51 years, marrying when she was 19 years old.   Patient identified their sexual orientation as heterosexual.  Patient reported having 3 child(dorie). Her middle son lives in [place]; her eldest son lives here in [place] but not supportive physically or emotionally; she believes he has Autism, and he is an ED physician who she believes he is extremely stressed in his career right now.   A son moved to [place] from [place] for what she believed would be additional family support, which didn't end up being the case.  She has realized her caregiver status in her family has led to not only additional family support, but her caring for two of her siblings.   Patient identified siblings; adult child; friends as part of their support system.  Patient identified the quality of these relationships as inconsistent Pt's  has been in a memory care facility and during Covid, she brought him back to live at home with her.       Patient's current living/housing situation involves staying in own home/apartment.  The immediate members of family and household include Wilbert Thompson, 73, and they report that housing is stable.  Pt reports her marriage has never been open or honest in that her  had always lied.  She reports he has always been dishonest, even before the brain injury.  She describes \"the worst part of his personality got worse over time.\"      Patient is currently retired.  Patient reports their finances are obtained through other. Patient " does not identify finances as a current stressor.      Patient reported that they have not been involved with the legal system. Patient does not report being under probation/ parole/ jurisdiction.     Patient's Strengths and Limitations:  Patient identified the following strengths or resources that will help them succeed in treatment: Zoroastrian / Baptist, commitment to health and well being, suzan / spirituality, friends / good social support, insight, intelligence, motivation, strong social skills and work ethic. Things that may interfere with the patient's success in treatment include: lack of family support, physical health concerns and unsupportive environment.     Personal and Family Medical History:  Patient does report a family history of mental health concerns.  Patient reports family history includes Cervical Cancer in her sister; Lung Cancer in her sister; Myocardial Infarction in her father and paternal uncle; Ovarian Cancer in her sister..     Patient reports a history of therapy in the past in dealing with her 's brain injury resulting from brain surgery.     Patient has had a physical exam to rule out medical causes for current symptoms.  Date of last physical exam was within the past year. Symptoms have developed since last physical exam and client was encouraged to follow up with PCP.  . The patient has a [facility] Provider, who is named Juju Chang. Patient reports several medical concerns- see below.  Patient denies any issues with pain..   There are not significant appetite / nutritional concerns / weight changes.   Patient does not report a history of head injury / trauma / cognitive impairment.      Patient reports current meds as:   Outpatient Medications Marked as Taking for the 12/7/21 encounter (Virtual Visit) with Tiara Klein [facility]   Medication Sig     DULoxetine (CYMBALTA) 60 MG capsule Take 1 capsule (60 mg) by mouth daily     traZODone (DESYREL) 100 MG tablet  "Take 100 mg by mouth nightly as needed    Pt reports her Duloxetine also helps with her pain and PCP wanted her to also take Sertraline but pt would like to hold off on that as she gets additional support in therapy.  Pt takes  mgs for sleep.    Medication Adherence:  Patient reports taking.      Patient Allergies:    Allergies   Allergen Reactions     Propoxyphene Other (See Comments)     Clarithromycin Other (See Comments)     Pt states, \"ototoxicity\". Balance problems  Pt states, \"ototoxicity\".         Medical History:    Past Medical History:   Diagnosis Date     Chronic osteoarthritis      Complication of anesthesia 1990's    DIfficulty waking up     Depressive disorder      Female bladder prolapse 9/3/2019     Parathyroid abnormality (H)      Prolapse of female pelvic organs 8/19/2019     Sleep apnea     Uses a Bi-Pap.. Will have son bring it if necessary on DOS     Pt has an autoimmune disorder for which she takes medication. She has been feeling better since the end of 2014.  She also takes a neuropsychological exam every couple of years because of her mother had dementia.    Pt has spinal stenosis and arthritis    Current Mental Status Exam:   Appearance:  Appropriate    Eye Contact:  Good   Psychomotor:  Normal       Gait / station:  no problem  Attitude / Demeanor: Cooperative   Speech      Rate / Production: Normal/ Responsive      Volume:  Normal  volume      Language:  intact  Mood:   Depressed   Affect:   Blunted  Tearful   Thought Content: Clear   Thought Process: Coherent  Logical       Associations: No loosening of associations  Insight:   Good   Judgment:  Intact   Orientation:  All  Attention/concentration: Good    Rating Scales:    PHQ9:    PHQ-9 SCORE 4/9/2021 11/30/2021 12/7/2021   PHQ-9 Total Score MyChart 4 (Minimal depression) 13 (Moderate depression) 16 (Moderately severe depression)   PHQ-9 Total Score 4 13 16   PHQ-9 External Data - - -       GAD7:    ELDON-7 SCORE 4/9/2021 10/1/2021 " 11/30/2021   Total Score 2 (minimal anxiety) 1 (minimal anxiety) 7 (mild anxiety)   Total Score 2 1 7     CGI:     First:No data recorded    Most recentNo data recorded    Substance Use:  Patient did not report a family history of substance use concerns; see medical history section for details.  Patient has not received chemical dependency treatment in the past.  Patient has not ever been to detox.      Patient is not currently receiving any chemical dependency treatment.         Substance History of use Age of first use Date of last use     Pattern and duration of use (include amounts and frequency)   Alcohol currently use   19 11/25/21 Drinks alcohol 1-2 drinks monthly   Cannabis   never used     REPORTS SUBSTANCE USE: N/A     Amphetamines   never used     REPORTS SUBSTANCE USE: N/A   Cocaine/crack    never used       REPORTS SUBSTANCE USE: N/A   Hallucinogens never used         REPORTS SUBSTANCE USE: N/A   Inhalants never used         REPORTS SUBSTANCE USE: N/A   Heroin never used         REPORTS SUBSTANCE USE: N/A   Other Opiates never used     REPORTS SUBSTANCE USE: N/A   Benzodiazepine   never used     REPORTS SUBSTANCE USE: N/A   Barbiturates never used     REPORTS SUBSTANCE USE: N/A   Over the counter meds never used     REPORTS SUBSTANCE USE: N/A   Caffeine currently use 18   Pt consumes a caffeine tablet twice daily (no doz).  Drinks one coke daily   Nicotine  never used     REPORTS SUBSTANCE USE: N/A   Other substances not listed above:  Identify:  never used     REPORTS SUBSTANCE USE: N/A     Patient reported the following problems as a result of their substance use: no problems, not applicable.     CAGE- AID:    CAGE-AID Total Score 12/7/2021 12/7/2021   Total Score 0 0   Total Score MyChart - 0 (A total score of 2 or greater is considered clinically significant)       Substance Use: No symptoms    Based on the negative CAGE score and clinical interview there  are not indications of drug or alcohol  abuse.      Significant Losses / Trauma / Abuse / Neglect Issues:   Patient did not serve in the .  There are indications or report of significant loss, trauma, abuse or neglect issues related to: death of sister recently and major medical problems  exposed himself, went to snf, sustained a brain injury during brain surgery for brain tumor.  This was before his dx of brain injury so pt had no idea about his mental condition.   was incarcertated multiple times in 2014 through 2016.  Pt was on probation subsequently.  Due to his age after his last offense, they agreed to a locked facility.  Concerns for possible neglect are not present.     Safety Assessment:   Current Safety Concerns:  Bath Suicide Severity Rating Scale (Lifetime/Recent)  Bath Suicide Severity Rating (Lifetime/Recent) 12/7/2021   1. Wish to be Dead (Lifetime) No   1. Wish to be Dead (Recent) No   2. Non-Specific Active Suicidal Thoughts (Lifetime) No   2. Non-Specific Active Suicidal Thoughts (Recent) No   3. Active Suicidal Ideation with any Methods (Not Plan) Without Intent to Act (Lifetime) No   3. Active Suicidal Ideation with any Methods (Not Plan) Without Intent to Act (Recent) No   4. Active Suicidal Ideation with Some Intent to Act, Without Specific Plan (Lifetime) No   4. Active Suicidal Ideation with Some Intent to Act, Without Specific Plan (Recent) No   5. Active Suicidal Ideation with Specific Plan and Intent (Lifetime) No   5. Active Suicidal Ideation with Specific Plan and Intent (Recent) No   Actual Attempt (Lifetime) No   Actual Attempt (Past 3 Months) No   Has subject engaged in non-suicidal self-injurious behavior? (Lifetime) No   Has subject engaged in non-suicidal self-injurious behavior? (Past 3 Months) No   Interrupted Attempts (Lifetime) No   Interrupted Attempts (Past 3 Months) No   Aborted or Self-Interrupted Attempt (Lifetime) No   Aborted or Self-Interrupted Attempt (Past 3 Months) No    Preparatory Acts or Behavior (Lifetime) No   Preparatory Acts or Behavior (Past 3 Months) No   Most Recent Attempt Actual Lethality Code NA   Most Lethal Attempt Actual Lethality Code NA   Initial/First Attempt Actual Lethality Code NA     Patient denies current homicidal ideation and behaviors.  Patient denies current self-injurious ideation and behaviors.    Patient denied risk behaviors associated with substance use.  Patient denies any high risk behaviors associated with mental health symptoms.  Patient reports the following current concerns for their personal safety: None.  Patient reports there are not firearms in the house.        History of Safety Concerns:  Patient denied a history of homicidal ideation.     Patient denied a history of personal safety concerns.    Patient denied a history of assaultive behaviors.    Patient denied a history of sexual assault behaviors.     Patient denied a history of risk behaviors associated with substance use.  Patient denies any history of high risk behaviors associated with mental health symptoms.  Patient reports the following protective factors: dedication to family or friends; safe and stable environment; regular sleep; sense of belonging; purpose; help seeking behaviors when distressed; daily obligations; effective problem solving skills; commitment to well being; sense of meaning; positive social skills; healthy fear of risky behaviors or pain; financial stability; strong sense of self worth or esteem; sense of personal control or determination; other    Risk Plan:  See Recommendations for Safety and Risk Management Plan    Review of Symptoms per patient report:  Depression: Change in sleep, Lack of interest, Change in energy level, Difficulties concentrating, Change in appetite, Low self-worth, Ruminations, Irritability and Feeling sad, down, or depressed  Kinga:  No Symptoms  Psychosis: No Symptoms  Anxiety: Excessive worry, Nervousness, Sleep disturbance,  Ruminations, Poor concentration and Irritability  Panic:  No symptoms  Post Traumatic Stress Disorder:  Experienced traumatic event 's criminal behaviors, recent death of sister, with whom she was close, father  of depression   Eating Disorder: No Symptoms  ADD / ADHD:  No symptoms  Conduct Disorder: No symptoms  Autism Spectrum Disorder: No symptoms  Obsessive Compulsive Disorder: No Symptoms    Patient reports the following compulsive behaviors and treatment history: none.      Diagnostic Criteria:   Major Depressive Disorder  A) Recurrent episode(s) - symptoms have been present during the same 2-week period and represent a change from previous functioning 5 or more symptoms (required for diagnosis)   - Depressed mood. Note: In children and adolescents, can be irritable mood.     - Diminished interest or pleasure in all, or almost all, activities.    - Decreased sleep.    - Fatigue or loss of energy.    - Feelings of worthlessness or inappropriate and excessive guilt.    - Diminished ability to think or concentrate, or indecisiveness.   B) The symptoms cause clinically significant distress or impairment in social, occupational, or other important areas of functioning  C) The episode is not attributable to the physiological effects of a substance or to another medical condition  D) The occurence of major depressive episode is not better explained by other thought / psychotic disorders  E) There has never been a manic episode or hypomanic episode    Functional Status:  Patient reports the following functional impairments: chronic disease management, health maintenance, home life with , management of the household and or completion of tasks and self-care.     WHODAS:   WHODAS 2.0 Total Score 2021   Total Score 28   Total Score MyChart 28     Nonprogrammatic care:  Patient is requesting basic services to address current mental health concerns.    Clinical Summary:  1. Reason for assessment:  depression, grief.  2. Psychosocial, Cultural and Contextual Factors: Takes care of  (brain injury), inadequate social support, relocation back to [place] last year.  3. Principal DSM5 Diagnoses  (Sustained by DSM5 Criteria Listed Above):   296.32 (F33.1) Major Depressive Disorder, Recurrent Episode, Moderate _ and With melancholic features.  4. Other Diagnoses that is relevant to services:   Grief over death of sister, physical limitations due to health, expectation of having a satisfying marriage  5. Provisional Diagnosis:  n/a  6. Prognosis: Expect Improvement, Relieve Acute Symptoms and Maintain Current Status / Prevent Deterioration.  7. Likely consequences of symptoms if not treated: increase in sx, decrease in functioning.  8. Client strengths include:  caring, empathetic, goal-focused, has a previous history of therapy, insightful, intelligent, motivated, open to learning, open to suggestions / feedback, supportive, wants to learn, willing to ask questions and willing to relate to others .     Recommendations:     1. Plan for Safety and Risk Management:   Recommended that patient call 911 or go to the local ED should there be a change in any of these risk factors..   Report to child / adult protection services was NA.     2. Patient's identified suzan / Mormon / spiritual influences will be incorporated into care by taking into consideration her [employer] suzan.     3. Initial Treatment will focus on:    Depressed Mood - long-standing  Grief / Loss - death of sister.     4. Resources/Service Plan:    services are not indicated.   Modifications to assist communication are not indicated.   Additional disability accommodations are not indicated.      5. Collaboration:   Collaboration / coordination of treatment will be initiated with the following  support professionals: PCP and potential referral to [facility].      6.  Referrals:   The following referral(s) will be initiated: Outpatient [facility]. Next Scheduled Appointment: March, 2022     A Release of Information has been obtained for the following: none.    7. MARY:    MARY:  Discussed the general effects of drugs and alcohol on health and well-being. Provider gave patient printed information about the effects of chemical use on their health and well being. Recommendations:  none     8. Records:   These were not available for review at time of assessment.   Information in this assessment was obtained from the medical record and provided by patient who is a good historian.     Patient will have open access to their mental health medical record.      Provider Name/ Credentials:  ABHIJIT Fair, [facility]  Behavioral Health Clinician  December 7, 2021

## 2021-12-08 ENCOUNTER — OFFICE VISIT (OUTPATIENT)
Dept: PULMONOLOGY | Facility: CLINIC | Age: 71
End: 2021-12-08
Attending: INTERNAL MEDICINE
Payer: MEDICARE

## 2021-12-08 VITALS
DIASTOLIC BLOOD PRESSURE: 80 MMHG | SYSTOLIC BLOOD PRESSURE: 116 MMHG | OXYGEN SATURATION: 96 % | HEART RATE: 86 BPM | BODY MASS INDEX: 26.5 KG/M2 | HEIGHT: 62 IN | WEIGHT: 144 LBS

## 2021-12-08 DIAGNOSIS — E55.9 VITAMIN D DEFICIENCY, UNSPECIFIED: ICD-10-CM

## 2021-12-08 DIAGNOSIS — D86.9 SARCOIDOSIS: Primary | ICD-10-CM

## 2021-12-08 DIAGNOSIS — D86.9 SARCOIDOSIS: ICD-10-CM

## 2021-12-08 LAB
6 MIN WALK (FT): 1500 FT
6 MIN WALK (M): 457 M
DLCOCOR-%PRED-PRE: 117 %
DLCOCOR-PRE: 21.29 ML/MIN/MMHG
DLCOUNC-%PRED-PRE: 118 %
DLCOUNC-PRE: 21.42 ML/MIN/MMHG
DLCOUNC-PRED: 18.1 ML/MIN/MMHG
ERV-%PRED-PRE: 70 %
ERV-PRE: 0.41 L
ERV-PRED: 0.58 L
EXPTIME-PRE: 7.5 SEC
FEF2575-%PRED-PRE: 152 %
FEF2575-PRE: 2.68 L/SEC
FEF2575-PRED: 1.76 L/SEC
FEFMAX-%PRED-PRE: 124 %
FEFMAX-PRE: 6.55 L/SEC
FEFMAX-PRED: 5.27 L/SEC
FEV1-%PRED-PRE: 140 %
FEV1-PRE: 2.87 L
FEV1FEV6-PRE: 79 %
FEV1FEV6-PRED: 79 %
FEV1FVC-PRE: 79 %
FEV1FVC-PRED: 78 %
FEV1SVC-PRE: 79 %
FEV1SVC-PRED: 73 %
FIFMAX-PRE: 6.32 L/SEC
FRCPLETH-%PRED-PRE: 89 %
FRCPLETH-PRE: 2.32 L
FRCPLETH-PRED: 2.6 L
FVC-%PRED-PRE: 138 %
FVC-PRE: 3.63 L
FVC-PRED: 2.62 L
IC-%PRED-PRE: 145 %
IC-PRE: 3.21 L
IC-PRED: 2.21 L
RVPLETH-%PRED-PRE: 96 %
RVPLETH-PRE: 1.91 L
RVPLETH-PRED: 1.99 L
TLCPLETH-%PRED-PRE: 120 %
TLCPLETH-PRE: 5.53 L
TLCPLETH-PRED: 4.6 L
VA-%PRED-PRE: 117 %
VA-PRE: 5.08 L
VC-%PRED-PRE: 129 %
VC-PRE: 3.62 L
VC-PRED: 2.79 L

## 2021-12-08 PROCEDURE — 94375 RESPIRATORY FLOW VOLUME LOOP: CPT | Performed by: INTERNAL MEDICINE

## 2021-12-08 PROCEDURE — 94618 PULMONARY STRESS TESTING: CPT | Performed by: INTERNAL MEDICINE

## 2021-12-08 PROCEDURE — 99213 OFFICE O/P EST LOW 20 MIN: CPT | Mod: 25 | Performed by: INTERNAL MEDICINE

## 2021-12-08 PROCEDURE — 94729 DIFFUSING CAPACITY: CPT | Performed by: INTERNAL MEDICINE

## 2021-12-08 PROCEDURE — 94726 PLETHYSMOGRAPHY LUNG VOLUMES: CPT | Performed by: INTERNAL MEDICINE

## 2021-12-08 PROCEDURE — G0463 HOSPITAL OUTPT CLINIC VISIT: HCPCS | Mod: 25

## 2021-12-08 ASSESSMENT — PATIENT HEALTH QUESTIONNAIRE - PHQ9: SUM OF ALL RESPONSES TO PHQ QUESTIONS 1-9: 16

## 2021-12-08 ASSESSMENT — MIFFLIN-ST. JEOR: SCORE: 1121.43

## 2021-12-08 ASSESSMENT — PAIN SCALES - GENERAL: PAINLEVEL: NO PAIN (0)

## 2021-12-08 NOTE — TELEPHONE ENCOUNTER
"[facility] reach out to patient \"We do not have the diagnostic sleep study on file. I spoke to the patient and she said she does not know where the most recent diagnostic study was completed or when. She mentioned she was going to complete a new study while in [place] anyway. This study will need to be a split night to have insurance continue to cover BIPAP/CPAP supplies. She is going to contact her provider to get that scheduled.\"    Called and patient states she is unable to get supplies. Message sent to Sherine Ramon PA-C to further advise.       SANDY Norman  [facility]      "

## 2021-12-08 NOTE — LETTER
12/8/2021         RE: Dinorah Thompson  [address]  [facility] [place] [facility] [address]        Dear Colleague,    Thank you for referring your patient, Dinorah Thompson, to the [facility] [place]. Please see a copy of my visit note below.    Reason for Visit  Dinorah Thompson is a 71 year old year old female who is being seen for sarcoidosis  Pulmonary HPI    The patient was seen and examined by Kushal Richmond MD     Ms. Thompson comes in for followup.  She was last seen in the Pulmonary Clinic for sarcoidosis in 06/2021.  At that time, she was off all systemic anti-inflammatory therapy, although she has a history of needing 6 months of prednisone x2.  She reported a pericardial effusion, which was possibly related to sarcoidosis.  Elevated PTH and vitamin D levels were found, and the patient has followed with Endocrine in the past.  Resection of parathyroid glands has been considered.    Today she comes in for routine followup.  She is grieving the loss of her sister, who she was the primary caretaker for and was very  close to.  She is tearful during the visit today.  She is working with her primary care physician, who has referred her to a psychologist, who is helping.  She is also dealing with her , who has a chronic illness, and several other family members who are admitted to the hospital with COVID-19 disease.    She reports no pulmonary symptoms.        Current Outpatient Medications   Medication     acetaminophen (TYLENOL) 500 MG tablet     baclofen (LIORESAL) 10 MG tablet     Carboxymethylcellul-Glycerin 1-0.9 % GEL     celecoxib (CELEBREX) 200 MG capsule     DULoxetine (CYMBALTA) 60 MG capsule     LYSINE PO     NONFORMULARY     propranolol (INDERAL) 10 MG tablet     rosuvastatin (CRESTOR) 5 MG tablet     tacrolimus (PROTOPIC) 0.1 % external ointment     traZODone (DESYREL) 100 MG tablet     valACYclovir (VALTREX) 1000 mg tablet      "clonazePAM (KLONOPIN) 0.5 MG tablet     No current facility-administered medications for this visit.     Allergies   Allergen Reactions     Propoxyphene Other (See Comments)     Clarithromycin Other (See Comments)     Pt states, \"ototoxicity\". Balance problems  Pt states, \"ototoxicity\".       Past Medical History:   Diagnosis Date     Chronic osteoarthritis      Complication of anesthesia 1990's    DIfficulty waking up     Depressive disorder      Female bladder prolapse 9/3/2019     Parathyroid abnormality (H)      Prolapse of female pelvic organs 8/19/2019     Sleep apnea     Uses a Bi-Pap.. Will have son bring it if necessary on DOS       Past Surgical History:   Procedure Laterality Date     APPENDECTOMY       diskectomy in toe       HYSTERECTOMY  08/2017    and bladder surgery     INJECT EPIDURAL LUMBAR Right 4/27/2021    Procedure: Right Lumbar 5- Sacral 1 transforaminal epidural steroid injection with fluoroscopy and conscious sedation.;  Surgeon: Tiera Santana MD;  Location: [facility] OR     INJECT EPIDURAL LUMBAR Left 11/11/2021    Procedure: Lumbar epidural steroid injection L5- S1 with fluoroscopy;  Surgeon: Tiera Santana MD;  Location: [facility] OR     left rotator cuff surgery       PARATHYROIDECTOMY N/A 7/21/2021    Procedure: PARATHYROIDECTOMY;  Surgeon: Gregoria Wilcox MD;  Location: [facility] OR     SINUS SURGERY      x2 in 2013 and 2018     TONSILLECTOMY  1955       Social History     Socioeconomic History     Marital status:      Spouse name: Wilbert     Number of children: Not on file     Years of education: Not on file     Highest education level: Bachelor's degree (e.g., BA, AB, BS)   Occupational History     Not on file   Tobacco Use     Smoking status: Never Smoker     Smokeless tobacco: Never Used   Vaping Use     Vaping Use: Never used   Substance and Sexual Activity     Alcohol use: Not Currently     Drug use: Never     Sexual activity: Not Currently     Partners: Male   Other Topics Concern " "    Parent/sibling w/ CABG, MI or angioplasty before 65F 55M? Not Asked   Social History Narrative     Not on file     Social Determinants of Health     Financial Resource Strain: Low Risk      Difficulty of Paying Living Expenses: Not hard at all   Food Insecurity: No Food Insecurity     Worried About Running Out of Food in the Last Year: Never true     Ran Out of Food in the Last Year: Never true   Transportation Needs: No Transportation Needs     Lack of Transportation (Medical): No     Lack of Transportation (Non-Medical): No   Physical Activity: Insufficiently Active     Days of Exercise per Week: 2 days     Minutes of Exercise per Session: 10 min   Stress: Stress Concern Present     Feeling of Stress : To some extent   Social Connections: Socially Integrated     Frequency of Communication with Friends and Family: More than three times a week     Frequency of Social Gatherings with Friends and Family: Once a week     Attends [employer] Services: More than 4 times per year     Active Member of Clubs or Organizations: Yes     Attends Club or Organization Meetings: More than 4 times per year     Marital Status:    Intimate Partner Violence: Not on file   Housing Stability: Low Risk      Unable to Pay for Housing in the Last Year: No     Number of Places Lived in the Last Year: 2     Unstable Housing in the Last Year: No       ROS Pulmonary    A complete ROS was otherwise negative except as noted in the HPI.  /80 (BP Location: Right arm, Patient Position: Chair)   Pulse 86   Ht 1.575 m (5' 2\")   Wt 65.3 kg (144 lb)   SpO2 96%   BMI 26.34 kg/m    Exam:   GENERAL APPEARANCE: Alert, and in no apparent distress.  EYES: PERRL, EOMI.  NECK: supple, no masses, no thyromegaly.  LYMPHATICS: No significant axillary, cervical, or supraclavicular nodes.  RESP: normal percussion, good air flow throughout.  No crackles. No rhonchi. No wheezes.  CV: Normal S1, S2, regular rhythm, normal rate. No murmur.  No rub. " No gallop. No LE edema.   MS: extremities normal. No clubbing. No cyanosis.  SKIN: no rash on limited exam  NEURO: Mentation intact, speech normal, normal strength and tone, normal gait and stance    Results:  PFTs done today were reviewed by me with the patient.  A 6-minute walk test also reviewed.  No exercise limitation based on 6-minute walk distance.  No desaturation.  FVC 3.63 liters (103% of predicted), FEV1 2.87 liters (140%) and the ratio is 79.  DLCO not corrected for hemoglobin is 118% of predicted.  My interpretation is that the spirometry and DLCO are all high normal range.  In comparison to prior spirometry, significant improvement is noted.    Assessment and plan: Assessment and plan: Ms. Thompson is a pleasant 70-year-old female of [place]  descent with degenerative disc disease with concern for impingement.  She has sarcoidosis previously treated with ~6 months x 2 of anti-inflammatory medications. 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  1.  Stable pulmonary symptoms.  We will continue to monitor1.  The patient believes that the previous exacerbations of her sarcoid were related to stressors in her life.  She is requesting to be seen promptly if she has worsening symptoms.  I am happy to see her as an add-on in case she calls later on.  2.  Extrapulmonary sarcoidosis.  Cardiac MRI 2020 with no LGE.  She had recent labs with her primary care physician.  We will check LDH.  We will check 125 dihydroxy vitamin D and PTH with inflammatory markers for sarcoidosis in the next 4-6 weeks.    I will see her back in 4 months.        This note consists of symbols derived from keyboarding, dictation and/or voice recognition software. As a result, there may be errors in the script that have gone undetected. Please consider this when interpreting information found in this chart    Again, thank you for allowing me to  participate in the care of your patient.        Sincerely,        Kushal Richmond MD

## 2021-12-08 NOTE — TELEPHONE ENCOUNTER
Attempted to reach patient via phone unsuccessful left message to call back to discuss sleep study and the next step.            SANDY Norman  [facility] [place]

## 2021-12-08 NOTE — TELEPHONE ENCOUNTER
Orders for sleep study placed at her visit back 4/15/21 should still be valid. Please contact her to schedule. Sherine Ramon PA-C on 12/8/2021 at 4:27 PM

## 2021-12-08 NOTE — NURSING NOTE
Chief Complaint   Patient presents with     Interstitial Lung Disease (ILD)     6 month ILD Follow up      Vitals were taken and medications were reconciled.     Rosy Houser RMA  10:55 AM

## 2021-12-08 NOTE — PROGRESS NOTES
"Reason for Visit  Dinorah Thompson is a 71 year old year old female who is being seen for sarcoidosis  Pulmonary HPI    The patient was seen and examined by Kushal Richmond MD     Ms. Thompson comes in for followup.  She was last seen in the Pulmonary Clinic for sarcoidosis in 06/2021.  At that time, she was off all systemic anti-inflammatory therapy, although she has a history of needing 6 months of prednisone x2.  She reported a pericardial effusion, which was possibly related to sarcoidosis.  Elevated PTH and vitamin D levels were found, and the patient has followed with Endocrine in the past.  Resection of parathyroid glands has been considered.    Today she comes in for routine followup.  She is grieving the loss of her sister, who she was the primary caretaker for and was very  close to.  She is tearful during the visit today.  She is working with her primary care physician, who has referred her to a psychologist, who is helping.  She is also dealing with her , who has a chronic illness, and several other family members who are admitted to the hospital with COVID-19 disease.    She reports no pulmonary symptoms.        Current Outpatient Medications   Medication     acetaminophen (TYLENOL) 500 MG tablet     baclofen (LIORESAL) 10 MG tablet     Carboxymethylcellul-Glycerin 1-0.9 % GEL     celecoxib (CELEBREX) 200 MG capsule     DULoxetine (CYMBALTA) 60 MG capsule     LYSINE PO     NONFORMULARY     propranolol (INDERAL) 10 MG tablet     rosuvastatin (CRESTOR) 5 MG tablet     tacrolimus (PROTOPIC) 0.1 % external ointment     traZODone (DESYREL) 100 MG tablet     valACYclovir (VALTREX) 1000 mg tablet     clonazePAM (KLONOPIN) 0.5 MG tablet     No current facility-administered medications for this visit.     Allergies   Allergen Reactions     Propoxyphene Other (See Comments)     Clarithromycin Other (See Comments)     Pt states, \"ototoxicity\". Balance problems  Pt states, \"ototoxicity\".       Past Medical " History:   Diagnosis Date     Chronic osteoarthritis      Complication of anesthesia 1990's    DIfficulty waking up     Depressive disorder      Female bladder prolapse 9/3/2019     Parathyroid abnormality (H)      Prolapse of female pelvic organs 8/19/2019     Sleep apnea     Uses a Bi-Pap.. Will have son bring it if necessary on DOS       Past Surgical History:   Procedure Laterality Date     APPENDECTOMY       diskectomy in toe       HYSTERECTOMY  08/2017    and bladder surgery     INJECT EPIDURAL LUMBAR Right 4/27/2021    Procedure: Right Lumbar 5- Sacral 1 transforaminal epidural steroid injection with fluoroscopy and conscious sedation.;  Surgeon: Tiera Santana MD;  Location: [facility] OR     INJECT EPIDURAL LUMBAR Left 11/11/2021    Procedure: Lumbar epidural steroid injection L5- S1 with fluoroscopy;  Surgeon: Tiera Santana MD;  Location: [facility] OR     left rotator cuff surgery       PARATHYROIDECTOMY N/A 7/21/2021    Procedure: PARATHYROIDECTOMY;  Surgeon: Gregoria Wilcox MD;  Location: [facility] OR     SINUS SURGERY      x2 in 2013 and 2018     TONSILLECTOMY  1955       Social History     Socioeconomic History     Marital status:      Spouse name: Wilbert     Number of children: Not on file     Years of education: Not on file     Highest education level: Bachelor's degree (e.g., BA, AB, BS)   Occupational History     Not on file   Tobacco Use     Smoking status: Never Smoker     Smokeless tobacco: Never Used   Vaping Use     Vaping Use: Never used   Substance and Sexual Activity     Alcohol use: Not Currently     Drug use: Never     Sexual activity: Not Currently     Partners: Male   Other Topics Concern     Parent/sibling w/ CABG, MI or angioplasty before 65F 55M? Not Asked   Social History Narrative     Not on file     Social Determinants of Health     Financial Resource Strain: Low Risk      Difficulty of Paying Living Expenses: Not hard at all   Food Insecurity: No Food Insecurity     Worried About  "Running Out of Food in the Last Year: Never true     Ran Out of Food in the Last Year: Never true   Transportation Needs: No Transportation Needs     Lack of Transportation (Medical): No     Lack of Transportation (Non-Medical): No   Physical Activity: Insufficiently Active     Days of Exercise per Week: 2 days     Minutes of Exercise per Session: 10 min   Stress: Stress Concern Present     Feeling of Stress : To some extent   Social Connections: Socially Integrated     Frequency of Communication with Friends and Family: More than three times a week     Frequency of Social Gatherings with Friends and Family: Once a week     Attends [employer] Services: More than 4 times per year     Active Member of Clubs or Organizations: Yes     Attends Club or Organization Meetings: More than 4 times per year     Marital Status:    Intimate Partner Violence: Not on file   Housing Stability: Low Risk      Unable to Pay for Housing in the Last Year: No     Number of Places Lived in the Last Year: 2     Unstable Housing in the Last Year: No       ROS Pulmonary    A complete ROS was otherwise negative except as noted in the HPI.  /80 (BP Location: Right arm, Patient Position: Chair)   Pulse 86   Ht 1.575 m (5' 2\")   Wt 65.3 kg (144 lb)   SpO2 96%   BMI 26.34 kg/m    Exam:   GENERAL APPEARANCE: Alert, and in no apparent distress.  EYES: PERRL, EOMI.  NECK: supple, no masses, no thyromegaly.  LYMPHATICS: No significant axillary, cervical, or supraclavicular nodes.  RESP: normal percussion, good air flow throughout.  No crackles. No rhonchi. No wheezes.  CV: Normal S1, S2, regular rhythm, normal rate. No murmur.  No rub. No gallop. No LE edema.   MS: extremities normal. No clubbing. No cyanosis.  SKIN: no rash on limited exam  NEURO: Mentation intact, speech normal, normal strength and tone, normal gait and stance    Results:  PFTs done today were reviewed by me with the patient.  A 6-minute walk test also reviewed.  No " exercise limitation based on 6-minute walk distance.  No desaturation.  FVC 3.63 liters (103% of predicted), FEV1 2.87 liters (140%) and the ratio is 79.  DLCO not corrected for hemoglobin is 118% of predicted.  My interpretation is that the spirometry and DLCO are all high normal range.  In comparison to prior spirometry, significant improvement is noted.    Assessment and plan: Assessment and plan: Ms. Thompson is a pleasant 70-year-old female of [place]  descent with degenerative disc disease with concern for impingement.  She has sarcoidosis previously treated with ~6 months x 2 of anti-inflammatory medications. 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  1.  Stable pulmonary symptoms.  We will continue to monitor1.  The patient believes that the previous exacerbations of her sarcoid were related to stressors in her life.  She is requesting to be seen promptly if she has worsening symptoms.  I am happy to see her as an add-on in case she calls later on.  2.  Extrapulmonary sarcoidosis.  Cardiac MRI 2020 with no LGE.  She had recent labs with her primary care physician.  We will check LDH.  We will check 125 dihydroxy vitamin D and PTH with inflammatory markers for sarcoidosis in the next 4-6 weeks.    I will see her back in 4 months.        This note consists of symbols derived from keyboarding, dictation and/or voice recognition software. As a result, there may be errors in the script that have gone undetected. Please consider this when interpreting information found in this chart            Answers for HPI/ROS submitted by the patient on 12/4/2021  General Symptoms: Yes  Skin Symptoms: No  HENT Symptoms: No  EYE SYMPTOMS: No  HEART SYMPTOMS: No  LUNG SYMPTOMS: No  INTESTINAL SYMPTOMS: No  URINARY SYMPTOMS: No  GYNECOLOGIC SYMPTOMS: No  BREAST SYMPTOMS: No  SKELETAL SYMPTOMS: Yes  BLOOD SYMPTOMS: No  NERVOUS SYSTEM SYMPTOMS:  No  MENTAL HEALTH SYMPTOMS: Yes  Fever: No  Loss of appetite: No  Weight loss: No  Weight gain: Yes  Fatigue: Yes  Night sweats: Yes  Chills: No  Increased stress: Yes  Excessive hunger: No  Excessive thirst: No  Feeling hot or cold when others believe the temperature is normal: No  Loss of height: No  Post-operative complications: No  Surgical site pain: No  Hallucinations: No  Change in or Loss of Energy: Yes  Hyperactivity: No  Confusion: No  Back pain: Yes  Muscle aches: Yes  Neck pain: No  Swollen joints: No  Joint pain: Yes  Bone pain: No  Muscle cramps: Yes  Muscle weakness: No  Joint stiffness: Yes  Bone fracture: No  Nervous or Anxious: Yes  Depression: Yes  Trouble sleeping: Yes  Trouble thinking or concentrating: Yes  Mood changes: Yes  Panic attacks: No

## 2021-12-09 ENCOUNTER — OFFICE VISIT (OUTPATIENT)
Dept: PODIATRY | Facility: CLINIC | Age: 71
End: 2021-12-09
Attending: NURSE PRACTITIONER
Payer: MEDICARE

## 2021-12-09 ENCOUNTER — ANCILLARY PROCEDURE (OUTPATIENT)
Dept: GENERAL RADIOLOGY | Facility: CLINIC | Age: 71
End: 2021-12-09
Attending: PODIATRIST
Payer: MEDICARE

## 2021-12-09 ENCOUNTER — TELEPHONE (OUTPATIENT)
Dept: SLEEP MEDICINE | Facility: CLINIC | Age: 71
End: 2021-12-09

## 2021-12-09 VITALS
WEIGHT: 144 LBS | BODY MASS INDEX: 26.5 KG/M2 | HEIGHT: 62 IN | SYSTOLIC BLOOD PRESSURE: 118 MMHG | DIASTOLIC BLOOD PRESSURE: 72 MMHG

## 2021-12-09 DIAGNOSIS — M79.671 BILATERAL FOOT PAIN: ICD-10-CM

## 2021-12-09 DIAGNOSIS — M20.62 DEFORMITY OF TOE OF LEFT FOOT: ICD-10-CM

## 2021-12-09 DIAGNOSIS — M79.672 BILATERAL FOOT PAIN: ICD-10-CM

## 2021-12-09 DIAGNOSIS — M79.672 LEFT FOOT PAIN: Primary | ICD-10-CM

## 2021-12-09 PROCEDURE — 99213 OFFICE O/P EST LOW 20 MIN: CPT | Performed by: PODIATRIST

## 2021-12-09 PROCEDURE — 73630 X-RAY EXAM OF FOOT: CPT | Mod: LT | Performed by: RADIOLOGY

## 2021-12-09 ASSESSMENT — MIFFLIN-ST. JEOR: SCORE: 1121.43

## 2021-12-09 NOTE — TELEPHONE ENCOUNTER
Attempted to call patient. No answer.  Left a VM that her order for a sleep study are still valid, and that we just need to get her study set up.  Gave her the contact information to call clinic back to get sleep study set up. Kenrda Valverde, CMA

## 2021-12-09 NOTE — PROGRESS NOTES
ASSESSMENT:  Encounter Diagnoses   Name Primary?     Bilateral foot pain      Left foot pain Yes     Deformity of toe of left foot      MEDICAL DECISION MAKING:  We talked about her left foot toe deformities and how it contributes to the interdigital corns on the second and third toes.  Her concern is that the moleskin she uses, does not stay in place.  I recommend she try a toe sleeve with the pad between the toes.  This should stay in place.  She likes this option.    I explained that surgical intervention is not recommended.  Even with the hallux held out of the way, when her forefoot is loaded, the second toe deviates laterally and contacts the third.  I explained how transverse plane deformities are difficult to correct surgically and unpredictable as far as outcomes.  Removal of the base of the toe versus the metatarsal head, would likely reduce the rigidity of the lateral deviation.  However, this would result in loss of function of the toe.  She is fine avoiding surgery.    I explained that her lateral foot pain, at the level of the fifth metatarsal base, might be an insertional tendinitis, stress reaction of bone or lateral column overload.    I personally reviewed the x-ray images with Dot.  No fractures or periosteal reaction seen.  Considering a peroneus brevis insertional tendinitis, she was provided a Tri-Lock brace.  She is to use the foot strap laterally.  I also discussed the option of purchasing some accommodative orthotic type devices, over-the-counter.    Follow-up if the condition is not improving.    Disclaimer: This note consists of symbols derived from keyboarding, dictation and/or voice recognition software. As a result, there may be errors in the script that have gone undetected. Please consider this when interpreting information found in this chart.    James Fontenot DPM, FACFAS, MS    [place] [facility]le  Sur[facility]      ____________________________________________________________________    HPI:       I was asked by Juju Chang APRN CNP to evaluate Dinorah Thompson in consultation for left foot pain.       Francie presents today reporting primarily left foot pain.  She shows me hyperkeratotic lesions between the left second and third toes.  She reports a history of left second toe surgery.  She describes how her left foot bunion pushes her toes laterally causing corns and discomfort.    She also reports lateral left foot pain, specifically the base of the fifth metatarsal.  This is existed since September.  There is a history of missing a bottom step and landing hard on the foot.  *  Past Medical History:   Diagnosis Date     Chronic osteoarthritis      Complication of anesthesia 1990's    DIfficulty waking up     Depressive disorder      Female bladder prolapse 9/3/2019     Parathyroid abnormality (H)      Prolapse of female pelvic organs 8/19/2019     Sleep apnea     Uses a Bi-Pap.. Will have son bring it if necessary on DOS   *  *  Past Surgical History:   Procedure Laterality Date     APPENDECTOMY       diskectomy in toe       HYSTERECTOMY  08/2017    and bladder surgery     INJECT EPIDURAL LUMBAR Right 4/27/2021    Procedure: Right Lumbar 5- Sacral 1 transforaminal epidural steroid injection with fluoroscopy and conscious sedation.;  Surgeon: Tiera Santana MD;  Location: [facility] OR     INJECT EPIDURAL LUMBAR Left 11/11/2021    Procedure: Lumbar epidural steroid injection L5- S1 with fluoroscopy;  Surgeon: Tiera Santana MD;  Location: [facility] OR     left rotator cuff surgery       PARATHYROIDECTOMY N/A 7/21/2021    Procedure: PARATHYROIDECTOMY;  Surgeon: Gregoria Wilcox MD;  Location: [facility] OR     SINUS SURGERY      x2 in 2013 and 2018     TONSILLECTOMY  1955   *  *  Current Outpatient Medications   Medication Sig Dispense Refill     acetaminophen (TYLENOL) 500 MG tablet Take 2 tablets (1,000 mg) by mouth every 6  "hours as needed for pain       baclofen (LIORESAL) 10 MG tablet Take 10 mg by mouth daily as needed        Carboxymethylcellul-Glycerin 1-0.9 % GEL Place 1 drop into both eyes daily        celecoxib (CELEBREX) 200 MG capsule Take 200 mg by mouth 2 times daily        clonazePAM (KLONOPIN) 0.5 MG tablet Take 0.5 mg by mouth nightly as needed  (Patient not taking: Reported on 12/7/2021)       DULoxetine (CYMBALTA) 60 MG capsule Take 1 capsule (60 mg) by mouth daily 90 capsule 0     LYSINE PO        NONFORMULARY Vitamin Packet from [employer] including Multi-vitamin, Leutin, Calcium, Antioxidant, Fish oil, Glucosamine- Chondroitin: Take 1 packet by mouth daily       propranolol (INDERAL) 10 MG tablet Take 10 mg by mouth daily as needed        rosuvastatin (CRESTOR) 5 MG tablet Take 5 mg by mouth every other day        tacrolimus (PROTOPIC) 0.1 % external ointment Apply to AA on the face BID PRN 30 g 5     traZODone (DESYREL) 100 MG tablet Take 100 mg by mouth nightly as needed        valACYclovir (VALTREX) 1000 mg tablet Take two tablets twice per day for one day for flare ups. 30 tablet 1         EXAM:    Vitals: /72   Ht 1.575 m (5' 2\")   Wt 65.3 kg (144 lb)   BMI 26.34 kg/m    BMI: Body mass index is 26.34 kg/m .    Constitutional:  Dinorah BRANCH White is in no apparent distress, appears well-nourished.  Cooperative with history and physical exam.    Vascular:  Pedal pulses are palpable for both the DP and PT arteries.  CFT < 3 sec.  No edema.      Neuro: Light touch sensation is intact to the L4, L5, S1 distributions  No evidence of weakness, spasticity, or contracture in the lower extremities.     Derm: Normal texture and turgor.  No erythema, ecchymosis, or cyanosis.  No open lesions.     Is hyperkeratotic lesion on the lateral aspect of the left second toe as well as the medial aspect of the left third toe.      Musculoskeletal:    Lower extremity muscle strength is normal.flatfoot structure.  Hallux " abductovalgus, left foot.  The hallux can be mostly reduced in the transverse plane.  With loading of the foot, the second toe forcefully contacts the third toe.  This is also the case when the hallux is held in a more rectus position.  The second toe appears to be laterally dislocating at the metatarsophalangeal joint.  This toe is rigid, likely secondary to a proximal interphalangeal joint arthrodesis in the past.      LEFT FOOT THREE OR MORE VIEWS   12/9/2021 10:17 AM      HISTORY:  Pain at base of the left fifth metatarsal. Left foot pain.     COMPARISON: Radiographs from 4/13/2021.                                                                      IMPRESSION: Freiberg's infraction and subluxation at the second MTP  joint again noted. Solid bony union across the PIP joint of the second  toe from prior surgery again noted. Mild multifocal degenerative  changes again noted. Small accessory navicular again noted. Hallux  valgus, lateral subluxation of the sesamoids and bunion again noted.  Pes planus again noted. No new findings. The fifth metatarsal appears  normal.

## 2021-12-09 NOTE — TELEPHONE ENCOUNTER
Reason for Call:  Other appointment    Detailed comments: patient called back and would like to schedule [facility]  States we have left her multiple messages to schedule.  Patient had to go again, and wants another callback.      Wants to makes sure that provider will be ordering diagnostic and split sleep study.      Please contact patient.  Thank you.      Phone Number Patient can be reached at: Home number on file [phone] (home)    Best Time: any    Can we leave a detailed message on this number? YES    Call taken on 12/9/2021 at 11:02 AM by Claudia Dixon

## 2021-12-09 NOTE — LETTER
12/9/2021         RE: Dinorah Thompson  [address]  [place] [address]        Dear Colleague,    Thank you for referring your patient, Dinorah Thompson, to the [facility]. Please see a copy of my visit note below.    ASSESSMENT:  Encounter Diagnoses   Name Primary?     Bilateral foot pain      Left foot pain Yes     Deformity of toe of left foot      MEDICAL DECISION MAKING:  We talked about her left foot toe deformities and how it contributes to the interdigital corns on the second and third toes.  Her concern is that the moleskin she uses, does not stay in place.  I recommend she try a toe sleeve with the pad between the toes.  This should stay in place.  She likes this option.    I explained that surgical intervention is not recommended.  Even with the hallux held out of the way, when her forefoot is loaded, the second toe deviates laterally and contacts the third.  I explained how transverse plane deformities are difficult to correct surgically and unpredictable as far as outcomes.  Removal of the base of the toe versus the metatarsal head, would likely reduce the rigidity of the lateral deviation.  However, this would result in loss of function of the toe.  She is fine avoiding surgery.    I explained that her lateral foot pain, at the level of the fifth metatarsal base, might be an insertional tendinitis, stress reaction of bone or lateral column overload.    I personally reviewed the x-ray images with Dot.  No fractures or periosteal reaction seen.  Considering a peroneus brevis insertional tendinitis, she was provided a Tri-Lock brace.  She is to use the foot strap laterally.  I also discussed the option of purchasing some accommodative orthotic type devices, over-the-counter.    Follow-up if the condition is not improving.    Disclaimer: This note consists of symbols derived from keyboarding, dictation and/or voice recognition software. As a result,  there may be errors in the script that have gone undetected. Please consider this when interpreting information found in this chart.    James Fontenot, GERI, FACFAS, MS    [place] [facility] Pod[facility]/Foot & Ankle Surgery      ____________________________________________________________________    HPI:       I was asked by Juju Chang APRN CNP to evaluate Dinorah Thompson in consultation for left foot pain.       Francie presents today reporting primarily left foot pain.  She shows me hyperkeratotic lesions between the left second and third toes.  She reports a history of left second toe surgery.  She describes how her left foot bunion pushes her toes laterally causing corns and discomfort.    She also reports lateral left foot pain, specifically the base of the fifth metatarsal.  This is existed since September.  There is a history of missing a bottom step and landing hard on the foot.  *  Past Medical History:   Diagnosis Date     Chronic osteoarthritis      Complication of anesthesia 1990's    DIfficulty waking up     Depressive disorder      Female bladder prolapse 9/3/2019     Parathyroid abnormality (H)      Prolapse of female pelvic organs 8/19/2019     Sleep apnea     Uses a Bi-Pap.. Will have son bring it if necessary on DOS   *  *  Past Surgical History:   Procedure Laterality Date     APPENDECTOMY       diskectomy in toe       HYSTERECTOMY  08/2017    and bladder surgery     INJECT EPIDURAL LUMBAR Right 4/27/2021    Procedure: Right Lumbar 5- Sacral 1 transforaminal epidural steroid injection with fluoroscopy and conscious sedation.;  Surgeon: Tiera Santana MD;  Location: [facility] OR     INJECT EPIDURAL LUMBAR Left 11/11/2021    Procedure: Lumbar epidural steroid injection L5- S1 with fluoroscopy;  Surgeon: Tiera Santana MD;  Location: [facility] OR     left rotator cuff surgery       PARATHYROIDECTOMY N/A 7/21/2021    Procedure: PARATHYROIDECTOMY;  Surgeon: Gregoria Wilcox MD;  Location: [facility] OR     SINUS  "SURGERY      x2 in 2013 and 2018     TONSILLECTOMY  1955   *  *  Current Outpatient Medications   Medication Sig Dispense Refill     acetaminophen (TYLENOL) 500 MG tablet Take 2 tablets (1,000 mg) by mouth every 6 hours as needed for pain       baclofen (LIORESAL) 10 MG tablet Take 10 mg by mouth daily as needed        Carboxymethylcellul-Glycerin 1-0.9 % GEL Place 1 drop into both eyes daily        celecoxib (CELEBREX) 200 MG capsule Take 200 mg by mouth 2 times daily        clonazePAM (KLONOPIN) 0.5 MG tablet Take 0.5 mg by mouth nightly as needed  (Patient not taking: Reported on 12/7/2021)       DULoxetine (CYMBALTA) 60 MG capsule Take 1 capsule (60 mg) by mouth daily 90 capsule 0     LYSINE PO        NONFORMULARY Vitamin Packet from [employer] including Multi-vitamin, Leutin, Calcium, Antioxidant, Fish oil, Glucosamine- Chondroitin: Take 1 packet by mouth daily       propranolol (INDERAL) 10 MG tablet Take 10 mg by mouth daily as needed        rosuvastatin (CRESTOR) 5 MG tablet Take 5 mg by mouth every other day        tacrolimus (PROTOPIC) 0.1 % external ointment Apply to AA on the face BID PRN 30 g 5     traZODone (DESYREL) 100 MG tablet Take 100 mg by mouth nightly as needed        valACYclovir (VALTREX) 1000 mg tablet Take two tablets twice per day for one day for flare ups. 30 tablet 1         EXAM:    Vitals: /72   Ht 1.575 m (5' 2\")   Wt 65.3 kg (144 lb)   BMI 26.34 kg/m    BMI: Body mass index is 26.34 kg/m .    Constitutional:  Dinorah BRANCH White is in no apparent distress, appears well-nourished.  Cooperative with history and physical exam.    Vascular:  Pedal pulses are palpable for both the DP and PT arteries.  CFT < 3 sec.  No edema.      Neuro: Light touch sensation is intact to the L4, L5, S1 distributions  No evidence of weakness, spasticity, or contracture in the lower extremities.     Derm: Normal texture and turgor.  No erythema, ecchymosis, or cyanosis.  No open lesions.     Is " hyperkeratotic lesion on the lateral aspect of the left second toe as well as the medial aspect of the left third toe.      Musculoskeletal:    Lower extremity muscle strength is normal.flatfoot structure.  Hallux abductovalgus, left foot.  The hallux can be mostly reduced in the transverse plane.  With loading of the foot, the second toe forcefully contacts the third toe.  This is also the case when the hallux is held in a more rectus position.  The second toe appears to be laterally dislocating at the metatarsophalangeal joint.  This toe is rigid, likely secondary to a proximal interphalangeal joint arthrodesis in the past.      LEFT FOOT THREE OR MORE VIEWS   12/9/2021 10:17 AM      HISTORY:  Pain at base of the left fifth metatarsal. Left foot pain.     COMPARISON: Radiographs from 4/13/2021.                                                                      IMPRESSION: Freiberg's infraction and subluxation at the second MTP  joint again noted. Solid bony union across the PIP joint of the second  toe from prior surgery again noted. Mild multifocal degenerative  changes again noted. Small accessory navicular again noted. Hallux  valgus, lateral subluxation of the sesamoids and bunion again noted.  Pes planus again noted. No new findings. The fifth metatarsal appears  normal.          Again, thank you for allowing me to participate in the care of your patient.        Sincerely,        James Fontenot DPM

## 2021-12-13 NOTE — TELEPHONE ENCOUNTER
Spoke with patient and got her scheduled for her PSG with Split night and her follow-up appointment with provider. Mailed PSG hand-out. Kendra Valverde, CMA

## 2021-12-16 ENCOUNTER — VIRTUAL VISIT (OUTPATIENT)
Dept: ANESTHESIOLOGY | Facility: CLINIC | Age: 71
End: 2021-12-16
Payer: MEDICARE

## 2021-12-16 DIAGNOSIS — M79.2 NEUROPATHIC PAIN: Primary | ICD-10-CM

## 2021-12-16 PROCEDURE — 99213 OFFICE O/P EST LOW 20 MIN: CPT | Mod: 95 | Performed by: ANESTHESIOLOGY

## 2021-12-16 ASSESSMENT — PATIENT HEALTH QUESTIONNAIRE - PHQ9
10. IF YOU CHECKED OFF ANY PROBLEMS, HOW DIFFICULT HAVE THESE PROBLEMS MADE IT FOR YOU TO DO YOUR WORK, TAKE CARE OF THINGS AT HOME, OR GET ALONG WITH OTHER PEOPLE: VERY DIFFICULT
SUM OF ALL RESPONSES TO PHQ QUESTIONS 1-9: 10
SUM OF ALL RESPONSES TO PHQ QUESTIONS 1-9: 10

## 2021-12-16 ASSESSMENT — ENCOUNTER SYMPTOMS
POLYPHAGIA: 0
DEPRESSION: 1
POLYDIPSIA: 0
ARTHRALGIAS: 1
NECK PAIN: 0
WEIGHT LOSS: 0
BACK PAIN: 1
MYALGIAS: 1
NIGHT SWEATS: 1
JOINT SWELLING: 0
PANIC: 0
DECREASED CONCENTRATION: 1
ALTERED TEMPERATURE REGULATION: 0
MUSCLE WEAKNESS: 1
INCREASED ENERGY: 1
FEVER: 0
CHILLS: 0
FATIGUE: 1
HALLUCINATIONS: 0
STIFFNESS: 1
WEIGHT GAIN: 0
INSOMNIA: 1
NERVOUS/ANXIOUS: 1
MUSCLE CRAMPS: 0
DECREASED APPETITE: 0

## 2021-12-16 NOTE — NURSING NOTE
Patient presents with:  RECHECK: ump return, 1/10 epidural nerve block       Data Unavailable     Pain Medications     Analgesics Other Refills Start End     acetaminophen (TYLENOL) 500 MG tablet     7/21/2021     Sig - Route: Take 2 tablets (1,000 mg) by mouth every 6 hours as needed for pain - Oral    Class: OTC          What medications are you using for pain?   Acetaminophen,celebrex        AVS mychart    [phone]      Via mychart      Artur Fallon, EMT

## 2021-12-16 NOTE — LETTER
12/16/2021       RE: Dinorah Thompson  [address]  [place] [address]     Dear Colleague,    Thank you for referring your patient, Dinorah Thompson, to the [facility] [place] at [facility]. Please see a copy of my visit note below.    Pain clinic follow up note    HPI: Dinorah Thompson is a 71 year old F  who presents to the pain clinic with low back pain, s/p L5/S1 ILESI on 11/11. Patient has been having significant relief of her pain after her injection. Pain is in low back and legs which are both significantly improved. Denies new weakness, numbness, loss of sensation, bowel or bladder incontinence.    Pain today is 1/10.    She is still taking tylenol and Celebrex for her pain which helps.    ROS:   Pertinent positives are noted in the HPI. Rest of the systems were negative. ROS details were reviewed with the patient.   Answers for HPI/ROS submitted by the patient on 12/16/2021  If you checked off any problems, how difficult have these problems made it for you to do your work, take care of things at home, or get along with other people?: Very difficult  PHQ9 TOTAL SCORE: 10  General Symptoms: Yes  Skin Symptoms: No  HENT Symptoms: No  EYE SYMPTOMS: No  HEART SYMPTOMS: No  LUNG SYMPTOMS: No  INTESTINAL SYMPTOMS: No  URINARY SYMPTOMS: No  GYNECOLOGIC SYMPTOMS: No  BREAST SYMPTOMS: No  SKELETAL SYMPTOMS: Yes  BLOOD SYMPTOMS: No  NERVOUS SYSTEM SYMPTOMS: No  MENTAL HEALTH SYMPTOMS: Yes  Fever: No  Loss of appetite: No  Weight loss: No  Weight gain: No  Fatigue: Yes  Night sweats: Yes  Chills: No  Increased stress: Yes  Excessive hunger: No  Excessive thirst: No  Feeling hot or cold when others believe the temperature is normal: No  Loss of height: No  Post-operative complications: No  Surgical site pain: No  Hallucinations: No  Change in or Loss of Energy: Yes  Hyperactivity: No  Confusion: No  Back  pain: Yes  Muscle aches: Yes  Neck pain: No  Swollen joints: No  Joint pain: Yes  Bone pain: No  Muscle cramps: No  Muscle weakness: Yes  Joint stiffness: Yes  Bone fracture: No  Nervous or Anxious: Yes  Depression: Yes  Trouble sleeping: Yes  Trouble thinking or concentrating: Yes  Mood changes: Yes  Panic attacks: No        Significant Medical History:   Past Medical History:   Diagnosis Date     Chronic osteoarthritis      Complication of anesthesia 1990's    DIfficulty waking up     Depressive disorder      Female bladder prolapse 9/3/2019     Parathyroid abnormality (H)      Prolapse of female pelvic organs 8/19/2019     Sleep apnea     Uses a Bi-Pap.. Will have son bring it if necessary on DOS          Past Surgical History:  Past Surgical History:   Procedure Laterality Date     APPENDECTOMY       diskectomy in toe       HYSTERECTOMY  08/2017    and bladder surgery     INJECT EPIDURAL LUMBAR Right 4/27/2021    Procedure: Right Lumbar 5- Sacral 1 transforaminal epidural steroid injection with fluoroscopy and conscious sedation.;  Surgeon: Tiera Santana MD;  Location: [facility] OR     INJECT EPIDURAL LUMBAR Left 11/11/2021    Procedure: Lumbar epidural steroid injection L5- S1 with fluoroscopy;  Surgeon: Tiera Santana MD;  Location: [facility] OR     left rotator cuff surgery       PARATHYROIDECTOMY N/A 7/21/2021    Procedure: PARATHYROIDECTOMY;  Surgeon: Gregoria Wilcox MD;  Location: [facility] OR     SINUS SURGERY      x2 in 2013 and 2018     TONSILLECTOMY  1955          Family History:  Family History   Problem Relation Age of Onset     Myocardial Infarction Father         late 50s     Ovarian Cancer Sister      Cervical Cancer Sister      Lung Cancer Sister      Myocardial Infarction Paternal Uncle         late 50s          Social History:  Social History     Tobacco Use     Smoking status: Never Smoker     Smokeless tobacco: Never Used   Vaping Use     Vaping Use: Never used   Substance Use Topics     Alcohol use:  "Not Currently     Drug use: Never          Allergies:     Allergies   Allergen Reactions     Propoxyphene Other (See Comments)     Clarithromycin Other (See Comments)     Pt states, \"ototoxicity\". Balance problems  Pt states, \"ototoxicity\".           Current Medications:   Current Outpatient Medications   Medication     acetaminophen (TYLENOL) 500 MG tablet     baclofen (LIORESAL) 10 MG tablet     Carboxymethylcellul-Glycerin 1-0.9 % GEL     celecoxib (CELEBREX) 200 MG capsule     clonazePAM (KLONOPIN) 0.5 MG tablet     DULoxetine (CYMBALTA) 60 MG capsule     LYSINE PO     NONFORMULARY     propranolol (INDERAL) 10 MG tablet     rosuvastatin (CRESTOR) 5 MG tablet     tacrolimus (PROTOPIC) 0.1 % external ointment     traZODone (DESYREL) 100 MG tablet     valACYclovir (VALTREX) 1000 mg tablet     No current facility-administered medications for this visit.        Current Pain Medications:    Baclofen, celebrex and cymbalta    Blood thinner:    none    Physical Exam:     General Appearance: No distress, seated comfortably  Mood: Euthymic  HE ENT: Non constricted pupils  Respiratory: Non labored breathing  CVS: acyanotic  Skin: No rashes over exposed skin      ASSESSMENT AND PLAN:     Encounter Diagnosis:    The patient is a 71 year old F with a PMH of lumbar radiculopathy presents to the pain clinic with low back pain.  Prior to this visit the patient has undergone L5/S1 ILESI.    RECOMMENDATIONS:     1. Procedure: We are scheduling the patient for L5/S1 CESAR (6 months out) in the procedure suite. Risks/benefits/alternatives were discussed.     Follow up: 3-4 weeks after FRANK    ATTENDING ATTESTATION  I saw the patient along with the pain medicine fellow Dr. Mk Sexton. Please see his note above for full details. I have edited his note and agree with it. I was involved in gathering history, physical examination and development of the plan of care.         Again, thank you for allowing me to participate in the care " of your patient.      Sincerely,    Tiera Santana MD

## 2021-12-16 NOTE — PROGRESS NOTES
Type of service:  Video Visit    Video Start Time: 1:28 PM    Video End Time: 1:50 PM    Originating Location (pt. Location): Home    Distant Location (provider location):  [facility] [place]     Platform used for Video Visit: [facility]     Pain clinic follow up note    HPI: Dinorah Thompson is a 71 year old F  who presents to the pain clinic with low back pain, s/p L5/S1 ILESI on 11/11. Patient has been having significant relief of her pain after her injection. Pain is in low back and legs which are both significantly improved. Denies new weakness, numbness, loss of sensation, bowel or bladder incontinence.    Pain today is 1/10.    She is still taking tylenol and Celebrex for her pain which helps.    ROS:   Pertinent positives are noted in the HPI. Rest of the systems were negative. ROS details were reviewed with the patient.   Answers for HPI/ROS submitted by the patient on 12/16/2021  If you checked off any problems, how difficult have these problems made it for you to do your work, take care of things at home, or get along with other people?: Very difficult  PHQ9 TOTAL SCORE: 10  General Symptoms: Yes  Skin Symptoms: No  HENT Symptoms: No  EYE SYMPTOMS: No  HEART SYMPTOMS: No  LUNG SYMPTOMS: No  INTESTINAL SYMPTOMS: No  URINARY SYMPTOMS: No  GYNECOLOGIC SYMPTOMS: No  BREAST SYMPTOMS: No  SKELETAL SYMPTOMS: Yes  BLOOD SYMPTOMS: No  NERVOUS SYSTEM SYMPTOMS: No  MENTAL HEALTH SYMPTOMS: Yes  Fever: No  Loss of appetite: No  Weight loss: No  Weight gain: No  Fatigue: Yes  Night sweats: Yes  Chills: No  Increased stress: Yes  Excessive hunger: No  Excessive thirst: No  Feeling hot or cold when others believe the temperature is normal: No  Loss of height: No  Post-operative complications: No  Surgical site pain: No  Hallucinations: No  Change in or Loss of Energy: Yes  Hyperactivity: No  Confusion: No  Back pain: Yes  Muscle aches: Yes  Neck pain: No  Swollen joints:  No  Joint pain: Yes  Bone pain: No  Muscle cramps: No  Muscle weakness: Yes  Joint stiffness: Yes  Bone fracture: No  Nervous or Anxious: Yes  Depression: Yes  Trouble sleeping: Yes  Trouble thinking or concentrating: Yes  Mood changes: Yes  Panic attacks: No        Significant Medical History:   Past Medical History:   Diagnosis Date     Chronic osteoarthritis      Complication of anesthesia 1990's    DIfficulty waking up     Depressive disorder      Female bladder prolapse 9/3/2019     Parathyroid abnormality (H)      Prolapse of female pelvic organs 8/19/2019     Sleep apnea     Uses a Bi-Pap.. Will have son bring it if necessary on DOS          Past Surgical History:  Past Surgical History:   Procedure Laterality Date     APPENDECTOMY       diskectomy in toe       HYSTERECTOMY  08/2017    and bladder surgery     INJECT EPIDURAL LUMBAR Right 4/27/2021    Procedure: Right Lumbar 5- Sacral 1 transforaminal epidural steroid injection with fluoroscopy and conscious sedation.;  Surgeon: Tiera Santana MD;  Location: [facility] OR     INJECT EPIDURAL LUMBAR Left 11/11/2021    Procedure: Lumbar epidural steroid injection L5- S1 with fluoroscopy;  Surgeon: Tiera Santana MD;  Location: [facility] OR     left rotator cuff surgery       PARATHYROIDECTOMY N/A 7/21/2021    Procedure: PARATHYROIDECTOMY;  Surgeon: Gregoria Wilcox MD;  Location: [facility] OR     SINUS SURGERY      x2 in 2013 and 2018     TONSILLECTOMY  1955          Family History:  Family History   Problem Relation Age of Onset     Myocardial Infarction Father         late 50s     Ovarian Cancer Sister      Cervical Cancer Sister      Lung Cancer Sister      Myocardial Infarction Paternal Uncle         late 50s          Social History:  Social History     Tobacco Use     Smoking status: Never Smoker     Smokeless tobacco: Never Used   Vaping Use     Vaping Use: Never used   Substance Use Topics     Alcohol use: Not Currently     Drug use: Never          Allergies:    "  Allergies   Allergen Reactions     Propoxyphene Other (See Comments)     Clarithromycin Other (See Comments)     Pt states, \"ototoxicity\". Balance problems  Pt states, \"ototoxicity\".           Current Medications:   Current Outpatient Medications   Medication     acetaminophen (TYLENOL) 500 MG tablet     baclofen (LIORESAL) 10 MG tablet     Carboxymethylcellul-Glycerin 1-0.9 % GEL     celecoxib (CELEBREX) 200 MG capsule     clonazePAM (KLONOPIN) 0.5 MG tablet     DULoxetine (CYMBALTA) 60 MG capsule     LYSINE PO     NONFORMULARY     propranolol (INDERAL) 10 MG tablet     rosuvastatin (CRESTOR) 5 MG tablet     tacrolimus (PROTOPIC) 0.1 % external ointment     traZODone (DESYREL) 100 MG tablet     valACYclovir (VALTREX) 1000 mg tablet     No current facility-administered medications for this visit.        Current Pain Medications:    Baclofen, celebrex and cymbalta    Blood thinner:    none    Physical Exam:     General Appearance: No distress, seated comfortably  Mood: Euthymic  HE ENT: Non constricted pupils  Respiratory: Non labored breathing  CVS: acyanotic  Skin: No rashes over exposed skin      ASSESSMENT AND PLAN:     Encounter Diagnosis:    The patient is a 71 year old F with a PMH of lumbar radiculopathy presents to the pain clinic with low back pain.  Prior to this visit the patient has undergone L5/S1 ILESI.    RECOMMENDATIONS:     1. Procedure: We are scheduling the patient for L5/S1 CESAR (6 months out) in the procedure suite. Risks/benefits/alternatives were discussed.     Follow up: 3-4 weeks after FRANK    ATTENDING ATTESTATION  I saw the patient along with the pain medicine fellow Dr. Mk Sexton. Please see his note above for full details. I have edited his note and agree with it. I was involved in gathering history, physical examination and development of the plan of care.     "

## 2021-12-17 ASSESSMENT — PATIENT HEALTH QUESTIONNAIRE - PHQ9: SUM OF ALL RESPONSES TO PHQ QUESTIONS 1-9: 10

## 2021-12-20 NOTE — PROGRESS NOTES
Discharge Note    Progress reporting period is from initial eval to Nov 15, 2021.     Dinorah failed to return for next follow up visit and current status is unknown.  Please see information below for last relevant information on current status.  Patient seen for Rxs Used: 4 visits.  SUBJECTIVE  Subjective changes noted by patient:  Subjective: Had injection 11/11/2021 with good results  .  Current pain level is Current Pain level: 3/10.     Previous pain level was  Initial Pain level: 4/10.   Changes in function:  Yes (See Goal flowsheet attached for changes in current functional level)  Adverse reaction to treatment or activity: None    OBJECTIVE  Changes noted in objective findings:       ASSESSMENT/PLAN  Diagnosis: Lx radiculopathy   DIAGP:  Diagnoses of Chronic pain of left knee, Primary osteoarthritis of left knee, and Lumbar radiculopathy were pertinent to this visit.  Updated problem list and treatment plan:   Pain - HEP  Decreased ROM/flexibility - HEP  Decreased function - HEP  Decreased strength - HEP  Impaired muscle performance - HEP  Decreased proprioception - HEP  Impaired posture - HEP  STG/LTGs have been met or progress has been made towards goals:  Yes, please see goal flowsheet for most current information  Assessment of Progress: current status is unknown.  Last current status:     Self Management Plans:  HEP  I have re-evaluated this patient and find that the nature, scope, duration and intensity of the therapy is appropriate for the medical condition of the patient.  Dinorah continues to require the following intervention to meet STG and LTG's:  HEP.    Recommendations:  Discharge with current home program.  Patient to follow up with MD as needed.    Please refer to the daily flowsheet for treatment today, total treatment time and time spent performing 1:1 timed codes.

## 2021-12-21 ENCOUNTER — MYC MEDICAL ADVICE (OUTPATIENT)
Dept: FAMILY MEDICINE | Facility: CLINIC | Age: 71
End: 2021-12-21
Payer: MEDICARE

## 2021-12-21 DIAGNOSIS — F43.21 GRIEF: Primary | ICD-10-CM

## 2021-12-24 RX ORDER — SERTRALINE HYDROCHLORIDE 25 MG/1
25 TABLET, FILM COATED ORAL DAILY
Qty: 30 TABLET | Refills: 0 | Status: SHIPPED | OUTPATIENT
Start: 2021-12-24 | End: 2022-01-17

## 2021-12-29 ENCOUNTER — TELEPHONE (OUTPATIENT)
Dept: FAMILY MEDICINE | Facility: CLINIC | Age: 71
End: 2021-12-29

## 2021-12-29 DIAGNOSIS — F32.89 OTHER DEPRESSION: ICD-10-CM

## 2021-12-31 RX ORDER — DULOXETIN HYDROCHLORIDE 60 MG/1
60 CAPSULE, DELAYED RELEASE ORAL DAILY
Qty: 90 CAPSULE | Refills: 0 | Status: SHIPPED | OUTPATIENT
Start: 2021-12-31 | End: 2022-01-20

## 2021-12-31 NOTE — TELEPHONE ENCOUNTER
Sent 90 day supply. Patient is due for visit with Juju in the end of January. Please notify and help schedule.    Guilherme Hernadez PA-C on 12/31/2021 at 10:36 AM

## 2021-12-31 NOTE — TELEPHONE ENCOUNTER
Routing refill request to provider for review/approval because:  Serotonin-Norepinephrine Reuptake Inhibitors  Failed 12/29/2021 10:04 AM   Protocol Details  PHQ-9 score of less than 5 in past 6 months     PHQ 11/30/2021 12/7/2021 12/16/2021   PHQ-9 Total Score 13 16 10   Q9: Thoughts of better off dead/self-harm past 2 weeks Not at all Not at all Not at all   PHQ-9 External Data - - -       Solange Carrasquillo RN

## 2022-01-10 ASSESSMENT — PATIENT HEALTH QUESTIONNAIRE - PHQ9
SUM OF ALL RESPONSES TO PHQ QUESTIONS 1-9: 8
10. IF YOU CHECKED OFF ANY PROBLEMS, HOW DIFFICULT HAVE THESE PROBLEMS MADE IT FOR YOU TO DO YOUR WORK, TAKE CARE OF THINGS AT HOME, OR GET ALONG WITH OTHER PEOPLE: SOMEWHAT DIFFICULT
SUM OF ALL RESPONSES TO PHQ QUESTIONS 1-9: 8

## 2022-01-11 ENCOUNTER — VIRTUAL VISIT (OUTPATIENT)
Dept: PSYCHOLOGY | Facility: CLINIC | Age: 72
End: 2022-01-11
Payer: MEDICARE

## 2022-01-11 DIAGNOSIS — F33.1 MODERATE EPISODE OF RECURRENT MAJOR DEPRESSIVE DISORDER (H): Primary | ICD-10-CM

## 2022-01-11 PROCEDURE — 90834 PSYTX W PT 45 MINUTES: CPT | Mod: 95 | Performed by: SOCIAL WORKER

## 2022-01-11 ASSESSMENT — PATIENT HEALTH QUESTIONNAIRE - PHQ9: SUM OF ALL RESPONSES TO PHQ QUESTIONS 1-9: 8

## 2022-01-11 NOTE — PROGRESS NOTES
Progress Note    Patient Name: Dinorah Thompson  Date: 2022         Service Type: Individual      Session Start Time: 1206  Session End Time: 1255     Session Length: 38-52    Session #: 1    Attendees: Client    Service Modality:  Video Visit:      Provider verified identity through the following two step process.  Patient provided:  Patient  and Patient address    Telemedicine Visit: The patient's condition can be safely assessed and treated via synchronous audio and visual telemedicine encounter.      Reason for Telemedicine Visit: Services only offered telehealth    Originating Site (Patient Location): Patient's home    Distant Site (Provider Location): Provider Remote Setting- Home Office    Consent:  The patient/guardian has verbally consented to: the potential risks and benefits of telemedicine (video visit) versus in person care; bill my insurance or make self-payment for services provided; and responsibility for payment of non-covered services.     Patient would like the video invitation sent by:  Send to e-mail at: [email or web address]    Mode of Communication:  Video Conference via [employer]    As the provider I attest to compliance with applicable laws and regulations related to telemedicine.     Treatment Plan Last Reviewed: 2022 due 2022  PHQ-9 / ELDON-7 : 8    DATA  Interactive Complexity: No  Crisis: No       Progress Since Last Session (Related to Symptoms / Goals / Homework):   Symptoms: Improving per patient    Homework: Completed in session      Episode of Care Goals: Minimal progress - CONTEMPLATION (Considering change and yet undecided); Intervened by assessing the negative and positive thinking (ambivalence) about behavior change     Current / Ongoing Stressors and Concerns:   Grief loss, taking care of , transitions     Treatment Objective(s) Addressed in This Session:   Patient will demonstrate/report improved stages of grief  that can be safely managed in a lower level of care.  Patient report of able to express feelings of grief and loss regarding loss of family member and behavior indicating progression of grief process towards acceptance and coping with reality of loss with acknowledgement of permanent change within 6 months.  Patient will demonstrate and report a level of depression that can be managed at a lower level of care.  Absence of persistent depression mood and report of reduced frequency and intensity of low mood and absence of persistent low energy and motivation to acceptable levels, report subjective improved motivation and increased energy for a period of 90 days, within 6 months as clinically observed and by patient self-report     Intervention:   Motivational Interviewing    MI Intervention: Co-Developed Goal: grief and loss, depression, Expressed Empathy/Understanding, Supported Autonomy, Collaboration, Evocation, Permission to raise concern or advise and Open-ended questions     Change Talk Expressed by the Patient: Desire to change Ability to change Reasons to change Need to change Committment to change    Provider Response to Change Talk: E - Evoked more info from patient about behavior change, A - Affirmed patient's thoughts, decisions, or attempts at behavior change, R - Reflected patient's change talk and S - Summarized patient's change talk statements          ASSESSMENT: Current Emotional / Mental Status (status of significant symptoms):   Risk status (Self / Other harm or suicidal ideation)   Patient denies current fears or concerns for personal safety.   Patient denies current or recent suicidal ideation or behaviors.   Patient denies current or recent homicidal ideation or behaviors.   Patient denies current or recent self injurious behavior or ideation.   Patient denies other safety concerns.   Patient reports there has been no change in risk factors since their last session.     Patient reports there has  "been no change in protective factors since their last session.     Recommended that patient call 911 or go to the local ED should there be a change in any of these risk factors.     Appearance:   Appropriate    Eye Contact:   Fair    Psychomotor Behavior: Normal    Attitude:   Cooperative    Orientation:   All   Speech    Rate / Production: Emotional Talkative Normal     Volume:  Normal    Mood:    Anxious  Depressed  Sad  Grieving   Affect:    Worrisome    Thought Content:  Clear    Thought Form:  Coherent    Insight:    Fair      Medication Review:   No changes to current psychiatric medication(s)     Medication Compliance:   Yes     Changes in Health Issues:   Yes: Pain, No Psychological Distress     Chemical Use Review:   Substance Use: Chemical use reviewed, no active concerns identified      Tobacco Use: No current tobacco use.      Diagnosis:  1. Moderate episode of recurrent major depressive disorder (H)        Collateral Reports Completed:   Not Applicable    PLAN: (Patient Tasks / Therapist Tasks / Other)  Read \"No saints around here\"        Kaley JULIA Tan, [facility] 1/11/2022                                                         ______________________________________________________________________    Treatment Plan    Patient's Name: Dinorah Thompson  YOB: 1950    Date:1/11/2022    DSM5 Diagnoses: 296.32 (F33.1) Major Depressive Disorder, Recurrent Episode, Moderate With anxious distress  Psychosocial / Contextual Factors: Grief loss, taking care of , transitions  WHODAS: 28    Referral / Collaboration:  Referral to another professional/service is not indicated at this time..    Anticipated number of session or this episode of care: 10      MeasurableTreatment Goal(s) related to diagnosis / functional impairment(s)  Goal 1: Patient will able to express feelings of grief and loss regarding loss of family member and behavior indicating progression of grief process towards acceptance " "and coping with reality of loss with acknowledgement of permanent change within 6 months    I will know I've met my goal when I remember her and it's laughing and not sad.      Objective #A (Patient Action)    Patient will demonstrate/report improved stages of grief that can be safely managed in a lower level of care.  Patient report of able to express feelings of grief and loss regarding loss of family member and behavior indicating progression of grief process towards acceptance and coping with reality of loss with acknowledgement of permanent change within 6 months.  Status: New - Date: 1/11/2022.     Intervention(s)  Therapist will provide individual therapy to process through grief and loss and to gain effective coping skills. Tx to discuss current stressors and interpersonal conflicts and how to cope with these, coaching, diagnostic testing, referral for medication as indicated, use prescribed medication, cognitive restructuring, interpersonal family therapy, supportive therapy services.        Goal 2: Patient will report absence of persistent depression mood and report of reduced frequency and intensity of low mood and absence of persistent low energy and motivation to acceptable levels, report subjective improved motivation and increased energy for a period of 90 days, within 6 months as clinically observed and by patient self-report    I will know I've met my goal when I can measure my fatigue vs depression \".    Objective #A (Patient Action)    Patient will demonstrate and report a level of depression that can be managed at a lower level of care.  Absence of persistent depression mood and report of reduced frequency and intensity of low mood and absence of persistent low energy and motivation to acceptable levels, report subjective improved motivation and increased energy for a period of 90 days, within 6 months as clinically observed and by patient self-report  Status: New- " date1/11/2022    Intervention(s)  Therapist will provide individual therapy to identify triggers to depression, gain feedback on helpful coping tools and thought-reframing techniques, and identify preferred way of being.  Tx to include discussion of current stressors and interpersonal conflicts and how to cope with these, coaching, diagnostic testing, referral for medication as indicated, use prescribed medication, cognitive restructuring, interpersonal, family therapy, supportive therapy services      Patient has reviewed and agreed to the above plan.      Kaley Tan, [facility]  January 11, 2022      Answers for HPI/ROS submitted by the patient on 1/10/2022  If you checked off any problems, how difficult have these problems made it for you to do your work, take care of things at home, or get along with other people?: Somewhat difficult  PHQ9 TOTAL SCORE: 8
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
## 2022-01-14 NOTE — TELEPHONE ENCOUNTER
Juju- see additional [employer] message below.  Please advise when you want med check.  Bhavana Aguero RN

## 2022-01-17 ENCOUNTER — VIRTUAL VISIT (OUTPATIENT)
Dept: FAMILY MEDICINE | Facility: CLINIC | Age: 72
End: 2022-01-17
Payer: MEDICARE

## 2022-01-17 DIAGNOSIS — E21.3 HYPERPARATHYROIDISM (H): ICD-10-CM

## 2022-01-17 DIAGNOSIS — F43.21 GRIEF: ICD-10-CM

## 2022-01-17 DIAGNOSIS — D86.0 SARCOIDOSIS OF LUNG (H): ICD-10-CM

## 2022-01-17 DIAGNOSIS — N18.30 STAGE 3 CHRONIC KIDNEY DISEASE, UNSPECIFIED WHETHER STAGE 3A OR 3B CKD (H): Primary | ICD-10-CM

## 2022-01-17 DIAGNOSIS — F33.1 MODERATE EPISODE OF RECURRENT MAJOR DEPRESSIVE DISORDER (H): ICD-10-CM

## 2022-01-17 PROCEDURE — 99214 OFFICE O/P EST MOD 30 MIN: CPT | Mod: 95 | Performed by: NURSE PRACTITIONER

## 2022-01-17 RX ORDER — SERTRALINE HYDROCHLORIDE 25 MG/1
25 TABLET, FILM COATED ORAL DAILY
Qty: 90 TABLET | Refills: 1 | Status: SHIPPED | OUTPATIENT
Start: 2022-01-17 | End: 2022-01-31

## 2022-01-17 ASSESSMENT — PATIENT HEALTH QUESTIONNAIRE - PHQ9
10. IF YOU CHECKED OFF ANY PROBLEMS, HOW DIFFICULT HAVE THESE PROBLEMS MADE IT FOR YOU TO DO YOUR WORK, TAKE CARE OF THINGS AT HOME, OR GET ALONG WITH OTHER PEOPLE: SOMEWHAT DIFFICULT
SUM OF ALL RESPONSES TO PHQ QUESTIONS 1-9: 8
10. IF YOU CHECKED OFF ANY PROBLEMS, HOW DIFFICULT HAVE THESE PROBLEMS MADE IT FOR YOU TO DO YOUR WORK, TAKE CARE OF THINGS AT HOME, OR GET ALONG WITH OTHER PEOPLE: SOMEWHAT DIFFICULT

## 2022-01-17 ASSESSMENT — ANXIETY QUESTIONNAIRES
4. TROUBLE RELAXING: NOT AT ALL
GAD7 TOTAL SCORE: 4
3. WORRYING TOO MUCH ABOUT DIFFERENT THINGS: SEVERAL DAYS
7. FEELING AFRAID AS IF SOMETHING AWFUL MIGHT HAPPEN: NOT AT ALL
GAD7 TOTAL SCORE: 4
4. TROUBLE RELAXING: NOT AT ALL
5. BEING SO RESTLESS THAT IT IS HARD TO SIT STILL: NOT AT ALL
7. FEELING AFRAID AS IF SOMETHING AWFUL MIGHT HAPPEN: NOT AT ALL
5. BEING SO RESTLESS THAT IT IS HARD TO SIT STILL: NOT AT ALL
2. NOT BEING ABLE TO STOP OR CONTROL WORRYING: SEVERAL DAYS
1. FEELING NERVOUS, ANXIOUS, OR ON EDGE: SEVERAL DAYS
1. FEELING NERVOUS, ANXIOUS, OR ON EDGE: SEVERAL DAYS
7. FEELING AFRAID AS IF SOMETHING AWFUL MIGHT HAPPEN: NOT AT ALL
2. NOT BEING ABLE TO STOP OR CONTROL WORRYING: SEVERAL DAYS
3. WORRYING TOO MUCH ABOUT DIFFERENT THINGS: SEVERAL DAYS
6. BECOMING EASILY ANNOYED OR IRRITABLE: SEVERAL DAYS
6. BECOMING EASILY ANNOYED OR IRRITABLE: SEVERAL DAYS
GAD7 TOTAL SCORE: 4

## 2022-01-17 NOTE — PROGRESS NOTES
Dot is a 71 year old who is being evaluated via a billable video visit.      How would you like to obtain your AVS? MyChart  If the video visit is dropped, the invitation should be resent by: Text to cell phone: [phone]  Will anyone else be joining your video visit? No      Video Start Time: 10:14 AM    Assessment & Plan     Stage 3 chronic kidney disease, unspecified whether stage 3a or 3b CKD (H)  Stable. Cr1.03, GFR 55    Moderate episode of recurrent major depressive disorder (H)  Controlled. Will continue with low dose sertraline, duloxetine, and counseling.     Sarcoidosis of lung (H)  Follows with Pulmonary.     Hyperparathyroidism (H)  PTH and Ca++ within normal limits.     Grief  Controlled as above.         Return in about 3 months (around 4/17/2022) for Preventive Visit, Depression/Anxiety.    JASWANT Johnson [facility]   Dot is a 71 year old who presents for the following health issues     History of Present Illness       Mental Health Follow-up:  Patient presents to follow-up on Depression & Anxiety.Patient's depression since last visit has been:  Better  The patient is having other symptoms associated with depression.  Patient's anxiety since last visit has been:  Better  The patient is not having other symptoms associated with anxiety.  Any significant life events: relationship concerns and grief or loss  Patient is not feeling anxious or having panic attacks.  Patient has no concerns about alcohol or drug use.     Social History  Tobacco Use    Smoking status: Never Smoker    Smokeless tobacco: Never Used  Vaping Use    Vaping Use: Never used  Alcohol use: Not Currently  Drug use: Never      Today's PHQ-9         PHQ-9 Total Score:     (P) 8   PHQ-9 Q9 Thoughts of better off dead/self-harm past 2 weeks :   (P) Not at all   Thoughts of suicide or self harm:      Self-harm Plan:        Self-harm Action:          Safety concerns for self or  others:           She eats 2-3 servings of fruits and vegetables daily.She consumes 0 sweetened beverage(s) daily.She exercises with enough effort to increase her heart rate 9 or less minutes per day.  She exercises with enough effort to increase her heart rate 3 or less days per week.   She is taking medications regularly.     ELDON-7 SCORE 11/30/2021 1/17/2022 1/17/2022   Total Score 7 (mild anxiety) - 4 (minimal anxiety)   Total Score 7 4 4     PHQ 1/10/2022 1/17/2022 1/17/2022   PHQ-9 Total Score 8 8 8   Q9: Thoughts of better off dead/self-harm past 2 weeks Not at all Not at all Not at all   PHQ-9 External Data - - -     Sertraline added to MDD/ELDON therapy 12/21/21 for increased symptoms.   Has been having improvements since starting use.   Working with counseling.   Was able to get Respite care for  approved. This in    Review of Systems         Objective    Vitals - Patient Reported  Weight (Patient Reported): 65.8 kg (145 lb)  Temperature (Patient Reported): 98.5  F (36.9  C) (ORAL)  Pain Score: Mild Pain (3)  Pain Loc: Low Back      Vitals:  No vitals were obtained today due to virtual visit.    Physical Exam   GENERAL: Healthy, alert and no distress  PSYCH: Mentation appears normal, affect normal/bright, judgement and insight intact, normal speech and appearance well-groomed.                Video-Visit Details    Type of service: Telephone as patient could not connect to video    Video End Time:10:28 AM    Originating Location (pt. Location): Home    Distant Location (provider location):  [facility]     Platform used for Video Visit: Other: telephone

## 2022-01-18 ENCOUNTER — VIRTUAL VISIT (OUTPATIENT)
Dept: PSYCHOLOGY | Facility: CLINIC | Age: 72
End: 2022-01-18
Payer: MEDICARE

## 2022-01-18 ENCOUNTER — VIRTUAL VISIT (OUTPATIENT)
Dept: BEHAVIORAL HEALTH | Facility: CLINIC | Age: 72
End: 2022-01-18
Payer: MEDICARE

## 2022-01-18 ENCOUNTER — VIRTUAL VISIT (OUTPATIENT)
Dept: PALLIATIVE MEDICINE | Facility: CLINIC | Age: 72
End: 2022-01-18
Payer: MEDICARE

## 2022-01-18 DIAGNOSIS — M79.2 NEUROPATHIC PAIN: ICD-10-CM

## 2022-01-18 DIAGNOSIS — E78.5 HYPERLIPIDEMIA, UNSPECIFIED HYPERLIPIDEMIA TYPE: Primary | ICD-10-CM

## 2022-01-18 DIAGNOSIS — F33.1 MAJOR DEPRESSIVE DISORDER, RECURRENT EPISODE, MODERATE (H): Primary | ICD-10-CM

## 2022-01-18 DIAGNOSIS — F32.89 OTHER DEPRESSION: ICD-10-CM

## 2022-01-18 DIAGNOSIS — M25.552 HIP PAIN, LEFT: ICD-10-CM

## 2022-01-18 DIAGNOSIS — M54.16 LUMBAR RADICULOPATHY: Primary | ICD-10-CM

## 2022-01-18 DIAGNOSIS — F33.1 MODERATE EPISODE OF RECURRENT MAJOR DEPRESSIVE DISORDER (H): Primary | ICD-10-CM

## 2022-01-18 PROCEDURE — 96156 HLTH BHV ASSMT/REASSESSMENT: CPT | Mod: 95 | Performed by: PSYCHOLOGIST

## 2022-01-18 PROCEDURE — 90834 PSYTX W PT 45 MINUTES: CPT | Mod: 95 | Performed by: SOCIAL WORKER

## 2022-01-18 PROCEDURE — 90832 PSYTX W PT 30 MINUTES: CPT | Mod: 95 | Performed by: SOCIAL WORKER

## 2022-01-18 ASSESSMENT — ANXIETY QUESTIONNAIRES
GAD7 TOTAL SCORE: 4
GAD7 TOTAL SCORE: 4

## 2022-01-18 ASSESSMENT — PATIENT HEALTH QUESTIONNAIRE - PHQ9
SUM OF ALL RESPONSES TO PHQ QUESTIONS 1-9: 8
SUM OF ALL RESPONSES TO PHQ QUESTIONS 1-9: 8

## 2022-01-18 NOTE — PROGRESS NOTES
[facility] Primary Care: Integrated Behavioral Health  January 18, 2022    Behavioral Health Clinician Progress Note    Patient Name: Dinorah Thompson      Telemedicine Visit: The patient's condition can be safely assessed and treated via synchronous audio and visual telemedicine encounter.      Reason for Telemedicine Visit: Patient has requested telehealth visit    Originating Site (Patient Location): Patient's home    Distant Site (Provider Location): [facility]: AV    Consent:  The patient/guardian has verbally consented to: the potential risks and benefits of telemedicine (video visit) versus in person care; bill my insurance or make self-payment for services provided; and responsibility for payment of non-covered services.     Mode of Communication:  Video Conference via [employer]    As the provider I attest to compliance with applicable laws and regulations related to telemedicine.      Service Type:  Individual      Service Location:   [employer] / Email (patient reached)     Session Start Time: 1:40 p.m.  Session End Time: 2:05 p.m.      Session Length: 16 - 37      Attendees: Client    Visit Activities (Refresh list every visit): [facility] Only    Diagnostic Assessment Date: 12/7/21  Treatment Plan Review Date: n/a  See Flowsheets for today's PHQ-9 and ELDON-7 results  Previous PHQ-9:   PHQ-9 SCORE 1/10/2022 1/17/2022 1/17/2022   PHQ-9 Total Score [facility] 8 (Mild depression) - 8 (Mild depression)   PHQ-9 Total Score 8 8 8   PHQ-9 External Data - - -     Previous ELDON-7:   ELDON-7 SCORE 11/30/2021 1/17/2022 1/17/2022   Total Score 7 (mild anxiety) - 4 (minimal anxiety)   Total Score 7 4 4       CHRIS LEVEL:  CHRIS Score (Last Two) 1/18/2021   CHRIS Raw Score 34   Activation Score 68.9   CHRIS Level 3       DATA  Extended Session (60+ minutes): No  Interactive Complexity: No  Crisis: No  [facility] Patient: No    Treatment Objective(s) Addressed in This Session:  Target Behavior(s):  "grief/depression    n/a    Current Stressors / Issues:  In addition to death of sister some months ago, pt's ex-SRAVAN and nephew  in the past several weeks.  Pt is feeling terribly for her niece and her family- went to visit them.  Pt reports she is feeling much better than she was the day writer met her beginning of last month.  PCP added Sertraline and pt feeling some effects already after a couple of weeks.   Pt obtained community therapist and saw her last week, having a second session today.  She felt like initial visit went well and it is a good fit.  Pt also saw her pain physician who introduced her to a pain psychologist.  Pt liked this visit, as well, and plans on continuing with that provider.      Pt just got notice that the [facility] approved 30 days of respite (care for her , who has TBI) annually so she can visit her kids and get a break; pt hoping to go mid-February.  Pt has realized that hope has sustained her through a lot and that she also needs breaks and self-care/rest.  Has been resting on different level of home than her , feeling somewhat guilty about this.  She also recognizes she has very high standards both when she had a career and now, in her personal life.   \"It's okay to not do everything that I think needs to be done.\"  Pt has also been getting much gratification out of prayer time.       Progress on Treatment Objective(s) / Homework:  Achieved / completed to satisfaction - ACTION (Actively working towards change); Intervened by reinforcing change plan / affirming steps taken    Motivational Interviewing    MI Intervention: Expressed Empathy/Understanding, Permission to raise concern or advise, Open-ended questions and Reflections: simple and complex     Change Talk Expressed by the Patient: Desire to change Reasons to change Committment to change    Provider Response to Change Talk: E - Evoked more info from patient about behavior change and R - Reflected patient's change " talk      Care Plan review completed: No    Medication Review:  Changes to psychiatric medications, see updated Medication List in EPIC.     Medication Compliance:  Yes    Changes in Health Issues:   None reported- does have pain    Chemical Use Review:   Substance Use: Chemical use reviewed, no active concerns identified      Tobacco Use: No current tobacco use.      Assessment: Current Emotional / Mental Status (status of significant symptoms):  Risk status (Self / Other harm or suicidal ideation)  Patient denies a history of suicidal ideation, suicide attempts, self-injurious behavior, homicidal ideation, homicidal behavior and and other safety concerns  Patient denies current fears or concerns for personal safety.  Patient denies current or recent suicidal ideation or behaviors.  Patient denies current or recent homicidal ideation or behaviors.  Patient denies current or recent self injurious behavior or ideation.  Patient denies other safety concerns.  A safety and risk management plan has not been developed at this time, however patient was encouraged to call [place] [phone] should there be a change in any of these risk factors.    Appearance:   Appropriate   Eye Contact:   Good   Psychomotor Behavior: Normal   Attitude:   Cooperative   Orientation:   All  Speech   Rate / Production: Normal/ Responsive   Volume:  Normal   Mood:    Normal  Affect:    Appropriate   Thought Content:  Clear   Thought Form:  Coherent  Logical   Insight:    Good     Diagnoses:  1. Major depressive disorder, recurrent episode, moderate (H)        Collateral Reports Completed:  Not Applicable    Plan: (Homework, other):  Patient was given information about behavioral services and encouraged to schedule a follow up appointment with the clinic [facility] as needed.  Pt will continue with community therapist and pain psychologist.  CD Recommendations: No indications of CD issues.  Tiara Klein MSW, LICSW

## 2022-01-18 NOTE — PROGRESS NOTES
Progress Note    Patient Name: Dinorah Thompson  Date: 2022         Service Type: Individual      Session Start Time:   Session End Time:      Session Length: 38-52    Session #: 2    Attendees: Client    Service Modality:  Video Visit:      Provider verified identity through the following two step process.  Patient provided:  Patient  and Patient address    Telemedicine Visit: The patient's condition can be safely assessed and treated via synchronous audio and visual telemedicine encounter.      Reason for Telemedicine Visit: Services only offered telehealth    Originating Site (Patient Location): Patient's home    Distant Site (Provider Location): Provider Remote Setting- Home Office    Consent:  The patient/guardian has verbally consented to: the potential risks and benefits of telemedicine (video visit) versus in person care; bill my insurance or make self-payment for services provided; and responsibility for payment of non-covered services.     Patient would like the video invitation sent by:  Send to e-mail at: [email or web address]    Mode of Communication:  Video Conference via [employer]    As the provider I attest to compliance with applicable laws and regulations related to telemedicine.     Treatment Plan Last Reviewed: 2022 due 2022  PHQ-9 / ELDON-7 : 8 last session    DATA  Interactive Complexity: No  Crisis: No       Progress Since Last Session (Related to Symptoms / Goals / Homework):   Symptoms: Improving per patient    Homework: Achieved / completed to satisfaction      Episode of Care Goals: Minimal progress - CONTEMPLATION (Considering change and yet undecided); Intervened by assessing the negative and positive thinking (ambivalence) about behavior change     Current / Ongoing Stressors and Concerns:   Grief loss, taking care of , transitions     Treatment Objective(s) Addressed in This Session:   Patient will demonstrate/report  improved stages of grief that can be safely managed in a lower level of care.  Patient report of able to express feelings of grief and loss regarding loss of family member and behavior indicating progression of grief process towards acceptance and coping with reality of loss with acknowledgement of permanent change within 6 months.  Patient will demonstrate and report a level of depression that can be managed at a lower level of care.  Absence of persistent depression mood and report of reduced frequency and intensity of low mood and absence of persistent low energy and motivation to acceptable levels, report subjective improved motivation and increased energy for a period of 90 days, within 6 months as clinically observed and by patient self-report     Intervention:   Therapist met with patient to review goals and interventions. Therapist utilized reflected listening as patient gave brief reflection of week. Patient reported she and her spouse went and looked at respite care and found one they liked. Patient processed her emotions and her hx of her marriage. Therapist supported patient as she processed and validated patient. Therapist encouraged patient to write out the different options she has with her spouse with pros and cons.   Patient presented with an anxious affect. Patient was engaged in session and open to feedback. Patient reported no safety concerns.    ASSESSMENT: Current Emotional / Mental Status (status of significant symptoms):   Risk status (Self / Other harm or suicidal ideation)   Patient denies current fears or concerns for personal safety.   Patient denies current or recent suicidal ideation or behaviors.   Patient denies current or recent homicidal ideation or behaviors.   Patient denies current or recent self injurious behavior or ideation.   Patient denies other safety concerns.   Patient reports there has been no change in risk factors since their last session.     Patient reports there has  "been no change in protective factors since their last session.     Recommended that patient call 911 or go to the local ED should there be a change in any of these risk factors.     Appearance:   Appropriate    Eye Contact:   Fair    Psychomotor Behavior: Normal    Attitude:   Cooperative    Orientation:   All   Speech    Rate / Production: Emotional Talkative    Volume:  Normal    Mood:    Anxious  Depressed  Sad  Grieving   Affect:    Worrisome    Thought Content:  Clear    Thought Form:  Coherent    Insight:    Fair      Medication Review:   No changes to current psychiatric medication(s)     Medication Compliance:   Yes     Changes in Health Issues:   Yes: Pain, No Psychological Distress     Chemical Use Review:   Substance Use: Chemical use reviewed, no active concerns identified      Tobacco Use: No current tobacco use.      Diagnosis:  1. Moderate episode of recurrent major depressive disorder (H)        Collateral Reports Completed:   Not Applicable    PLAN: (Patient Tasks / Therapist Tasks / Other)  Read \"No saints around here\"  patient to write out the different options she has with her spouse with pros and cons.      Kaley Tan, [facility] 1/18/2022                                                         ______________________________________________________________________    Treatment Plan    Patient's Name: Dinorah Thompson  YOB: 1950    Date:1/11/2022    DSM5 Diagnoses: 296.32 (F33.1) Major Depressive Disorder, Recurrent Episode, Moderate With anxious distress  Psychosocial / Contextual Factors: Grief loss, taking care of , transitions  WHODAS: 28    Referral / Collaboration:  Referral to another professional/service is not indicated at this time..    Anticipated number of session or this episode of care: 10      MeasurableTreatment Goal(s) related to diagnosis / functional impairment(s)  Goal 1: Patient will able to express feelings of grief and loss regarding loss of family " "member and behavior indicating progression of grief process towards acceptance and coping with reality of loss with acknowledgement of permanent change within 6 months    I will know I've met my goal when I remember her and it's laughing and not sad.      Objective #A (Patient Action)    Patient will demonstrate/report improved stages of grief that can be safely managed in a lower level of care.  Patient report of able to express feelings of grief and loss regarding loss of family member and behavior indicating progression of grief process towards acceptance and coping with reality of loss with acknowledgement of permanent change within 6 months.  Status: New - Date: 1/11/2022.     Intervention(s)  Therapist will provide individual therapy to process through grief and loss and to gain effective coping skills. Tx to discuss current stressors and interpersonal conflicts and how to cope with these, coaching, diagnostic testing, referral for medication as indicated, use prescribed medication, cognitive restructuring, interpersonal family therapy, supportive therapy services.        Goal 2: Patient will report absence of persistent depression mood and report of reduced frequency and intensity of low mood and absence of persistent low energy and motivation to acceptable levels, report subjective improved motivation and increased energy for a period of 90 days, within 6 months as clinically observed and by patient self-report    I will know I've met my goal when I can measure my fatigue vs depression \".    Objective #A (Patient Action)    Patient will demonstrate and report a level of depression that can be managed at a lower level of care.  Absence of persistent depression mood and report of reduced frequency and intensity of low mood and absence of persistent low energy and motivation to acceptable levels, report subjective improved motivation and increased energy for a period of 90 days, within 6 months as clinically " observed and by patient self-report  Status: New- date1/11/2022    Intervention(s)  Therapist will provide individual therapy to identify triggers to depression, gain feedback on helpful coping tools and thought-reframing techniques, and identify preferred way of being.  Tx to include discussion of current stressors and interpersonal conflicts and how to cope with these, coaching, diagnostic testing, referral for medication as indicated, use prescribed medication, cognitive restructuring, interpersonal, family therapy, supportive therapy services      Patient has reviewed and agreed to the above plan.      Kaley Tan, [facility]  January 11, 2022      Answers for HPI/ROS submitted by the patient on 1/10/2022  If you checked off any problems, how difficult have these problems made it for you to do your work, take care of things at home, or get along with other people?: Somewhat difficult  PHQ9 TOTAL SCORE: 8

## 2022-01-19 ENCOUNTER — LAB (OUTPATIENT)
Dept: LAB | Facility: CLINIC | Age: 72
End: 2022-01-19
Payer: MEDICARE

## 2022-01-19 DIAGNOSIS — E55.9 VITAMIN D DEFICIENCY, UNSPECIFIED: ICD-10-CM

## 2022-01-19 DIAGNOSIS — D86.9 SARCOIDOSIS: ICD-10-CM

## 2022-01-19 LAB
ALBUMIN SERPL-MCNC: 4 G/DL (ref 3.4–5)
ALP SERPL-CCNC: 60 U/L (ref 40–150)
ALT SERPL W P-5'-P-CCNC: 29 U/L (ref 0–50)
ANION GAP SERPL CALCULATED.3IONS-SCNC: 6 MMOL/L (ref 3–14)
AST SERPL W P-5'-P-CCNC: 25 U/L (ref 0–45)
BASOPHILS # BLD AUTO: 0 10E3/UL (ref 0–0.2)
BASOPHILS NFR BLD AUTO: 1 %
BILIRUB SERPL-MCNC: 0.8 MG/DL (ref 0.2–1.3)
BUN SERPL-MCNC: 15 MG/DL (ref 7–30)
CALCIUM SERPL-MCNC: 8.8 MG/DL (ref 8.5–10.1)
CHLORIDE BLD-SCNC: 104 MMOL/L (ref 94–109)
CO2 SERPL-SCNC: 24 MMOL/L (ref 20–32)
CREAT SERPL-MCNC: 0.87 MG/DL (ref 0.52–1.04)
DEPRECATED CALCIDIOL+CALCIFEROL SERPL-MC: 65 UG/L (ref 20–75)
EOSINOPHIL # BLD AUTO: 0.1 10E3/UL (ref 0–0.7)
EOSINOPHIL NFR BLD AUTO: 3 %
ERYTHROCYTE [DISTWIDTH] IN BLOOD BY AUTOMATED COUNT: 14.1 % (ref 10–15)
GFR SERPL CREATININE-BSD FRML MDRD: 71 ML/MIN/1.73M2
GLUCOSE BLD-MCNC: 96 MG/DL (ref 70–99)
HCT VFR BLD AUTO: 42.4 % (ref 35–47)
HGB BLD-MCNC: 14 G/DL (ref 11.7–15.7)
LYMPHOCYTES # BLD AUTO: 1.9 10E3/UL (ref 0.8–5.3)
LYMPHOCYTES NFR BLD AUTO: 40 %
MCH RBC QN AUTO: 29.9 PG (ref 26.5–33)
MCHC RBC AUTO-ENTMCNC: 33 G/DL (ref 31.5–36.5)
MCV RBC AUTO: 91 FL (ref 78–100)
MONOCYTES # BLD AUTO: 0.5 10E3/UL (ref 0–1.3)
MONOCYTES NFR BLD AUTO: 11 %
NEUTROPHILS # BLD AUTO: 2.2 10E3/UL (ref 1.6–8.3)
NEUTROPHILS NFR BLD AUTO: 46 %
PLATELET # BLD AUTO: 281 10E3/UL (ref 150–450)
POTASSIUM BLD-SCNC: 4 MMOL/L (ref 3.4–5.3)
PROT SERPL-MCNC: 7.1 G/DL (ref 6.8–8.8)
PTH-INTACT SERPL-MCNC: 87 PG/ML (ref 18–80)
RBC # BLD AUTO: 4.68 10E6/UL (ref 3.8–5.2)
SODIUM SERPL-SCNC: 134 MMOL/L (ref 133–144)
WBC # BLD AUTO: 4.8 10E3/UL (ref 4–11)

## 2022-01-19 PROCEDURE — 85025 COMPLETE CBC W/AUTO DIFF WBC: CPT

## 2022-01-19 PROCEDURE — 36415 COLL VENOUS BLD VENIPUNCTURE: CPT

## 2022-01-19 PROCEDURE — 83970 ASSAY OF PARATHORMONE: CPT

## 2022-01-19 PROCEDURE — 99000 SPECIMEN HANDLING OFFICE-LAB: CPT

## 2022-01-19 PROCEDURE — 82652 VIT D 1 25-DIHYDROXY: CPT

## 2022-01-19 PROCEDURE — 80053 COMPREHEN METABOLIC PANEL: CPT

## 2022-01-19 PROCEDURE — 82306 VITAMIN D 25 HYDROXY: CPT

## 2022-01-19 PROCEDURE — 82164 ANGIOTENSIN I ENZYME TEST: CPT | Mod: 90

## 2022-01-20 ENCOUNTER — LAB (OUTPATIENT)
Dept: LAB | Facility: CLINIC | Age: 72
End: 2022-01-20
Attending: PHYSICIAN ASSISTANT
Payer: MEDICARE

## 2022-01-20 DIAGNOSIS — Z20.822 COVID-19 RULED OUT: ICD-10-CM

## 2022-01-20 LAB — 1,25(OH)2D SERPL-MCNC: 103 PG/ML (ref 19.9–79.3)

## 2022-01-20 PROCEDURE — U0003 INFECTIOUS AGENT DETECTION BY NUCLEIC ACID (DNA OR RNA); SEVERE ACUTE RESPIRATORY SYNDROME CORONAVIRUS 2 (SARS-COV-2) (CORONAVIRUS DISEASE [COVID-19]), AMPLIFIED PROBE TECHNIQUE, MAKING USE OF HIGH THROUGHPUT TECHNOLOGIES AS DESCRIBED BY CMS-2020-01-R: HCPCS

## 2022-01-20 PROCEDURE — U0005 INFEC AGEN DETEC AMPLI PROBE: HCPCS

## 2022-01-20 RX ORDER — ROSUVASTATIN CALCIUM 5 MG/1
5 TABLET, COATED ORAL EVERY OTHER DAY
Qty: 90 TABLET | Refills: 0 | Status: SHIPPED | OUTPATIENT
Start: 2022-01-20 | End: 2022-06-30

## 2022-01-20 RX ORDER — CELECOXIB 200 MG/1
200 CAPSULE ORAL DAILY
Qty: 90 CAPSULE | Refills: 0 | Status: SHIPPED | OUTPATIENT
Start: 2022-01-20 | End: 2022-01-25

## 2022-01-20 RX ORDER — DULOXETIN HYDROCHLORIDE 60 MG/1
60 CAPSULE, DELAYED RELEASE ORAL DAILY
Qty: 90 CAPSULE | Refills: 0 | Status: SHIPPED | OUTPATIENT
Start: 2022-01-20 | End: 2022-01-25

## 2022-01-20 NOTE — TELEPHONE ENCOUNTER
Sent 90 days. Patient is due for visit in April for a physical with Juju. Please help schedule.     Guilherme Hernadez PA-C on 1/20/2022 at 12:08 PM

## 2022-01-20 NOTE — TELEPHONE ENCOUNTER
Routing refill request to provider for review/approval because:  Drug not on the [facility] refill protocol confirm celebrex  Drug interaction warning-duloxetine and sertraline  Medication is reported/historical-rosuvastatin   Elizabeth Tomlin RN, BSN  [facility]

## 2022-01-21 LAB — SARS-COV-2 RNA RESP QL NAA+PROBE: NEGATIVE

## 2022-01-21 NOTE — TELEPHONE ENCOUNTER
KENTRELLM for pt to call back to the clinic to schedule a physical with PCP in April.  Allison Park MA on 1/21/2022 at 8:56 AM

## 2022-01-22 LAB — ACE SERPL-CCNC: 36 U/L

## 2022-01-23 ENCOUNTER — THERAPY VISIT (OUTPATIENT)
Dept: SLEEP MEDICINE | Facility: CLINIC | Age: 72
End: 2022-01-23
Payer: MEDICARE

## 2022-01-23 DIAGNOSIS — G47.39 COMPLEX SLEEP APNEA SYNDROME: ICD-10-CM

## 2022-01-23 PROCEDURE — 95810 POLYSOM 6/> YRS 4/> PARAM: CPT | Performed by: PSYCHIATRY & NEUROLOGY

## 2022-01-25 DIAGNOSIS — F32.89 OTHER DEPRESSION: ICD-10-CM

## 2022-01-25 DIAGNOSIS — M25.552 HIP PAIN, LEFT: ICD-10-CM

## 2022-01-25 RX ORDER — CELECOXIB 200 MG/1
200 CAPSULE ORAL DAILY
Qty: 30 CAPSULE | Refills: 0 | Status: SHIPPED | OUTPATIENT
Start: 2022-01-25 | End: 2022-03-15

## 2022-01-25 RX ORDER — DULOXETIN HYDROCHLORIDE 60 MG/1
60 CAPSULE, DELAYED RELEASE ORAL DAILY
Qty: 30 CAPSULE | Refills: 0 | Status: SHIPPED | OUTPATIENT
Start: 2022-01-25 | End: 2022-01-31

## 2022-01-25 NOTE — TELEPHONE ENCOUNTER
Routing refill request to provider for review/approval because:  Patient called stating that there is an issue with the pharmacy, unable to get her the medication before she runs out tomorrow. Patient states medication should be here by end of week but does not have shipping confirmation.     Zena Culver, FAVIANN, RN  [facility]

## 2022-01-26 LAB — SCANNED LAB RESULT: NORMAL

## 2022-01-26 NOTE — PROCEDURES
" SLEEP STUDY INTERPRETATION  DIAGNOSTIC POLYSOMNOGRAPHY REPORT      Patient: ANGELINA ROLON  YOB: 1950  Study Date: 1/23/2022  MRN: [id number]  Referring Provider: MD Basilio [facility]  Ordering Provider: KULDIP Ramon Amber    Indications for Polysomnography: The patient is a 71 year old Female who is 5' 2\" and weighs 144.0 lbs. Her BMI is 26.5, [place] sleepiness scale 3 and neck circumference is 36 cm. 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 A diagnostic polysomnogram was performed to evaluate for sleep apnea in [place].    Polysomnogram Data: A full night polysomnogram recorded the standard physiologic parameters including EEG, EOG, EMG, ECG, nasal and oral airflow. Respiratory parameters of chest and abdominal movements were recorded with respiratory inductance plethysmography. Oxygen saturation was recorded by pulse oximetry. Hypopnea scoring rule used: 1B 4%.    Sleep Architecture: Sleep fragmentation  The total recording time of the polysomnogram was 537.0 minutes. The total sleep time was 498.5 minutes. Sleep latency was 9.0 minutes. REM latency was 239.5 minutes. Arousal index was 19.1 arousals per hour. Sleep efficiency was 92.8%. Wake after sleep onset was 27.5 minutes. The patient spent 5.9% of total sleep time in Stage N1, 43.7% in Stage N2, 25.9% in Stage N3, and 24.5% in REM. Time in REM supine was 0 minutes.    Respiration: This study did not demonstrate clinically significant sleep disordered breathing.  This was a good study but did not include REM supine.      Events ? The polysomnogram revealed a presence of 3 obstructive, 7 central, and - mixed apneas resulting in an apnea index of 1.2 events per hour. There were 6 obstructive hypopneas and - central hypopneas resulting in an obstructive hypopnea index of 0.7 and central hypopnea index of - events per hour. The combined apnea/hypopnea index was 1.9 events per hour (central " apnea/hypopnea index was 0.8 events per hour). The REM AHI was 5.4 events per hour. The supine AHI was - events per hour. The RERA index was 2.5 events per hour.  The RDI was 4.5 events per hour.    Snoring - was reported as moderate-loud.    Respiratory rate and pattern - was notable for normal respiratory rate and pattern.    Sustained Sleep Associated Hypoventilation - Transcutaneous carbon dioxide monitoring was not used.    Sleep Associated Hypoxemia - (Greater than 5 minutes O2 sat at or below 88%) was not present. Baseline oxygen saturation was 95.5%. Lowest oxygen saturation was 88.2%. Time spent less than or equal to 88% was 0 minutes. Time spent less than or equal to 89% was 0.1 minutes.    Movement Activity: Increased motor activity throughout sleep (increased PLMs during NREM and increased transient motor activity during REM).      Periodic Limb Activity - There were 273 PLMs during the entire study. The PLM index was 32.9 movements per hour. The PLM Arousal Index was 7.6 per hour.    REM EMG Activity - Excessive transient muscle activity was present.    Nocturnal Behavior - Abnormal sleep related behaviors were not noted.    Bruxism - None apparent.    Cardiac Summary: Sinus  The average pulse rate was 60.6 bpm. The minimum pulse rate was 50.9 bpm while the maximum pulse rate was 90.8 bpm.      Assessment:     This study did not demonstrate clinically significant sleep disordered breathing.  This was a good study but did not include REM supine.      Increased motor activity throughout sleep (increased PLMs during NREM and increased transient motor activity during REM).      Recommendations:    Patient may be reassured that per this study they did not demonstrate clinically significant sleep disordered breathing, as long as they do not sleep supine. If they do sleep supine frequently, then could consider repeat PSG with emphasis upon supine positioning.   o If interested in treating snoring could  consider options such as oral appliance or upper airway surgery.    Recommend optimizing the duration and circadian timing of sleep.     Advice regarding the risks of drowsy driving.    Suggest optimizing sleep schedule and avoiding sleep deprivation.    Treatment of PLMs (alpha 2 delta ligands, dopaminergics) should be focused upon symptoms of motor restlessness, a high suspicion for PLM disorder and not for PLMs alone.    Recommend screening for dream enactment and if present consider a diagnosis of RBD.       Diagnostic Code: G47.9      James García MD 1-26-22  Diplomate, ABPN Sleep Medicine

## 2022-01-29 ENCOUNTER — MYC MEDICAL ADVICE (OUTPATIENT)
Dept: FAMILY MEDICINE | Facility: CLINIC | Age: 72
End: 2022-01-29
Payer: MEDICARE

## 2022-01-29 DIAGNOSIS — F32.89 OTHER DEPRESSION: ICD-10-CM

## 2022-01-29 DIAGNOSIS — F43.21 GRIEF: ICD-10-CM

## 2022-01-31 RX ORDER — DULOXETIN HYDROCHLORIDE 60 MG/1
60 CAPSULE, DELAYED RELEASE ORAL DAILY
Qty: 90 CAPSULE | Refills: 1 | Status: SHIPPED | OUTPATIENT
Start: 2022-01-31 | End: 2022-08-22

## 2022-01-31 RX ORDER — SERTRALINE HYDROCHLORIDE 25 MG/1
25 TABLET, FILM COATED ORAL DAILY
Qty: 90 TABLET | Refills: 1 | Status: SHIPPED | OUTPATIENT
Start: 2022-01-31 | End: 2022-10-21 | Stop reason: ALTCHOICE

## 2022-01-31 NOTE — TELEPHONE ENCOUNTER
Pt confirmed receipt, see D4P message, encounter closed  Elizabeth Tomlin, RN, BSN  [facility]

## 2022-01-31 NOTE — TELEPHONE ENCOUNTER
Routing refill request to provider for review/approval because:  Labs out of range:    PHQ-9 score:    PHQ 1/17/2022   PHQ-9 Total Score 8   Q9: Thoughts of better off dead/self-harm past 2 weeks Not at all   PHQ-9 External Data -       Zena Culver, FAVIANN, RN  [facility]

## 2022-01-31 NOTE — TELEPHONE ENCOUNTER
Routing to refill pool.     Zena Culver, BSN, RN  [facility]

## 2022-02-03 ENCOUNTER — VIRTUAL VISIT (OUTPATIENT)
Dept: PALLIATIVE MEDICINE | Facility: CLINIC | Age: 72
End: 2022-02-03
Payer: MEDICARE

## 2022-02-03 ENCOUNTER — TELEPHONE (OUTPATIENT)
Dept: FAMILY MEDICINE | Facility: CLINIC | Age: 72
End: 2022-02-03

## 2022-02-03 DIAGNOSIS — M79.2 NEUROPATHIC PAIN: ICD-10-CM

## 2022-02-03 DIAGNOSIS — M54.16 LUMBAR RADICULOPATHY: Primary | ICD-10-CM

## 2022-02-03 PROCEDURE — 96158 HLTH BHV IVNTJ INDIV 1ST 30: CPT | Mod: 95 | Performed by: PSYCHOLOGIST

## 2022-02-03 PROCEDURE — 96159 HLTH BHV IVNTJ INDIV EA ADDL: CPT | Mod: 95 | Performed by: PSYCHOLOGIST

## 2022-02-03 NOTE — PROGRESS NOTES
"Dinorah Thompson is a 71 year old female who is being evaluated via a billable video visit.      The patient has been notified of following:     \"This video visit will be conducted via a call between you and your physician/provider. We have found that certain health care needs can be provided without the need for an in-person physical exam.  This service lets us provide the care you need with a video conversation.     Video visits are billed at different rates depending on your insurance coverage.  Please reach out to your insurance provider with any questions.    If during the course of the call the physician/provider feels a video visit is not appropriate, you will not be charged for this service.\"    Patient has given verbal consent for Video visit? Yes    Patient would like the video invitation sent by: Text to cell phone: .[phone]}    Video Start Time: 8:59 AM    Additional provider notes:      Pain Diagnoses per pain provider:   Lumbar radiculopathy      Neuropathic pain      DATA: During today's visit you reported the following: Your back pain is mildly worse - mixup with prescription for Celebrex with insurance coverage so was without for 2 weeks. Neuropathic pain is mildly improved, hasn't been severe since last injection. Your mood is in general improving. Your activity level is unchanged. Your stress level is unchanged. Your sleep is unchanged.     You identified that you would like to focus on the following or had questions regarding the following issues or concerns, and we discussed the following:   - oriented to follow up  - awaiting consult follow up  - trip to [place] mid-month to spend time with son and his family, awaiting confirmation for respite care  - discussed self-care techniques currently being used  - feeling increased fatigue - discussed impact of disrupted sleep, care for   - discussed Spoon Theory  - discussed importance of engaging in self-care on days when  is in adult day " care  - heading to gym twice a week to swim  - considering higher level of care for      ASSESSMENT: Easily engaged and open to exploration of new techniques to manage pain. Seems to have a good awareness of the interconnection between stress related to care for  and fatigue which can negatively impact their pain experience.    PLAN:   Your next appointment is scheduled for 3/3 at 1:00 PM.  Assignment/Objectives /interventions for next session:   - check out [employer]oon[employer] liana  - continue to build in self-care    Video-Visit Details    Type of service:  Video Visit    Video End Time (time video stopped): 9:53 AM    Originating Location (pt. Location): Home    Distant Location (provider location):  [facility]     Mode of Communication:  Video Conference via Raulito Powers PsyD LP  Licensed Psychologist  Outpatient Clinic Therapist  [facility]

## 2022-02-04 ENCOUNTER — LAB (OUTPATIENT)
Dept: LAB | Facility: CLINIC | Age: 72
End: 2022-02-04
Payer: MEDICARE

## 2022-02-04 DIAGNOSIS — N18.30 STAGE 3 CHRONIC KIDNEY DISEASE, UNSPECIFIED WHETHER STAGE 3A OR 3B CKD (H): Primary | ICD-10-CM

## 2022-02-04 PROCEDURE — 82043 UR ALBUMIN QUANTITATIVE: CPT

## 2022-02-05 LAB
CREAT UR-MCNC: 116 MG/DL
MICROALBUMIN UR-MCNC: 8 MG/L
MICROALBUMIN/CREAT UR: 6.9 MG/G CR (ref 0–25)

## 2022-02-08 ENCOUNTER — VIRTUAL VISIT (OUTPATIENT)
Dept: FAMILY MEDICINE | Facility: CLINIC | Age: 72
End: 2022-02-08
Payer: MEDICARE

## 2022-02-08 VITALS — RESPIRATION RATE: 17 BRPM | BODY MASS INDEX: 26.68 KG/M2 | WEIGHT: 145 LBS | HEIGHT: 62 IN

## 2022-02-08 DIAGNOSIS — R42 DIZZINESS: ICD-10-CM

## 2022-02-08 DIAGNOSIS — F43.21 GRIEF: ICD-10-CM

## 2022-02-08 DIAGNOSIS — E21.3 HYPERPARATHYROIDISM (H): Primary | ICD-10-CM

## 2022-02-08 DIAGNOSIS — R68.89 OTHER GENERAL SYMPTOMS AND SIGNS: ICD-10-CM

## 2022-02-08 DIAGNOSIS — R53.83 OTHER FATIGUE: ICD-10-CM

## 2022-02-08 PROBLEM — H10.9 BACTERIAL CONJUNCTIVITIS: Status: ACTIVE | Noted: 2017-07-29

## 2022-02-08 PROBLEM — J45.20 MILD INTERMITTENT ASTHMA: Status: ACTIVE | Noted: 2017-07-29

## 2022-02-08 PROBLEM — Z81.8 FAMILY HISTORY OF DEMENTIA: Status: ACTIVE | Noted: 2018-12-26

## 2022-02-08 PROBLEM — F33.40 RECURRENT MAJOR DEPRESSION IN REMISSION (H): Status: ACTIVE | Noted: 2017-07-07

## 2022-02-08 PROBLEM — J01.30 ACUTE SPHENOIDAL SINUSITIS: Status: ACTIVE | Noted: 2018-12-26

## 2022-02-08 PROCEDURE — 99214 OFFICE O/P EST MOD 30 MIN: CPT | Mod: 95 | Performed by: NURSE PRACTITIONER

## 2022-02-08 RX ORDER — CELECOXIB 200 MG/1
1 CAPSULE ORAL EVERY 12 HOURS
COMMUNITY
Start: 2020-12-15 | End: 2022-03-02

## 2022-02-08 RX ORDER — ESTRADIOL 0.1 MG/G
CREAM VAGINAL
COMMUNITY
End: 2022-03-02

## 2022-02-08 RX ORDER — ALBUTEROL SULFATE 90 UG/1
AEROSOL, METERED RESPIRATORY (INHALATION)
COMMUNITY
Start: 2020-12-29 | End: 2022-03-02

## 2022-02-08 RX ORDER — MOMETASONE FUROATE MONOHYDRATE 50 UG/1
SPRAY, METERED NASAL
COMMUNITY
Start: 2020-10-20

## 2022-02-08 RX ORDER — BACLOFEN 10 MG/1
TABLET ORAL
COMMUNITY
Start: 2020-11-13 | End: 2022-03-02

## 2022-02-08 ASSESSMENT — MIFFLIN-ST. JEOR: SCORE: 1125.97

## 2022-02-08 ASSESSMENT — PAIN SCALES - GENERAL: PAINLEVEL: MODERATE PAIN (4)

## 2022-02-08 NOTE — PROGRESS NOTES
"Dot is a 71 year old who is being evaluated via a billable video visit.      How would you like to obtain your AVS? MyChart  If the video visit is dropped, the invitation should be resent by: Text to cell phone:   Will anyone else be joining your video visit? No      Video Start Time: 5:10 PM    Assessment & Plan     Hyperparathyroidism (H)  Plans to reestablish with Endocrine. Previous Endocrine     Other fatigue  Dizziness  Grief  Patient continued to brush symptoms off secondary to her stress until they became more notable. Although this could be a possibility, discussed the atypical cardiac symptoms women can present with for cardiovascular concerns.   Discussed and agreed upon EKG appointment and labs for tomorrow morning. Discussed red flags and need for urgent follow-up.   - EKG 12-lead complete with read (future)  - Comprehensive metabolic panel (BMP + Alb, Alk Phos, ALT, AST, Total. Bili, TP)  - D dimer, quantitative  - CBC with platelets  - ESR: Erythrocyte sedimentation rate  - Troponin I  - Echocardiogram Complete      Other general symptoms and signs   As above.   - Echocardiogram Complete               BMI:   Estimated body mass index is 26.52 kg/m  as calculated from the following:    Height as of this encounter: 1.575 m (5' 2\").    Weight as of this encounter: 65.8 kg (145 lb).           Return in about 1 day (around 2/9/2022) for MA Only Visit for EKG and Labs.    JASWANT Johnson CNP  [facility]    Maya Chris is a 71 year old who presents for the following health issues     HPI     Fatigue   Depression is improved with addition of sertraline use, however, with extreme fatigue.   Has noted some intermittent dizziness and hot flashes.   Fatigue has increased since 11/2021 with grief from loss of sister.   Is working with Sleep Clinic. Has follow-up next week for concerns of possible PLM.  Denies chest pain, shortness of breath.   Significant stress x 1 year " with full time care of her  fully dependent from Alzheimer.      Answers for HPI/ROS submitted by the patient on 2/5/2022  How many servings of fruits and vegetables do you eat daily?: 2-3  On average, how many sweetened beverages do you drink each day (Examples: soda, juice, sweet tea, etc.  Do NOT count diet or artificially sweetened beverages)?: 0  How many minutes a day do you exercise enough to make your heart beat faster?: 9 or less  How many days a week do you exercise enough to make your heart beat faster?: 3 or less  How many days per week do you miss taking your medication?: 0        Review of Systems   Constitutional, HEENT, cardiovascular, pulmonary, gi and gu systems are negative, except as otherwise noted.      Objective    Vitals - Patient Reported  Pain Score: Moderate Pain (4)      Vitals:  No vitals were obtained today due to virtual visit.    Physical Exam   GENERAL: Healthy, alert and no distress  EYES: Eyes grossly normal to inspection.  No discharge or erythema, or obvious scleral/conjunctival abnormalities.  RESP: No audible wheeze, cough, or visible cyanosis.  No visible retractions or increased work of breathing.    SKIN: Visible skin clear. No significant rash, abnormal pigmentation or lesions.  NEURO: Cranial nerves grossly intact.  Mentation and speech appropriate for age.  PSYCH: Mentation appears normal, affect normal/bright, judgement and insight intact, normal speech and appearance well-groomed.                Video-Visit Details    Type of service:  Video Visit    Video End Time:5:33 PM    Originating Location (pt. Location): Home    Distant Location (provider location):  [facility]     Platform used for Video Visit: [employer]eeWell

## 2022-02-09 ENCOUNTER — ALLIED HEALTH/NURSE VISIT (OUTPATIENT)
Dept: FAMILY MEDICINE | Facility: CLINIC | Age: 72
End: 2022-02-09
Payer: MEDICARE

## 2022-02-09 ENCOUNTER — MYC MEDICAL ADVICE (OUTPATIENT)
Dept: FAMILY MEDICINE | Facility: CLINIC | Age: 72
End: 2022-02-09

## 2022-02-09 ENCOUNTER — LAB (OUTPATIENT)
Dept: LAB | Facility: CLINIC | Age: 72
End: 2022-02-09

## 2022-02-09 DIAGNOSIS — R53.83 OTHER FATIGUE: ICD-10-CM

## 2022-02-09 DIAGNOSIS — R42 DIZZINESS: ICD-10-CM

## 2022-02-09 LAB
ALBUMIN SERPL-MCNC: 3.9 G/DL (ref 3.4–5)
ALP SERPL-CCNC: 64 U/L (ref 40–150)
ALT SERPL W P-5'-P-CCNC: 27 U/L (ref 0–50)
ANION GAP SERPL CALCULATED.3IONS-SCNC: 5 MMOL/L (ref 3–14)
AST SERPL W P-5'-P-CCNC: 22 U/L (ref 0–45)
BILIRUB SERPL-MCNC: 0.7 MG/DL (ref 0.2–1.3)
BUN SERPL-MCNC: 20 MG/DL (ref 7–30)
CALCIUM SERPL-MCNC: 9.1 MG/DL (ref 8.5–10.1)
CHLORIDE BLD-SCNC: 106 MMOL/L (ref 94–109)
CO2 SERPL-SCNC: 26 MMOL/L (ref 20–32)
CREAT SERPL-MCNC: 0.9 MG/DL (ref 0.52–1.04)
D DIMER PPP FEU-MCNC: 0.32 UG/ML FEU (ref 0–0.5)
ERYTHROCYTE [DISTWIDTH] IN BLOOD BY AUTOMATED COUNT: 14.4 % (ref 10–15)
ERYTHROCYTE [SEDIMENTATION RATE] IN BLOOD BY WESTERGREN METHOD: 7 MM/HR (ref 0–30)
GFR SERPL CREATININE-BSD FRML MDRD: 68 ML/MIN/1.73M2
GLUCOSE BLD-MCNC: 106 MG/DL (ref 70–99)
HCT VFR BLD AUTO: 41.8 % (ref 35–47)
HGB BLD-MCNC: 13.9 G/DL (ref 11.7–15.7)
MCH RBC QN AUTO: 30.2 PG (ref 26.5–33)
MCHC RBC AUTO-ENTMCNC: 33.3 G/DL (ref 31.5–36.5)
MCV RBC AUTO: 91 FL (ref 78–100)
PLATELET # BLD AUTO: 292 10E3/UL (ref 150–450)
POTASSIUM BLD-SCNC: 3.9 MMOL/L (ref 3.4–5.3)
PROT SERPL-MCNC: 6.9 G/DL (ref 6.8–8.8)
RBC # BLD AUTO: 4.6 10E6/UL (ref 3.8–5.2)
SODIUM SERPL-SCNC: 137 MMOL/L (ref 133–144)
TROPONIN I SERPL HS-MCNC: 4 NG/L
WBC # BLD AUTO: 4.5 10E3/UL (ref 4–11)

## 2022-02-09 PROCEDURE — 93000 ELECTROCARDIOGRAM COMPLETE: CPT

## 2022-02-09 PROCEDURE — 84484 ASSAY OF TROPONIN QUANT: CPT

## 2022-02-09 PROCEDURE — 85379 FIBRIN DEGRADATION QUANT: CPT

## 2022-02-09 PROCEDURE — 36415 COLL VENOUS BLD VENIPUNCTURE: CPT

## 2022-02-09 PROCEDURE — 99207 PR NO CHARGE NURSE ONLY: CPT

## 2022-02-09 PROCEDURE — 85652 RBC SED RATE AUTOMATED: CPT

## 2022-02-09 PROCEDURE — 85027 COMPLETE CBC AUTOMATED: CPT

## 2022-02-09 PROCEDURE — 80053 COMPREHEN METABOLIC PANEL: CPT

## 2022-02-09 NOTE — TELEPHONE ENCOUNTER
Patient was seen for VV 2/8/22 w/ A.G. Please see patient MyChart message and advise.     Osiel DUMONT RN

## 2022-02-13 ASSESSMENT — SLEEP AND FATIGUE QUESTIONNAIRES
HOW LIKELY ARE YOU TO NOD OFF OR FALL ASLEEP WHILE WATCHING TV: MODERATE CHANCE OF DOZING
HOW LIKELY ARE YOU TO NOD OFF OR FALL ASLEEP WHILE SITTING AND READING: MODERATE CHANCE OF DOZING
HOW LIKELY ARE YOU TO NOD OFF OR FALL ASLEEP WHILE SITTING QUIETLY AFTER LUNCH WITHOUT ALCOHOL: MODERATE CHANCE OF DOZING
HOW LIKELY ARE YOU TO NOD OFF OR FALL ASLEEP WHEN YOU ARE A PASSENGER IN A CAR FOR AN HOUR WITHOUT A BREAK: SLIGHT CHANCE OF DOZING
HOW LIKELY ARE YOU TO NOD OFF OR FALL ASLEEP WHILE LYING DOWN TO REST IN THE AFTERNOON WHEN CIRCUMSTANCES PERMIT: HIGH CHANCE OF DOZING
HOW LIKELY ARE YOU TO NOD OFF OR FALL ASLEEP WHILE SITTING INACTIVE IN A PUBLIC PLACE: WOULD NEVER DOZE
HOW LIKELY ARE YOU TO NOD OFF OR FALL ASLEEP WHILE SITTING AND TALKING TO SOMEONE: WOULD NEVER DOZE
HOW LIKELY ARE YOU TO NOD OFF OR FALL ASLEEP IN A CAR, WHILE STOPPED FOR A FEW MINUTES IN TRAFFIC: WOULD NEVER DOZE

## 2022-02-14 LAB — SLPCOMP: NORMAL

## 2022-02-15 ENCOUNTER — VIRTUAL VISIT (OUTPATIENT)
Dept: SLEEP MEDICINE | Facility: CLINIC | Age: 72
End: 2022-02-15
Payer: MEDICARE

## 2022-02-15 VITALS — WEIGHT: 140 LBS | HEIGHT: 62 IN | BODY MASS INDEX: 25.76 KG/M2

## 2022-02-15 DIAGNOSIS — R53.83 OTHER FATIGUE: ICD-10-CM

## 2022-02-15 DIAGNOSIS — M54.16 LUMBAR RADICULOPATHY: Primary | ICD-10-CM

## 2022-02-15 DIAGNOSIS — Z11.59 ENCOUNTER FOR SCREENING FOR OTHER VIRAL DISEASES: Primary | ICD-10-CM

## 2022-02-15 DIAGNOSIS — R06.83 SNORING: Primary | ICD-10-CM

## 2022-02-15 PROBLEM — G47.33 OSA TREATED WITH BIPAP: Status: RESOLVED | Noted: 2018-06-13 | Resolved: 2022-02-15

## 2022-02-15 PROCEDURE — 99214 OFFICE O/P EST MOD 30 MIN: CPT | Mod: 95 | Performed by: PHYSICIAN ASSISTANT

## 2022-02-15 ASSESSMENT — MIFFLIN-ST. JEOR: SCORE: 1103.29

## 2022-02-15 NOTE — PROGRESS NOTES
Dot is a 71 year old who is being evaluated via a billable video visit.      How would you like to obtain your AVS? MyChart  If the video visit is dropped, the invitation should be resent by: Send to e-mail at: [email or web address]  Will anyone else be joining your video visit? No      Video-Visit Details    Type of service:  Video Visit    Video Start Time: 4:10PM    Video End Time:4:30PM    Originating Location (pt. Location): Home    Distant Location (provider location):  [facility] [place]     Platform used for Video Visit: [employer]

## 2022-02-15 NOTE — PATIENT INSTRUCTIONS
Your BMI is Body mass index is 25.61 kg/m .  Weight management is a personal decision.  If you are interested in exploring weight loss strategies, the following discussion covers the approaches that may be successful. Body mass index (BMI) is one way to tell whether you are at a healthy weight, overweight, or obese. It measures your weight in relation to your height.  A BMI of 18.5 to 24.9 is in the healthy range. A person with a BMI of 25 to 29.9 is considered overweight, and someone with a BMI of 30 or greater is considered obese. More than two-thirds of American adults are considered overweight or obese.  Being overweight or obese increases the risk for further weight gain. Excess weight may lead to heart disease and diabetes.  Creating and following plans for healthy eating and physical activity may help you improve your health.  Weight control is part of healthy lifestyle and includes exercise, emotional health, and healthy eating habits. Careful eating habits lifelong are the mainstay of weight control. Though there are significant health benefits from weight loss, long-term weight loss with diet alone may be very difficult to achieve- studies show long-term success with dietary management in less than 10% of people. Attaining a healthy weight may be especially difficult to achieve in those with severe obesity. In some cases, medications, devices and surgical management might be considered.  What can you do?  If you are overweight or obese and are interested in methods for weight loss, you should discuss this with your provider.     Consider reducing daily calorie intake by 500 calories.     Keep a food journal.     Avoiding skipping meals, consider cutting portions instead.    Diet combined with exercise helps maintain muscle while optimizing fat loss. Strength training is particularly important for building and maintaining muscle mass. Exercise helps reduce stress, increase energy, and improves fitness.  Increasing exercise without diet control, however, may not burn enough calories to loose weight.       Start walking three days a week 10-20 minutes at a time    Work towards walking thirty minutes five days a week     Eventually, increase the speed of your walking for 1-2 minutes at time    And look into health and wellness programs that may be available through your health insurance provider, employer, local community center, or [employer].

## 2022-02-15 NOTE — PROGRESS NOTES
"[facility]   Outpatient Sleep Medicine  Feb 15, 2022       Name: Dinorah Thompson MRN# [id number]   Age: 71 year old YOB: 1950          Assessment and Plan:   1. Snoring  During this visit, we reviewed the summary of the study including stage, position, event distribution, oximetry and heart rate. Sleep architecture revealed all sleep stages present with normal duration, normal sleep efficiency of 92.8%, normal arousal index.  This sleep study did not show evidence of clinically significant sleep apnea with AHI 1.9 events per hour, RDI 4.5 events per hour.  No significant hypoxemia with oxygen jagdish 88.2%.  No abnormalities on 1-lead EKG monitoring.  There was increased motor activity throughout sleep including increased PLM's during NREM (PLM index 32.9, arousal index 7.6) and increased transient motor activity during REM.    Given no significant sleep apnea at this altitude will discontinue BiPAP therapy.     Instead, our focus today was more on motor activity seen during the study. Discussed elevated PLMS on study though not associated with excessive arousal. Does get occasional restless legs but \"very minimal and very occasional\". Discussed that treatment of leg movements is generally more focused on symptoms of motor restlessness versus finding of PLMs alone and ultimate decision was no treatment at this time. Of note, gabapentin/pregabalin not tolerated in the past. Never been on dopaminergic. Could consider pending progress.     Denies dream enactment behavior. Increased REM motor tone may be reflective of selective serotonin reuptake inhibitor. For now agreed to watchful waiting but if develops dream enactment consider RBD diagnosis and treatment with melatonin/clonazepam.     Patient will follow-up with sleep medicine on an as needed basis.         Chief Complaint      Chief Complaint   Patient presents with     Video Visit     sleep study follow up           History of " "Present Illness:   Dinorah Thompson is a 71 year old female who presents to the clinic for results of recent sleep study completed on 1/23/2022. 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 A diagnostic polysomnogram was performed to evaluate for sleep apnea in [place].    Patient reports she did not sleep well the night of her study - \"my spinal stenosis was acting up and the bed was just not comfortable\".     Reviewed results of sleep study with patient as follows:  DIAGNOSTIC POLYSOMNOGRAPHY REPORT  Sleep Architecture: Sleep fragmentation  The total recording time of the polysomnogram was 537.0 minutes. The total sleep time was 498.5 minutes. Sleep latency was 9.0 minutes. REM latency was 239.5 minutes. Arousal index was 19.1 arousals per hour. Sleep efficiency was 92.8%. Wake after sleep onset was 27.5 minutes. The patient spent 5.9% of total sleep time in Stage N1, 43.7% in Stage N2, 25.9% in Stage N3, and 24.5% in REM. Time in REM supine was 0 minutes.     Respiration: This study did not demonstrate clinically significant sleep disordered breathing.  This was a good study but did not include REM supine.      Events ? The polysomnogram revealed a presence of 3 obstructive, 7 central, and - mixed apneas resulting in an apnea index of 1.2 events per hour. There were 6 obstructive hypopneas and - central hypopneas resulting in an obstructive hypopnea index of 0.7 and central hypopnea index of - events per hour. The combined apnea/hypopnea index was 1.9 events per hour (central apnea/hypopnea index was 0.8 events per hour). The REM AHI was 5.4 events per hour. The supine AHI was - events per hour. The RERA index was 2.5 events per hour.  The RDI was 4.5 events per hour.    Snoring - was reported as moderate-loud.    Respiratory rate and pattern - was notable for normal respiratory rate and pattern.    Sustained Sleep Associated Hypoventilation - Transcutaneous carbon dioxide " "monitoring was not used.    Sleep Associated Hypoxemia - (Greater than 5 minutes O2 sat at or below 88%) was not present. Baseline oxygen saturation was 95.5%. Lowest oxygen saturation was 88.2%. Time spent less than or equal to 88% was 0 minutes. Time spent less than or equal to 89% was 0.1 minutes.     Movement Activity: Increased motor activity throughout sleep (increased PLMs during NREM and increased transient motor activity during REM).     Periodic Limb Activity - There were 273 PLMs during the entire study. The PLM index was 32.9 movements per hour. The PLM Arousal Index was 7.6 per hour.    REM EMG Activity - Excessive transient muscle activity was present.    Nocturnal Behavior - Abnormal sleep related behaviors were not noted.    Bruxism - None apparent.     Cardiac Summary: Sinus  The average pulse rate was 60.6 bpm. The minimum pulse rate was 50.9 bpm while the maximum pulse rate was 90.8 bpm.       Past medical/surgical history, family history, social history, medications and allergies were reviewed.           Physical Examination:   Ht 1.575 m (5' 2\")   Wt 63.5 kg (140 lb)   BMI 25.61 kg/m    General appearance: Awake, alert, cooperative. Well groomed. Sitting comfortably in chair. In no apparent distress.  HEENT: Head: Normocephalic, atraumatic. Eyes:Conjunctiva clear. Sclera normal. Nose: External appearance without deformity.   Pulmonary:  Able to speak easily in full sentences. No cough or wheeze.   Skin:  No rashes or significant lesions on visible skin.   Neurologic: Alert, oriented x3.   Psychiatric: Mood euthymic. Affect congruent with full range and intensity.    CC:  Juju Chang PA-C  Feb 15, 2022     [facility]  [address] [place] [address]     [facility]  [address] [address]    Chart documentation was completed, in part, with [employer] voice-recognition software. Even though " reviewed, some grammatical, spelling, and word errors may remain.    35 minutes spent on day of encounter doing chart review, history and exam, documentation, and further activities as noted above

## 2022-02-28 ENCOUNTER — VIRTUAL VISIT (OUTPATIENT)
Dept: PSYCHOLOGY | Facility: CLINIC | Age: 72
End: 2022-02-28
Payer: MEDICARE

## 2022-02-28 ENCOUNTER — MYC MEDICAL ADVICE (OUTPATIENT)
Dept: FAMILY MEDICINE | Facility: CLINIC | Age: 72
End: 2022-02-28
Payer: MEDICARE

## 2022-02-28 DIAGNOSIS — F33.1 MODERATE EPISODE OF RECURRENT MAJOR DEPRESSIVE DISORDER (H): Primary | ICD-10-CM

## 2022-02-28 PROCEDURE — 90834 PSYTX W PT 45 MINUTES: CPT | Mod: 95 | Performed by: SOCIAL WORKER

## 2022-02-28 ASSESSMENT — ENCOUNTER SYMPTOMS
POLYDIPSIA: 0
LIGHT-HEADEDNESS: 1
POLYPHAGIA: 0
DECREASED CONCENTRATION: 1
WEIGHT LOSS: 0
ORTHOPNEA: 0
WEIGHT GAIN: 1
POSTURAL DYSPNEA: 0
PANIC: 0
SNORES LOUDLY: 1
SPUTUM PRODUCTION: 0
COUGH DISTURBING SLEEP: 0
CHILLS: 0
ARTHRALGIAS: 1
INCREASED ENERGY: 1
HALLUCINATIONS: 0
LEG PAIN: 1
ALTERED TEMPERATURE REGULATION: 0
HYPOTENSION: 0
SLEEP DISTURBANCES DUE TO BREATHING: 0
HEMOPTYSIS: 0
DYSPNEA ON EXERTION: 1
JOINT SWELLING: 0
SYNCOPE: 0
COUGH: 0
FATIGUE: 1
MUSCLE CRAMPS: 1
DEPRESSION: 1
BACK PAIN: 1
HYPERTENSION: 0
WHEEZING: 0
NIGHT SWEATS: 1
PALPITATIONS: 0
FEVER: 0
MUSCLE WEAKNESS: 1
NECK PAIN: 1
MYALGIAS: 1
DECREASED APPETITE: 0
INSOMNIA: 0
SWOLLEN GLANDS: 0
BRUISES/BLEEDS EASILY: 0
NERVOUS/ANXIOUS: 1
EXERCISE INTOLERANCE: 1
STIFFNESS: 1
SHORTNESS OF BREATH: 1

## 2022-02-28 NOTE — PROGRESS NOTES
[facility]                                     Progress Note    Patient Name: Dinorah Thompson  Date: 2022         Service Type: Individual      Session Start Time: 140  Session End Time: 144     Session Length: 38-52    Session #: 2    Attendees: Client    Service Modality:  Video Visit:      Provider verified identity through the following two step process.  Patient provided:  Patient  and Patient is known previously to provider    Telemedicine Visit: The patient's condition can be safely assessed and treated via synchronous audio and visual telemedicine encounter.      Reason for Telemedicine Visit: Services only offered telehealth    Originating Site (Patient Location): Patient's home    Distant Site (Provider Location): Provider Remote Setting- Home Office    Consent:  The patient/guardian has verbally consented to: the potential risks and benefits of telemedicine (video visit) versus in person care; bill my insurance or make self-payment for services provided; and responsibility for payment of non-covered services.     Patient would like the video invitation sent by:  Send to e-mail at: [email or web address]    Mode of Communication:  Video Conference via [employer]    As the provider I attest to compliance with applicable laws and regulations related to telemedicine.    DATA  Interactive Complexity: No  Crisis: No        Progress Since Last Session (Related to Symptoms / Goals / Homework):   Symptoms: Improving per patient    Homework: Partially completed      Episode of Care Goals: Minimal progress - PREPARATION (Decided to change - considering how); Intervened by negotiating a change plan and determining options / strategies for behavior change, identifying triggers, exploring social supports, and working towards setting a date to begin behavior change     Current / Ongoing Stressors and Concerns:    Grief loss, taking care of , transitions     Treatment Objective(s)  Addressed in This Session:     Patient will demonstrate/report improved stages of grief that can be safely managed in a lower level of care.  Patient report of able to express feelings of grief and loss regarding loss of family member and behavior indicating progression of grief process towards acceptance and coping with reality of loss with acknowledgement of permanent change within 6 months.  Patient will demonstrate and report a level of depression that can be managed at a lower level of care.  Absence of persistent depression mood and report of reduced frequency and intensity of low mood and absence of persistent low energy and motivation to acceptable levels, report subjective improved motivation and increased energy for a period of 90 days, within 6 months as clinically observed and by patient self-report     Intervention:   Motivational Interviewing    MI Intervention: Co-Developed Goal: pain and more loss with establishing new routines , Expressed Empathy/Understanding, Supported Autonomy, Collaboration, Evocation and Permission to raise concern or advise     Change Talk Expressed by the Patient: Desire to change Ability to change Reasons to change Need to change Committment to change    Provider Response to Change Talk: E - Evoked more info from patient about behavior change, A - Affirmed patient's thoughts, decisions, or attempts at behavior change, R - Reflected patient's change talk and S - Summarized patient's change talk statements      Assessments completed prior to visit:  The following assessments were completed by patient for this visit:  PHQ9:   PHQ-9 SCORE 4/9/2021 11/30/2021 12/7/2021 12/16/2021 1/10/2022 1/17/2022 1/17/2022   PHQ-9 Total Score MyChart 4 (Minimal depression) 13 (Moderate depression) 16 (Moderately severe depression) 10 (Moderate depression) 8 (Mild depression) - 8 (Mild depression)   PHQ-9 Total Score 4 13 16 10 8 8 8   PHQ-9 External Data - - - - - - -     GAD7:   ELDON-7 SCORE  2/9/2021 4/9/2021 10/1/2021 11/30/2021 1/17/2022 1/17/2022   Total Score 3 (minimal anxiety) 2 (minimal anxiety) 1 (minimal anxiety) 7 (mild anxiety) - 4 (minimal anxiety)   Total Score 3 2 1 7 4 4     CAGE-AID:   CAGE-AID Total Score 12/7/2021 12/7/2021 1/10/2022   Total Score 0 0 0   Total Score MyChart - 0 (A total score of 2 or greater is considered clinically significant) 0 (A total score of 2 or greater is considered clinically significant)     Perkins Suicide Severity Rating Scale (Lifetime/Recent)  Perkins Suicide Severity Rating (Lifetime/Recent) 12/7/2021   Wish to be Dead (Lifetime) No   Non-Specific Active Suicidal Thoughts (Lifetime) No   RETIRED: 1. Wish to be Dead (Recent) No   RETIRED: 2. Non-Specific Active Suicidal Thoughts (Recent) No   3. Active Suicidal Ideation with any Methods (Not Plan) Without Intent to Act (Lifetime) No   RETIRED: 3. Active Suicidal Ideation with any Methods (Not Plan) Without Intent to Act (Recent) No   RETIRE: 4. Active Suicidal Ideation with Some Intent to Act, Without Specific Plan (Lifetime) No   4. Active Suicidal Ideation with Some Intent to Act, Without Specific Plan (Recent) No   RETIRE: 5. Active Suicidal Ideation with Specific Plan and Intent (Lifetime) No   RETIRED: 5. Active Suicidal Ideation with Specific Plan and Intent (Recent) No   Actual Attempt (Lifetime) No   Actual Attempt (Past 3 Months) No   Has subject engaged in non-suicidal self-injurious behavior? (Lifetime) No   Has subject engaged in non-suicidal self-injurious behavior? (Past 3 Months) No   Interrupted Attempts (Lifetime) No   Interrupted Attempts (Past 3 Months) No   Aborted or Self-Interrupted Attempt (Lifetime) No   Aborted or Self-Interrupted Attempt (Past 3 Months) No   Preparatory Acts or Behavior (Lifetime) No   Preparatory Acts or Behavior (Past 3 Months) No   Most Recent Attempt Actual Lethality Code NA   Most Lethal Attempt Actual Lethality Code NA   Initial/First Attempt Actual  Lethality Code NA         ASSESSMENT: Current Emotional / Mental Status (status of significant symptoms):   Risk status (Self / Other harm or suicidal ideation)   Patient denies current fears or concerns for personal safety.   Patient denies current or recent suicidal ideation or behaviors.   Patient denies current or recent homicidal ideation or behaviors.   Patient denies current or recent self injurious behavior or ideation.   Patient denies other safety concerns.   Patient reports there has been no change in risk factors since their last session.     Patient reports there has been no change in protective factors since their last session.     Recommended that patient call 911 or go to the local ED should there be a change in any of these risk factors.     Appearance:   Appropriate    Eye Contact:   Fair    Psychomotor Behavior: Restless    Attitude:   Cooperative    Orientation:   All   Speech    Rate / Production: Emotional Talkative    Volume:  Normal    Mood:    Anxious  Depressed  Sad    Affect:    Worrisome    Thought Content:  Clear    Thought Form:  Coherent    Insight:    Fair      Medication Review:   No changes to current psychiatric medication(s)     Medication Compliance:   Yes     Changes in Health Issues:   Yes: tired     Chemical Use Review:   Substance Use: Chemical use reviewed, no active concerns identified      Tobacco Use: No current tobacco use.      Diagnosis:  1. Moderate episode of recurrent major depressive disorder (H)        Collateral Reports Completed:   Not Applicable    PLAN: (Patient Tasks / Therapist Tasks / Other)  Call PCP to let her know about new symptoms  Continue to advocate for self  Make new goals that fit where she is at        [facility] CenterMendez Tan, [facility] CareSW 2/28/2022                                                         ______________________________________________________________________    Individual Treatment Plan    Patient's Name: Dinorah Thompson  Date Of  Birth: 1950    Date of Creation: 1/11/2022  Date Treatment Plan Last Reviewed/Revised: 1/11/2022 due 5/11/2022    DSM5 Diagnoses: 296.32 (F33.1) Major Depressive Disorder, Recurrent Episode, Moderate With mixed features  Psychosocial / Contextual Factors:  Grief loss, taking care of , transitions  PROMIS (reviewed every 90 days):     Referral / Collaboration:  Referral to another professional/service is not indicated at this time..    Anticipated number of session for this episode of care: 12  Anticipation frequency of session: Biweekly  Anticipated Duration of each session: 38-52 minutes  Treatment plan will be reviewed in 90 days or when goals have been changed.       MeasurableTreatment Goal(s) related to diagnosis / functional impairment(s)  Goal 1: Patient will able to express feelings of grief and loss regarding loss of family member and behavior indicating progression of grief process towards acceptance and coping with reality of loss with acknowledgement of permanent change within 6 months    I will know I've met my goal when I remember her and it's laughing and not sad.      Objective #A (Patient Action)    Patient will demonstrate/report improved stages of grief that can be safely managed in a lower level of care.  Patient report of able to express feelings of grief and loss regarding loss of family member and behavior indicating progression of grief process towards acceptance and coping with reality of loss with acknowledgement of permanent change within 6 months.  Status: New - Date: 1/11/2022.     Intervention(s)  Therapist will provide individual therapy to process through grief and loss and to gain effective coping skills. Tx to discuss current stressors and interpersonal conflicts and how to cope with these, coaching, diagnostic testing, referral for medication as indicated, use prescribed medication, cognitive restructuring, interpersonal family therapy, supportive therapy  "services.        Goal 2: Patient will report absence of persistent depression mood and report of reduced frequency and intensity of low mood and absence of persistent low energy and motivation to acceptable levels, report subjective improved motivation and increased energy for a period of 90 days, within 6 months as clinically observed and by patient self-report    I will know I've met my goal when I can measure my fatigue vs depression \".    Objective #A (Patient Action)    Patient will demonstrate and report a level of depression that can be managed at a lower level of care.  Absence of persistent depression mood and report of reduced frequency and intensity of low mood and absence of persistent low energy and motivation to acceptable levels, report subjective improved motivation and increased energy for a period of 90 days, within 6 months as clinically observed and by patient self-report  Status: New- date1/11/2022    Intervention(s)  Therapist will provide individual therapy to identify triggers to depression, gain feedback on helpful coping tools and thought-reframing techniques, and identify preferred way of being.  Tx to include discussion of current stressors and interpersonal conflicts and how to cope with these, coaching, diagnostic testing, referral for medication as indicated, use prescribed medication, cognitive restructuring, interpersonal, family therapy, supportive therapy services      Patient has reviewed and agreed to the above plan.      Kaley Tan, [facility]  January 11, 2022        "

## 2022-03-01 NOTE — TELEPHONE ENCOUNTER
Sent [employer] response, confirming, will monitor and f/u after pt responds  Elizabeth Tomlin RN, BSN  [facility]

## 2022-03-01 NOTE — TELEPHONE ENCOUNTER
See [employer] messages, pt has echo tomorrow  Elizabeth Tomlin, RN, BSN  [facility]

## 2022-03-02 ENCOUNTER — HOSPITAL ENCOUNTER (OUTPATIENT)
Dept: CARDIOLOGY | Facility: CLINIC | Age: 72
Discharge: HOME | End: 2022-03-02
Attending: NURSE PRACTITIONER | Admitting: NURSE PRACTITIONER
Payer: MEDICARE

## 2022-03-02 ENCOUNTER — MYC MEDICAL ADVICE (OUTPATIENT)
Dept: FAMILY MEDICINE | Facility: CLINIC | Age: 72
End: 2022-03-02

## 2022-03-02 ENCOUNTER — VIRTUAL VISIT (OUTPATIENT)
Dept: ENDOCRINOLOGY | Facility: CLINIC | Age: 72
End: 2022-03-02
Payer: MEDICARE

## 2022-03-02 DIAGNOSIS — E21.3 HPTH (HYPERPARATHYROIDISM) (H): Primary | ICD-10-CM

## 2022-03-02 DIAGNOSIS — R82.994 HYPERCALCIURIA: ICD-10-CM

## 2022-03-02 DIAGNOSIS — R68.89 OTHER GENERAL SYMPTOMS AND SIGNS: ICD-10-CM

## 2022-03-02 DIAGNOSIS — R53.83 OTHER FATIGUE: ICD-10-CM

## 2022-03-02 DIAGNOSIS — R42 DIZZINESS: ICD-10-CM

## 2022-03-02 DIAGNOSIS — M85.80 OSTEOPENIA, UNSPECIFIED LOCATION: ICD-10-CM

## 2022-03-02 PROCEDURE — 99214 OFFICE O/P EST MOD 30 MIN: CPT | Mod: 95 | Performed by: INTERNAL MEDICINE

## 2022-03-02 PROCEDURE — 93306 TTE W/DOPPLER COMPLETE: CPT

## 2022-03-02 PROCEDURE — 93306 TTE W/DOPPLER COMPLETE: CPT | Mod: 26 | Performed by: INTERNAL MEDICINE

## 2022-03-02 NOTE — LETTER
3/2/2022         RE: Dinorah Thompson  [address]  [place] [address]        Dear Colleague,    Thank you for referring your patient, Dinorah Thompson, to the [facility]. Please see a copy of my visit note below.    Dot is a 71 year old who is being evaluated via a billable video visit.      How would you like to obtain your AVS? MyChart  If the video visit is dropped, the invitation should be resent by:   Will anyone else be joining your video visit? No      Video Start Time: 9:30 AM     S:   Patient presents for management of HPTH.   Reports being told in 2013 that she had an elevated PTH level.  Saw an Endocrinologist in [place] and that person moved and thus she did not follow up on this issue.     She was diagnosed with sarcoidosis in 2008.   Initially treated with prednisone.   Then imuran and methotrexate.   She has been on medication since 2014.     Prior finger fracture after jamming it.   No kidney stones.     Taking calcium carbonate 500 mg BID.     Chronic thirst. Not waking at night to urinate.   Chronic constipation.     Diagnosed with osteopenia in 2016.   DEXA in 2/2020 showed osteopenia with FRAX below threshold to treat.   The distal radius was not assessed.     Remarks she had early menopause, age 45. She did not have formal osteoporosis but was treated due to her age.   She was on fosamax and boniva at stopped around 2013.   In part stopped 2/2 GERD which has since resolved.     Chronic muscle pains which are worse since moving to [place].     Presents today for follow up.      ROS: 10 point ROS neg other than the symptoms noted above in the HPI.    Exam:   GENERAL: Healthy, alert and no distress  EYES: Eyes grossly normal to inspection.  No discharge or erythema, or obvious scleral/conjunctival abnormalities.  HENT: Normal cephalic/atraumatic.  External ears, nose and mouth without ulcers or lesions.  No nasal drainage visible.  RESP: No audible wheeze,  cough, or visible cyanosis.  No visible retractions or increased work of breathing.    MS: No gross musculoskeletal defects noted.  Normal range of motion.  No visible edema.  SKIN: Visible skin clear. No significant rash, abnormal pigmentation or lesions.  NEURO: Cranial nerves grossly intact.  Mentation and speech appropriate for age.  PSYCH: Mentation appears normal, affect normal/bright, judgement and insight intact, normal speech and appearance well-groomed.     A/P:   HPTH - in the setting of a normal Ca, Cr, and vitamin D. She reports high calcium levels in the past but this was when her sarcoid was active. DEXA in 2/2020 showed osteopenia with FRAX below threshold to treat. The distal radius was not assessed.   Urine calcium elevated in 3/2021.   DEXA from 4/7/2021 showed osteopenia with FRAX above threshold to treat.   Reviewed parathyroid surgery versus bisphosphonate use. Prior GI upset with oral bisphosphonates. Thus reclast reviewed.  She would like to get neck imaging and see if there is a clear target.    US 6/9/21 showed a benign thyroid cyst.   Parathyroid scan 6/9/21: Suspected parathyroid adenoma posterior to the superior left thyroid.  Surgery with Dr Wilcox on 7/21/21:   A. Parathyroid, left superior, parathyroidectomy:  -Hyperplastic parathyroid tissue.  -Weight: 325 mg.  -See comment.    iPTH normalized post-op.     Calcium remains normal but iPTH from 1/19/22 was elevated at 87. Normal vitamin D. She is taking supplemental calcium citrate 1000 mg every day, also taking a multivitamin with Ca (dose unknown), and two slices of cheese a day. Either this is a spurious PTH elevation, a second overactive parathyroid, or H.     -Schedule labs. If still abnormal, proceed to 24 hour urine collection.   -Repeat DEXA in late 2022.   -Recommend future care with Dr Sidhu or Dr Kincaid.     Hypercalciuria - as above.     Osteopenia - as above.     James Toribio MD on 3/2/2022 at 9:41  AM      Video-Visit Details    Type of service:  Video Visit    Video End Time:9:42 AM    Originating Location (pt. Location): Home    Distant Location (provider location):  [facility]     Platform used for Video Visit: [employer]    Answers for HPI/ROS submitted by the patient on 2/28/2022  General Symptoms: Yes  Skin Symptoms: No  HENT Symptoms: No  EYE SYMPTOMS: No  HEART SYMPTOMS: Yes  LUNG SYMPTOMS: Yes  INTESTINAL SYMPTOMS: No  URINARY SYMPTOMS: No  GYNECOLOGIC SYMPTOMS: No  BREAST SYMPTOMS: No  SKELETAL SYMPTOMS: Yes  BLOOD SYMPTOMS: Yes  NERVOUS SYSTEM SYMPTOMS: No  MENTAL HEALTH SYMPTOMS: Yes  Fever: No  Loss of appetite: No  Weight loss: No  Weight gain: Yes  Fatigue: Yes  Night sweats: Yes  Chills: No  Increased stress: Yes  Excessive hunger: No  Excessive thirst: No  Feeling hot or cold when others believe the temperature is normal: No  Loss of height: No  Post-operative complications: No  Surgical site pain: No  Hallucinations: No  Change in or Loss of Energy: Yes  Hyperactivity: No  Confusion: No  Chest pain or pressure: Yes  Fast or irregular heartbeat: No  Pain in legs with walking: Yes  Trouble breathing while lying down: No  Fingers or toes appear blue: No  High blood pressure: No  Low blood pressure: No  Fainting: No  Murmurs: No  Pacemaker: No  Varicose veins: No  Wake up at night with shortness of breath: No  Light-headedness: Yes  Exercise intolerance: Yes  Cough: No  Sputum or phlegm: No  Coughing up blood: No  Difficulty breating or shortness of breath: Yes  Snoring: Yes  Wheezing: No  Difficulty breathing on exertion: Yes  Nighttime Cough: No  Difficulty breathing when lying flat: No  Back pain: Yes  Muscle aches: Yes  Neck pain: Yes  Swollen joints: No  Joint pain: Yes  Bone pain: No  Muscle cramps: Yes  Muscle weakness: Yes  Joint stiffness: Yes  Bone fracture: No  Edema or swelling: Yes  Anemia: No  Swollen glands: No  Easy bleeding or bruising: No  Nervous or Anxious:  Yes  Depression: Yes  Trouble sleeping: No  Trouble thinking or concentrating: Yes  Mood changes: No  Panic attacks: No          Again, thank you for allowing me to participate in the care of your patient.        Sincerely,        James Toribio MD

## 2022-03-02 NOTE — PROGRESS NOTES
Dot is a 71 year old who is being evaluated via a billable video visit.      How would you like to obtain your AVS? MyChart  If the video visit is dropped, the invitation should be resent by:   Will anyone else be joining your video visit? No      Video Start Time: 9:30 AM     S:   Patient presents for management of HPTH.   Reports being told in 2013 that she had an elevated PTH level.  Saw an Endocrinologist in [place] and that person moved and thus she did not follow up on this issue.     She was diagnosed with sarcoidosis in 2008.   Initially treated with prednisone.   Then imuran and methotrexate.   She has been on medication since 2014.     Prior finger fracture after jamming it.   No kidney stones.     Taking calcium carbonate 500 mg BID.     Chronic thirst. Not waking at night to urinate.   Chronic constipation.     Diagnosed with osteopenia in 2016.   DEXA in 2/2020 showed osteopenia with FRAX below threshold to treat.   The distal radius was not assessed.     Remarks she had early menopause, age 45. She did not have formal osteoporosis but was treated due to her age.   She was on fosamax and boniva at stopped around 2013.   In part stopped 2/2 GERD which has since resolved.     Chronic muscle pains which are worse since moving to [place].     Presents today for follow up.      ROS: 10 point ROS neg other than the symptoms noted above in the HPI.    Exam:   GENERAL: Healthy, alert and no distress  EYES: Eyes grossly normal to inspection.  No discharge or erythema, or obvious scleral/conjunctival abnormalities.  HENT: Normal cephalic/atraumatic.  External ears, nose and mouth without ulcers or lesions.  No nasal drainage visible.  RESP: No audible wheeze, cough, or visible cyanosis.  No visible retractions or increased work of breathing.    MS: No gross musculoskeletal defects noted.  Normal range of motion.  No visible edema.  SKIN: Visible skin clear. No significant rash, abnormal pigmentation or  lesions.  NEURO: Cranial nerves grossly intact.  Mentation and speech appropriate for age.  PSYCH: Mentation appears normal, affect normal/bright, judgement and insight intact, normal speech and appearance well-groomed.     A/P:   HPTH - in the setting of a normal Ca, Cr, and vitamin D. She reports high calcium levels in the past but this was when her sarcoid was active. DEXA in 2/2020 showed osteopenia with FRAX below threshold to treat. The distal radius was not assessed.   Urine calcium elevated in 3/2021.   DEXA from 4/7/2021 showed osteopenia with FRAX above threshold to treat.   Reviewed parathyroid surgery versus bisphosphonate use. Prior GI upset with oral bisphosphonates. Thus reclast reviewed.  She would like to get neck imaging and see if there is a clear target.    US 6/9/21 showed a benign thyroid cyst.   Parathyroid scan 6/9/21: Suspected parathyroid adenoma posterior to the superior left thyroid.  Surgery with Dr Wilcox on 7/21/21:   A. Parathyroid, left superior, parathyroidectomy:  -Hyperplastic parathyroid tissue.  -Weight: 325 mg.  -See comment.    iPTH normalized post-op.     Calcium remains normal but iPTH from 1/19/22 was elevated at 87. Normal vitamin D. She is taking supplemental calcium citrate 1000 mg every day, also taking a multivitamin with Ca (dose unknown), and two slices of cheese a day. Either this is a spurious PTH elevation, a second overactive parathyroid, or FHH.     -Schedule labs. If still abnormal, proceed to 24 hour urine collection.   -Repeat DEXA in late 2022.   -Recommend future care with Dr Sidhu or Dr Kincaid.     Hypercalciuria - as above.     Osteopenia - as above.     James Toribio MD on 3/2/2022 at 9:41 AM      Video-Visit Details    Type of service:  Video Visit    Video End Time:9:42 AM    Originating Location (pt. Location): Home    Distant Location (provider location):  [facility]     Platform used for Video Visit:  Well    Answers for HPI/ROS submitted by the patient on 2/28/2022  General Symptoms: Yes  Skin Symptoms: No  HENT Symptoms: No  EYE SYMPTOMS: No  HEART SYMPTOMS: Yes  LUNG SYMPTOMS: Yes  INTESTINAL SYMPTOMS: No  URINARY SYMPTOMS: No  GYNECOLOGIC SYMPTOMS: No  BREAST SYMPTOMS: No  SKELETAL SYMPTOMS: Yes  BLOOD SYMPTOMS: Yes  NERVOUS SYSTEM SYMPTOMS: No  MENTAL HEALTH SYMPTOMS: Yes  Fever: No  Loss of appetite: No  Weight loss: No  Weight gain: Yes  Fatigue: Yes  Night sweats: Yes  Chills: No  Increased stress: Yes  Excessive hunger: No  Excessive thirst: No  Feeling hot or cold when others believe the temperature is normal: No  Loss of height: No  Post-operative complications: No  Surgical site pain: No  Hallucinations: No  Change in or Loss of Energy: Yes  Hyperactivity: No  Confusion: No  Chest pain or pressure: Yes  Fast or irregular heartbeat: No  Pain in legs with walking: Yes  Trouble breathing while lying down: No  Fingers or toes appear blue: No  High blood pressure: No  Low blood pressure: No  Fainting: No  Murmurs: No  Pacemaker: No  Varicose veins: No  Wake up at night with shortness of breath: No  Light-headedness: Yes  Exercise intolerance: Yes  Cough: No  Sputum or phlegm: No  Coughing up blood: No  Difficulty breating or shortness of breath: Yes  Snoring: Yes  Wheezing: No  Difficulty breathing on exertion: Yes  Nighttime Cough: No  Difficulty breathing when lying flat: No  Back pain: Yes  Muscle aches: Yes  Neck pain: Yes  Swollen joints: No  Joint pain: Yes  Bone pain: No  Muscle cramps: Yes  Muscle weakness: Yes  Joint stiffness: Yes  Bone fracture: No  Edema or swelling: Yes  Anemia: No  Swollen glands: No  Easy bleeding or bruising: No  Nervous or Anxious: Yes  Depression: Yes  Trouble sleeping: No  Trouble thinking or concentrating: Yes  Mood changes: No  Panic attacks: No

## 2022-03-03 ENCOUNTER — VIRTUAL VISIT (OUTPATIENT)
Dept: PALLIATIVE MEDICINE | Facility: CLINIC | Age: 72
End: 2022-03-03
Payer: MEDICARE

## 2022-03-03 DIAGNOSIS — M54.16 LUMBAR RADICULOPATHY: Primary | ICD-10-CM

## 2022-03-03 DIAGNOSIS — M79.2 NEUROPATHIC PAIN: ICD-10-CM

## 2022-03-03 PROCEDURE — 96158 HLTH BHV IVNTJ INDIV 1ST 30: CPT | Mod: 95 | Performed by: PSYCHOLOGIST

## 2022-03-03 PROCEDURE — 96159 HLTH BHV IVNTJ INDIV EA ADDL: CPT | Mod: 95 | Performed by: PSYCHOLOGIST

## 2022-03-03 NOTE — PROGRESS NOTES
"Dinorah Thompson is a 71 year old female who is being evaluated via a billable video visit.      The patient has been notified of following:     \"This video visit will be conducted via a call between you and your physician/provider. We have found that certain health care needs can be provided without the need for an in-person physical exam.  This service lets us provide the care you need with a video conversation.     Video visits are billed at different rates depending on your insurance coverage.  Please reach out to your insurance provider with any questions.    If during the course of the call the physician/provider feels a video visit is not appropriate, you will not be charged for this service.\"    Patient has given verbal consent for Video visit? Yes    Patient would like the video invitation sent by: Text to cell phone: [phone]}    Video Start Time: patient running late, joined at 1:03 PM after phone call and connection to video    Additional provider notes:      Pain Diagnoses per pain provider:   Lumbar radiculopathy      Neuropathic pain            DATA: During today's visit you reported the following: Your back and nerve pain is worsening, have epidural next week. Your mood is mildly improved yet. Your activity level is unchanged - fatigue is primarily preventing engagement as you'd like. Your stress level is mildly improved following trip to [facility]. Your sleep is fairly stable. You reported engaging in self-care for your pain 2-3 times daily.    You identified that you would like to focus on the following or had questions regarding the following issues or concerns, and we discussed the following:   - enjoyed trip to [place], respite care worked really well for   - update on health to address ongoing fatigue  - arthritic pain is greatly improved since being able to restart Celebrex over past month  - feeling discouraged about fatigue, weight gain  - increased housecleaning help to " weekly  - looking forward to engaging in more activities after epidural  - compromise with pulmonologist about canaries  - pool has been closed, unable to swim  - no longer need BiPap    ASSESSMENT: Pain is increased, which Dot believes is likely due to epidural wearing off.     PLAN:   Your next appointment is scheduled for 3/17 at 11:00 AM.  Assignment/Objectives /interventions for next session:   - discussed pacing resumption of physical activity to prevent over-engagement  - continue to engage in self-care  - schedule walking/stretching/floor exercises 1-3 times per week to start; at end of week explore factors that facilitated or prevented engagement and problem solve as needed      We believe regular attendance is key to your success in our program!      Any time you are unable to keep your appointment we ask that you call us at [phone] at least 24 hours in advance to cancel.This will allow us to offer the appointment time to another patient.     Multiple missed appointments may lead to dismissal from the clinic.    Video-Visit Details    Type of service:  Video Visit    Video End Time (time video stopped): 1:56 PM    Originating Location (pt. Location): Home    Distant Location (provider location):  [facility]     Mode of Communication:  Video Conference via Raulito Powers PsyD LP  Licensed Psychologist  Outpatient Clinic Therapist  [facility]

## 2022-03-04 ENCOUNTER — LAB (OUTPATIENT)
Dept: LAB | Facility: CLINIC | Age: 72
End: 2022-03-04

## 2022-03-04 ENCOUNTER — LAB (OUTPATIENT)
Dept: URGENT CARE | Facility: URGENT CARE | Age: 72
End: 2022-03-04
Payer: MEDICARE

## 2022-03-04 DIAGNOSIS — E21.3 HPTH (HYPERPARATHYROIDISM) (H): ICD-10-CM

## 2022-03-04 DIAGNOSIS — R82.994 HYPERCALCIURIA: ICD-10-CM

## 2022-03-04 DIAGNOSIS — Z11.59 ENCOUNTER FOR SCREENING FOR OTHER VIRAL DISEASES: ICD-10-CM

## 2022-03-04 LAB
PTH-INTACT SERPL-MCNC: 40 PG/ML (ref 18–80)
SARS-COV-2 RNA RESP QL NAA+PROBE: NEGATIVE

## 2022-03-04 PROCEDURE — 82565 ASSAY OF CREATININE: CPT

## 2022-03-04 PROCEDURE — 83735 ASSAY OF MAGNESIUM: CPT

## 2022-03-04 PROCEDURE — 36415 COLL VENOUS BLD VENIPUNCTURE: CPT

## 2022-03-04 PROCEDURE — 82306 VITAMIN D 25 HYDROXY: CPT

## 2022-03-04 PROCEDURE — 82040 ASSAY OF SERUM ALBUMIN: CPT

## 2022-03-04 PROCEDURE — U0005 INFEC AGEN DETEC AMPLI PROBE: HCPCS

## 2022-03-04 PROCEDURE — U0003 INFECTIOUS AGENT DETECTION BY NUCLEIC ACID (DNA OR RNA); SEVERE ACUTE RESPIRATORY SYNDROME CORONAVIRUS 2 (SARS-COV-2) (CORONAVIRUS DISEASE [COVID-19]), AMPLIFIED PROBE TECHNIQUE, MAKING USE OF HIGH THROUGHPUT TECHNOLOGIES AS DESCRIBED BY CMS-2020-01-R: HCPCS

## 2022-03-04 PROCEDURE — 83970 ASSAY OF PARATHORMONE: CPT

## 2022-03-04 PROCEDURE — 84100 ASSAY OF PHOSPHORUS: CPT

## 2022-03-04 PROCEDURE — 82310 ASSAY OF CALCIUM: CPT

## 2022-03-05 LAB
ALBUMIN SERPL-MCNC: 4 G/DL (ref 3.4–5)
CALCIUM SERPL-MCNC: 9.8 MG/DL (ref 8.5–10.1)
CREAT SERPL-MCNC: 0.99 MG/DL (ref 0.52–1.04)
GFR SERPL CREATININE-BSD FRML MDRD: 61 ML/MIN/1.73M2
MAGNESIUM SERPL-MCNC: 2.7 MG/DL (ref 1.6–2.3)
PHOSPHATE SERPL-MCNC: 3.9 MG/DL (ref 2.5–4.5)

## 2022-03-06 LAB — DEPRECATED CALCIDIOL+CALCIFEROL SERPL-MC: 71 UG/L (ref 20–75)

## 2022-03-07 DIAGNOSIS — E21.3 HPTH (HYPERPARATHYROIDISM) (H): Primary | ICD-10-CM

## 2022-03-08 ENCOUNTER — HOSPITAL ENCOUNTER (OUTPATIENT)
Facility: AMBULATORY SURGERY CENTER | Age: 72
Discharge: HOME | End: 2022-03-08
Attending: ANESTHESIOLOGY | Admitting: ANESTHESIOLOGY
Payer: MEDICARE

## 2022-03-08 ENCOUNTER — ANCILLARY PROCEDURE (OUTPATIENT)
Dept: RADIOLOGY | Facility: AMBULATORY SURGERY CENTER | Age: 72
End: 2022-03-08
Attending: ANESTHESIOLOGY
Payer: MEDICARE

## 2022-03-08 VITALS
HEART RATE: 82 BPM | RESPIRATION RATE: 16 BRPM | OXYGEN SATURATION: 93 % | DIASTOLIC BLOOD PRESSURE: 77 MMHG | TEMPERATURE: 97.6 F | SYSTOLIC BLOOD PRESSURE: 137 MMHG

## 2022-03-08 DIAGNOSIS — R52 PAIN: ICD-10-CM

## 2022-03-08 PROCEDURE — 62323 NJX INTERLAMINAR LMBR/SAC: CPT | Mod: GC | Performed by: ANESTHESIOLOGY

## 2022-03-08 PROCEDURE — 62323 NJX INTERLAMINAR LMBR/SAC: CPT

## 2022-03-08 RX ORDER — BUPIVACAINE HYDROCHLORIDE 2.5 MG/ML
INJECTION, SOLUTION EPIDURAL; INFILTRATION; INTRACAUDAL PRN
Status: DISCONTINUED | OUTPATIENT
Start: 2022-03-08 | End: 2022-03-08 | Stop reason: HOSPADM

## 2022-03-08 RX ORDER — IOPAMIDOL 408 MG/ML
INJECTION, SOLUTION INTRATHECAL PRN
Status: DISCONTINUED | OUTPATIENT
Start: 2022-03-08 | End: 2022-03-08 | Stop reason: HOSPADM

## 2022-03-08 RX ORDER — METHYLPREDNISOLONE ACETATE 40 MG/ML
INJECTION, SUSPENSION INTRA-ARTICULAR; INTRALESIONAL; INTRAMUSCULAR; SOFT TISSUE PRN
Status: DISCONTINUED | OUTPATIENT
Start: 2022-03-08 | End: 2022-03-08 | Stop reason: HOSPADM

## 2022-03-08 RX ORDER — LIDOCAINE HYDROCHLORIDE 10 MG/ML
INJECTION, SOLUTION EPIDURAL; INFILTRATION; INTRACAUDAL; PERINEURAL PRN
Status: DISCONTINUED | OUTPATIENT
Start: 2022-03-08 | End: 2022-03-08 | Stop reason: HOSPADM

## 2022-03-08 NOTE — DISCHARGE INSTRUCTIONS
"PAIN INJECTION HOME CARE INSTRUCTIONS  Activity  -You may resume most normal activity levels with the exception of strenuous activity. It may help to move in ways that hurt before the injection, to see if the pain is still there, but do not overdo it. Take it easy for the rest of the day.    -DO NOT remove bandaid for 24 hours  -DO NOT shower for 24 hours      Pain  -You may feel immediate pain relief and numbness for a period of time after the injection. This may indicate the medication has reached the right spot.  -Your pain may return after this short pain-free period, or may even be a little worse for a day or two. It may be caused by needle irritation or by the medication itself. The medications usually take two or three days to start working, but can take as long as a week.    -You may use an ice pack for 20 minutes every 2 hours for the first 24 hours  -You may use a heating pad after the first 24 hours  -You may use Tylenol (acetaminophen) every 4 hours or other pain medicines as directed by your physician      DID YOU RECEIVE STEROIDS TODAY?  {YES / NO:129751::\"Yes\"}    Common side effects of steroids:  Not everyone will experience corticosteroid side effects. If side effects are experienced, they will gradually subside in the 7-10 day period following an injection. Most common side effects include:  -Flushed face and/or chest  -Feeling of warmth, particularly in the face but could be an overall feeling of warmth  -Increased blood sugar in diabetic patients  -Menstrual irregularities my occur. If taking hormone-based birth control an alternate method of birth control is recommended  -Sleep disturbances and/or mood swings are possible  -Leg cramps    PLEASE KEEP TRACK OF YOUR SYMPTOMS AND NOTE ANY CHANGES FOR YOUR DOCTOR.       Please contact us if you have:  -Severe pain  -Fever more than 101.5 degrees Fahrenheit  -Signs of infection at the injection site (redness, swelling, or drainage)      FOR PAIN " CENTER PATIENTS:  If you have questions, please contact the Pain Clinic at [phone] Option #1 between the hours of 7:00 am and 3:00 pm Monday through Friday. After office hours you can contact the on call provider by dialing [phone]. If you need immediate attention, we recommend that you go to a hospital emergency room or dial [phone].      FOR PM&R PATIENTS:  For patients seen by the PM and R service, please call [phone]. If you need to fax a pain diary to PM&R the fax number is [phone]. If you are unable to fax, uploading to [employer] is encouraged, then send to provider. If you have procedure scheduling questions please call [phone] Option #2

## 2022-03-08 NOTE — OP NOTE
Lumbar Epidural Steroid Injection    Procedure:  1. Lumbar epidural steroid injection at left L5-S1  2. Fluoroscopy for needle guidance    Pre-operative Diagnosis:  1. Intervertebral disc disorders w radiculopathy, lumbosacral region  2. Other intervertebral disc degeneration, lumbosacral region    Post-Operative Diagnosis:  1. Intervertebral disc disorders w radiculopathy, lumbosacral region  2. Other intervertebral disc degeneration, lumbosacral region    Anesthesia:  Local anesthesia    Staff: NAYE Sandoval  Resident: James Carolina MD    Medical Indications:  The patient presents to the ambulatory surgery center today for the treatment of persistent pain. We believe that the pain is spinally mediated. The patient has been exhibiting symptoms consistent with lumbar intraspinal inflammation and radiculopathy. Symptoms have been persistent, disabling and intermittently severe. The patient has been evaluated in the pain clinic and scheduled today for a spinal injection to aid in the diagnosis and treatment of presumed spinal pain. The risks, benefits, potential complications, and alternatives were discussed with the patient as per the signed consent form, and informed consent was obtained. The patient has received information about the procedure and its risks. The physician personally confirmed the procedure, side, and site to be injected with the patient and marked the site in the pre-procedure area.    Description of Procedure:  An additional intraoperative timeout, specifically to confirm accurate localization, was conducted by the attending physician. The patient remained awake and alert throughout the procedure. The patient was placed in the prone position. The skin was prepped with chlorhexidine and sterile drapes were applied. The skin and soft tissues were anesthetized with lidocaine 1%. A 22 gauge 3.5 inch touhy needle epidural needle was inserted percutaneously and advanced under fluoroscopic  guidance using AP, and lateral projections. Using loss of resistance to air and a left sided interlaminar approach, the L5/S1 posterior epidural space was entered after the lamina was contacted with the epidural needle. No paraesthesias occurred and aspiration was negative. Epidurography and radiographic interpretation: Epidurography was performed by injection of Isovue 200 under live fluoroscopy. Multiple Xray images were obtained. Radiologic examination confirmed proper spread of the contrast medium within the epidural space. There was no evidence of intrathecal or intravascular runoff. The needle was injected with 40mg methylprednisolone mixed with 3 ml of 0.25% bupivacaine. The needle was removed after flushing with 1% lidocaine. A sterile bandage was applied. The patient did not experience any hemodynamic or neurologic sequelae. The patient was transferred to the recovery area. Post operative instructions were explained and given to the patient.    I was present for the entire case and helped the resident at all times.     Complications:  No procedural or immediate post-procedural complications occurred and the patient was transferred to PACU in stable condition.  Procedure Images:    Post-Operative Plan:  The patient will monitor pain relief from today's procedure. They will call the clinic for any problems related to today's procedure. A copy of our written post-procedure instructions was provided. They will return to clinic as scheduled.

## 2022-03-08 NOTE — H&P
Dinorah Thompson  [id number]  female  71 year old       Reason for procedure/surgery: low back pain    Patient Active Problem List   Diagnosis     Cervical radiculopathy, chronic     Chronic pain disorder     Intervertebral disc disorders with radiculopathy, lumbar region     Lumbar radiculopathy, chronic     Other cervical disc degeneration at C5-C6 level     Spondylosis without myelopathy or radiculopathy, lumbar region     Osteoarthrosis, hand     Primary localized osteoarthritis of knees, bilateral     Hyperlipidemia     Depression     Sarcoidosis of lung (H)     Hoarseness     Laryngeal disorder     Precancerous lesion     History of retinal detachment     Lumbar radiculopathy     Hyperparathyroidism (H)     Chronic pain of left knee     Primary osteoarthritis of left knee     Moderate episode of recurrent major depressive disorder (H)     Grief     Stage 3 chronic kidney disease, unspecified whether stage 3a or 3b CKD (H)     Acute sphenoidal sinusitis     Bacterial conjunctivitis     Family history of dementia     Gastroesophageal reflux disease     Insomnia     Mild intermittent asthma     Osteoarthrosis     Osteoporosis     Recurrent major depression in remission (H)       Past Surgical History:    Past Surgical History:   Procedure Laterality Date     APPENDECTOMY       BIOPSY  2008    Lungs     COLONOSCOPY       diskectomy in toe       EYE SURGERY  Cataracts     GENITOURINARY SURGERY  2019    Bladder mesh repair     GYN SURGERY  2019    Total hysterectomy     HYSTERECTOMY  08/2017    and bladder surgery     INJECT EPIDURAL LUMBAR Right 4/27/2021    Procedure: Right Lumbar 5- Sacral 1 transforaminal epidural steroid injection with fluoroscopy and conscious sedation.;  Surgeon: Tiera Santana MD;  Location: [facility] OR     INJECT EPIDURAL LUMBAR Left 11/11/2021    Procedure: Lumbar epidural steroid injection L5- S1 with fluoroscopy;  Surgeon: Tiera Santana MD;  Location: [facility] OR     left rotator cuff surgery   "     PARATHYROIDECTOMY N/A 7/21/2021    Procedure: PARATHYROIDECTOMY;  Surgeon: Gregoria Wilcox MD;  Location: [facility] OR     SINUS SURGERY      x2 in 2013 and 2018     TONSILLECTOMY  1955       Past Medical History:   Past Medical History:   Diagnosis Date     Chronic osteoarthritis      Complication of anesthesia 1990's    DIfficulty waking up     Depressive disorder      Female bladder prolapse 9/3/2019     Mild intermittent asthma 7/29/2017     FAHAD treated with BiPAP 6/13/2018     Parathyroid abnormality (H)      Prolapse of female pelvic organs 8/19/2019     Sleep apnea     Uses a Bi-Pap.. Will have son bring it if necessary on DOS       Social History:   Social History     Tobacco Use     Smoking status: Never Smoker     Smokeless tobacco: Never Used   Substance Use Topics     Alcohol use: Yes     Comment: 0-2 drinks per month       Family History:   Family History   Problem Relation Age of Onset     Myocardial Infarction Father         late 50s     Coronary Artery Disease Father      Depression Father      Ovarian Cancer Sister      Cervical Cancer Sister      Lung Cancer Sister      Myocardial Infarction Paternal Uncle         late 50s     Hyperlipidemia Mother      Osteoporosis Mother      Cerebrovascular Disease Paternal Grandmother      Cerebrovascular Disease Paternal Grandfather      Other Cancer Sister         Cervix, ovary, lung     Depression Sister      Anxiety Disorder Sister      Anxiety Disorder Son        Allergies:   Allergies   Allergen Reactions     Propoxyphene Other (See Comments)     Clarithromycin Other (See Comments)     Pt states, \"ototoxicity\". Balance problems  Pt states, \"ototoxicity\".         Active Medications:   Current Outpatient Medications   Medication Sig Dispense Refill     acetaminophen (TYLENOL) 500 MG tablet Take 2 tablets (1,000 mg) by mouth every 6 hours as needed for pain       baclofen (LIORESAL) 10 MG tablet Take 10 mg by mouth daily as needed        " Carboxymethylcellul-Glycerin 1-0.9 % GEL Place 1 drop into both eyes daily        celecoxib (CELEBREX) 200 MG capsule Take 1 capsule (200 mg) by mouth daily 30 capsule 0     clonazePAM (KLONOPIN) 0.5 MG tablet Take 0.5 mg by mouth nightly as needed        DULoxetine (CYMBALTA) 60 MG capsule Take 1 capsule (60 mg) by mouth daily 90 capsule 1     LYSINE PO        mometasone (NASONEX) 50 MCG/ACT nasal spray spray 2 spray by intranasal route  every day in each nostril       NONFORMULARY Vitamin Packet from [employer] including Multi-vitamin, Leutin, Calcium, Antioxidant, Fish oil, Glucosamine- Chondroitin: Take 1 packet by mouth daily       propranolol (INDERAL) 10 MG tablet Take 10 mg by mouth daily as needed        rosuvastatin (CRESTOR) 5 MG tablet Take 1 tablet (5 mg) by mouth every other day 90 tablet 0     sertraline (ZOLOFT) 25 MG tablet Take 1 tablet (25 mg) by mouth daily 90 tablet 1     tacrolimus (PROTOPIC) 0.1 % external ointment Apply to AA on the face BID PRN 30 g 5     traZODone (DESYREL) 100 MG tablet Take 100 mg by mouth nightly as needed        valACYclovir (VALTREX) 1000 mg tablet Take two tablets twice per day for one day for flare ups. 30 tablet 1       Systemic Review:   CONSTITUTIONAL: NEGATIVE for fever, chills, change in weight  ENT/MOUTH: NEGATIVE for ear, mouth and throat problems  RESP: NEGATIVE for significant cough or SOB  CV: NEGATIVE for chest pain, palpitations or peripheral edema    Physical Examination:   Vital Signs: /78   Pulse 95   Temp 97.3  F (36.3  C)   Resp 11   SpO2 97%   GENERAL: healthy, alert and no distress  NECK: no adenopathy, no asymmetry, masses, or scars  RESP: normal work of breathing on RA  CV: well perfused warm extremities  ABDOMEN: soft, nontender, no hepatosplenomegaly, no masses and bowel sounds normal  MS: no gross musculoskeletal defects noted, no edema    Plan: Appropriate to proceed as scheduled.      Aries Wood MD  Pain Medicine  Fellow  [facility]  3/8/2022

## 2022-03-11 ASSESSMENT — ACTIVITIES OF DAILY LIVING (ADL): KNEE_ACTIVITY_OF_DAILY_LIVING_SCORE: 57.14

## 2022-03-14 ENCOUNTER — THERAPY VISIT (OUTPATIENT)
Dept: PHYSICAL THERAPY | Facility: CLINIC | Age: 72
End: 2022-03-14
Payer: MEDICARE

## 2022-03-14 DIAGNOSIS — G89.29 CHRONIC PAIN OF LEFT KNEE: ICD-10-CM

## 2022-03-14 DIAGNOSIS — F32.89 OTHER DEPRESSION: ICD-10-CM

## 2022-03-14 DIAGNOSIS — M25.552 HIP PAIN, LEFT: ICD-10-CM

## 2022-03-14 DIAGNOSIS — M25.562 CHRONIC PAIN OF LEFT KNEE: ICD-10-CM

## 2022-03-14 PROCEDURE — 97110 THERAPEUTIC EXERCISES: CPT | Mod: GP | Performed by: PHYSICAL THERAPIST

## 2022-03-14 PROCEDURE — 97161 PT EVAL LOW COMPLEX 20 MIN: CPT | Mod: GP | Performed by: PHYSICAL THERAPIST

## 2022-03-14 ASSESSMENT — ACTIVITIES OF DAILY LIVING (ADL)
STAND: ACTIVITY IS VERY DIFFICULT
SQUAT: ACTIVITY IS VERY DIFFICULT
WALK: ACTIVITY IS SOMEWHAT DIFFICULT
SWELLING: I DO NOT HAVE THE SYMPTOM
GIVING WAY, BUCKLING OR SHIFTING OF KNEE: THE SYMPTOM AFFECTS MY ACTIVITY SLIGHTLY
PAIN: THE SYMPTOM AFFECTS MY ACTIVITY MODERATELY
STIFFNESS: THE SYMPTOM AFFECTS MY ACTIVITY SLIGHTLY
KNEE_ACTIVITY_OF_DAILY_LIVING_SUM: 40
KNEEL ON THE FRONT OF YOUR KNEE: ACTIVITY IS MINIMALLY DIFFICULT
GO DOWN STAIRS: ACTIVITY IS FAIRLY DIFFICULT
GO UP STAIRS: ACTIVITY IS VERY DIFFICULT
RAW_SCORE: 40
LIMPING: I HAVE THE SYMPTOM BUT IT DOES NOT AFFECT MY ACTIVITY
WEAKNESS: THE SYMPTOM AFFECTS MY ACTIVITY SLIGHTLY
SIT WITH YOUR KNEE BENT: ACTIVITY IS NOT DIFFICULT
KNEE_ACTIVITY_OF_DAILY_LIVING_SCORE: 57.14
RISE FROM A CHAIR: ACTIVITY IS SOMEWHAT DIFFICULT

## 2022-03-14 NOTE — PROGRESS NOTES
Physical Therapy Initial Evaluation  Subjective:    Patient Health History  Dinorah Thompson being seen for Knee pain, lumbar spinal stenosis.          Pain is reported as 3/10 on pain scale.  General health as reported by patient is good.  Pertinent medical history includes: changes in bowel/bladder, depression, hepatitis, menopausal, migraines/headaches, numbness/tingling, overweight, osteoarthritis, osteoporosis, pain at night/rest, sleep disorder/apnea and other. Other medical history details: Sarcoidosis.     Medical allergies: other. Other medical allergies details: Propoxephene, clarithromycin.   Surgeries include:  Orthopedic surgery. Other surgery history details: Appendix, tonsils, sinus, hysterectomy, bladder, parathyroid,.    Current medications:  Anti-depressants, anti-inflammatory, sleep medication and other. Other medications details: Cholesterol,.    Current occupation is Retired nurse practitioner.   Primary job tasks include:  Computer work, driving, lifting/carrying and other.   Other job/home tasks details: Laundry, housekeeping.                Therapist Generated HPI Evaluation  Problem details: Pt c/o L knee x 6 months (years but worse over past 6 months).  Did PT in Nov 2021 but stopped due to death of sister (2 additional deaths in family also 4-6 weeks later).  Was not doing HEP and felt knee worsened back to pre PT levels.  Pt had also had LBP improved with PT.  Upon return on LBP had Lx injections  3/8/2022 and unsure of results yet.  Pt states focus on recovery at this time is for her L knee pain.  MD order date 10/25/2021..         Type of problem:  Left knee.    This is a chronic condition.  Condition occurred with:  Insidious onset.  Where condition occurred: for unknown reasons.  Patient reports pain:  Anterior and medial.  Pain is described as aching, shooting and stabbing and is constant.  Pain radiates to:  Thigh. Pain is the same all the time.  Since onset symptoms are  unchanged.  Associated symptoms:  Loss of strength and loss of motion/stiffness. Symptoms are exacerbated by ascending stairs, bending/squatting, descending stairs, transfers, standing, kneeling and walking  and relieved by rest and activity/movement.      Barriers include:  None as reported by patient.           Knee Activity of Daily Living Score: 57.14            Objective:    Gait:    Gait Type:  Antalgic   Assistive Devices:  None  Deviations:  Lumbar:  Trunk flexionKnee:  Knee extension decr L               Lumbar/SI Evaluation  ROM:    AROM Lumbar:   Flexion:          ERT/PUR  Ext:                    ERT   Side Bend:        Left:  ERT    Right:  ERT  Rotation:           Left:     Right:   Side Glide:        Left:     Right:         Strength: Fair core stab recruitment                                                           Knee Evaluation:  ROM:  Strength wnl knee: Glute med R 4+/5, L 4/5.  AROM      Extension:  Left: 5    Right:  0  Flexion: Left: 120    Right: 125  PROM      Extension: Left: 2    Right:  0  Flexion: Left: 130    Right:  135      Strength:     Extension:  Left: 4+/5    Pain:+      Right: 5-/5    Pain:-  Flexion:  Left: 5-/5    Pain:+/-      Right:/5  Strong/pain free  Pain:    Quad Set Left:  Fair and delayed    Pain: +/-   Quad Set Right:  Good    Pain: -    Special Tests:   Left knee positive for the following special tests:  Patellar Compression and Patellar Tracking-Abduction Lateral          Functional Testing:            Proprioception:   Stork Balance Test:  Left:  2-3 sec +  Right:  10-12 sec  % of Uninvolved:           General     ROS    Assessment/Plan:    Patient is a 71 year old female with left side knee complaints.    Patient has the following significant findings with corresponding treatment plan.                Diagnosis 1:  L knee pain  Pain -  hot/cold therapy, directional preference exercise and home program  Decreased ROM/flexibility - manual therapy and therapeutic  exercise  Decreased strength - therapeutic exercise and therapeutic activities  Decreased proprioception - neuro re-education and therapeutic activities  Impaired gait - gait training  Impaired muscle performance - neuro re-education  Decreased function - therapeutic activities  Impaired posture - neuro re-education    Therapy Evaluation Codes:   Cumulative Therapy Evaluation is: Low complexity.    Previous and current functional limitations:  (See Goal Flow Sheet for this information)    Short term and Long term goals: (See Goal Flow Sheet for this information)     Communication ability:  Patient appears to be able to clearly communicate and understand verbal and written communication and follow directions correctly.  Treatment Explanation - The following has been discussed with the patient:   RX ordered/plan of care  Anticipated outcomes  Possible risks and side effects  This patient would benefit from PT intervention to resume normal activities.   Rehab potential is good.    Frequency:  1 X week, once daily  Duration:  for 12 weeks  Discharge Plan:  Achieve all LTG.  Independent in home treatment program.  Reach maximal therapeutic benefit.    Please refer to the daily flowsheet for treatment today, total treatment time and time spent performing 1:1 timed codes.

## 2022-03-14 NOTE — PROGRESS NOTES
[facility]    OUTPATIENT Physical Therapy ORTHOPEDIC EVALUATION  PLAN OF TREATMENT FOR OUTPATIENT REHABILITATION  (COMPLETE FOR INITIAL CLAIMS ONLY)  Patient's Last Name, First Name, M.I.  YOB: 1950  Dinorah Thompson    Provider s Name:  [facility]   Medical Record No.  [id number]   Start of Care Date:  03/14/22   Onset Date:   10/25/21   Type:     _X__PT   ___OT Medical Diagnosis:  No diagnosis found.     Treatment Diagnosis:  L>R knee pain        Goals:     03/14/22 0500   Body Part   Goals listed below are for L>R knee pain   Goal #1   Goal #1 squatting/kneeling   Previous Functional Level No restrictions   Current Functional Level Can do a partial squat   Performance Level 5/10 pain   STG Target Performance Do a partial squat   Performance Level 3/10 pain   Rationale for proper body mechanics while performing housework;for proper body mechanics while performing yardwork and home maintenance;for proper body mechanics when lifting, personal hygiene, dressing   Due date 04/04/22   LTG [employer] Performance Kneel on a soft surface;Do a full squat   Performance Level 1/10 pain kneel, pain free full squat   Rationale for proper body mechanics while performing housework;for proper body mechanics while performing yardwork and home maintenance;for proper body mechanics when lifting, personal hygiene, dressing   Due date 06/06/22       Therapy Frequency:  1x/week  Predicted Duration of Therapy Intervention:  12 weeks    Baudilio Johnson, PT                 I CERTIFY THE NEED FOR THESE SERVICES FURNISHED UNDER        THIS PLAN OF TREATMENT AND WHILE UNDER MY CARE     (Physician attestation of this document indicates review and certification of the therapy plan).                       Certification Date From:  03/14/22   Certification Date To:  03/14/22    Referring  Provider:  Albert Yeo    Initial Assessment        See Epic Evaluation SOC Date: 03/14/22

## 2022-03-15 RX ORDER — CELECOXIB 200 MG/1
CAPSULE ORAL
Qty: 90 CAPSULE | Refills: 0 | Status: SHIPPED | OUTPATIENT
Start: 2022-03-15 | End: 2022-03-29 | Stop reason: DRUGHIGH

## 2022-03-15 RX ORDER — DULOXETIN HYDROCHLORIDE 60 MG/1
CAPSULE, DELAYED RELEASE ORAL
Qty: 90 CAPSULE | Refills: 1 | OUTPATIENT
Start: 2022-03-15

## 2022-03-15 NOTE — TELEPHONE ENCOUNTER
Routing refill request to provider for review/approval because:  Fail: Patient is age 6-64 years    Osiel DUMONT RN

## 2022-03-17 ENCOUNTER — VIRTUAL VISIT (OUTPATIENT)
Dept: PALLIATIVE MEDICINE | Facility: CLINIC | Age: 72
End: 2022-03-17
Payer: MEDICARE

## 2022-03-17 DIAGNOSIS — M79.2 NEUROPATHIC PAIN: ICD-10-CM

## 2022-03-17 DIAGNOSIS — M54.16 LUMBAR RADICULOPATHY: Primary | ICD-10-CM

## 2022-03-17 PROCEDURE — 96158 HLTH BHV IVNTJ INDIV 1ST 30: CPT | Mod: 95 | Performed by: PSYCHOLOGIST

## 2022-03-17 NOTE — PROGRESS NOTES
"Dinorah Thompson is a 71 year old female who is being evaluated via a billable video visit.      The patient has been notified of following:     \"This video visit will be conducted via a call between you and your physician/provider. We have found that certain health care needs can be provided without the need for an in-person physical exam.  This service lets us provide the care you need with a video conversation.     Video visits are billed at different rates depending on your insurance coverage.  Please reach out to your insurance provider with any questions.    If during the course of the call the physician/provider feels a video visit is not appropriate, you will not be charged for this service.\"    Patient has given verbal consent for Video visit? Yes    Patient would like the video invitation sent by: Text to cell phone: [phone]}    Video Start Time: patient joined at 11:03 AM    Additional provider notes:      Pain Diagnoses per pain provider:   Lumbar radiculopathy      Neuropathic pain          DATA: During today's visit you reported the following: Your lumbar back pain is unchanged since injection. Neuropathic pain is improved since injection - no longer have burning nerve pain down to foot. Your mood is much improved. Your activity level is mildly increased, getting to PT and pool and developing new routine. Your stress level is improved overall. Your sleep is improved due to being more busy lately, not napping as often. You reported engaging in self-care for your pain 2-3 times daily.    You identified that you would like to focus on the following or had questions regarding the following issues or concerns, and we discussed the following:   - not sure that recent injection was helpful, didn't feel like it was 'in the right spot' - helped with radiculopathy but not local pain  - restarted PT - hopeful this might be helpful with localized pain in lumbar area  - feel like you're getting back into " your normal routine  - continuing to find benefit with housekeeping once weekly  - discussed communicating with PT if any exercises exacerbate pain or trigger soreness  - wonder about OT assessment for yourself, especially as it relates to mobility on stairs as this tends to exacerbate knee pain  - excited to share you are doing better emotionally    ASSESSMENT: Patient reports overall she is feeling better in terms of her emotional well-being, not noting a ton of improvement in lumbar back pain although is hopeful this might improve another week out from her epidural injection. She is sleeping better as the neuropathic pain has significantly improved with the injection.     PLAN:   Your next appointment is scheduled for 4/28 at 9:00 AM. May schedule earlier as needed.  Assignment/Objectives /interventions for next session:   - continue to engage in PT as you're able  - reach out to Dr. Albert Yeo about whether you might qualify for OT assessment    We believe regular attendance is key to your success in our program!      Any time you are unable to keep your appointment we ask that you call us at [phone] at least 24 hours in advance to cancel.This will allow us to offer the appointment time to another patient.     Multiple missed appointments may lead to dismissal from the clinic.    Video-Visit Details    Type of service:  Video Visit    Video End Time (time video stopped): 11:30 AM    Originating Location (pt. Location): Home    Distant Location (provider location):  [facility]     Mode of Communication:  Video Conference via Raulito Powers PsyD LP  Licensed Psychologist  Outpatient Clinic Therapist  [facility]

## 2022-03-18 ENCOUNTER — MYC MEDICAL ADVICE (OUTPATIENT)
Dept: FAMILY MEDICINE | Facility: CLINIC | Age: 72
End: 2022-03-18
Payer: MEDICARE

## 2022-03-18 DIAGNOSIS — M25.552 HIP PAIN, LEFT: ICD-10-CM

## 2022-03-18 RX ORDER — CELECOXIB 200 MG/1
CAPSULE ORAL
Qty: 90 CAPSULE | Refills: 0 | Status: CANCELLED | OUTPATIENT
Start: 2022-03-18

## 2022-03-18 NOTE — TELEPHONE ENCOUNTER
See [employer] message below.  Celebrex was historical as twice a day but when ordered here has always been ordered as once a day.  Please advise and update if OK for twice a day.  Also wants 1 month RX locally and 90 day to mail order.  Please advise.  Bhavana Aguero RN

## 2022-03-18 NOTE — TELEPHONE ENCOUNTER
Please confirm dose with pharmacy. 200 mg two times per day is high dose and we should not be using this long-term.   Thank you  JASWANT Johnson CNP on 3/18/2022 at 3:01 PM

## 2022-03-21 RX ORDER — CELECOXIB 100 MG/1
100 CAPSULE ORAL 2 TIMES DAILY
Qty: 60 CAPSULE | Refills: 0 | Status: SHIPPED | OUTPATIENT
Start: 2022-03-21 | End: 2022-03-29

## 2022-03-21 NOTE — TELEPHONE ENCOUNTER
Patient calling.  Previous RX was from [place].  If you feel 200 mg twice a day is too much she is ok with lowering.  She would like to try 100 mg twice a day vs 200 mg daily.  T'd up.  Still one month to [facility] and then she will call in a couple of weeks to update with how she is doing and have 3 month sent to mail order if working well.  Bhavana Aguero RN

## 2022-03-22 ENCOUNTER — VIRTUAL VISIT (OUTPATIENT)
Dept: PSYCHOLOGY | Facility: CLINIC | Age: 72
End: 2022-03-22
Payer: MEDICARE

## 2022-03-22 ENCOUNTER — THERAPY VISIT (OUTPATIENT)
Dept: PHYSICAL THERAPY | Facility: CLINIC | Age: 72
End: 2022-03-22
Payer: MEDICARE

## 2022-03-22 DIAGNOSIS — F33.1 MODERATE EPISODE OF RECURRENT MAJOR DEPRESSIVE DISORDER (H): Primary | ICD-10-CM

## 2022-03-22 DIAGNOSIS — G89.29 CHRONIC PAIN OF LEFT KNEE: ICD-10-CM

## 2022-03-22 DIAGNOSIS — M25.562 CHRONIC PAIN OF LEFT KNEE: ICD-10-CM

## 2022-03-22 PROCEDURE — 97112 NEUROMUSCULAR REEDUCATION: CPT | Mod: GP | Performed by: PHYSICAL THERAPIST

## 2022-03-22 PROCEDURE — 90834 PSYTX W PT 45 MINUTES: CPT | Mod: 95 | Performed by: SOCIAL WORKER

## 2022-03-22 PROCEDURE — 97110 THERAPEUTIC EXERCISES: CPT | Mod: GP | Performed by: PHYSICAL THERAPIST

## 2022-03-22 ASSESSMENT — ANXIETY QUESTIONNAIRES
7. FEELING AFRAID AS IF SOMETHING AWFUL MIGHT HAPPEN: NOT AT ALL
1. FEELING NERVOUS, ANXIOUS, OR ON EDGE: SEVERAL DAYS
6. BECOMING EASILY ANNOYED OR IRRITABLE: SEVERAL DAYS
GAD7 TOTAL SCORE: 11
4. TROUBLE RELAXING: MORE THAN HALF THE DAYS
3. WORRYING TOO MUCH ABOUT DIFFERENT THINGS: MORE THAN HALF THE DAYS
2. NOT BEING ABLE TO STOP OR CONTROL WORRYING: MORE THAN HALF THE DAYS
5. BEING SO RESTLESS THAT IT IS HARD TO SIT STILL: NEARLY EVERY DAY

## 2022-03-22 ASSESSMENT — PATIENT HEALTH QUESTIONNAIRE - PHQ9: SUM OF ALL RESPONSES TO PHQ QUESTIONS 1-9: 9

## 2022-03-22 NOTE — TELEPHONE ENCOUNTER
Monitoring and blood pressure control needed at this time. Can consider ACEI medication for additional renal protection. Let's plan for a virtual visit if wanting to further discuss.   OK with Celebrex at 100 mg two times per day.    Thank you  JASWANT Johnson CNP on 3/22/2022 at 5:29 PM

## 2022-03-22 NOTE — TELEPHONE ENCOUNTER
Pt calls back,  ~did her research and confirmed she has been taking 200 mg twice daily   ~pt informs  researched online and appears ok to take at this dose  ~pt changed mind and does want to take 200 mg twice daily  ~pt willing to discuss to discuss with pain or back specialist if you want them to rx~also wonders about GFR on her problem list, usually  around 70 and one inadvertent one was 55, wonders if stage 3 CKD error? If so, pt would like removed    Routed to AG, please review and advise, pt wants resent  If able, route to inform pt of plan    Elizabeth Tomlin, RN, BSN  [facility]

## 2022-03-22 NOTE — TELEPHONE ENCOUNTER
Juju MICHAUD CNP     Patient was shocked to find out she has CKD 3 - she has never been talked to about this   Wonders if she needs to see nephrology?   Should she even be taking celebrex at all with this kidney diagnosis?     Please advise if you would like virtual visit to discuss     Thank you,   Nolvia Florence, Registered Nurse  [facility]

## 2022-03-22 NOTE — TELEPHONE ENCOUNTER
- would recommend patient inquire with Pain clinic on prescribing the higher dose of 200 mg two times per day. I won't prescribe higher than 100 mg two times per day ongoing.  - patient with multiple GFR's below 60 since 2017. Chronic Kidney disease stage 3 is diagnosed when patient has two readings GFR's below 60. It is recommended for this diagnosis to stay on chart for monitoring and safe medication prescribing.   Thank you  JASWANT Johnson CNP on 3/22/2022 at 2:30 PM

## 2022-03-22 NOTE — PROGRESS NOTES
[facility]                                     Progress Note    Patient Name: Dinorah Thompson  Date: 3.22.2022         Service Type: Individual      Session Start Time: 1303  Session End Time:      Session Length: 38-52    Session #: 3    Attendees: Client    Service Modality:  Video Visit:      Provider verified identity through the following two step process.  Patient provided:  Patient  and Patient is known previously to provider    Telemedicine Visit: The patient's condition can be safely assessed and treated via synchronous audio and visual telemedicine encounter.      Reason for Telemedicine Visit: Services only offered telehealth    Originating Site (Patient Location): Patient's home    Distant Site (Provider Location): Provider Remote Setting- Home Office    Consent:  The patient/guardian has verbally consented to: the potential risks and benefits of telemedicine (video visit) versus in person care; bill my insurance or make self-payment for services provided; and responsibility for payment of non-covered services.     Patient would like the video invitation sent by:  Send to e-mail at: [email or web address]    Mode of Communication:  Video Conference via [employer]    As the provider I attest to compliance with applicable laws and regulations related to telemedicine.    DATA  Interactive Complexity: No  Crisis: No        Progress Since Last Session (Related to Symptoms / Goals / Homework):   Symptoms: Worsening pain and communication    Homework: Partially completed      Episode of Care Goals: Minimal progress - PREPARATION (Decided to change - considering how); Intervened by negotiating a change plan and determining options / strategies for behavior change, identifying triggers, exploring social supports, and working towards setting a date to begin behavior change     Current / Ongoing Stressors and Concerns:     Grief loss, taking care of , transitions     Treatment  Objective(s) Addressed in This Session:     Patient will demonstrate/report improved stages of grief that can be safely managed in a lower level of care.  Patient report of able to express feelings of grief and loss regarding loss of family member and behavior indicating progression of grief process towards acceptance and coping with reality of loss with acknowledgement of permanent change within 6 months.  Patient will demonstrate and report a level of depression that can be managed at a lower level of care.  Absence of persistent depression mood and report of reduced frequency and intensity of low mood and absence of persistent low energy and motivation to acceptable levels, report subjective improved motivation and increased energy for a period of 90 days, within 6 months as clinically observed and by patient self-report     Intervention:   Motivational Interviewing    MI Intervention: Co-Developed Goal: allow self to grieve, Expressed Empathy/Understanding, Supported Autonomy, Collaboration, Evocation and Permission to raise concern or advise     Change Talk Expressed by the Patient: Reasons to change Need to change Activation    Provider Response to Change Talk: E - Evoked more info from patient about behavior change, A - Affirmed patient's thoughts, decisions, or attempts at behavior change, R - Reflected patient's change talk and S - Summarized patient's change talk statements      Assessments completed prior to visit:  PHQ9:   PHQ-9 SCORE 11/30/2021 12/7/2021 12/16/2021 1/10/2022 1/17/2022 1/17/2022 3/22/2022   PHQ-9 Total Score MyChart 13 (Moderate depression) 16 (Moderately severe depression) 10 (Moderate depression) 8 (Mild depression) - 8 (Mild depression) -   PHQ-9 Total Score 13 16 10 8 8 8 9   PHQ-9 External Data - - - - - - -     GAD7:   ELDON-7 SCORE 2/9/2021 4/9/2021 10/1/2021 11/30/2021 1/17/2022 1/17/2022 3/22/2022   Total Score 3 (minimal anxiety) 2 (minimal anxiety) 1 (minimal anxiety) 7 (mild  anxiety) - 4 (minimal anxiety) -   Total Score 3 2 1 7 4 4 11     PROMIS 10-Global Health (all questions and answers displayed):   PROMIS 10 1/10/2022 3/22/2022   In general, would you say your health is: Fair -   In general, would you say your quality of life is: Very good -   In general, how would you rate your physical health? Fair -   In general, how would you rate your mental health, including your mood and your ability to think? Good -   In general, how would you rate your satisfaction with your social activities and relationships? Fair -   In general, please rate how well you carry out your usual social activities and roles Good -   To what extent are you able to carry out your everyday physical activities such as walking, climbing stairs, carrying groceries, or moving a chair? Moderately -   How often have you been bothered by emotional problems such as feeling anxious, depressed or irritable? Sometimes -   How would you rate your fatigue on average? Moderate -   How would you rate your pain on average?   0 = No Pain  to  10 = Worst Imaginable Pain 4 -   In general, would you say your health is: 2 3   In general, would you say your quality of life is: 4 5   In general, how would you rate your physical health? 2 3   In general, how would you rate your mental health, including your mood and your ability to think? 3 4   In general, how would you rate your satisfaction with your social activities and relationships? 2 4   In general, please rate how well you carry out your usual social activities and roles. (This includes activities at home, at work and in your community, and responsibilities as a parent, child, spouse, employee, friend, etc.) 3 3   To what extent are you able to carry out your everyday physical activities such as walking, climbing stairs, carrying groceries, or moving a chair? 3 3   In the past 7 days, how often have you been bothered by emotional problems such as feeling anxious, depressed, or  irritable? 3 3   In the past 7 days, how would you rate your fatigue on average? 3 4   In the past 7 days, how would you rate your pain on average, where 0 means no pain, and 10 means worst imaginable pain? 4 5   Global Mental Health Score 12 16   Global Physical Health Score 11 11   PROMIS TOTAL - SUBSCORES 23 27   Some recent data might be hidden         ASSESSMENT: Current Emotional / Mental Status (status of significant symptoms):   Risk status (Self / Other harm or suicidal ideation)   Patient denies current fears or concerns for personal safety.   Patient denies current or recent suicidal ideation or behaviors.   Patient denies current or recent homicidal ideation or behaviors.   Patient denies current or recent self injurious behavior or ideation.   Patient denies other safety concerns.   Patient reports there has been no change in risk factors since their last session.     Patient reports there has been no change in protective factors since their last session.     Recommended that patient call 911 or go to the local ED should there be a change in any of these risk factors.     Appearance:   Appropriate    Eye Contact:   Fair    Psychomotor Behavior: Restless    Attitude:   Cooperative    Orientation:   All   Speech    Rate / Production: Emotional Talkative    Volume:  Normal    Mood:    Anxious  Depressed  Sad  Grieving   Affect:    Worrisome    Thought Content:  Clear    Thought Form:  Coherent    Insight:    Fair      Medication Review:   No changes to current psychiatric medication(s)     Medication Compliance:   Yes     Changes in Health Issues:   None reported     Chemical Use Review:   Substance Use: Chemical use reviewed, no active concerns identified      Tobacco Use: No current tobacco use.      Diagnosis:  1. Moderate episode of recurrent major depressive disorder (H)        Collateral Reports Completed:   Not Applicable    PLAN: (Patient Tasks / Therapist Tasks / Other)  Allow self to  martin  Speak to PCP about medication        Kaley Tan, LICSW  3.22.2022                                                         ______________________________________________________________________    Individual Treatment Plan    Patient's Name: Dinorah Thompson  YOB: 1950    Date of Creation: 1/11/2022  Date Treatment Plan Last Reviewed/Revised: 1/11/2022 due 3/11/2022    DSM5 Diagnoses: 296.32 (F33.1) Major Depressive Disorder, Recurrent Episode, Moderate With anxious distress  Psychosocial / Contextual Factors: Grief loss, taking care of , transitions  PROMIS (reviewed every 90 days):     Referral / Collaboration:  Referral to another professional/service is not indicated at this time..    Anticipated number of session for this episode of care: 12  Anticipation frequency of session: Biweekly  Anticipated Duration of each session: 38-52 minutes  Treatment plan will be reviewed in 90 days or when goals have been changed.       MeasurableTreatment Goal(s) related to diagnosis / functional impairment(s)  Goal 1: Patient will able to express feelings of grief and loss regarding loss of family member and behavior indicating progression of grief process towards acceptance and coping with reality of loss with acknowledgement of permanent change within 6 months    I will know I've met my goal when I remember her and it's laughing and not sad.      Objective #A (Patient Action)    Patient will demonstrate/report improved stages of grief that can be safely managed in a lower level of care.  Patient report of able to express feelings of grief and loss regarding loss of family member and behavior indicating progression of grief process towards acceptance and coping with reality of loss with acknowledgement of permanent change within 6 months.  Status: New - Date: 1/11/2022.     Intervention(s)  Therapist will provide individual therapy to process through grief and loss and to gain effective  "coping skills. Tx to discuss current stressors and interpersonal conflicts and how to cope with these, coaching, diagnostic testing, referral for medication as indicated, use prescribed medication, cognitive restructuring, interpersonal family therapy, supportive therapy services.        Goal 2: Patient will report absence of persistent depression mood and report of reduced frequency and intensity of low mood and absence of persistent low energy and motivation to acceptable levels, report subjective improved motivation and increased energy for a period of 90 days, within 6 months as clinically observed and by patient self-report    I will know I've met my goal when I can measure my fatigue vs depression \".    Objective #A (Patient Action)    Patient will demonstrate and report a level of depression that can be managed at a lower level of care.  Absence of persistent depression mood and report of reduced frequency and intensity of low mood and absence of persistent low energy and motivation to acceptable levels, report subjective improved motivation and increased energy for a period of 90 days, within 6 months as clinically observed and by patient self-report  Status: New- date1/11/2022    Intervention(s)  Therapist will provide individual therapy to identify triggers to depression, gain feedback on helpful coping tools and thought-reframing techniques, and identify preferred way of being.  Tx to include discussion of current stressors and interpersonal conflicts and how to cope with these, coaching, diagnostic testing, referral for medication as indicated, use prescribed medication, cognitive restructuring, interpersonal, family therapy, supportive therapy services      Patient has reviewed and agreed to the above plan.      Kaley Tan, [facility]  January 11, 2022  "

## 2022-03-23 ASSESSMENT — ANXIETY QUESTIONNAIRES: GAD7 TOTAL SCORE: 11

## 2022-03-23 NOTE — TELEPHONE ENCOUNTER
Discussed with patient message below from pcp     Patient states she is uncomfortable taking the celebrex due to kidney concerns, will go without until virtual visit RN scheduled on 3/29/2022    Nolvia Florence, Registered Nurse  [facility]

## 2022-03-29 ENCOUNTER — THERAPY VISIT (OUTPATIENT)
Dept: PHYSICAL THERAPY | Facility: CLINIC | Age: 72
End: 2022-03-29
Payer: MEDICARE

## 2022-03-29 ENCOUNTER — VIRTUAL VISIT (OUTPATIENT)
Dept: FAMILY MEDICINE | Facility: CLINIC | Age: 72
End: 2022-03-29
Payer: MEDICARE

## 2022-03-29 DIAGNOSIS — M25.552 HIP PAIN, LEFT: ICD-10-CM

## 2022-03-29 DIAGNOSIS — G89.29 CHRONIC PAIN OF LEFT KNEE: ICD-10-CM

## 2022-03-29 DIAGNOSIS — M25.562 CHRONIC PAIN OF LEFT KNEE: ICD-10-CM

## 2022-03-29 DIAGNOSIS — N18.30 STAGE 3 CHRONIC KIDNEY DISEASE, UNSPECIFIED WHETHER STAGE 3A OR 3B CKD (H): Primary | ICD-10-CM

## 2022-03-29 PROCEDURE — 97112 NEUROMUSCULAR REEDUCATION: CPT | Mod: GP | Performed by: PHYSICAL THERAPIST

## 2022-03-29 PROCEDURE — 97110 THERAPEUTIC EXERCISES: CPT | Mod: GP | Performed by: PHYSICAL THERAPIST

## 2022-03-29 PROCEDURE — 99214 OFFICE O/P EST MOD 30 MIN: CPT | Mod: 95 | Performed by: NURSE PRACTITIONER

## 2022-03-29 PROCEDURE — 97035 APP MDLTY 1+ULTRASOUND EA 15: CPT | Mod: GP | Performed by: PHYSICAL THERAPIST

## 2022-03-29 RX ORDER — CELECOXIB 100 MG/1
100 CAPSULE ORAL 2 TIMES DAILY
Qty: 180 CAPSULE | Refills: 0 | Status: SHIPPED | OUTPATIENT
Start: 2022-03-29 | End: 2022-12-01

## 2022-03-29 ASSESSMENT — ASTHMA QUESTIONNAIRES
QUESTION_4 LAST FOUR WEEKS HOW OFTEN HAVE YOU USED YOUR RESCUE INHALER OR NEBULIZER MEDICATION (SUCH AS ALBUTEROL): NOT AT ALL
QUESTION_1 LAST FOUR WEEKS HOW MUCH OF THE TIME DID YOUR ASTHMA KEEP YOU FROM GETTING AS MUCH DONE AT WORK, SCHOOL OR AT HOME: NONE OF THE TIME
QUESTION_2 LAST FOUR WEEKS HOW OFTEN HAVE YOU HAD SHORTNESS OF BREATH: NOT AT ALL
ACT_TOTALSCORE: 25
ACT_TOTALSCORE: 25
QUESTION_3 LAST FOUR WEEKS HOW OFTEN DID YOUR ASTHMA SYMPTOMS (WHEEZING, COUGHING, SHORTNESS OF BREATH, CHEST TIGHTNESS OR PAIN) WAKE YOU UP AT NIGHT OR EARLIER THAN USUAL IN THE MORNING: NOT AT ALL
QUESTION_5 LAST FOUR WEEKS HOW WOULD YOU RATE YOUR ASTHMA CONTROL: COMPLETELY CONTROLLED

## 2022-03-29 NOTE — PROGRESS NOTES
Dot is a 71 year old who is being evaluated via a billable video visit.      How would you like to obtain your AVS? MyChart  If the video visit is dropped, the invitation should be resent by: Text to cell phone: [phone]  Will anyone else be joining your video visit? No      Video Start Time: 1730    Assessment & Plan     Stage 3 chronic kidney disease, unspecified whether stage 3a or 3b CKD (H)  Stable. GFR 68, two readings < 60. Reassured of supportive measures to support renal function.   Discussed reduced dose of Celebrex at 100 mg two times per day was appropriate for a balance of pain management and renal function. This was in comparison to her chronic use of 200 mg two times per day.     30 minutes spent on encounter on day of visit with chart review, visit, Planning, and documentation.     Return in about 2 weeks (around 4/12/2022) for Preventive Visit, appointment already scheduled.    JASWANT Johnson [facility]   Dot is a 71 year old who presents for the following health issues     Kidney Problem    History of Present Illness       CKD: She uses over the counter pain medication, including Tylenol, three times daily.    She eats 2-3 servings of fruits and vegetables daily.She consumes 0 sweetened beverage(s) daily.She exercises with enough effort to increase her heart rate 9 or less minutes per day.  She exercises with enough effort to increase her heart rate 3 or less days per week.   She is taking medications regularly.         Review of Systems   Constitutional, HEENT, cardiovascular, pulmonary, gi and gu systems are negative, except as otherwise noted.      Objective           Vitals:  No vitals were obtained today due to virtual visit.    Physical Exam   GENERAL: Healthy, alert and no distress  EYES: Eyes grossly normal to inspection.  No discharge or erythema, or obvious scleral/conjunctival abnormalities.  RESP: No audible wheeze, cough, or  visible cyanosis.  No visible retractions or increased work of breathing.    SKIN: Visible skin clear. No significant rash, abnormal pigmentation or lesions.  NEURO: Cranial nerves grossly intact.  Mentation and speech appropriate for age.  PSYCH: Mentation appears normal, affect normal/bright, judgement and insight intact, normal speech and appearance well-groomed.                Video-Visit Details    Type of service:  Video Visit    Video End Time:5:46 PM    Originating Location (pt. Location): Home    Distant Location (provider location):  [facility]     Platform used for Video Visit: [employer]

## 2022-03-30 NOTE — PROGRESS NOTES
ASSESSMENT & PLAN  Patient Instructions     1. Primary osteoarthritis of left knee      -Patient is following up for chronic left knee pain due to arthritis and likely degenerative medial meniscus tear and cartilage injuries  -Patient tolerated left intra-articular cortisone injection today without complications.  Patient was given postprocedure instructions  -Patient may return to low impact cardiovascular exercises as her pain allows  -patient will restart formal physical therapy in approximately 2 to 3 weeks once pain improved  -We discussed potentially trying viscosupplementation in the future if patient has less than 4 months of pain relief from the cortisone injections.  We also discussed potentially trying geniculate nerve blocks and ablation for pain relief.  -Patient will ultimately need a knee replacement surgery at some point in the future.  We will get an MRI of the left knee prior to surgical consideration for full assessment  -Patient has been taking Celebrex chronically for her significant arthritis and back pains.  Patient has been decreased from 200 mg twice a day to 100 mg twice a day.  Patient has a follow-up appointment with her pain management doctor.  Patient should discuss weaning off of Celebrex to other oral pain medications that are safe to take long-term.  Patient is already on Cymbalta 60 mg daily  -Patient may continue with extra Tylenol for breakthrough pain  -Patient will follow up when pain returns  -Call direct clinic number [[phone]] at any time with questions or concerns.    Albert Yeo MD [facility]  [facility]          -----    SUBJECTIVE:  Dinorah Thompson is a 71 year old female who is seen in follow-up for left knee pain.They were last seen 10/25/2021.  She states she took a break from physical therapy due to some family issues. She restarted recently but is still having a lot of pain. Working on using the [employer] at  "the [facility] for exercise. She had to cut Celebrex in half due to kidney disease.     Since their last visit reports worsening pain. They indicate that their current pain level is 5/10. They have tried physical therapy (5 visits), Celebrex, Tylenol.      The patient is seen by themselves.    Patient's past medical, surgical, social, and family histories were reviewed today and no changes are noted.    REVIEW OF SYSTEMS:  Constitutional: NEGATIVE for fever, chills, change in weight  Skin: NEGATIVE for worrisome rashes, moles or lesions  GI/: NEGATIVE for bowel or bladder changes  Neuro: NEGATIVE for weakness, dizziness or paresthesias    OBJECTIVE:  /82   Ht 1.575 m (5' 2\")   Wt 65.3 kg (144 lb)   BMI 26.34 kg/m     General: healthy, alert and in no distress  HEENT: no scleral icterus or conjunctival erythema  Skin: no suspicious lesions or rash. No jaundice.  CV: regular rhythm by palpation, no pedal edema  Resp: normal respiratory effort without conversational dyspnea   Psych: normal mood and affect  Gait: normal steady gait with appropriate coordination and balance  Neuro: normal light touch sensory exam of the extremities.    MSK:    LEFT KNEE  Inspection:    normal alignment  Palpation:    Tender about the lateral patellar facet, medial patellar facet and medial joint line. Remainder of bony and ligamentous landmarks are nontender.    Trace effusion is present    Patellofemoral crepitus is Present  Range of Motion:     50 extension to 1200 flexion  Strength:    Quadriceps grossly intact    Extensor mechanism intact  Special Tests:    Positive: none    Negative: MCL/valgus stress (0 & 30 deg), LCL/varus stress (0 & 30 deg), Lachman's, anterior drawer, posterior drawer, Sang's    Independent visualization of the below image:  Large Joint Injection/Arthocentesis: L knee joint    Date/Time: 4/5/2022 9:58 AM  Performed by: Yeo, Albert, MD  Authorized by: Yeo, Albert, MD     Indications:  Pain and " osteoarthritis  Needle Size:  25 G  Guidance: ultrasound    Location:  Knee      Medications:  40 mg methylPREDNISolone 40 MG/ML  Outcome:  Tolerated well, no immediate complications  Procedure discussed: discussed risks, benefits, and alternatives    Consent Given by:  Patient  Prep: patient was prepped and draped in usual sterile fashion          Patient's conditions were thoroughly discussed during today's visit with total time spent face-to-face with the patient and documentation being 25 minutes.    Albert Yeo MD, CAQSM  [facility]

## 2022-04-05 ENCOUNTER — OFFICE VISIT (OUTPATIENT)
Dept: ORTHOPEDICS | Facility: CLINIC | Age: 72
End: 2022-04-05
Payer: MEDICARE

## 2022-04-05 VITALS
WEIGHT: 144 LBS | SYSTOLIC BLOOD PRESSURE: 119 MMHG | HEIGHT: 62 IN | BODY MASS INDEX: 26.5 KG/M2 | DIASTOLIC BLOOD PRESSURE: 82 MMHG

## 2022-04-05 DIAGNOSIS — M17.12 PRIMARY OSTEOARTHRITIS OF LEFT KNEE: Primary | ICD-10-CM

## 2022-04-05 PROCEDURE — 20611 DRAIN/INJ JOINT/BURSA W/US: CPT | Mod: LT | Performed by: FAMILY MEDICINE

## 2022-04-05 PROCEDURE — 99213 OFFICE O/P EST LOW 20 MIN: CPT | Mod: 25 | Performed by: FAMILY MEDICINE

## 2022-04-05 RX ORDER — METHYLPREDNISOLONE ACETATE 40 MG/ML
40 INJECTION, SUSPENSION INTRA-ARTICULAR; INTRALESIONAL; INTRAMUSCULAR; SOFT TISSUE
Status: DISCONTINUED | OUTPATIENT
Start: 2022-04-05 | End: 2022-07-26

## 2022-04-05 RX ADMIN — METHYLPREDNISOLONE ACETATE 40 MG: 40 INJECTION, SUSPENSION INTRA-ARTICULAR; INTRALESIONAL; INTRAMUSCULAR; SOFT TISSUE at 09:58

## 2022-04-05 ASSESSMENT — ENCOUNTER SYMPTOMS
HALLUCINATIONS: 0
SPEECH CHANGE: 0
FATIGUE: 1
POLYPHAGIA: 0
SEIZURES: 0
CHILLS: 0
ARTHRALGIAS: 1
MUSCLE WEAKNESS: 1
TINGLING: 0
JOINT SWELLING: 0
DIZZINESS: 0
WEAKNESS: 1
NUMBNESS: 0
WEIGHT LOSS: 0
NECK PAIN: 1
MEMORY LOSS: 1
DECREASED APPETITE: 0
NIGHT SWEATS: 1
TREMORS: 0
WEIGHT GAIN: 0
STIFFNESS: 1
LOSS OF CONSCIOUSNESS: 0
HEADACHES: 1
POLYDIPSIA: 0
FEVER: 0
DISTURBANCES IN COORDINATION: 0
INCREASED ENERGY: 0
MUSCLE CRAMPS: 0
BACK PAIN: 1
ALTERED TEMPERATURE REGULATION: 0
MYALGIAS: 1
PARALYSIS: 0

## 2022-04-05 NOTE — PATIENT INSTRUCTIONS
1. Primary osteoarthritis of left knee      -Patient is following up for chronic left knee pain due to arthritis and likely degenerative medial meniscus tear and cartilage injuries  -Patient tolerated left intra-articular cortisone injection today without complications.  Patient was given postprocedure instructions  -Patient may return to low impact cardiovascular exercises as her pain allows  -patient will restart formal physical therapy in approximately 2 to 3 weeks once pain improved  -We discussed potentially trying viscosupplementation in the future if patient has less than 4 months of pain relief from the cortisone injections.  We also discussed potentially trying geniculate nerve blocks and ablation for pain relief.  -Patient will ultimately need a knee replacement surgery at some point in the future.  We will get an MRI of the left knee prior to surgical consideration for full assessment  -Patient has been taking Celebrex chronically for her significant arthritis and back pains.  Patient has been decreased from 200 mg twice a day to 100 mg twice a day.  Patient has a follow-up appointment with her pain management doctor.  Patient should discuss weaning off of Celebrex to other oral pain medications that are safe to take long-term.  Patient is already on Cymbalta 60 mg daily  -Patient may continue with extra Tylenol for breakthrough pain  -Patient will follow up when pain returns  -Call direct clinic number [[phone]] at any time with questions or concerns.    Albert Yeo MD [facility]  [facility] [place]

## 2022-04-05 NOTE — LETTER
4/5/2022         RE: Dinorah Thompson  [address]  [place] [address]        Dear Colleague,    Thank you for referring your patient, Dinorah Thompson, to the [facility] [place]. Please see a copy of my visit note below.    ASSESSMENT & PLAN  Patient Instructions     1. Primary osteoarthritis of left knee      -Patient is following up for chronic left knee pain due to arthritis and likely degenerative medial meniscus tear and cartilage injuries  -Patient tolerated left intra-articular cortisone injection today without complications.  Patient was given postprocedure instructions  -Patient may return to low impact cardiovascular exercises as her pain allows  -patient will restart formal physical therapy in approximately 2 to 3 weeks once pain improved  -We discussed potentially trying viscosupplementation in the future if patient has less than 4 months of pain relief from the cortisone injections.  We also discussed potentially trying geniculate nerve blocks and ablation for pain relief.  -Patient will ultimately need a knee replacement surgery at some point in the future.  We will get an MRI of the left knee prior to surgical consideration for full assessment  -Patient has been taking Celebrex chronically for her significant arthritis and back pains.  Patient has been decreased from 200 mg twice a day to 100 mg twice a day.  Patient has a follow-up appointment with her pain management doctor.  Patient should discuss weaning off of Celebrex to other oral pain medications that are safe to take long-term.  Patient is already on Cymbalta 60 mg daily  -Patient may continue with extra Tylenol for breakthrough pain  -Patient will follow up when pain returns  -Call direct clinic number [[phone]] at any time with questions or concerns.    Albert Yeo MD [facility]  [facility]  "Center          -----    SUBJECTIVE:  Dinorah Thompson is a 71 year old female who is seen in follow-up for left knee pain.They were last seen 10/25/2021.  She states she took a break from physical therapy due to some family issues. She restarted recently but is still having a lot of pain. Working on using the [employer] at the [facility] for exercise. She had to cut Celebrex in half due to kidney disease.     Since their last visit reports worsening pain. They indicate that their current pain level is 5/10. They have tried physical therapy (5 visits), Celebrex, Tylenol.      The patient is seen by themselves.    Patient's past medical, surgical, social, and family histories were reviewed today and no changes are noted.    REVIEW OF SYSTEMS:  Constitutional: NEGATIVE for fever, chills, change in weight  Skin: NEGATIVE for worrisome rashes, moles or lesions  GI/: NEGATIVE for bowel or bladder changes  Neuro: NEGATIVE for weakness, dizziness or paresthesias    OBJECTIVE:  /82   Ht 1.575 m (5' 2\")   Wt 65.3 kg (144 lb)   BMI 26.34 kg/m     General: healthy, alert and in no distress  HEENT: no scleral icterus or conjunctival erythema  Skin: no suspicious lesions or rash. No jaundice.  CV: regular rhythm by palpation, no pedal edema  Resp: normal respiratory effort without conversational dyspnea   Psych: normal mood and affect  Gait: normal steady gait with appropriate coordination and balance  Neuro: normal light touch sensory exam of the extremities.    MSK:    LEFT KNEE  Inspection:    normal alignment  Palpation:    Tender about the lateral patellar facet, medial patellar facet and medial joint line. Remainder of bony and ligamentous landmarks are nontender.    Trace effusion is present    Patellofemoral crepitus is Present  Range of Motion:     50 extension to 1200 flexion  Strength:    Quadriceps grossly intact    Extensor mechanism intact  Special Tests:    Positive: none    Negative: MCL/valgus stress (0 & 30 " deg), LCL/varus stress (0 & 30 deg), Lachman's, anterior drawer, posterior drawer, Sang's    Independent visualization of the below image:  Large Joint Injection/Arthocentesis: L knee joint    Date/Time: 4/5/2022 9:58 AM  Performed by: Yeo, Albert, MD  Authorized by: Yeo, Albert, MD     Indications:  Pain and osteoarthritis  Needle Size:  25 G  Guidance: ultrasound    Location:  Knee      Medications:  40 mg methylPREDNISolone 40 MG/ML  Outcome:  Tolerated well, no immediate complications  Procedure discussed: discussed risks, benefits, and alternatives    Consent Given by:  Patient  Prep: patient was prepped and draped in usual sterile fashion          Patient's conditions were thoroughly discussed during today's visit with total time spent face-to-face with the patient and documentation being 25 minutes.    Albert Yeo MD, [facility]            Again, thank you for allowing me to participate in the care of your patient.        Sincerely,        Albert Yeo, MD

## 2022-04-06 ENCOUNTER — OFFICE VISIT (OUTPATIENT)
Dept: PULMONOLOGY | Facility: CLINIC | Age: 72
End: 2022-04-06
Attending: INTERNAL MEDICINE
Payer: MEDICARE

## 2022-04-06 VITALS
SYSTOLIC BLOOD PRESSURE: 125 MMHG | HEART RATE: 76 BPM | OXYGEN SATURATION: 98 % | RESPIRATION RATE: 17 BRPM | BODY MASS INDEX: 27.23 KG/M2 | HEIGHT: 62 IN | DIASTOLIC BLOOD PRESSURE: 76 MMHG | WEIGHT: 148 LBS

## 2022-04-06 DIAGNOSIS — D86.9 SARCOIDOSIS: Primary | ICD-10-CM

## 2022-04-06 DIAGNOSIS — D86.9 SARCOIDOSIS: ICD-10-CM

## 2022-04-06 LAB
6 MIN WALK (FT): 1500 FT
6 MIN WALK (M): 457 M

## 2022-04-06 PROCEDURE — 94729 DIFFUSING CAPACITY: CPT | Performed by: INTERNAL MEDICINE

## 2022-04-06 PROCEDURE — 94726 PLETHYSMOGRAPHY LUNG VOLUMES: CPT | Performed by: INTERNAL MEDICINE

## 2022-04-06 PROCEDURE — 94375 RESPIRATORY FLOW VOLUME LOOP: CPT | Performed by: INTERNAL MEDICINE

## 2022-04-06 PROCEDURE — 99207 PR NO CHARGE LOS: CPT

## 2022-04-06 PROCEDURE — 94618 PULMONARY STRESS TESTING: CPT | Performed by: INTERNAL MEDICINE

## 2022-04-06 PROCEDURE — 99214 OFFICE O/P EST MOD 30 MIN: CPT | Mod: 25 | Performed by: INTERNAL MEDICINE

## 2022-04-06 PROCEDURE — G0463 HOSPITAL OUTPT CLINIC VISIT: HCPCS | Mod: 25

## 2022-04-06 ASSESSMENT — PAIN SCALES - GENERAL: PAINLEVEL: MODERATE PAIN (5)

## 2022-04-06 NOTE — LETTER
4/6/2022         RE: Dinorah Thompson  [address]  [place] [address]        Dear Colleague,    Thank you for referring your patient, Dinorah Thompson, to the [facility] [place]. Please see a copy of my visit note below.    Reason for Visit  Dinorah Thompson is a 71 year old year old female who is being seen for follow up of sarcoidosis  Pulmonary HPI  The patient was seen and examined by Kushal Richmond MD     Ms. Thompson comes in for followup of her sarcoidosis.  She was last seen in the Pulmonary Clinic in December.  At that time, she was still grieving the loss of her sister.  For sarcoidosis, she has been off systemic anti-inflammatory therapy.  There was a concern that there could be a sarcoid flare given all the stressors in her life, for which a shorter-term followup visit was set up.  She is here for this.      Overall, she is doing better.  From a mood standpoint, she believes that she is more upbeat most days of the week.  She denies any pulmonary symptoms.  She does report a dry cough, which is not bothersome.  She believes this is related to laryngeal dysfunction, for which she was advised speech therapy, and she is not currently following recommended plan rigorously.      She also reports a headache, which has been present since last night.  This is a sharp pain present on both sides of the head.  It is not associated with any vision changes or weakness.  She denies any other neurological symptoms associated with it.  No nausea.  There is no history of trauma.    The patient was told that she has CKD, stage 3, based on GFR, which could be related to use of nonsteroidals.    With a steroid injection of the left knee recently, she had a significant improvement in her knee pain.  She continues to have low back pain.      Current Outpatient Medications   Medication     acetaminophen (TYLENOL) 500 MG tablet     baclofen (LIORESAL) 10  "MG tablet     Carboxymethylcellul-Glycerin 1-0.9 % GEL     celecoxib (CELEBREX) 100 MG capsule     clonazePAM (KLONOPIN) 0.5 MG tablet     DULoxetine (CYMBALTA) 60 MG capsule     LYSINE PO     mometasone (NASONEX) 50 MCG/ACT nasal spray     NONFORMULARY     propranolol (INDERAL) 10 MG tablet     rosuvastatin (CRESTOR) 5 MG tablet     sertraline (ZOLOFT) 25 MG tablet     tacrolimus (PROTOPIC) 0.1 % external ointment     traZODone (DESYREL) 100 MG tablet     valACYclovir (VALTREX) 1000 mg tablet     Current Facility-Administered Medications   Medication     methylPREDNISolone (DEPO-MEDROL) injection 40 mg     Allergies   Allergen Reactions     Propoxyphene Other (See Comments)     Clarithromycin Other (See Comments)     Pt states, \"ototoxicity\". Balance problems  Pt states, \"ototoxicity\".       Past Medical History:   Diagnosis Date     Chronic osteoarthritis      Complication of anesthesia 1990's    DIfficulty waking up     Depressive disorder      Female bladder prolapse 9/3/2019     Mild intermittent asthma 7/29/2017     FAHAD treated with BiPAP 6/13/2018     Parathyroid abnormality (H)      Prolapse of female pelvic organs 8/19/2019     Sleep apnea     Uses a Bi-Pap.. Will have son bring it if necessary on DOS       Past Surgical History:   Procedure Laterality Date     APPENDECTOMY       BIOPSY  2008    Lungs     COLONOSCOPY       diskectomy in toe       EYE SURGERY  Cataracts     GENITOURINARY SURGERY  2019    Bladder mesh repair     GYN SURGERY  2019    Total hysterectomy     HYSTERECTOMY  08/2017    and bladder surgery     INJECT EPIDURAL LUMBAR Right 4/27/2021    Procedure: Right Lumbar 5- Sacral 1 transforaminal epidural steroid injection with fluoroscopy and conscious sedation.;  Surgeon: Tiera Santana MD;  Location: [facility] OR     INJECT EPIDURAL LUMBAR Left 11/11/2021    Procedure: Lumbar epidural steroid injection L5- S1 with fluoroscopy;  Surgeon: Tiera Santana MD;  Location: [facility] OR     INJECT EPIDURAL " LUMBAR Left 3/8/2022    Procedure: L5-S1 Epidural steroid injection with fluoroscopy;  Surgeon: Tiera Santana MD;  Location: [facility] OR     left rotator cuff surgery       PARATHYROIDECTOMY N/A 7/21/2021    Procedure: PARATHYROIDECTOMY;  Surgeon: Gregoria Wilcox MD;  Location: [facility] OR     SINUS SURGERY      x2 in 2013 and 2018     TONSILLECTOMY  1955       Social History     Socioeconomic History     Marital status:      Spouse name: Wilbert     Number of children: Not on file     Years of education: Not on file     Highest education level: Bachelor's degree (e.g., BA, AB, BS)   Occupational History     Not on file   Tobacco Use     Smoking status: Never Smoker     Smokeless tobacco: Never Used   Vaping Use     Vaping Use: Never used   Substance and Sexual Activity     Alcohol use: Yes     Comment: 0-2 drinks per month     Drug use: Never     Sexual activity: Not Currently     Partners: Male     Birth control/protection: Post-menopausal   Other Topics Concern     Parent/sibling w/ CABG, MI or angioplasty before 65F 55M? Yes     Comment: Father, MI   Social History Narrative     Not on file     Social Determinants of Health     Financial Resource Strain: Low Risk      Difficulty of Paying Living Expenses: Not hard at all   Food Insecurity: No Food Insecurity     Worried About Running Out of Food in the Last Year: Never true     Ran Out of Food in the Last Year: Never true   Transportation Needs: No Transportation Needs     Lack of Transportation (Medical): No     Lack of Transportation (Non-Medical): No   Physical Activity: Insufficiently Active     Days of Exercise per Week: 2 days     Minutes of Exercise per Session: 10 min   Stress: Stress Concern Present     Feeling of Stress : To some extent   Social Connections: Socially Integrated     Frequency of Communication with Friends and Family: More than three times a week     Frequency of Social Gatherings with Friends and Family: Once a week     Attends  "Christianity Services: More than 4 times per year     Active Member of Clubs or Organizations: Yes     Attends Club or Organization Meetings: More than 4 times per year     Marital Status:    Intimate Partner Violence: Not on file   Housing Stability: Low Risk      Unable to Pay for Housing in the Last Year: No     Number of Places Lived in the Last Year: 2     Unstable Housing in the Last Year: No       ROS Pulmonary  A complete ROS was otherwise negative except as noted in the HPI.  /76   Pulse 76   Resp 17   Ht 1.575 m (5' 2\")   Wt 67.1 kg (148 lb)   SpO2 98%   BMI 27.07 kg/m    Exam:   GENERAL APPEARANCE: Alert, and in no apparent distress.  EYES: PERRL, EOMI  HENT: Nasal mucosa with no edema and no hyperemia.   MOUTH: Oral mucosa is moist, without any lesions, no tonsillar enlargement, no oropharyngeal exudate.  NECK: supple, no masses, no thyromegaly.  LYMPHATICS: No significant axillary, cervical, or supraclavicular nodes.  RESP: normal percussion, good air flow throughout.  No crackles. No rhonchi. No wheezes.  CV: Normal S1, S2, regular rhythm, normal rate. No murmur.  No rub. No gallop. No LE edema.   MS: extremities normal. No clubbing. No cyanosis.  SKIN: no rash on limited exam  NEURO: Mentation intact, speech normal, normal strength and tone, normal gait and stance    Results:  PFTs done today were reviewed by me with the patient.  She also had a 6 minute walk test done where the 6 minute walk distance was within normal range.  No desaturation was noted.  FVC is 3.43 L (130%), FEV1 is 2.66 L (130%), and the ratio is 74.  Total lung capacity is 5.49 (119%), and DLCO is 18.97 (104%).  My interpretation is that the spirometry, lung volumes and DLCO are all in the normal range.  In comparison to prior spirometry, the FVC has decreased by 200 cc, FEV1 has decreased by 210 cc, and total lung capacity has decreased by 40 cc.      Assessment and plan: Ms. Thompson is a pleasant 71-year-old female " of [place]  descent with degenerative disc disease with concern for impingement.  She has sarcoidosis previously treated with ~6 months x 2 of anti-inflammatory medications. She also has a  history of pericardial effusion, and possible REM related sleep movement.  1.  Pulmonary:  The PFTs are stable.  The patient has cough, probably related to laryngeal dysfunction which she will improve with adherence to speech therapy.  2.  Extrapulmonary sarcoidosis.  Cardiac MRI in 2020 with no LGE.  It is unlikely that her CKD is related to sarcoidosis.  Labs done recently with normal calcium, LFTs and CBC.  3.  Sleep-disordered breathing.  The patient had recent evaluation done and was found to have an AHI of less than 5.  Question of REM-related movements.  The patient will follow up with Sleep as needed.    This note consists of symbols derived from keyboarding, dictation and/or voice recognition software. As a result, there may be errors in the script that have gone undetected. Please consider this when interpreting information found in this chart    Answers for HPI/ROS submitted by the patient on 4/5/2022  General Symptoms: Yes  Skin Symptoms: No  HENT Symptoms: No  EYE SYMPTOMS: No  HEART SYMPTOMS: No  LUNG SYMPTOMS: No  INTESTINAL SYMPTOMS: No  URINARY SYMPTOMS: No  GYNECOLOGIC SYMPTOMS: No  BREAST SYMPTOMS: No  SKELETAL SYMPTOMS: Yes  BLOOD SYMPTOMS: No  NERVOUS SYSTEM SYMPTOMS: Yes  MENTAL HEALTH SYMPTOMS: No  Fever: No  Loss of appetite: No  Weight loss: No  Weight gain: No  Fatigue: Yes  Night sweats: Yes  Chills: No  Increased stress: No  Excessive hunger: No  Excessive thirst: No  Feeling hot or cold when others believe the temperature is normal: No  Loss of height: No  Post-operative complications: No  Surgical site pain: No  Hallucinations: No  Change in or Loss of Energy: No  Hyperactivity: No  Confusion: No  Back pain: Yes  Muscle aches: Yes  Neck pain: Yes  Swollen joints: No  Joint pain: Yes  Bone  pain: No  Muscle cramps: No  Muscle weakness: Yes  Joint stiffness: Yes  Bone fracture: No  Trouble with coordination: No  Dizziness or trouble with balance: No  Fainting or black-out spells: No  Memory loss: Yes  Headache: Yes  Seizures: No  Speech problems: No  Tingling: No  Tremor: No  Weakness: Yes  Difficulty walking: No  Paralysis: No  Numbness: No          Again, thank you for allowing me to participate in the care of your patient.        Sincerely,        Kushal Richmond MD

## 2022-04-06 NOTE — PROGRESS NOTES
Reason for Visit  Dinorah Thompson is a 71 year old year old female who is being seen for follow up of sarcoidosis  Pulmonary HPI  The patient was seen and examined by Kushal Richmond MD     Ms. Thompson comes in for followup of her sarcoidosis.  She was last seen in the Pulmonary Clinic in December.  At that time, she was still grieving the loss of her sister.  For sarcoidosis, she has been off systemic anti-inflammatory therapy.  There was a concern that there could be a sarcoid flare given all the stressors in her life, for which a shorter-term followup visit was set up.  She is here for this.      Overall, she is doing better.  From a mood standpoint, she believes that she is more upbeat most days of the week.  She denies any pulmonary symptoms.  She does report a dry cough, which is not bothersome.  She believes this is related to laryngeal dysfunction, for which she was advised speech therapy, and she is not currently following recommended plan rigorously.      She also reports a headache, which has been present since last night.  This is a sharp pain present on both sides of the head.  It is not associated with any vision changes or weakness.  She denies any other neurological symptoms associated with it.  No nausea.  There is no history of trauma.    The patient was told that she has CKD, stage 3, based on GFR, which could be related to use of nonsteroidals.    With a steroid injection of the left knee recently, she had a significant improvement in her knee pain.  She continues to have low back pain.      Current Outpatient Medications   Medication     acetaminophen (TYLENOL) 500 MG tablet     baclofen (LIORESAL) 10 MG tablet     Carboxymethylcellul-Glycerin 1-0.9 % GEL     celecoxib (CELEBREX) 100 MG capsule     clonazePAM (KLONOPIN) 0.5 MG tablet     DULoxetine (CYMBALTA) 60 MG capsule     LYSINE PO     mometasone (NASONEX) 50 MCG/ACT nasal spray     NONFORMULARY     propranolol (INDERAL) 10 MG tablet      "rosuvastatin (CRESTOR) 5 MG tablet     sertraline (ZOLOFT) 25 MG tablet     tacrolimus (PROTOPIC) 0.1 % external ointment     traZODone (DESYREL) 100 MG tablet     valACYclovir (VALTREX) 1000 mg tablet     Current Facility-Administered Medications   Medication     methylPREDNISolone (DEPO-MEDROL) injection 40 mg     Allergies   Allergen Reactions     Propoxyphene Other (See Comments)     Clarithromycin Other (See Comments)     Pt states, \"ototoxicity\". Balance problems  Pt states, \"ototoxicity\".       Past Medical History:   Diagnosis Date     Chronic osteoarthritis      Complication of anesthesia 1990's    DIfficulty waking up     Depressive disorder      Female bladder prolapse 9/3/2019     Mild intermittent asthma 7/29/2017     FAHAD treated with BiPAP 6/13/2018     Parathyroid abnormality (H)      Prolapse of female pelvic organs 8/19/2019     Sleep apnea     Uses a Bi-Pap.. Will have son bring it if necessary on DOS       Past Surgical History:   Procedure Laterality Date     APPENDECTOMY       BIOPSY  2008    Lungs     COLONOSCOPY       diskectomy in toe       EYE SURGERY  Cataracts     GENITOURINARY SURGERY  2019    Bladder mesh repair     GYN SURGERY  2019    Total hysterectomy     HYSTERECTOMY  08/2017    and bladder surgery     INJECT EPIDURAL LUMBAR Right 4/27/2021    Procedure: Right Lumbar 5- Sacral 1 transforaminal epidural steroid injection with fluoroscopy and conscious sedation.;  Surgeon: Tiera Santana MD;  Location: [facility] OR     INJECT EPIDURAL LUMBAR Left 11/11/2021    Procedure: Lumbar epidural steroid injection L5- S1 with fluoroscopy;  Surgeon: Tiera Santana MD;  Location: [facility] OR     INJECT EPIDURAL LUMBAR Left 3/8/2022    Procedure: L5-S1 Epidural steroid injection with fluoroscopy;  Surgeon: Tiera Santana MD;  Location: [facility] OR     left rotator cuff surgery       PARATHYROIDECTOMY N/A 7/21/2021    Procedure: PARATHYROIDECTOMY;  Surgeon: Gregoria Wilcox MD;  Location: [facility] OR     SINUS " SURGERY      x2 in 2013 and 2018     TONSILLECTOMY  1955       Social History     Socioeconomic History     Marital status:      Spouse name: Wilbert     Number of children: Not on file     Years of education: Not on file     Highest education level: Bachelor's degree (e.g., BA, AB, BS)   Occupational History     Not on file   Tobacco Use     Smoking status: Never Smoker     Smokeless tobacco: Never Used   Vaping Use     Vaping Use: Never used   Substance and Sexual Activity     Alcohol use: Yes     Comment: 0-2 drinks per month     Drug use: Never     Sexual activity: Not Currently     Partners: Male     Birth control/protection: Post-menopausal   Other Topics Concern     Parent/sibling w/ CABG, MI or angioplasty before 65F 55M? Yes     Comment: Father, MI   Social History Narrative     Not on file     Social Determinants of Health     Financial Resource Strain: Low Risk      Difficulty of Paying Living Expenses: Not hard at all   Food Insecurity: No Food Insecurity     Worried About Running Out of Food in the Last Year: Never true     Ran Out of Food in the Last Year: Never true   Transportation Needs: No Transportation Needs     Lack of Transportation (Medical): No     Lack of Transportation (Non-Medical): No   Physical Activity: Insufficiently Active     Days of Exercise per Week: 2 days     Minutes of Exercise per Session: 10 min   Stress: Stress Concern Present     Feeling of Stress : To some extent   Social Connections: Socially Integrated     Frequency of Communication with Friends and Family: More than three times a week     Frequency of Social Gatherings with Friends and Family: Once a week     Attends Anabaptism Services: More than 4 times per year     Active Member of Clubs or Organizations: Yes     Attends Club or Organization Meetings: More than 4 times per year     Marital Status:    Intimate Partner Violence: Not on file   Housing Stability: Low Risk      Unable to Pay for Housing in the  "Last Year: No     Number of Places Lived in the Last Year: 2     Unstable Housing in the Last Year: No       ROS Pulmonary  A complete ROS was otherwise negative except as noted in the HPI.  /76   Pulse 76   Resp 17   Ht 1.575 m (5' 2\")   Wt 67.1 kg (148 lb)   SpO2 98%   BMI 27.07 kg/m    Exam:   GENERAL APPEARANCE: Alert, and in no apparent distress.  EYES: PERRL, EOMI  HENT: Nasal mucosa with no edema and no hyperemia.   MOUTH: Oral mucosa is moist, without any lesions, no tonsillar enlargement, no oropharyngeal exudate.  NECK: supple, no masses, no thyromegaly.  LYMPHATICS: No significant axillary, cervical, or supraclavicular nodes.  RESP: normal percussion, good air flow throughout.  No crackles. No rhonchi. No wheezes.  CV: Normal S1, S2, regular rhythm, normal rate. No murmur.  No rub. No gallop. No LE edema.   MS: extremities normal. No clubbing. No cyanosis.  SKIN: no rash on limited exam  NEURO: Mentation intact, speech normal, normal strength and tone, normal gait and stance    Results:  PFTs done today were reviewed by me with the patient.  She also had a 6 minute walk test done where the 6 minute walk distance was within normal range.  No desaturation was noted.  FVC is 3.43 L (130%), FEV1 is 2.66 L (130%), and the ratio is 74.  Total lung capacity is 5.49 (119%), and DLCO is 18.97 (104%).  My interpretation is that the spirometry, lung volumes and DLCO are all in the normal range.  In comparison to prior spirometry, the FVC has decreased by 200 cc, FEV1 has decreased by 210 cc, and total lung capacity has decreased by 40 cc.      Assessment and plan: Ms. Thompson is a pleasant 71-year-old female of [place]  descent with degenerative disc disease with concern for impingement.  She has sarcoidosis previously treated with ~6 months x 2 of anti-inflammatory medications. She also has a  history of pericardial effusion, and possible REM related sleep movement.  1.  Pulmonary:  The PFTs are " stable.  The patient has cough, probably related to laryngeal dysfunction which she will improve with adherence to speech therapy.  2.  Extrapulmonary sarcoidosis.  Cardiac MRI in 2020 with no LGE.  It is unlikely that her CKD is related to sarcoidosis.  Labs done recently with normal calcium, LFTs and CBC.  3.  Sleep-disordered breathing.  The patient had recent evaluation done and was found to have an AHI of less than 5.  Question of REM-related movements.  The patient will follow up with Sleep as needed.    This note consists of symbols derived from keyboarding, dictation and/or voice recognition software. As a result, there may be errors in the script that have gone undetected. Please consider this when interpreting information found in this chart    Answers for HPI/ROS submitted by the patient on 4/5/2022  General Symptoms: Yes  Skin Symptoms: No  HENT Symptoms: No  EYE SYMPTOMS: No  HEART SYMPTOMS: No  LUNG SYMPTOMS: No  INTESTINAL SYMPTOMS: No  URINARY SYMPTOMS: No  GYNECOLOGIC SYMPTOMS: No  BREAST SYMPTOMS: No  SKELETAL SYMPTOMS: Yes  BLOOD SYMPTOMS: No  NERVOUS SYSTEM SYMPTOMS: Yes  MENTAL HEALTH SYMPTOMS: No  Fever: No  Loss of appetite: No  Weight loss: No  Weight gain: No  Fatigue: Yes  Night sweats: Yes  Chills: No  Increased stress: No  Excessive hunger: No  Excessive thirst: No  Feeling hot or cold when others believe the temperature is normal: No  Loss of height: No  Post-operative complications: No  Surgical site pain: No  Hallucinations: No  Change in or Loss of Energy: No  Hyperactivity: No  Confusion: No  Back pain: Yes  Muscle aches: Yes  Neck pain: Yes  Swollen joints: No  Joint pain: Yes  Bone pain: No  Muscle cramps: No  Muscle weakness: Yes  Joint stiffness: Yes  Bone fracture: No  Trouble with coordination: No  Dizziness or trouble with balance: No  Fainting or black-out spells: No  Memory loss: Yes  Headache: Yes  Seizures: No  Speech problems: No  Tingling: No  Tremor: No  Weakness:  Yes  Difficulty walking: No  Paralysis: No  Numbness: No

## 2022-04-06 NOTE — NURSING NOTE
Chief Complaint   Patient presents with     RECHECK     Return Interstitial Lung - 4 month follow up     Medications reviewed and vital signs taken.   Wilder Mendenhall, CMA

## 2022-04-07 LAB
DLCOUNC-%PRED-PRE: 104 %
DLCOUNC-PRE: 18.97 ML/MIN/MMHG
DLCOUNC-PRED: 18.1 ML/MIN/MMHG
ERV-%PRED-PRE: 61 %
ERV-PRE: 0.34 L
ERV-PRED: 0.55 L
EXPTIME-PRE: 7.27 SEC
FEF2575-%PRED-PRE: 134 %
FEF2575-PRE: 2.36 L/SEC
FEF2575-PRED: 1.76 L/SEC
FEFMAX-%PRED-PRE: 120 %
FEFMAX-PRE: 6.37 L/SEC
FEFMAX-PRED: 5.27 L/SEC
FEV1-%PRED-PRE: 130 %
FEV1-PRE: 2.66 L
FEV1FEV6-PRE: 79 %
FEV1FEV6-PRED: 79 %
FEV1FVC-PRE: 78 %
FEV1FVC-PRED: 78 %
FEV1SVC-PRE: 74 %
FEV1SVC-PRED: 73 %
FIFMAX-PRE: 6.84 L/SEC
FRCPLETH-%PRED-PRE: 86 %
FRCPLETH-PRE: 2.24 L
FRCPLETH-PRED: 2.6 L
FVC-%PRED-PRE: 130 %
FVC-PRE: 3.43 L
FVC-PRED: 2.62 L
IC-%PRED-PRE: 144 %
IC-PRE: 3.25 L
IC-PRED: 2.24 L
RVPLETH-%PRED-PRE: 95 %
RVPLETH-PRE: 1.9 L
RVPLETH-PRED: 1.99 L
TLCPLETH-%PRED-PRE: 119 %
TLCPLETH-PRE: 5.49 L
TLCPLETH-PRED: 4.6 L
VA-%PRED-PRE: 111 %
VA-PRE: 4.8 L
VC-%PRED-PRE: 128 %
VC-PRE: 3.58 L
VC-PRED: 2.79 L

## 2022-04-18 ENCOUNTER — THERAPY VISIT (OUTPATIENT)
Dept: PHYSICAL THERAPY | Facility: CLINIC | Age: 72
End: 2022-04-18
Payer: MEDICARE

## 2022-04-18 DIAGNOSIS — G89.29 CHRONIC PAIN OF LEFT KNEE: Primary | ICD-10-CM

## 2022-04-18 DIAGNOSIS — M25.562 CHRONIC PAIN OF LEFT KNEE: Primary | ICD-10-CM

## 2022-04-18 PROCEDURE — 97112 NEUROMUSCULAR REEDUCATION: CPT | Mod: GP | Performed by: PHYSICAL THERAPIST

## 2022-04-18 PROCEDURE — 97530 THERAPEUTIC ACTIVITIES: CPT | Mod: GP | Performed by: PHYSICAL THERAPIST

## 2022-04-18 PROCEDURE — 97110 THERAPEUTIC EXERCISES: CPT | Mod: GP | Performed by: PHYSICAL THERAPIST

## 2022-04-19 ENCOUNTER — VIRTUAL VISIT (OUTPATIENT)
Dept: ANESTHESIOLOGY | Facility: CLINIC | Age: 72
End: 2022-04-19
Payer: MEDICARE

## 2022-04-19 DIAGNOSIS — M54.16 LUMBAR RADICULOPATHY: Primary | ICD-10-CM

## 2022-04-19 PROCEDURE — 99214 OFFICE O/P EST MOD 30 MIN: CPT | Mod: 95 | Performed by: ANESTHESIOLOGY

## 2022-04-19 ASSESSMENT — ENCOUNTER SYMPTOMS
NECK PAIN: 0
BACK PAIN: 1
ARTHRALGIAS: 1
MUSCLE WEAKNESS: 0
JOINT SWELLING: 0
MUSCLE CRAMPS: 0
STIFFNESS: 1
MYALGIAS: 0

## 2022-04-19 NOTE — NURSING NOTE
Patient presents with:  Pain: Follow up appointment, discuss pain management between procedure appointments.       Data Unavailable     Pain Medications     Analgesics Other Refills Start End     acetaminophen (TYLENOL) 500 MG tablet     7/21/2021     Sig - Route: Take 2 tablets (1,000 mg) by mouth every 6 hours as needed for pain - Oral    Class: OTC          What medications are you using for pain? Tylenol      (Return Patients only) What refills are you needing today? None.     Patient will connect via [employer].     Patient is feeling great benefit from their recent Lumbar FRANK on 3/8/22  Pt states today its her Left Knee pain- 3/10. That is causing her discomfort.     AVS- "[employer]t.

## 2022-04-19 NOTE — PATIENT INSTRUCTIONS
Procedures:    Call to schedule your procedure: [phone], option #2    Lumbar Epidural Steroid Injection. (Month of July)      Your pre-procedure instructions are below, please call our clinic if you have any questions.      Treatment planning:    Continue PT exercises and water exercises.      Recommended Follow up:      Follow up 8 weeks after procedure.          Please call [phone], option #1 to schedule your clinic appointment if you don't already have an appointment scheduled.      Procedure Information related to COVID-19    Please call [phone] to schedule, reschedule, or cancel your procedure appointment.   Phones are answered Monday - Friday from 08:00 - 4:30pm.  Leave a voicemail with your name, birth date, and phone number if no one is available to take your call.     You will need to be tested for COVID-19 within 4 days (96 hours) of your procedure.  You will be called to schedule your COVID test by a central scheduling team. If you have not been contacted to schedule your test within 4 days of the scheduled procedure, call [phone]    Please be aware that the turn around time for the test is approximately 24-72 hours.   If your results are still pending the day of your procedure, you will be notified as soon as possible as the procedure may be cancelled.    Please note: You will only be contacted for positive and pending results.     The procedure center staff will call you several days before the procedure to review important information that you will need to know for the day of the procedure.   Please contact the clinic if you have further questions about this information.       Information related to Scheduling and Pre-Procedure Instructions:        After calling to schedule your procedure appointment, please contact the clinic to make your follow up clinic appointment 8 weeks after the procedure.  Clinic phone- [phone]      If you are having a Diagnostic procedure, you will  be given a Pain Diary to document your pain relief.   Please fax the completed form to our office.   fax number is [phone]    If you must reschedule your procedure more than two times, you must follow up in clinic before rescheduling again.        Preparing for your procedure    CAUTION - FAILURE TO FOLLOW THESE PRE-PROCEDURE INSTRUCTIONS WILL RESULT IN YOUR PROCEDURE BEING RESCHEDULED.      Your procedure:Lumbar Epidural Steroid Injection            You must have a  take you home after your procedure. Transportation by taxi or para-transit is okay as long as you have a responsible adult accompany you. You must provide your 's full name and contact number at time of check in.     Fasting Protocol Please have nothing to eat and drink for 2 hours before the procedure.    Medications If you take any medications, DO NOT STOP. Take your medications as usual the day of your procedure with a sip of water AT LEAST 2 HOURS PRIOR TO ARRIVAL.    Antibiotics If you are currently taking antibiotics, you must complete the entire dose 7 days prior to your scheduled procedure. You must be clear of any signs or symptoms of infection. If you begin antibiotics, please contact our clinic for instructions.     Fever, Chills, or Rash If you experience a fever of higher than 100 degrees, chills, rash, or open wounds during the one week before your procedure, please call the clinic to see if you may proceed with your procedure.      Medication Hold List  **Patients under Cardiology/Neurology care should consult their provider prior to the pain procedure to verify pre-procedure medication instructions. The information below contains general guidelines.**      Blood Thinners If you are taking daily ASPIRIN, PLAVIX, OR OTHER BLOOD THINNERS SUCH AS COUMADIN/WARFARIN, we will need your prescribing doctor to sign a release permitting you to stop these medications. Once approved by your prescribing doctor - STOP ALL BLOOD  THINNERS BASED ON THE TIME TABLE BELOW PRIOR TO YOUR PROCEDURE. If you have been instructed to stop WARFARIN(COUMADIN), you must have an INR lab drawn the day before your procedure. . Your INR must be within normal limits before we can perform your injection. MEDICATIONS CAN BE RESTARTED AFTER YOUR PROCEDURE.    14 DAY HOLD  Ticlid (ticlopidine)    10 DAY HOLD  Effient (Prasugel)    3 DAY HOLD  Xarelto (rivaroxaban) 7 DAY HOLD  Anacin, Bufferin, Ecotrin, Excedrin, Aggrenox (Aspirin)  Brilinta (ticagrelor)  Coumadin (Warfarin)  Pradexa (Dabigatran)  Elmiron (Pentosan)  Plavix (Clopidogrel Bisulfate)  Pletal (Cilostazol)    24 HOUR HOLD  Lovenox (enoxaparin)  Agrylin (Anagrelide)        Non-steroidal Anti-inflammatories (NSAIDs) DO NOT TAKE any non-steroidal anti-inflammatory medications (NSAIDs) listed on the table below. MEDICATIONS CAN BE RESTARTED AFTER YOUR PROCEDURE. Celebrex is OK to take and does not need to be discontinued.     Medications to stop:  3 DAY HOLD  Advil, Motrin (Ibuprofen)  Arthrotec (diciofenac sodium/misoprostol)  Clinoril (Sulindac)  Indocin (Indomethacin)  Lodine (Etodolac)  Toradol (Ketorolac)  Vicoprofen (Hydrocodone and Ibuprofen)  Voltaren (Diclotenac)    14 DAY HOLD  Daypro (Oxaprozin)  Feldene (Piroxicam)   7 DAY HOLD  Aleve (Naproxen sodium)  Darvon compound (contains aspirin)  Naprosyn (Naproxen)  Norgesic Forte (contains aspirin)  Mobic (Meloxicam)  Oruvall (Ketoprofen)  Percodan (contains aspirin)  Relafen (Nabumetone)  Salsalate  Trilisate  Vitamin E (more than 400 mg per day)  Any medication containing aspirin                To speak with a nurse, schedule/reschedule/cancel a clinic appointment, or request a medication refill call: [phone]     You can also reach us by [employer]: [email or web address]

## 2022-04-19 NOTE — PROGRESS NOTES
Type of service:  Video Visit    Video Start Time: 10:27 AM    Video End Time:10:45 AM    Originating Location (pt. Location): Home    Distant Location (provider location):  [facility] [place]     Platform used for Video Visit: [facility]    Pain clinic follow up note    HPI:   Dinorah Thompson is a 71 year old year old female  who presents to the pain clinic with low back pain, s/p LESI    Patient Supplied Answers To the [facility] Pain Questionnaire  [facility] Pain -  Patient Entered Questionnaire/Answers 4/19/2022   What number best describes your pain right now:  0 = No pain  to  10 = Worst pain imaginable 3   How would you describe the pain? dull, aching   Which of the following worsen your pain? standing, walking   Which of the following improve or reduce your pain?  relaxation   What number best describes your average pain for the past week:  0 = No pain  to  10 = Worst pain imaginable 4   What number best describes your LOWEST pain in past 24 hours:  0 = No pain  to  10 = Worst pain imaginable 2   What number best describes your WORST pain in past 24 hours:  0 = No pain  to  10 = Worst pain imaginable 4   When is your pain worst? Constant   What non-medicine treatments have you already had for your pain? pain clinic, spine injections (shots)   Have you tried treating your pain with medication?  Yes   Are you currently taking medications for your pain? Yes             The patient states that the leg improved right away and back pain improved 3 weeks after the FRANK.     Pain today is 2-3, pain is manageable. She stopped the celebrex because of kidney insufficiency and pain is improved. The patients pcp recommended reduced dose of celebrex. She had a knee joint steroid injection.     Patient reports compared to the last visit their pain is improved by following percent:    Pain intensity: 50 %  Pain frequency: 50 %  Duration of pain episodes: unable to answer  Falling  asleep: 50 %  Staying asleep: 50 %  Ability to work: 65 %  Ability to exercise: 65 %  Performing chores: 65 %    ROS:  Review of Systems   Musculoskeletal: Positive for back pain. Negative for myalgias and neck pain.   All other systems reviewed and are negative.    Answers for HPI/ROS submitted by the patient on 4/19/2022  General Symptoms: No  Skin Symptoms: No  HENT Symptoms: No  EYE SYMPTOMS: No  HEART SYMPTOMS: No  LUNG SYMPTOMS: No  INTESTINAL SYMPTOMS: No  URINARY SYMPTOMS: No  GYNECOLOGIC SYMPTOMS: No  BREAST SYMPTOMS: No  SKELETAL SYMPTOMS: Yes  BLOOD SYMPTOMS: No  NERVOUS SYSTEM SYMPTOMS: No  MENTAL HEALTH SYMPTOMS: No  Swollen joints: No  Joint pain: Yes  Bone pain: No  Muscle cramps: No  Muscle weakness: No  Joint stiffness: Yes  Bone fracture: No        Significant Medical History:   Past Medical History:   Diagnosis Date     Chronic osteoarthritis      Complication of anesthesia 1990's    DIfficulty waking up     Depressive disorder      Female bladder prolapse 9/3/2019     Mild intermittent asthma 7/29/2017     FAHAD treated with BiPAP 6/13/2018     Parathyroid abnormality (H)      Prolapse of female pelvic organs 8/19/2019     Sleep apnea     Uses a Bi-Pap.. Will have son bring it if necessary on DOS          Past Surgical History:  Past Surgical History:   Procedure Laterality Date     APPENDECTOMY       BIOPSY  2008    Lungs     COLONOSCOPY       diskectomy in toe       EYE SURGERY  Cataracts     GENITOURINARY SURGERY  2019    Bladder mesh repair     GYN SURGERY  2019    Total hysterectomy     HYSTERECTOMY  08/2017    and bladder surgery     INJECT EPIDURAL LUMBAR Right 4/27/2021    Procedure: Right Lumbar 5- Sacral 1 transforaminal epidural steroid injection with fluoroscopy and conscious sedation.;  Surgeon: Tiera Santana MD;  Location: [facility] OR     INJECT EPIDURAL LUMBAR Left 11/11/2021    Procedure: Lumbar epidural steroid injection L5- S1 with fluoroscopy;  Surgeon: Tiera Santana MD;  Location:  [facility] OR     INJECT EPIDURAL LUMBAR Left 3/8/2022    Procedure: L5-S1 Epidural steroid injection with fluoroscopy;  Surgeon: Tiera Santana MD;  Location: [facility] OR     left rotator cuff surgery       PARATHYROIDECTOMY N/A 7/21/2021    Procedure: PARATHYROIDECTOMY;  Surgeon: Gregoria Wilcox MD;  Location: [facility]     SINUS SURGERY      x2 in 2013 and 2018     TONSILLECTOMY  1955          Family History:  Family History   Problem Relation Age of Onset     Myocardial Infarction Father         late 50s     Coronary Artery Disease Father      Depression Father      Ovarian Cancer Sister      Cervical Cancer Sister      Lung Cancer Sister      Depression Sister      Anxiety Disorder Sister      Myocardial Infarction Paternal Uncle         late 50s     Hyperlipidemia Mother      Osteoporosis Mother      Cerebrovascular Disease Paternal Grandmother      Cerebrovascular Disease Paternal Grandfather      Anxiety Disorder Son           Social History:  Social History     Socioeconomic History     Marital status:      Spouse name: Wilbert     Number of children: Not on file     Years of education: Not on file     Highest education level: Bachelor's degree (e.g., BA, AB, BS)   Occupational History     Not on file   Tobacco Use     Smoking status: Never Smoker     Smokeless tobacco: Never Used   Vaping Use     Vaping Use: Never used   Substance and Sexual Activity     Alcohol use: Yes     Comment: 0-2 drinks per month     Drug use: Never     Sexual activity: Not Currently     Partners: Male     Birth control/protection: Post-menopausal   Other Topics Concern     Parent/sibling w/ CABG, MI or angioplasty before 65F 55M? Yes     Comment: Father, MI   Social History Narrative     Not on file     Social Determinants of Health     Financial Resource Strain: Low Risk      Difficulty of Paying Living Expenses: Not hard at all   Food Insecurity: No Food Insecurity     Worried About Running Out of Food in the Last Year: Never true  "    Ran Out of Food in the Last Year: Never true   Transportation Needs: No Transportation Needs     Lack of Transportation (Medical): No     Lack of Transportation (Non-Medical): No   Physical Activity: Inactive     Days of Exercise per Week: 0 days     Minutes of Exercise per Session: 0 min   Stress: No Stress Concern Present     Feeling of Stress : Only a little   Social Connections: Socially Integrated     Frequency of Communication with Friends and Family: Once a week     Frequency of Social Gatherings with Friends and Family: Twice a week     Attends Restorationist Services: More than 4 times per year     Active Member of Clubs or Organizations: Yes     Attends Club or Organization Meetings: Not on file     Marital Status:    Intimate Partner Violence: Not on file   Housing Stability: Low Risk      Unable to Pay for Housing in the Last Year: No     Number of Places Lived in the Last Year: 1     Unstable Housing in the Last Year: No     Social History     Social History Narrative     Not on file          Allergies:  Allergies   Allergen Reactions     Propoxyphene Other (See Comments)     Clarithromycin Other (See Comments)     Pt states, \"ototoxicity\". Balance problems  Pt states, \"ototoxicity\".         Current Medications:   Current Outpatient Medications   Medication Sig Dispense Refill     acetaminophen (TYLENOL) 500 MG tablet Take 2 tablets (1,000 mg) by mouth every 6 hours as needed for pain       baclofen (LIORESAL) 10 MG tablet Take 10 mg by mouth daily as needed        Carboxymethylcellul-Glycerin 1-0.9 % GEL Place 1 drop into both eyes daily        clonazePAM (KLONOPIN) 0.5 MG tablet Take 0.5 mg by mouth nightly as needed        DULoxetine (CYMBALTA) 60 MG capsule Take 1 capsule (60 mg) by mouth daily 90 capsule 1     LYSINE PO        mometasone (NASONEX) 50 MCG/ACT nasal spray spray 2 spray by intranasal route  every day in each nostril       NONFORMULARY Vitamin Packet from [employer] including " Multi-vitamin, Leutin, Calcium, Antioxidant, Fish oil, Glucosamine- Chondroitin: Take 1 packet by mouth daily       propranolol (INDERAL) 10 MG tablet Take 10 mg by mouth daily as needed        rosuvastatin (CRESTOR) 5 MG tablet Take 1 tablet (5 mg) by mouth every other day 90 tablet 0     sertraline (ZOLOFT) 25 MG tablet Take 1 tablet (25 mg) by mouth daily 90 tablet 1     tacrolimus (PROTOPIC) 0.1 % external ointment Apply to AA on the face BID PRN 30 g 5     traZODone (DESYREL) 100 MG tablet Take 100 mg by mouth nightly as needed        valACYclovir (VALTREX) 1000 mg tablet Take two tablets twice per day for one day for flare ups. 30 tablet 1     celecoxib (CELEBREX) 100 MG capsule Take 1 capsule (100 mg) by mouth 2 times daily (Patient not taking: Reported on 4/19/2022) 180 capsule 0       Physical Exam:     There were no vitals taken for this visit.    General Appearance: No distress, seated comfortably  Mood: Euthymic  HE ENT: Non constricted pupils  Respiratory: Non labored breathing    ASSESSMENT AND PLAN:     Encounter Diagnosis:  Lumbar radiculopathy  Right knee pain, osteoarthritis       Dinorah Thompson is a 71 year old year old female  who presents to the pain clinic with improved low back pain, s/p LESI    RECOMMENDATIONS:     1. Medications: The patient has voltaren gel available and low dose celebrex approved by her pcp.   The patient will continue duloxetine and baclofen as needed.     2. Procedure: We are scheduling the patient for LESI in the procedure suite in the month of July. 4 month from previous injectionRisks/benefits/alternatives were discussed.     3. Education: Continue PT exercises and water exercises.     Follow up: 8 weeks after LESI.

## 2022-04-19 NOTE — LETTER
4/19/2022       RE: Dinorah Thompson  [id number]  [place] [address]     Dear Colleague,    Thank you for referring your patient, Dinorah Thompson, to the [facility] [place] at [facility]. Please see a copy of my visit note below.      Pain clinic follow up note    HPI:   Dinorah Thompson is a 71 year old year old female  who presents to the pain clinic with low back pain, s/p LESI    Patient Supplied Answers To the [facility] Pain Questionnaire  [facility] -  Patient Entered Questionnaire/Answers 4/19/2022   What number best describes your pain right now:  0 = No pain  to  10 = Worst pain imaginable 3   How would you describe the pain? dull, aching   Which of the following worsen your pain? standing, walking   Which of the following improve or reduce your pain?  relaxation   What number best describes your average pain for the past week:  0 = No pain  to  10 = Worst pain imaginable 4   What number best describes your LOWEST pain in past 24 hours:  0 = No pain  to  10 = Worst pain imaginable 2   What number best describes your WORST pain in past 24 hours:  0 = No pain  to  10 = Worst pain imaginable 4   When is your pain worst? Constant   What non-medicine treatments have you already had for your pain? pain clinic, spine injections (shots)   Have you tried treating your pain with medication?  Yes   Are you currently taking medications for your pain? Yes             The patient states that the leg improved right away and back pain improved 3 weeks after the FRANK.     Pain today is 2-3, pain is manageable. She stopped the celebrex because of kidney insufficiency and pain is improved. The patients pcp recommended reduced dose of celebrex. She had a knee joint steroid injection.     Patient reports compared to the last visit their pain is improved by following percent:    Pain intensity: 50 %  Pain frequency: 50  %  Duration of pain episodes: unable to answer  Falling asleep: 50 %  Staying asleep: 50 %  Ability to work: 65 %  Ability to exercise: 65 %  Performing chores: 65 %    ROS:  Review of Systems   Musculoskeletal: Positive for back pain. Negative for myalgias and neck pain.   All other systems reviewed and are negative.    Answers for HPI/ROS submitted by the patient on 4/19/2022  General Symptoms: No  Skin Symptoms: No  HENT Symptoms: No  EYE SYMPTOMS: No  HEART SYMPTOMS: No  LUNG SYMPTOMS: No  INTESTINAL SYMPTOMS: No  URINARY SYMPTOMS: No  GYNECOLOGIC SYMPTOMS: No  BREAST SYMPTOMS: No  SKELETAL SYMPTOMS: Yes  BLOOD SYMPTOMS: No  NERVOUS SYSTEM SYMPTOMS: No  MENTAL HEALTH SYMPTOMS: No  Swollen joints: No  Joint pain: Yes  Bone pain: No  Muscle cramps: No  Muscle weakness: No  Joint stiffness: Yes  Bone fracture: No        Significant Medical History:   Past Medical History:   Diagnosis Date     Chronic osteoarthritis      Complication of anesthesia 1990's    DIfficulty waking up     Depressive disorder      Female bladder prolapse 9/3/2019     Mild intermittent asthma 7/29/2017     FAHAD treated with BiPAP 6/13/2018     Parathyroid abnormality (H)      Prolapse of female pelvic organs 8/19/2019     Sleep apnea     Uses a Bi-Pap.. Will have son bring it if necessary on DOS          Past Surgical History:  Past Surgical History:   Procedure Laterality Date     APPENDECTOMY       BIOPSY  2008    Lungs     COLONOSCOPY       diskectomy in toe       EYE SURGERY  Cataracts     GENITOURINARY SURGERY  2019    Bladder mesh repair     GYN SURGERY  2019    Total hysterectomy     HYSTERECTOMY  08/2017    and bladder surgery     INJECT EPIDURAL LUMBAR Right 4/27/2021    Procedure: Right Lumbar 5- Sacral 1 transforaminal epidural steroid injection with fluoroscopy and conscious sedation.;  Surgeon: Tiera Santana MD;  Location: [facility] OR     INJECT EPIDURAL LUMBAR Left 11/11/2021    Procedure: Lumbar epidural steroid injection L5- S1  with fluoroscopy;  Surgeon: Tiera Santana MD;  Location: [facility] OR     INJECT EPIDURAL LUMBAR Left 3/8/2022    Procedure: L5-S1 Epidural steroid injection with fluoroscopy;  Surgeon: Tiera Santana MD;  Location: [facility] OR     left rotator cuff surgery       PARATHYROIDECTOMY N/A 7/21/2021    Procedure: PARATHYROIDECTOMY;  Surgeon: Gregoria Wilcox MD;  Location: [facility]     SINUS SURGERY      x2 in 2013 and 2018     TONSILLECTOMY  1955          Family History:  Family History   Problem Relation Age of Onset     Myocardial Infarction Father         late 50s     Coronary Artery Disease Father      Depression Father      Ovarian Cancer Sister      Cervical Cancer Sister      Lung Cancer Sister      Depression Sister      Anxiety Disorder Sister      Myocardial Infarction Paternal Uncle         late 50s     Hyperlipidemia Mother      Osteoporosis Mother      Cerebrovascular Disease Paternal Grandmother      Cerebrovascular Disease Paternal Grandfather      Anxiety Disorder Son           Social History:  Social History     Socioeconomic History     Marital status:      Spouse name: Wilbert     Number of children: Not on file     Years of education: Not on file     Highest education level: Bachelor's degree (e.g., BA, AB, BS)   Occupational History     Not on file   Tobacco Use     Smoking status: Never Smoker     Smokeless tobacco: Never Used   Vaping Use     Vaping Use: Never used   Substance and Sexual Activity     Alcohol use: Yes     Comment: 0-2 drinks per month     Drug use: Never     Sexual activity: Not Currently     Partners: Male     Birth control/protection: Post-menopausal   Other Topics Concern     Parent/sibling w/ CABG, MI or angioplasty before 65F 55M? Yes     Comment: Father, MI   Social History Narrative     Not on file     Social Determinants of Health     Financial Resource Strain: Low Risk      Difficulty of Paying Living Expenses: Not hard at all   Food Insecurity: No Food Insecurity      "Worried About Running Out of Food in the Last Year: Never true     Ran Out of Food in the Last Year: Never true   Transportation Needs: No Transportation Needs     Lack of Transportation (Medical): No     Lack of Transportation (Non-Medical): No   Physical Activity: Inactive     Days of Exercise per Week: 0 days     Minutes of Exercise per Session: 0 min   Stress: No Stress Concern Present     Feeling of Stress : Only a little   Social Connections: Socially Integrated     Frequency of Communication with Friends and Family: Once a week     Frequency of Social Gatherings with Friends and Family: Twice a week     Attends Druze Services: More than 4 times per year     Active Member of Clubs or Organizations: Yes     Attends Club or Organization Meetings: Not on file     Marital Status:    Intimate Partner Violence: Not on file   Housing Stability: Low Risk      Unable to Pay for Housing in the Last Year: No     Number of Places Lived in the Last Year: 1     Unstable Housing in the Last Year: No     Social History     Social History Narrative     Not on file          Allergies:  Allergies   Allergen Reactions     Propoxyphene Other (See Comments)     Clarithromycin Other (See Comments)     Pt states, \"ototoxicity\". Balance problems  Pt states, \"ototoxicity\".         Current Medications:   Current Outpatient Medications   Medication Sig Dispense Refill     acetaminophen (TYLENOL) 500 MG tablet Take 2 tablets (1,000 mg) by mouth every 6 hours as needed for pain       baclofen (LIORESAL) 10 MG tablet Take 10 mg by mouth daily as needed        Carboxymethylcellul-Glycerin 1-0.9 % GEL Place 1 drop into both eyes daily        clonazePAM (KLONOPIN) 0.5 MG tablet Take 0.5 mg by mouth nightly as needed        DULoxetine (CYMBALTA) 60 MG capsule Take 1 capsule (60 mg) by mouth daily 90 capsule 1     LYSINE PO        mometasone (NASONEX) 50 MCG/ACT nasal spray spray 2 spray by intranasal route  every day in each nostril  "      NONFORMULARY Vitamin Packet from [employer] including Multi-vitamin, Leutin, Calcium, Antioxidant, Fish oil, Glucosamine- Chondroitin: Take 1 packet by mouth daily       propranolol (INDERAL) 10 MG tablet Take 10 mg by mouth daily as needed        rosuvastatin (CRESTOR) 5 MG tablet Take 1 tablet (5 mg) by mouth every other day 90 tablet 0     sertraline (ZOLOFT) 25 MG tablet Take 1 tablet (25 mg) by mouth daily 90 tablet 1     tacrolimus (PROTOPIC) 0.1 % external ointment Apply to AA on the face BID PRN 30 g 5     traZODone (DESYREL) 100 MG tablet Take 100 mg by mouth nightly as needed        valACYclovir (VALTREX) 1000 mg tablet Take two tablets twice per day for one day for flare ups. 30 tablet 1     celecoxib (CELEBREX) 100 MG capsule Take 1 capsule (100 mg) by mouth 2 times daily (Patient not taking: Reported on 4/19/2022) 180 capsule 0       Physical Exam:     There were no vitals taken for this visit.    General Appearance: No distress, seated comfortably  Mood: Euthymic  HE ENT: Non constricted pupils  Respiratory: Non labored breathing    ASSESSMENT AND PLAN:     Encounter Diagnosis:  Lumbar radiculopathy  Right knee pain, osteoarthritis       Dinorah Thompson is a 71 year old year old female  who presents to the pain clinic with improved low back pain, s/p LESI    RECOMMENDATIONS:     1. Medications: The patient has voltaren gel available and low dose celebrex approved by her pcp.   The patient will continue duloxetine and baclofen as needed.     2. Procedure: We are scheduling the patient for LESI in the procedure suite in the month of July. 4 month from previous injectionRisks/benefits/alternatives were discussed.     3. Education: Continue PT exercises and water exercises.     Follow up: 8 weeks after LESI.         Sincerely,    Tiera Santana MD

## 2022-04-20 ENCOUNTER — TELEPHONE (OUTPATIENT)
Dept: ANESTHESIOLOGY | Facility: CLINIC | Age: 72
End: 2022-04-20
Payer: MEDICARE

## 2022-04-28 ENCOUNTER — VIRTUAL VISIT (OUTPATIENT)
Dept: PALLIATIVE MEDICINE | Facility: CLINIC | Age: 72
End: 2022-04-28
Payer: MEDICARE

## 2022-04-28 ENCOUNTER — TELEPHONE (OUTPATIENT)
Dept: ANESTHESIOLOGY | Facility: CLINIC | Age: 72
End: 2022-04-28

## 2022-04-28 DIAGNOSIS — M54.16 LUMBAR RADICULOPATHY: Primary | ICD-10-CM

## 2022-04-28 DIAGNOSIS — M79.2 NEUROPATHIC PAIN: ICD-10-CM

## 2022-04-28 PROCEDURE — 96158 HLTH BHV IVNTJ INDIV 1ST 30: CPT | Mod: 95 | Performed by: PSYCHOLOGIST

## 2022-04-28 NOTE — PROGRESS NOTES
"Dinorah Thompson is a 71 year old female who is being evaluated via a billable video visit.      The patient has been notified of following:     \"This video visit will be conducted via a call between you and your physician/provider. We have found that certain health care needs can be provided without the need for an in-person physical exam.  This service lets us provide the care you need with a video conversation.     Video visits are billed at different rates depending on your insurance coverage.  Please reach out to your insurance provider with any questions.    If during the course of the call the physician/provider feels a video visit is not appropriate, you will not be charged for this service.\"    Patient has given verbal consent for Video visit? Yes    Patient would like the video invitation sent by: Text to cell phone: .[phone]}    Video Start Time: 9:01 AM    Additional provider notes:      Pain Diagnoses per pain provider:   Lumbar radiculopathy      Neuropathic pain          DATA: During today's visit you reported the following: Your back pain is much improved. Neuropathic pain remains stable. Your mood is stable - feel increased daylight has been very helpful. Your activity level is about the same - however, not able to get to the pool as regularly due to limited open hours. Getting to [employer] 2-3 times per week with  to do new step machine. Your stress level remains manageable. Your sleep is mildly improved overall. You reported engaging in self-care for your pain 2-3 times daily.    You identified that you would like to focus on the following or had questions regarding the following issues or concerns, and we discussed the following:   - injection finally took for back  - also had knee injection which was very helpful, feel this likely also contributed to back pain  - discussed seasonal impact of low light and to bring up with PCP to ask about full spectrum light which you've had in the " past    ASSESSMENT: Overall pain and mood remains stable since her last visit. She seems to have developed a good routine of physical activity and self-care, and notes improvement overall with her mood as the days have been lengthening. She seems to agree with the plan to be proactive in addressing any seasonal impact on her mood so that she can address it before the days shorten in the fall.    PLAN:   Your next appointment is scheduled for 6/23 at 9:00 AM.  Assignment/Objectives /interventions for next session:   - discuss full spectrum light with PCP at next appointment  - continue to engage in physical activity as tolerated  - continue to engage in daily self-care as often as needed    We believe regular attendance is key to your success in our program!      Any time you are unable to keep your appointment we ask that you call us at [phone] at least 24 hours in advance to cancel.This will allow us to offer the appointment time to another patient.     Multiple missed appointments may lead to dismissal from the clinic.    Video-Visit Details    Type of service:  Video Visit    Video End Time (time video stopped): 9:27 AM    Originating Location (pt. Location): Home    Distant Location (provider location):  [place] [facility]     Mode of Communication:  Video Conference via Raulito Powers PsyD LP  Licensed Psychologist  Outpatient Clinic Therapist  [facility]   Dinorah Thompson is a 71 year old female who is being evaluated via a billable video visit.

## 2022-04-28 NOTE — TELEPHONE ENCOUNTER
Patient is scheduled for procedure with Dr. Santana    Spoke with: Patient    Date of Procedure: 06-28-22    [place] [facility] [place]    Informed patient they will need an adult  Yes    Pre-procedure COVID-19 Test: 06-24-22    Additional comments: N/A    Patient is aware pre-op RN will call 2-3 days prior to procedure with arrival time and instructions. Selene Santoyo on 4/28/2022 at 10:38 AM

## 2022-05-05 ENCOUNTER — THERAPY VISIT (OUTPATIENT)
Dept: PHYSICAL THERAPY | Facility: CLINIC | Age: 72
End: 2022-05-05
Payer: MEDICARE

## 2022-05-05 DIAGNOSIS — G89.29 CHRONIC PAIN OF LEFT KNEE: Primary | ICD-10-CM

## 2022-05-05 DIAGNOSIS — M25.562 CHRONIC PAIN OF LEFT KNEE: Primary | ICD-10-CM

## 2022-05-05 PROCEDURE — 97530 THERAPEUTIC ACTIVITIES: CPT | Mod: GP | Performed by: PHYSICAL THERAPIST

## 2022-05-05 PROCEDURE — 97112 NEUROMUSCULAR REEDUCATION: CPT | Mod: GP | Performed by: PHYSICAL THERAPIST

## 2022-05-05 PROCEDURE — 97110 THERAPEUTIC EXERCISES: CPT | Mod: GP | Performed by: PHYSICAL THERAPIST

## 2022-05-09 ASSESSMENT — ENCOUNTER SYMPTOMS
HEMATOCHEZIA: 0
PARESTHESIAS: 0
ABDOMINAL PAIN: 0
HEARTBURN: 0
BREAST MASS: 0
NAUSEA: 0
FREQUENCY: 0
HEADACHES: 0
ARTHRALGIAS: 0
SORE THROAT: 0
COUGH: 0
WEAKNESS: 0
HEMATURIA: 0
NERVOUS/ANXIOUS: 0
DIZZINESS: 0
CHILLS: 0
PALPITATIONS: 0
CONSTIPATION: 0
FEVER: 0
MYALGIAS: 0
DYSURIA: 0
DIARRHEA: 0
SHORTNESS OF BREATH: 0
EYE PAIN: 0
JOINT SWELLING: 0

## 2022-05-09 ASSESSMENT — ANXIETY QUESTIONNAIRES
5. BEING SO RESTLESS THAT IT IS HARD TO SIT STILL: NOT AT ALL
3. WORRYING TOO MUCH ABOUT DIFFERENT THINGS: NOT AT ALL
7. FEELING AFRAID AS IF SOMETHING AWFUL MIGHT HAPPEN: NOT AT ALL
6. BECOMING EASILY ANNOYED OR IRRITABLE: SEVERAL DAYS
GAD7 TOTAL SCORE: 2
8. IF YOU CHECKED OFF ANY PROBLEMS, HOW DIFFICULT HAVE THESE MADE IT FOR YOU TO DO YOUR WORK, TAKE CARE OF THINGS AT HOME, OR GET ALONG WITH OTHER PEOPLE?: NOT DIFFICULT AT ALL
GAD7 TOTAL SCORE: 2
7. FEELING AFRAID AS IF SOMETHING AWFUL MIGHT HAPPEN: NOT AT ALL
4. TROUBLE RELAXING: NOT AT ALL
2. NOT BEING ABLE TO STOP OR CONTROL WORRYING: NOT AT ALL
1. FEELING NERVOUS, ANXIOUS, OR ON EDGE: SEVERAL DAYS
GAD7 TOTAL SCORE: 2

## 2022-05-09 ASSESSMENT — PATIENT HEALTH QUESTIONNAIRE - PHQ9
SUM OF ALL RESPONSES TO PHQ QUESTIONS 1-9: 3
SUM OF ALL RESPONSES TO PHQ QUESTIONS 1-9: 3
10. IF YOU CHECKED OFF ANY PROBLEMS, HOW DIFFICULT HAVE THESE PROBLEMS MADE IT FOR YOU TO DO YOUR WORK, TAKE CARE OF THINGS AT HOME, OR GET ALONG WITH OTHER PEOPLE: SOMEWHAT DIFFICULT

## 2022-05-09 ASSESSMENT — ACTIVITIES OF DAILY LIVING (ADL): CURRENT_FUNCTION: HOUSEWORK REQUIRES ASSISTANCE

## 2022-05-10 ENCOUNTER — OFFICE VISIT (OUTPATIENT)
Dept: FAMILY MEDICINE | Facility: CLINIC | Age: 72
End: 2022-05-10
Payer: MEDICARE

## 2022-05-10 VITALS
TEMPERATURE: 98.1 F | DIASTOLIC BLOOD PRESSURE: 76 MMHG | BODY MASS INDEX: 27.25 KG/M2 | SYSTOLIC BLOOD PRESSURE: 119 MMHG | OXYGEN SATURATION: 100 % | WEIGHT: 149 LBS | HEART RATE: 65 BPM

## 2022-05-10 DIAGNOSIS — Z86.69 HISTORY OF RETINAL DETACHMENT: ICD-10-CM

## 2022-05-10 DIAGNOSIS — R41.3 MEMORY CHANGES: ICD-10-CM

## 2022-05-10 DIAGNOSIS — Z81.8 FAMILY HISTORY OF DEMENTIA: ICD-10-CM

## 2022-05-10 DIAGNOSIS — E21.3 HPTH (HYPERPARATHYROIDISM) (H): ICD-10-CM

## 2022-05-10 DIAGNOSIS — F33.40 RECURRENT MAJOR DEPRESSION IN REMISSION (H): Primary | ICD-10-CM

## 2022-05-10 DIAGNOSIS — Z23 NEED FOR COVID-19 VACCINE: ICD-10-CM

## 2022-05-10 DIAGNOSIS — Z11.59 ENCOUNTER FOR SCREENING FOR OTHER VIRAL DISEASES: ICD-10-CM

## 2022-05-10 DIAGNOSIS — H90.6 MIXED CONDUCTIVE AND SENSORINEURAL HEARING LOSS OF BOTH EARS: ICD-10-CM

## 2022-05-10 DIAGNOSIS — Z00.00 ENCOUNTER FOR MEDICARE ANNUAL WELLNESS EXAM: ICD-10-CM

## 2022-05-10 LAB — PTH-INTACT SERPL-MCNC: 58 PG/ML (ref 18–80)

## 2022-05-10 PROCEDURE — 83735 ASSAY OF MAGNESIUM: CPT | Performed by: NURSE PRACTITIONER

## 2022-05-10 PROCEDURE — G0439 PPPS, SUBSEQ VISIT: HCPCS | Performed by: NURSE PRACTITIONER

## 2022-05-10 PROCEDURE — 82040 ASSAY OF SERUM ALBUMIN: CPT | Performed by: NURSE PRACTITIONER

## 2022-05-10 PROCEDURE — 91306 COVID-19,PF,MODERNA (18+ YRS BOOSTER .25ML): CPT | Performed by: NURSE PRACTITIONER

## 2022-05-10 PROCEDURE — 84100 ASSAY OF PHOSPHORUS: CPT | Performed by: NURSE PRACTITIONER

## 2022-05-10 PROCEDURE — 82310 ASSAY OF CALCIUM: CPT | Performed by: NURSE PRACTITIONER

## 2022-05-10 PROCEDURE — 83970 ASSAY OF PARATHORMONE: CPT | Performed by: NURSE PRACTITIONER

## 2022-05-10 PROCEDURE — 0064A COVID-19,PF,MODERNA (18+ YRS BOOSTER .25ML): CPT | Performed by: NURSE PRACTITIONER

## 2022-05-10 PROCEDURE — 36415 COLL VENOUS BLD VENIPUNCTURE: CPT | Performed by: NURSE PRACTITIONER

## 2022-05-10 PROCEDURE — 82565 ASSAY OF CREATININE: CPT | Performed by: NURSE PRACTITIONER

## 2022-05-10 PROCEDURE — 82306 VITAMIN D 25 HYDROXY: CPT | Performed by: NURSE PRACTITIONER

## 2022-05-10 PROCEDURE — 99213 OFFICE O/P EST LOW 20 MIN: CPT | Mod: 25 | Performed by: NURSE PRACTITIONER

## 2022-05-10 ASSESSMENT — ACTIVITIES OF DAILY LIVING (ADL): CURRENT_FUNCTION: HOUSEWORK REQUIRES ASSISTANCE

## 2022-05-10 ASSESSMENT — ENCOUNTER SYMPTOMS
DIZZINESS: 0
HEMATOCHEZIA: 0
PARESTHESIAS: 0
FEVER: 0
MYALGIAS: 0
NAUSEA: 0
FREQUENCY: 0
SORE THROAT: 0
JOINT SWELLING: 0
ARTHRALGIAS: 0
DYSURIA: 0
HEARTBURN: 0
BREAST MASS: 0
CHILLS: 0
SHORTNESS OF BREATH: 0
CONSTIPATION: 0
COUGH: 0
HEADACHES: 0
ABDOMINAL PAIN: 0
EYE PAIN: 0
HEMATURIA: 0
WEAKNESS: 0
NERVOUS/ANXIOUS: 0
DIARRHEA: 0
PALPITATIONS: 0

## 2022-05-10 ASSESSMENT — ANXIETY QUESTIONNAIRES: GAD7 TOTAL SCORE: 2

## 2022-05-10 ASSESSMENT — PATIENT HEALTH QUESTIONNAIRE - PHQ9: SUM OF ALL RESPONSES TO PHQ QUESTIONS 1-9: 3

## 2022-05-10 NOTE — PROGRESS NOTES
"SUBJECTIVE:   Dinorah Thompson is a 71 year old female who presents for Preventive Visit.      Patient has been advised of split billing requirements and indicates understanding: Yes  Are you in the first 12 months of your Medicare coverage?  No    Healthy Habits:     In general, how would you rate your overall health?  Good    Frequency of exercise:  2-3 days/week    Duration of exercise:  30-45 minutes    Do you usually eat at least 4 servings of fruit and vegetables a day, include whole grains    & fiber and avoid regularly eating high fat or \"junk\" foods?  No    Taking medications regularly:  Yes    Medication side effects:  None    Ability to successfully perform activities of daily living:  Housework requires assistance    Home Safety:  No safety concerns identified    Hearing Impairment:  Need to ask people to speak up or repeat themselves, find that men's voices are easier to understand than woman's and difficulty understanding speech on the telephone    In the past 6 months, have you been bothered by leaking of urine?  No    In general, how would you rate your overall mental or emotional health?  Good      PHQ-2 Total Score: 0    Additional concerns today:  Yes    Would like prescription for light box for winter months. Has used in past with good results. Has had good improvements in mood since day light has increased.     Would like to get hearing assessments. Unable to communicate in many conversations.     Mother with history of demenitia.   Was getting NeuroPsych evals annually and has not had one since 2019.   Has noted mistakes in banking and typical ADL's.     Do you feel safe in your environment? Yes    Have you ever done Advance Care Planning? (For example, a Health Directive, POLST, or a discussion with a medical provider or your loved ones about your wishes): Yes, patient states has an Advance Care Planning document and will bring a copy to the clinic.      Fall risk  Fallen 2 or more times in " the past year?: No  Any fall with injury in the past year?: No  click delete button to remove this line now  Cognitive Screening   1) Repeat 3 items (Leader, Season, Table)    2) Clock draw: NORMAL  3) 3 item recall: Recalls 3 objects  Results: 3 items recalled: COGNITIVE IMPAIRMENT LESS LIKELY    Mini-CogTM Copyright WAQAR Martinez. Licensed by the author for use in [facility]; reprinted with permission (odalys@[facility]). All rights reserved.      Do you have sleep apnea, excessive snoring or daytime drowsiness?: no    Reviewed and updated as needed this visit by clinical staff   Tobacco  Allergies  Meds  Problems  Med Hx  Surg Hx  Fam Hx  Soc   Hx          Reviewed and updated as needed this visit by Provider   Tobacco  Allergies  Meds  Problems  Med Hx  Surg Hx  Fam Hx           Social History     Tobacco Use     Smoking status: Never Smoker     Smokeless tobacco: Never Used   Substance Use Topics     Alcohol use: Yes     Comment: 0-2 drinks per month     If you drink alcohol do you typically have >3 drinks per day or >7 drinks per week? No    No flowsheet data found.      Current providers sharing in care for this patient include:   Patient Care Team:  Juju Chang APRN CNP as PCP - General (Family Medicine)  Sherine Ramon PA-C as Assigned Sleep Provider  Gregoria Wilcox MD as Assigned Surgical Provider  Juju Chang APRN CNP as Assigned PCP  James Toribio MD as Assigned Endocrinology Provider  Yeo, Albert, MD as Assigned Musculoskeletal Provider  Roberta Powers as Assigned Behavioral Health Provider    The following health maintenance items are reviewed in Epic and correct as of today:  Health Maintenance Due   Topic Date Due     URINE DRUG SCREEN  Never done     ASTHMA ACTION PLAN  Never done     URINALYSIS  Never done     COVID-19 Vaccine (4 - Booster for Moderna series) 03/30/2022     FALL RISK ASSESSMENT  04/09/2022     Lab work is in process  Labs  "reviewed in EPIC          Review of Systems   Constitutional: Negative for chills and fever.   HENT: Positive for hearing loss. Negative for congestion, ear pain and sore throat.    Eyes: Negative for pain and visual disturbance.   Respiratory: Negative for cough and shortness of breath.    Cardiovascular: Positive for peripheral edema. Negative for chest pain and palpitations.   Gastrointestinal: Negative for abdominal pain, constipation, diarrhea, heartburn, hematochezia and nausea.   Breasts:  Negative for tenderness, breast mass and discharge.   Genitourinary: Negative for dysuria, frequency, genital sores, hematuria, pelvic pain, urgency, vaginal bleeding and vaginal discharge.   Musculoskeletal: Negative for arthralgias, joint swelling and myalgias.   Skin: Negative for rash.   Neurological: Negative for dizziness, weakness, headaches and paresthesias.   Psychiatric/Behavioral: Negative for mood changes. The patient is not nervous/anxious.      Constitutional, HEENT, cardiovascular, pulmonary, gi and gu systems are negative, except as otherwise noted.    OBJECTIVE:   /76 (BP Location: Right arm, Patient Position: Chair, Cuff Size: Adult Regular)   Pulse 65   Temp 98.1  F (36.7  C)   Wt 67.6 kg (149 lb)   SpO2 100%   BMI 27.25 kg/m   Estimated body mass index is 27.25 kg/m  as calculated from the following:    Height as of 4/6/22: 1.575 m (5' 2\").    Weight as of this encounter: 67.6 kg (149 lb).  Physical Exam  GENERAL: healthy, alert and no distress  EYES: Eyes grossly normal to inspection, PERRL and conjunctivae and sclerae normal  HENT: ear canals and TM's normal, nose and mouth without ulcers or lesions  NECK: no adenopathy, no asymmetry, masses, or scars and thyroid normal to palpation  RESP: lungs clear to auscultation - no rales, rhonchi or wheezes  CV: regular rate and rhythm, normal S1 S2, no S3 or S4, no murmur, click or rub, no peripheral edema and peripheral pulses strong  ABDOMEN: soft, " "nontender, no hepatosplenomegaly, no masses and bowel sounds normal  MS: no gross musculoskeletal defects noted, no edema  SKIN: no suspicious lesions or rashes  NEURO: Normal strength and tone, mentation intact and speech normal  PSYCH: mentation appears normal, affect normal/bright    Diagnostic Test Results:  Labs reviewed in Epic    ASSESSMENT / PLAN:   Dinorah was seen today for wellness visit.    Diagnoses and all orders for this visit:    Recurrent major depression in remission (H)  -     Miscellaneous Order for DME - ONLY FOR DME  Mood much improved with season change and more daylight exposure.   Will plan for light box therapy DME.   Continue sertraline a this time.     Encounter for Medicare annual wellness exam  HM updated.     History of retinal detachment  -     Adult Eye Referral; Future  Refer. Update eye exam.     Mixed conductive and sensorineural hearing loss of both ears  -     Adult Audiology Referral; Future  difficulty in communications.     Family history of dementia  -     Adult Neuropsychology Referral; Future  Update testing. Refer.     Memory changes  -     Adult Neuropsychology Referral; Future  As above.     Encounter for screening for other viral diseases  -     Asymptomatic COVID-19 Virus (Coronavirus) by PCR  Per Endocrine.     HPTH (hyperparathyroidism) (H)  -     Calcium  -     Albumin level  -     Creatinine  -     Parathyroid Hormone Intact  -     Magnesium  -     Phosphorus  -     Vitamin D Deficiency  Per Endocrine.       Patient has been advised of split billing requirements and indicates understanding: Yes    COUNSELING:  Reviewed preventive health counseling, as reflected in patient instructions    Estimated body mass index is 27.25 kg/m  as calculated from the following:    Height as of 4/6/22: 1.575 m (5' 2\").    Weight as of this encounter: 67.6 kg (149 lb).    Weight management plan: Discussed healthy diet and exercise guidelines    She reports that she has never " smoked. She has never used smokeless tobacco.      Appropriate preventive services were discussed with this patient, including applicable screening as appropriate for cardiovascular disease, diabetes, osteopenia/osteoporosis, and glaucoma.  As appropriate for age/gender, discussed screening for colorectal cancer, prostate cancer, breast cancer, and cervical cancer. Checklist reviewing preventive services available has been given to the patient.    Reviewed patients plan of care and provided an AVS. The Intermediate Care Plan ( asthma action plan, low back pain action plan, and migraine action plan) for Dinorah meets the Care Plan requirement. This Care Plan has been established and reviewed with the Patient.    Counseling Resources:  ATP IV Guidelines  Pooled Cohorts Equation Calculator  Breast Cancer Risk Calculator  Breast Cancer: Medication to Reduce Risk  FRAX Risk Assessment  ICSI Preventive Guidelines  Dietary Guidelines for Americans, 2010  [employer]'s MyPlate  ASA Prophylaxis  Lung CA Screening    JASWANT Johnson [facility]    Identified Health Risks:  Answers for HPI/ROS submitted by the patient on 5/9/2022  If you checked off any problems, how difficult have these problems made it for you to do your work, take care of things at home, or get along with other people?: Somewhat difficult  PHQ9 TOTAL SCORE: 3  ELDON 7 TOTAL SCORE: 2        The patient was counseled and encouraged to consider modifying their diet and eating habits. She was provided with information on recommended healthy diet options.  The patient reports that she has difficulty with activities of daily living. I have asked that the patient make a follow up appointment in 4 weeks where this issue will be further evaluated and addressed.  The patient was provided with written information regarding signs of hearing loss.

## 2022-05-10 NOTE — PATIENT INSTRUCTIONS
Patient Education   Personalized Prevention Plan  You are due for the preventive services outlined below.  Your care team is available to assist you in scheduling these services.  If you have already completed any of these items, please share that information with your care team to update in your medical record.  Health Maintenance Due   Topic Date Due     URINE DRUG SCREEN  Never done     Asthma Action Plan - yearly  Never done     Urine Test  Never done     COVID-19 Vaccine (4 - Booster for Moderna series) 03/30/2022     FALL RISK ASSESSMENT  04/09/2022       Understanding USDA MyPlate  The USDA has guidelines to help you make healthy food choices. These are called MyPlate. MyPlate shows the food groups that make up healthy meals using the image of a place setting. Before you eat, think about the healthiest choices for what to put on your plate or in your cup or bowl. To learn more about building a healthy plate, visit www.Vivacta.gov.    The food groups    Fruits. Any fruit or 100% fruit juice counts as part of the Fruit Group. Fruits may be fresh, canned, frozen, or dried, and may be whole, cut-up, or pureed. Make 1/2 of your plate fruits and vegetables.    Vegetables. Any vegetable or 100% vegetable juice counts as a member of the Vegetable Group. Vegetables may be fresh, frozen, canned, or dried. They can be served raw or cooked and may be whole, cut-up, or mashed. Make 1/2 of your plate fruits and vegetables.    Grains. All foods made from grains are part of the Grains Group. These include wheat, rice, oats, cornmeal, and barley. Grains are often used to make foods such as bread, pasta, oatmeal, cereal, tortillas, and grits. Grains should be no more than 1/4 of your plate. At least half of your grains should be whole grains.    Protein. This group includes meat, poultry, seafood, beans and peas, eggs, processed soy products (such as tofu), nuts (including nut butters), and seeds. Make protein choices  no more than 1/4 of your plate. Meat and poultry choices should be lean or low fat.    Dairy. The Dairy Group includes all fluid milk products and foods made from milk that contain calcium, such as yogurt and cheese. (Foods that have little calcium, such as cream, butter, and cream cheese, are not part of this group.) Most dairy choices should be low-fat or fat-free.    Oils. Oils aren't a food group, but they do contain essential nutrients. However it's important to watch your intake of oils. These are fats that are liquid at room temperature. They include canola, corn, olive, soybean, vegetable, and sunflower oil. Foods that are mainly oil include mayonnaise, certain salad dressings, and soft margarines. You likely already get your daily oil allowance from the foods you eat.  Things to limit  Eating healthy also means limiting these things in your diet:       Salt (sodium). Many processed foods have a lot of sodium. To keep sodium intake down, eat fresh vegetables, meats, poultry, and seafood when possible. Purchase low-sodium, reduced-sodium, or no-salt-added food products at the store. And don't add salt to your meals at home. Instead, season them with herbs and spices such as dill, oregano, cumin, and paprika. Or try adding flavor with lemon or lime zest and juice.    Saturated fat. Saturated fats are most often found in animal products such as beef, pork, and chicken. They are often solid at room temperature, such as butter. To reduce your saturated fat intake, choose leaner cuts of meat and poultry. And try healthier cooking methods such as grilling, broiling, roasting, or baking. For a simple lower-fat swap, use plain nonfat yogurt instead of mayonnaise when making potato salad or macaroni salad.    Added sugars. These are sugars added to foods. They are in foods such as ice cream, candy, soda, fruit drinks, sports drinks, energy drinks, cookies, pastries, jams, and syrups. Cut down on added sugars by  sharing sweet treats with a family member or friend. You can also choose fruit for dessert, and drink water or other unsweetened beverages.     [employer] last reviewed this educational content on 6/1/2020 2000-2021 [employer]. All rights reserved. This information is not intended as a substitute for professional medical care. Always follow your healthcare professional's instructions.        Activities of Daily Living    Your Health Risk Assessment indicates you have difficulties with activities of daily living such as housework, bathing, preparing meals, taking medication, etc. Please make a follow up appointment for us to address this issue in more detail.    Signs of Hearing Loss      Hearing much better with one ear can be a sign of hearing loss.   Hearing loss is a problem shared by many people. In fact, it is one of the most common health problems, particularly as people age. Most people age 65 and older have some hearing loss. By age 80, almost everyone does. Hearing loss often occurs slowly over the years. So you may not realize your hearing has gotten worse.  Have your hearing checked  Call your healthcare provider if you:    Have to strain to hear normal conversation    Have to watch other people s faces very carefully to follow what they re saying    Need to ask people to repeat what they ve said    Often misunderstand what people are saying    Turn the volume of the television or radio up so high that others complain    Feel that people are mumbling when they re talking to you    Find that the effort to hear leaves you feeling tired and irritated    Notice, when using the phone, that you hear better with one ear than the other  [employer] last reviewed this educational content on 1/1/2020 2000-2021 [employer]. All rights reserved. This information is not intended as a substitute for professional medical care. Always follow your healthcare professional's  instructions.

## 2022-05-11 LAB
ALBUMIN SERPL-MCNC: 3.9 G/DL (ref 3.4–5)
CALCIUM SERPL-MCNC: 8.4 MG/DL (ref 8.5–10.1)
CREAT SERPL-MCNC: 0.84 MG/DL (ref 0.52–1.04)
DEPRECATED CALCIDIOL+CALCIFEROL SERPL-MC: 59 UG/L (ref 20–75)
GFR SERPL CREATININE-BSD FRML MDRD: 74 ML/MIN/1.73M2
MAGNESIUM SERPL-MCNC: 2.4 MG/DL (ref 1.6–2.3)
PHOSPHATE SERPL-MCNC: 3.3 MG/DL (ref 2.5–4.5)

## 2022-05-17 ENCOUNTER — ANCILLARY PROCEDURE (OUTPATIENT)
Dept: MAMMOGRAPHY | Facility: CLINIC | Age: 72
End: 2022-05-17
Attending: NURSE PRACTITIONER
Payer: MEDICARE

## 2022-05-17 DIAGNOSIS — Z12.31 VISIT FOR SCREENING MAMMOGRAM: ICD-10-CM

## 2022-05-17 PROCEDURE — 77063 BREAST TOMOSYNTHESIS BI: CPT | Mod: TC | Performed by: RADIOLOGY

## 2022-05-17 PROCEDURE — 77067 SCR MAMMO BI INCL CAD: CPT | Mod: TC | Performed by: RADIOLOGY

## 2022-05-24 ENCOUNTER — VIRTUAL VISIT (OUTPATIENT)
Dept: PSYCHOLOGY | Facility: CLINIC | Age: 72
End: 2022-05-24
Payer: MEDICARE

## 2022-05-24 ENCOUNTER — THERAPY VISIT (OUTPATIENT)
Dept: PHYSICAL THERAPY | Facility: CLINIC | Age: 72
End: 2022-05-24
Payer: MEDICARE

## 2022-05-24 DIAGNOSIS — M25.562 CHRONIC PAIN OF LEFT KNEE: Primary | ICD-10-CM

## 2022-05-24 DIAGNOSIS — G89.29 CHRONIC PAIN OF LEFT KNEE: Primary | ICD-10-CM

## 2022-05-24 DIAGNOSIS — F33.1 MODERATE EPISODE OF RECURRENT MAJOR DEPRESSIVE DISORDER (H): Primary | ICD-10-CM

## 2022-05-24 PROCEDURE — 97530 THERAPEUTIC ACTIVITIES: CPT | Mod: GP | Performed by: PHYSICAL THERAPIST

## 2022-05-24 PROCEDURE — 97112 NEUROMUSCULAR REEDUCATION: CPT | Mod: GP | Performed by: PHYSICAL THERAPIST

## 2022-05-24 PROCEDURE — 90832 PSYTX W PT 30 MINUTES: CPT | Mod: 95 | Performed by: SOCIAL WORKER

## 2022-05-24 PROCEDURE — 97110 THERAPEUTIC EXERCISES: CPT | Mod: GP | Performed by: PHYSICAL THERAPIST

## 2022-05-24 ASSESSMENT — ACTIVITIES OF DAILY LIVING (ADL)
GIVING WAY, BUCKLING OR SHIFTING OF KNEE: I DO NOT HAVE THE SYMPTOM
WEAKNESS: I HAVE THE SYMPTOM BUT IT DOES NOT AFFECT MY ACTIVITY
STIFFNESS: I DO NOT HAVE THE SYMPTOM
RISE FROM A CHAIR: ACTIVITY IS MINIMALLY DIFFICULT
HOW_WOULD_YOU_RATE_THE_OVERALL_FUNCTION_OF_YOUR_KNEE_DURING_YOUR_USUAL_DAILY_ACTIVITIES?: NEARLY NORMAL
GO DOWN STAIRS: ACTIVITY IS MINIMALLY DIFFICULT
HOW_WOULD_YOU_RATE_THE_CURRENT_FUNCTION_OF_YOUR_KNEE_DURING_YOUR_USUAL_DAILY_ACTIVITIES_ON_A_SCALE_FROM_0_TO_100_WITH_100_BEING_YOUR_LEVEL_OF_KNEE_FUNCTION_PRIOR_TO_YOUR_INJURY_AND_0_BEING_THE_INABILITY_TO_PERFORM_ANY_OF_YOUR_USUAL_DAILY_ACTIVITIES?: 80
KNEEL ON THE FRONT OF YOUR KNEE: ACTIVITY IS MINIMALLY DIFFICULT
SQUAT: ACTIVITY IS SOMEWHAT DIFFICULT
SWELLING: I DO NOT HAVE THE SYMPTOM
WALK: ACTIVITY IS NOT DIFFICULT
PAIN: I HAVE THE SYMPTOM BUT IT DOES NOT AFFECT MY ACTIVITY
RAW_SCORE: 61
STAND: ACTIVITY IS MINIMALLY DIFFICULT
GO UP STAIRS: ACTIVITY IS MINIMALLY DIFFICULT
KNEE_ACTIVITY_OF_DAILY_LIVING_SUM: 61
KNEE_ACTIVITY_OF_DAILY_LIVING_SCORE: 87.14
AS_A_RESULT_OF_YOUR_KNEE_INJURY,_HOW_WOULD_YOU_RATE_YOUR_CURRENT_LEVEL_OF_DAILY_ACTIVITY?: NEARLY NORMAL
LIMPING: I DO NOT HAVE THE SYMPTOM
SIT WITH YOUR KNEE BENT: ACTIVITY IS NOT DIFFICULT

## 2022-05-24 ASSESSMENT — ANXIETY QUESTIONNAIRES
3. WORRYING TOO MUCH ABOUT DIFFERENT THINGS: NOT AT ALL
7. FEELING AFRAID AS IF SOMETHING AWFUL MIGHT HAPPEN: NOT AT ALL
2. NOT BEING ABLE TO STOP OR CONTROL WORRYING: NOT AT ALL
6. BECOMING EASILY ANNOYED OR IRRITABLE: SEVERAL DAYS
1. FEELING NERVOUS, ANXIOUS, OR ON EDGE: SEVERAL DAYS
GAD7 TOTAL SCORE: 2
5. BEING SO RESTLESS THAT IT IS HARD TO SIT STILL: NOT AT ALL
GAD7 TOTAL SCORE: 2
4. TROUBLE RELAXING: NOT AT ALL

## 2022-05-24 ASSESSMENT — COLUMBIA-SUICIDE SEVERITY RATING SCALE - C-SSRS
2. HAVE YOU ACTUALLY HAD ANY THOUGHTS OF KILLING YOURSELF?: NO
SUICIDE, SINCE LAST CONTACT: NO
1. SINCE LAST CONTACT, HAVE YOU WISHED YOU WERE DEAD OR WISHED YOU COULD GO TO SLEEP AND NOT WAKE UP?: NO
6. HAVE YOU EVER DONE ANYTHING, STARTED TO DO ANYTHING, OR PREPARED TO DO ANYTHING TO END YOUR LIFE?: NO
ATTEMPT SINCE LAST CONTACT: NO
TOTAL  NUMBER OF ABORTED OR SELF INTERRUPTED ATTEMPTS SINCE LAST CONTACT: NO
TOTAL  NUMBER OF INTERRUPTED ATTEMPTS SINCE LAST CONTACT: NO

## 2022-05-24 ASSESSMENT — PATIENT HEALTH QUESTIONNAIRE - PHQ9: SUM OF ALL RESPONSES TO PHQ QUESTIONS 1-9: 1

## 2022-05-24 NOTE — PROGRESS NOTES
Discharge Summary  Multiple Sessions    Client Name: Dinorah Thompson MRN#: [id number] YOB: 1950    Discharge Date:   May 24, 2022    Service Modality: Video Visit:      Provider verified identity through the following two step process.  Patient provided:  Patient is known previously to provider    Telemedicine Visit: The patient's condition can be safely assessed and treated via synchronous audio and visual telemedicine encounter.      Reason for Telemedicine Visit: Services only offered telehealth    Originating Site (Patient Location): Patient's home    Distant Site (Provider Location): Provider Remote Setting- Home Office    Consent:  The patient/guardian has verbally consented to: the potential risks and benefits of telemedicine (video visit) versus in person care; bill my insurance or make self-payment for services provided; and responsibility for payment of non-covered services.     Patient would like the video invitation sent by:  Send to e-mail at: [email or web address]    Mode of Communication:  Video Conference via [employer]    As the provider I attest to compliance with applicable laws and regulations related to telemedicine.    Service Type: Individual      Session Start Time: 1107  Session End Time: 1142      Session Length: 20-37     Session #: 4     Attendees: Client      Focus of Treatment Objective(s):  Client's presenting concerns included: Depressed Mood - with anxiety  Stage of Change at time of Discharge: ACTION (Actively working towards change)    Medication Adherence:  Yes    Chemical Use:  No    Assessment: Current Emotional / Mental Status (status of significant symptoms):    Risk status (Self / Other harm or suicidal ideation)  Client denies current fears or concerns for personal safety.  Client denies current or recent suicidal ideation or behaviors.  Client denies current or recent homicidal ideation or behaviors.  Client denies current or recent self injurious  behavior or ideation.  Client denies other safety concerns.  A safety and risk management plan has not been developed at this time, however client was given the after-hours number should there be a change in any of these risk factors.    Appearance:   Appropriate   Eye Contact:   Fair   Psychomotor Behavior: Normal   Attitude:   Cooperative   Orientation:   All  Speech   Rate / Production: Normal    Volume:  Normal   Mood:    Normal  Affect:    Appropriate   Thought Content:  Clear   Thought Form:  Coherent  Logical   Insight:   Good     DSM5 Diagnoses: (Sustained by DSM5 Criteria Listed Above)  Diagnoses: 296.22 (F32.1)  Major Depressive Disorder, Single Episode, Moderate With anxious distress  Psychosocial & Contextual Factors: Grief loss, taking care of , transitions      Reason for Discharge:  Client is satisfied with progress      Aftercare Plan:  Client may resume counseling services at any time in the future by calling the [facility] Intake Office, [phone].      Kaley Tan, [facility] Maine Medical CenterSW  May 24, 2022

## 2022-06-10 ENCOUNTER — OFFICE VISIT (OUTPATIENT)
Dept: URGENT CARE | Facility: URGENT CARE | Age: 72
End: 2022-06-10
Payer: MEDICARE

## 2022-06-10 VITALS
SYSTOLIC BLOOD PRESSURE: 100 MMHG | HEART RATE: 72 BPM | RESPIRATION RATE: 16 BRPM | BODY MASS INDEX: 26.52 KG/M2 | TEMPERATURE: 99.4 F | WEIGHT: 145 LBS | DIASTOLIC BLOOD PRESSURE: 78 MMHG | OXYGEN SATURATION: 97 %

## 2022-06-10 DIAGNOSIS — R05.9 COUGH: Primary | ICD-10-CM

## 2022-06-10 DIAGNOSIS — J22 LOWER RESPIRATORY TRACT INFECTION: ICD-10-CM

## 2022-06-10 DIAGNOSIS — J98.01 BRONCHOSPASM: ICD-10-CM

## 2022-06-10 PROCEDURE — 99214 OFFICE O/P EST MOD 30 MIN: CPT | Performed by: FAMILY MEDICINE

## 2022-06-10 RX ORDER — DOXYCYCLINE 100 MG/1
100 CAPSULE ORAL 2 TIMES DAILY
Qty: 20 CAPSULE | Refills: 0 | Status: SHIPPED | OUTPATIENT
Start: 2022-06-10 | End: 2022-06-20

## 2022-06-10 RX ORDER — BENZONATATE 200 MG/1
200 CAPSULE ORAL 3 TIMES DAILY PRN
Qty: 30 CAPSULE | Refills: 0 | Status: SHIPPED | OUTPATIENT
Start: 2022-06-10 | End: 2022-07-26

## 2022-06-10 RX ORDER — CODEINE PHOSPHATE AND GUAIFENESIN 10; 100 MG/5ML; MG/5ML
2 SOLUTION ORAL EVERY 6 HOURS PRN
Qty: 118 ML | Refills: 0 | Status: SHIPPED | OUTPATIENT
Start: 2022-06-10 | End: 2022-07-26

## 2022-06-10 RX ORDER — ALBUTEROL SULFATE 90 UG/1
2 AEROSOL, METERED RESPIRATORY (INHALATION) EVERY 6 HOURS PRN
Qty: 18 G | Refills: 0 | Status: SHIPPED | OUTPATIENT
Start: 2022-06-10 | End: 2022-07-26

## 2022-06-10 NOTE — PROGRESS NOTES
SUBJECTIVE:   Dinorah Thompson is a 72 year old female presenting with a chief complaint of cough, congestion.  Initially had more sinus and ear discomfort, now moved to chest and is coughing alot  Onset of symptoms was 1 week(s) ago.  Course of illness is worsening.    Severity moderate  Current and Associated symptoms: cough, bronchospasm/wheezing, SOB  Treatment measures tried include Tylenol/Ibuprofen, Fluids, Rest and Medrol dospak, Amoxicillin.  Predisposing factors include HX of ?asthma.    Completed Moderna COVID vaccinations, boosted X2    Had travelled to [place], thought that was sick due to flying and altitude sickenss.  Was given Medrol dospak and given RX Amoxicillin for ear infection and sinus infection    Feeling more SOB and dizzy, coughing is worse and has been wheezing especially when coughs, laughs or do things.  Was told that has asthma before but found out that was due to laryngeal issues.  Has an old inhaler.    Past Medical History:   Diagnosis Date     Chronic osteoarthritis      Complication of anesthesia 1990's    DIfficulty waking up     Depressive disorder      Female bladder prolapse 9/3/2019     Mild intermittent asthma 7/29/2017     FAHAD treated with BiPAP 6/13/2018     Parathyroid abnormality (H)      Prolapse of female pelvic organs 8/19/2019     Sleep apnea     Uses a Bi-Pap.. Will have son bring it if necessary on DOS     Current Outpatient Medications   Medication Sig Dispense Refill     acetaminophen (TYLENOL) 500 MG tablet Take 2 tablets (1,000 mg) by mouth every 6 hours as needed for pain       baclofen (LIORESAL) 10 MG tablet Take 10 mg by mouth daily as needed        Carboxymethylcellul-Glycerin 1-0.9 % GEL Place 1 drop into both eyes daily        celecoxib (CELEBREX) 100 MG capsule Take 1 capsule (100 mg) by mouth 2 times daily (Patient not taking: Reported on 4/19/2022) 180 capsule 0     clonazePAM (KLONOPIN) 0.5 MG tablet Take 0.5 mg by mouth nightly as needed         DULoxetine (CYMBALTA) 60 MG capsule Take 1 capsule (60 mg) by mouth daily 90 capsule 1     LYSINE PO        mometasone (NASONEX) 50 MCG/ACT nasal spray spray 2 spray by intranasal route  every day in each nostril       NONFORMULARY Vitamin Packet from [employer] including Multi-vitamin, Leutin, Calcium, Antioxidant, Fish oil, Glucosamine- Chondroitin: Take 1 packet by mouth daily       propranolol (INDERAL) 10 MG tablet Take 10 mg by mouth daily as needed        rosuvastatin (CRESTOR) 5 MG tablet Take 1 tablet (5 mg) by mouth every other day 90 tablet 0     sertraline (ZOLOFT) 25 MG tablet Take 1 tablet (25 mg) by mouth daily 90 tablet 1     tacrolimus (PROTOPIC) 0.1 % external ointment Apply to AA on the face BID PRN 30 g 5     traZODone (DESYREL) 100 MG tablet Take 100 mg by mouth nightly as needed        valACYclovir (VALTREX) 1000 mg tablet Take two tablets twice per day for one day for flare ups. 30 tablet 1     Social History     Tobacco Use     Smoking status: Never Smoker     Smokeless tobacco: Never Used   Substance Use Topics     Alcohol use: Yes     Comment: 0-2 drinks per month       ROS:  Review of systems negative except as stated above.    OBJECTIVE:  /78 (BP Location: Right arm, Patient Position: Chair, Cuff Size: Adult Regular)   Pulse 72   Temp 99.4  F (37.4  C) (Oral)   Resp 16   Wt 65.8 kg (145 lb)   SpO2 97%   Breastfeeding No   BMI 26.52 kg/m    GENERAL APPEARANCE: healthy, alert and no distress  EYES: EOMI,  PERRL, conjunctiva clear  HENT: ear canals and TM's normal.   RESP: lungs clear to auscultation - no rales, rhonchi or wheezes  CV: regular rates and rhythm, normal S1 S2, no murmur noted    CXR - no acute infiltrate, no pleural effusion, no pneumothorax personally viewed by me    ASSESSMENT/PLAN:  (R05.9) Cough  (primary encounter diagnosis)  Plan: XR Chest 2 Views, benzonatate (TESSALON) 200 MG        capsule, guaiFENesin-codeine (ROBITUSSIN AC)         100-10 MG/5ML  solution            (J22) Lower respiratory tract infection  Plan: doxycycline hyclate (VIBRAMYCIN) 100 MG         capsule, albuterol (PROAIR HFA/PROVENTIL         HFA/VENTOLIN HFA) 108 (90 Base) MCG/ACT inhaler            (J98.01) Bronchospasm  Plan: albuterol (PROAIR HFA/PROVENTIL HFA/VENTOLIN         HFA) 108 (90 Base) MCG/ACT inhaler            Reassurance given, reviewed symptomatic treatment with tylenol, ibuprofen, plenty of fluids and rest.  Will switch antibiotic - new RX doxycycline as this will have better coverage for respiratory etiology.  RX tessalon perles and RX doe AC given to help with cough.  RX albuterol inhaler given to help with bronchospasms symptoms.  Will follow up on formal Xray report and notify if any abnormalities.    Follow up with primary provider if no improvement of symptoms in 1 week    Ra Connelly MD  Sosa 10, 2022 2:02 PM

## 2022-06-22 ENCOUNTER — DOCUMENTATION ONLY (OUTPATIENT)
Dept: LAB | Facility: CLINIC | Age: 72
End: 2022-06-22

## 2022-06-22 NOTE — PROGRESS NOTES
I am not aware of labs needed, please contact patient.   Thank you  JASWANT Johnson CNP on 6/22/2022 at 10:24 AM

## 2022-06-22 NOTE — PROGRESS NOTES
Pt has a lab only coming up and currently no labs. Please review and place future orders.   CC Dr. Toribio on this encounter as well.  Questions or concerns, please have your care team contact pt directly.    Thank You  Sasha MOTT

## 2022-06-23 ENCOUNTER — VIRTUAL VISIT (OUTPATIENT)
Dept: PALLIATIVE MEDICINE | Facility: CLINIC | Age: 72
End: 2022-06-23
Payer: MEDICARE

## 2022-06-23 ENCOUNTER — TELEPHONE (OUTPATIENT)
Dept: ANESTHESIOLOGY | Facility: CLINIC | Age: 72
End: 2022-06-23

## 2022-06-23 DIAGNOSIS — M54.16 LUMBAR RADICULOPATHY: Primary | ICD-10-CM

## 2022-06-23 PROCEDURE — 96158 HLTH BHV IVNTJ INDIV 1ST 30: CPT | Mod: 95 | Performed by: PSYCHOLOGIST

## 2022-06-23 NOTE — PROGRESS NOTES
"Dinorah Thompson is a 72 year old female who is being evaluated via a billable video visit.      The patient has been notified of following:     \"This video visit will be conducted via a call between you and your physician/provider. We have found that certain health care needs can be provided without the need for an in-person physical exam.  This service lets us provide the care you need with a video conversation.     Video visits are billed at different rates depending on your insurance coverage.  Please reach out to your insurance provider with any questions.    If during the course of the call the physician/provider feels a video visit is not appropriate, you will not be charged for this service.\"    Patient has given verbal consent for Video visit? Yes    Patient would like the video invitation sent by: Text to cell phone: .[phone]}    Video Start Time: 9:00 AM    Additional provider notes:      Pain Diagnoses per pain provider:   Lumbar radiculopathy        DATA: During today's visit you reported the following: Your back pain remains manageable. Your mood is stable - report depression has been stable. Your activity level is mildly increased - gardening, walking on vacation - went on hike with daughter in law and felt pleased about being able to walk quite a distance for about an hour total. Your stress level is manageable overall - starting to be nervous about increased costs of everything in general. Your sleep is mostly stable. You reported engaging in self-care for your pain 2-3 times daily.    You identified that you would like to focus on the following or had questions regarding the following issues or concerns, and we discussed the following:   - caught up since last visit on 4/28  - would like to reschedule epidural as you are still finding benefit from last injection  - visit to [place] to see grand daughter's 8th grade graduation, came down with cold  -  has a new home health aide for another day " - now having in-home services 3 times weekly  - gardening more - wonder if this could be contributing to some pain  - started intermittent fasting after reading [employer] article - feeling better doing this, noting weight loss of about 7 pounds  - started to do more fun activities - this is a new venture for you   - PCP provided Rx for lightbox for winter and already purchased one  - discussed as things are still very stable, you would like to check in after the summer with option to schedule sooner if needed      ASSESSMENT: Overall Dot continues to experience good relief and management of pain following injections in knee and back in the spring. She continues to note stable mood and has purchase a lightbox in preparation for seasonal impact on mood. She seems to be continuing to actively utilize skills and resources.    PLAN:   Your next appointment is scheduled for 9/29 at 10:00 AM.  Assignment/Objectives /interventions for next session:   - continue to pay attention to cues from your body regarding pain and fatigue  - continue to engage in self-care as often as needed    We believe regular attendance is key to your success in our program!      Any time you are unable to keep your appointment we ask that you call us at [phone] at least 24 hours in advance to cancel.This will allow us to offer the appointment time to another patient.     Multiple missed appointments may lead to dismissal from the clinic.    Video-Visit Details    Type of service:  Video Visit    Video End Time (time video stopped): 9:22 AM    Originating Location (pt. Location): Home    Distant Location (provider location):  [facility]     Mode of Communication:  Video Conference via Stevie      Roberta Powers PsyD LP  Licensed Psychologist  Outpatient Clinic Therapist  [facility]

## 2022-06-23 NOTE — TELEPHONE ENCOUNTER
Patient is scheduled for procedure with Dr. Santana    Spoke with: Patient    Date of Procedure: 07-26-22    Location: [facility]    Informed patient they will need an adult  Yes    Pre-procedure COVID-19 Test: @ home    Additional comments: N/A    Patient is aware pre-op RN will call 2-3 days prior to procedure with arrival time and instructions. Yes      Cammy Santoyo on 6/23/2022 at 1:04 PM

## 2022-06-23 NOTE — TELEPHONE ENCOUNTER
[facility] Call Center    Phone Message    May a detailed message be left on voicemail: yes     Reason for Call: Other: Patient wants to reschedule her procedure. Please call back and discuss.     Action Taken: Message routed to:  [facility] pain    Travel Screening: Not Applicable

## 2022-06-27 DIAGNOSIS — E78.5 HYPERLIPIDEMIA, UNSPECIFIED HYPERLIPIDEMIA TYPE: ICD-10-CM

## 2022-06-30 RX ORDER — ROSUVASTATIN CALCIUM 5 MG/1
TABLET, COATED ORAL
Qty: 45 TABLET | Refills: 0 | Status: SHIPPED | OUTPATIENT
Start: 2022-06-30 | End: 2022-08-24

## 2022-07-18 NOTE — PROGRESS NOTES
AUDIOLOGY REPORT    SUBJECTIVE:  Dot Thompson is a 72 year old female who was seen in the Audiology Clinic at the [facility] for audiologic evaluation, referred by JASWANT Johnson CNP. The patient reports a history of gradual hearing loss in both ears for the past 3-5 years. She states she has had trouble hearing her grandchildren for a few years and she has now been experiencing more difficulty hearing her .  The patient denies a history of otalgia, otorrhea, tinnitus, aural fullness, dizziness, ear surgery, and noise exposure. Dot reports a family history of hearing loss due to her parents and grandparents experiencing age-related hearing loss. The patient notes difficulty with communication in noisy listening situations.     OBJECTIVE:  Abuse Screening:  Do you feel unsafe at home or work/school? No  Do you feel threatened by someone? No  Does anyone try to keep you from having contact with others, or doing things outside of your home? Yes  Safety Plan initiated:Primary Care provider office notified. Dot reports that her sister-in-law doesn't allow her to see her brother.  Physical signs of abuse present? No     Fall Risk Screen:  1. Have you fallen two or more times in the past year? No  2. Have you fallen and had an injury in the past year? Yes- fell due to tripping and got a bruise.    Otoscopic exam indicates ears are clear of cerumen bilaterally.    Pure Tone Thresholds assessed using conventional audiometry with good  reliability from 250-8000 Hz bilaterally using insert earphones and circumaural headphones     RIGHT:  normal sloping to mild sensorineural hearing loss    LEFT:    normal sloping to mild sensorineural hearing loss   *Left ear is 10-15 dB worse than right ear from 5242-6568 Hz and 30 dB worse than the right ear at 8000 Hz.    Tympanogram:    RIGHT: slightly reduced eardrum mobility (type As)    LEFT:   normal eardrum mobility with some negative  pressure    Reflexes (reported by stimulus ear):  RIGHT: Ipsilateral is present at normal levels  RIGHT: Contralateral is absent at frequencies tested  LEFT:   Ipsilateral is present at normal levels  LEFT:   Contralateral is present at normal levels      Speech Reception Threshold:    RIGHT: 25 dB HL    LEFT:   25 dB HL  Word Recognition Score:     RIGHT: 100% at 65 dB HL using NU-6 recorded word list.    LEFT:   100% at 65 dB HL using NU-6 recorded word list.      ASSESSMENT:   Testing today reveals normal sloping to mild sensorineural hearing loss in both ears with 10-15 dB asymmetry in hearing from 6945-9812 Hz and 30 dB asymmetry at 8000 Hz with the left ear worse than the right ear. Today s results were discussed with the patient in detail.     PLAN:  Patient was counseled regarding hearing loss and impact on communication. It is recommended that the patient see an ENT due to the slight asymmetry in hearing found today. Dot prefers to monitor her hearing with another hearing test in 1 year. She is told to come in sooner if she notices any changes.  Please call this clinic with questions regarding these results or recommendations.      Martinez Humphreys, [facility]3]-A  [place] Licensed Audiologist #[id number]

## 2022-07-19 ENCOUNTER — OFFICE VISIT (OUTPATIENT)
Dept: AUDIOLOGY | Facility: CLINIC | Age: 72
End: 2022-07-19
Payer: MEDICARE

## 2022-07-19 DIAGNOSIS — H90.6 MIXED CONDUCTIVE AND SENSORINEURAL HEARING LOSS OF BOTH EARS: ICD-10-CM

## 2022-07-19 DIAGNOSIS — H90.3 SENSORINEURAL HEARING LOSS, BILATERAL: Primary | ICD-10-CM

## 2022-07-19 PROCEDURE — 92557 COMPREHENSIVE HEARING TEST: CPT | Performed by: AUDIOLOGIST

## 2022-07-19 PROCEDURE — 92550 TYMPANOMETRY & REFLEX THRESH: CPT | Performed by: AUDIOLOGIST

## 2022-07-19 NOTE — PROGRESS NOTES
Called patient and left voicemail to call back and ask to speak to any triage nurse.    Jemima Huddleston RN

## 2022-07-19 NOTE — TELEPHONE ENCOUNTER
----- Message from Jesus Carreon sent at 7/19/2022 11:49 AM CDT -----  Ting Matute,    I saw this patient for a hearing test today. I asked her the abuse screening questions and the falls risk questions. She answered yes to the abuse screening when I asked her if anyone tries to keep her from having contact with others. She said that her sister-in-law (who reportedly has mental illness) doesn't allow her to see her brother.    Dot also answered yes to the fall screening. She said that she tripped and fell and got a bruise within the past year. Please let me know if you have any questions.    Thank you,  Martinez Humphreys, [facility]-A  [place] Licensed Audiologist #[id number]

## 2022-07-19 NOTE — PROGRESS NOTES
Please call patient to understand if the lack of contact with her brother is a concern for patient. Based on reporting at Audiology today.   Thank you  JASWANT Johnson CNP on 7/19/2022 at 4:14 PM

## 2022-07-19 NOTE — PROGRESS NOTES
Call Documentation      Juju Chang APRN CNP at 7/19/2022  4:13 PM    Status: Signed   ----- Message from Jesus Carreon sent at 7/19/2022 11:49 AM CDT -----  Ting Matute,     I saw this patient for a hearing test today. I asked her the abuse screening questions and the falls risk questions. She answered yes to the abuse screening when I asked her if anyone tries to keep her from having contact with others. She said that her sister-in-law (who reportedly has mental illness) doesn't allow her to see her brother.     Dot also answered yes to the fall screening. She said that she tripped and fell and got a bruise within the past year. Please let me know if you have any questions.     Thank you,  Martinez Humphreys, [facility]-A  [place] Licensed Audiologist #[id number]

## 2022-07-20 ENCOUNTER — TELEPHONE (OUTPATIENT)
Dept: NURSING | Facility: CLINIC | Age: 72
End: 2022-07-20

## 2022-07-20 NOTE — PROGRESS NOTES
"Sister-in-Law is \"bi-polar\" and sometimes when she goes off her medications she escalates.     \"I'm allowed to see my brother if she is present\".    \"This is not new\" \"It's something we've dealt with for many years\".    \"I'm not worried for myself\".    \"I used to talk to my sister about this but she passed away last November\" \"It's been hard because I haven't had any alone time to grieve her with my brother.\"    Patient sees a counselor and has a supportive \"[employer] group\". She is aware of the 988 crisis number and triage nurse available by phone 24/7 through [facility].   "

## 2022-07-20 NOTE — PROGRESS NOTES
AG, see note and confirm    Patient calling back after missing a call from A Charlene RENEE .  Unable to find the message she wanted relayed. She did say she was not afraid of her sister in law.  Routing to provider.     Christy Degroot RN   [facility] Nurse Advisor  8:40 AM 7/20/2022

## 2022-07-20 NOTE — TELEPHONE ENCOUNTER
Telephone call    Patient calling back after missing a call from A Charlene VÍCTOR .  Unable to find the message she wanted relayed. She did say she was not afraid of her sister in law.  Routing to provider.    Christy Degroot RN   [facility] Nurse Advisor  8:40 AM 7/20/2022    COVID 19 Nurse Triage Plan/Patient Instructions    Please be aware that novel coronavirus (COVID-19) may be circulating in the community. If you develop symptoms such as fever, cough, or SOB or if you have concerns about the presence of another infection including coronavirus (COVID-19), please contact your health care provider or visit [email or web address].     Disposition/Instructions    Home care recommended. Follow home care protocol based instructions.    Thank you for taking steps to prevent the spread of this virus.  o Limit your contact with others.  o Wear a simple mask to cover your cough.  o Wash your hands well and often.    Resources    [facility]: About COVID-19: [email or web address]    CDC: What to Do If You're Sick: www.cdc.gov/coronavirus/2019-ncov/about/steps-when-sick.html    CDC: Ending Home Isolation: www.cdc.gov/coronavirus/2019-ncov/hcp/disposition-in-home-patients.html     CDC: Caring for Someone: www.cdc.gov/coronavirus/2019-ncov/if-you-are-sick/care-for-someone.html     [facility]: Interim Guidance for Hospital Discharge to Home: www.health.[facility].[place]/diseases/coronavirus/hcp/hospdischarge.pdf    [facility] clinical trials (COVID-19 research studies): clinicalaffairs.[facility]/umn-clinical-trials     Below are the COVID-19 hotlines at the [facility] ([facility]). Interpreters are available.   o For health questions: Call 426-191-6410 or 1-394.129.9373 (7 a.m. to 7 p.m.)  o For questions about schools and childcare: Call 267-096-2955 or 1-835.541.8079 (7 a.m. to 7 p.m.)

## 2022-07-21 ENCOUNTER — OFFICE VISIT (OUTPATIENT)
Dept: PODIATRY | Facility: CLINIC | Age: 72
End: 2022-07-21
Payer: MEDICARE

## 2022-07-21 VITALS — SYSTOLIC BLOOD PRESSURE: 104 MMHG | DIASTOLIC BLOOD PRESSURE: 70 MMHG

## 2022-07-21 DIAGNOSIS — M20.5X1 HALLUX LIMITUS OF RIGHT FOOT: Primary | ICD-10-CM

## 2022-07-21 DIAGNOSIS — M25.571 PAIN, JOINT, FOOT, RIGHT: ICD-10-CM

## 2022-07-21 PROCEDURE — 99213 OFFICE O/P EST LOW 20 MIN: CPT | Performed by: PODIATRIST

## 2022-07-21 NOTE — PROGRESS NOTES
ASSESSMENT:  Encounter Diagnoses   Name Primary?     Hallux limitus of right foot Yes     Pain, joint, foot, right      MEDICAL DECISION MAKING:  I personally reviewed the x-ray images with Dot and showed her the degenerative changes of the right first metatarsal phalangeal joint.  This includes joint space narrowing, sclerosis, and periarticular spurring.    Due to the degree of narrowing, I recommend a steroid injection with the use of imaging.  She is agreeable.  I have referred her to Dr. Yeo for a possible ultrasound guided steroid injection.  She has seen him in the past for knee pain.    I reviewed that the other conservative options for treating this, namely a stiffer or rigid soled shoe.     Follow-up on an as-needed basis.    Disclaimer: This note consists of symbols derived from keyboarding, dictation and/or voice recognition software. As a result, there may be errors in the script that have gone undetected. Please consider this when interpreting information found in this chart.    James Fontenot DPM, FACFAS, MS    [facility] Ankle [facility]      ____________________________________________________________________    HPI:       Francie presents today reporting pain and swelling involving her right great toe.  This has been a problem for 5 to 6 years.  She has burning and aching pain rated a 5 out of 10.  Pain is experienced daily with weightbearing activities.  Treatment has involved Voltaren gel, rest and ice.  She also has a history of a steroid injection.  I evaluated Francie for left foot pain since December 2021.  There this is doing better.  She also previously saw a podiatrist in [place] and my partner, Dr. Weeks, 4/13/2021.  She has known hallux limitus on the right.  I last evaluated Francie    She is interested in a steroid injection for the first metatarsophalangeal joint on the right.  This has provided her months of relief in the past.  *  Past Medical History:    Diagnosis Date     Chronic osteoarthritis      Complication of anesthesia 1990's    DIfficulty waking up     Depressive disorder      Female bladder prolapse 9/3/2019     Mild intermittent asthma 7/29/2017     FAHAD treated with BiPAP 6/13/2018     Parathyroid abnormality (H)      Prolapse of female pelvic organs 8/19/2019     Sleep apnea     Uses a Bi-Pap.. Will have son bring it if necessary on DOS   *  *  Past Surgical History:   Procedure Laterality Date     APPENDECTOMY       BIOPSY  2008    Lungs     COLONOSCOPY       diskectomy in toe       EYE SURGERY  Cataracts     GENITOURINARY SURGERY  2019    Bladder mesh repair     GYN SURGERY  2019    Total hysterectomy     HYSTERECTOMY  08/2017    and bladder surgery     INJECT EPIDURAL LUMBAR Right 4/27/2021    Procedure: Right Lumbar 5- Sacral 1 transforaminal epidural steroid injection with fluoroscopy and conscious sedation.;  Surgeon: Tiera Santana MD;  Location: [facility] OR     INJECT EPIDURAL LUMBAR Left 11/11/2021    Procedure: Lumbar epidural steroid injection L5- S1 with fluoroscopy;  Surgeon: Tiera Santana MD;  Location: [facility] OR     INJECT EPIDURAL LUMBAR Left 3/8/2022    Procedure: L5-S1 Epidural steroid injection with fluoroscopy;  Surgeon: Tiera Santana MD;  Location: [facility] OR     left rotator cuff surgery       PARATHYROIDECTOMY N/A 7/21/2021    Procedure: PARATHYROIDECTOMY;  Surgeon: Gregoria Wilcox MD;  Location: [facility] OR     SINUS SURGERY      x2 in 2013 and 2018     TONSILLECTOMY  1955   *  *  Current Outpatient Medications   Medication Sig Dispense Refill     acetaminophen (TYLENOL) 500 MG tablet Take 2 tablets (1,000 mg) by mouth every 6 hours as needed for pain       albuterol (PROAIR HFA/PROVENTIL HFA/VENTOLIN HFA) 108 (90 Base) MCG/ACT inhaler Inhale 2 puffs into the lungs every 6 hours as needed for shortness of breath / dyspnea or wheezing 18 g 0     baclofen (LIORESAL) 10 MG tablet Take 10 mg by mouth daily as needed        benzonatate  (TESSALON) 200 MG capsule Take 1 capsule (200 mg) by mouth 3 times daily as needed for cough 30 capsule 0     Carboxymethylcellul-Glycerin 1-0.9 % GEL Place 1 drop into both eyes daily        celecoxib (CELEBREX) 100 MG capsule Take 1 capsule (100 mg) by mouth 2 times daily (Patient not taking: Reported on 4/19/2022) 180 capsule 0     clonazePAM (KLONOPIN) 0.5 MG tablet Take 0.5 mg by mouth nightly as needed        DULoxetine (CYMBALTA) 60 MG capsule Take 1 capsule (60 mg) by mouth daily 90 capsule 1     guaiFENesin-codeine (ROBITUSSIN AC) 100-10 MG/5ML solution Take 10 mLs by mouth every 6 hours as needed for cough 118 mL 0     LYSINE PO        mometasone (NASONEX) 50 MCG/ACT nasal spray spray 2 spray by intranasal route  every day in each nostril       NONFORMULARY Vitamin Packet from [employer] including Multi-vitamin, Leutin, Calcium, Antioxidant, Fish oil, Glucosamine- Chondroitin: Take 1 packet by mouth daily       propranolol (INDERAL) 10 MG tablet Take 10 mg by mouth daily as needed        rosuvastatin (CRESTOR) 5 MG tablet TAKE 1 TABLET EVERY OTHER DAY 45 tablet 0     sertraline (ZOLOFT) 25 MG tablet Take 1 tablet (25 mg) by mouth daily 90 tablet 1     tacrolimus (PROTOPIC) 0.1 % external ointment Apply to AA on the face BID PRN 30 g 5     traZODone (DESYREL) 100 MG tablet Take 100 mg by mouth nightly as needed        valACYclovir (VALTREX) 1000 mg tablet Take two tablets twice per day for one day for flare ups. 30 tablet 1         EXAM:    Vitals: There were no vitals taken for this visit.  BMI: There is no height or weight on file to calculate BMI.    Constitutional:  Dinorah BRANCH White is in no apparent distress, appears well-nourished.  Cooperative with history and physical exam.    Vascular:  Pedal pulses are palpable for both the DP and PT arteries.  CFT < 3 sec.  No edema.      Neuro: Light touch sensation is intact to the L4, L5, S1 distributions  No evidence of weakness, spasticity, or contracture in  the lower extremities.     Derm: Normal texture and turgor.  No erythema, ecchymosis, or cyanosis.  No open lesions.     Musculoskeletal:    Lower extremity muscle strength is normal.  With loading of the right forefoot, there is minimal ability to dorsiflex at the first metatarsophalangeal joint.  Joint range of motion is painful.      X-Ray Findings:  I personally reviewed the right foot images.  Please see comments above

## 2022-07-21 NOTE — LETTER
7/21/2022         RE: Dinorah Thompson  [address]  [place] [address]        Dear Colleague,    Thank you for referring your patient, Dinorah Thompson, to the [facility] POD[facility]RY. Please see a copy of my visit note below.    ASSESSMENT:  Encounter Diagnoses   Name Primary?     Hallux limitus of right foot Yes     Pain, joint, foot, right      MEDICAL DECISION MAKING:  I personally reviewed the x-ray images with Dot and showed her the degenerative changes of the right first metatarsal phalangeal joint.  This includes joint space narrowing, sclerosis, and periarticular spurring.    Due to the degree of narrowing, I recommend a steroid injection with the use of imaging.  She is agreeable.  I have referred her to Dr. Yeo for a possible ultrasound guided steroid injection.  She has seen him in the past for knee pain.    I reviewed that the other conservative options for treating this, namely a stiffer or rigid soled shoe.     Follow-up on an as-needed basis.    Disclaimer: This note consists of symbols derived from keyboarding, dictation and/or voice recognition software. As a result, there may be errors in the script that have gone undetected. Please consider this when interpreting information found in this chart.    James Fontenot DPM, FACFAS, MS    [place] Department of Podiatry/Foot & Ankle Surgery      ____________________________________________________________________    HPI:       Francie presents today reporting pain and swelling involving her right great toe.  This has been a problem for 5 to 6 years.  She has burning and aching pain rated a 5 out of 10.  Pain is experienced daily with weightbearing activities.  Treatment has involved Voltaren gel, rest and ice.  She also has a history of a steroid injection.  I evaluated Francie for left foot pain since December 2021.  There this is doing better.  She also previously saw a podiatrist in [place] and my partner,   Desi, 4/13/2021.  She has known hallux limitus on the right.  I last evaluated Francie    She is interested in a steroid injection for the first metatarsophalangeal joint on the right.  This has provided her months of relief in the past.  *  Past Medical History:   Diagnosis Date     Chronic osteoarthritis      Complication of anesthesia 1990's    DIfficulty waking up     Depressive disorder      Female bladder prolapse 9/3/2019     Mild intermittent asthma 7/29/2017     FAHAD treated with BiPAP 6/13/2018     Parathyroid abnormality (H)      Prolapse of female pelvic organs 8/19/2019     Sleep apnea     Uses a Bi-Pap.. Will have son bring it if necessary on DOS   *  *  Past Surgical History:   Procedure Laterality Date     APPENDECTOMY       BIOPSY  2008    Lungs     COLONOSCOPY       diskectomy in toe       EYE SURGERY  Cataracts     GENITOURINARY SURGERY  2019    Bladder mesh repair     GYN SURGERY  2019    Total hysterectomy     HYSTERECTOMY  08/2017    and bladder surgery     INJECT EPIDURAL LUMBAR Right 4/27/2021    Procedure: Right Lumbar 5- Sacral 1 transforaminal epidural steroid injection with fluoroscopy and conscious sedation.;  Surgeon: Tiera Santana MD;  Location: [facility] OR     INJECT EPIDURAL LUMBAR Left 11/11/2021    Procedure: Lumbar epidural steroid injection L5- S1 with fluoroscopy;  Surgeon: Tiera Santana MD;  Location: [facility] OR     INJECT EPIDURAL LUMBAR Left 3/8/2022    Procedure: L5-S1 Epidural steroid injection with fluoroscopy;  Surgeon: Tiera Santana MD;  Location: [facility] OR     left rotator cuff surgery       PARATHYROIDECTOMY N/A 7/21/2021    Procedure: PARATHYROIDECTOMY;  Surgeon: Gregoria Wilcox MD;  Location: [facility] OR     SINUS SURGERY      x2 in 2013 and 2018     TONSILLECTOMY  1955   *  *  Current Outpatient Medications   Medication Sig Dispense Refill     acetaminophen (TYLENOL) 500 MG tablet Take 2 tablets (1,000 mg) by mouth every 6 hours as needed for pain       albuterol (PROAIR  HFA/PROVENTIL HFA/VENTOLIN HFA) 108 (90 Base) MCG/ACT inhaler Inhale 2 puffs into the lungs every 6 hours as needed for shortness of breath / dyspnea or wheezing 18 g 0     baclofen (LIORESAL) 10 MG tablet Take 10 mg by mouth daily as needed        benzonatate (TESSALON) 200 MG capsule Take 1 capsule (200 mg) by mouth 3 times daily as needed for cough 30 capsule 0     Carboxymethylcellul-Glycerin 1-0.9 % GEL Place 1 drop into both eyes daily        celecoxib (CELEBREX) 100 MG capsule Take 1 capsule (100 mg) by mouth 2 times daily (Patient not taking: Reported on 4/19/2022) 180 capsule 0     clonazePAM (KLONOPIN) 0.5 MG tablet Take 0.5 mg by mouth nightly as needed        DULoxetine (CYMBALTA) 60 MG capsule Take 1 capsule (60 mg) by mouth daily 90 capsule 1     guaiFENesin-codeine (ROBITUSSIN AC) 100-10 MG/5ML solution Take 10 mLs by mouth every 6 hours as needed for cough 118 mL 0     LYSINE PO        mometasone (NASONEX) 50 MCG/ACT nasal spray spray 2 spray by intranasal route  every day in each nostril       NONFORMULARY Vitamin Packet from Melaleuca including Multi-vitamin, Leutin, Calcium, Antioxidant, Fish oil, Glucosamine- Chondroitin: Take 1 packet by mouth daily       propranolol (INDERAL) 10 MG tablet Take 10 mg by mouth daily as needed        rosuvastatin (CRESTOR) 5 MG tablet TAKE 1 TABLET EVERY OTHER DAY 45 tablet 0     sertraline (ZOLOFT) 25 MG tablet Take 1 tablet (25 mg) by mouth daily 90 tablet 1     tacrolimus (PROTOPIC) 0.1 % external ointment Apply to AA on the face BID PRN 30 g 5     traZODone (DESYREL) 100 MG tablet Take 100 mg by mouth nightly as needed        valACYclovir (VALTREX) 1000 mg tablet Take two tablets twice per day for one day for flare ups. 30 tablet 1         EXAM:    Vitals: There were no vitals taken for this visit.  BMI: There is no height or weight on file to calculate BMI.    Constitutional:  Dinorah BRANCH White is in no apparent distress, appears well-nourished.  Cooperative  with history and physical exam.    Vascular:  Pedal pulses are palpable for both the DP and PT arteries.  CFT < 3 sec.  No edema.      Neuro: Light touch sensation is intact to the L4, L5, S1 distributions  No evidence of weakness, spasticity, or contracture in the lower extremities.     Derm: Normal texture and turgor.  No erythema, ecchymosis, or cyanosis.  No open lesions.     Musculoskeletal:    Lower extremity muscle strength is normal.  With loading of the right forefoot, there is minimal ability to dorsiflex at the first metatarsophalangeal joint.  Joint range of motion is painful.      X-Ray Findings:  I personally reviewed the right foot images.  Please see comments above            Again, thank you for allowing me to participate in the care of your patient.        Sincerely,        James Fontenot DPM

## 2022-07-21 NOTE — PATIENT INSTRUCTIONS
"Thank you for choosing [facility] Podiatry / Foot & Ankle Surgery!    DR. MINOR'S CLINIC LOCATIONS:     [facility] TRIAGE LINE: [phone]   [address] APPOINTMENTS: [phone]   [place] [address] RADIOLOGY: [phone]    SET UP SURGERY: [phone]    BILLING QUESTIONS: [phone]   [facility] FAX: [phone] [id number]    [place] [address]      Follow up: on an as-needed basis    Next steps:   Ultrasound-guided steroid injection  Remember that stiff-soled shoes might help a lot    DEGENERATIVE ARTHRITIS OF THE BIG TOE JOINT   (hallux limitus/hallux rigidus)   Arthritis of the joint at the base of the big toe (metatarsophalangeal joint) has several causes. Usually it results from repetitive trauma to the joint, secondary to abnormal foot mechanics. Often it is hereditary. However, a one-time traumatic event can lead to arthritis. The condition doesn't improve with time, and even with treatment, can worsen. The cartilage wears out, joint surfaces are no longer smooth, bone rubs on bone, inflammation occurs with pain, and eventually bone spurs and loose fragments might develop.   The joint is often painful with activity, worse with flimsy shoes or walking barefoot, and it slowly progresses over time. A person might notice the toe \"locking up\" with walking. There often is an obvious, and irritating, bony bump on top of the foot. Shoes might be uncomfortable. In some people the pain is so bothersome that recreational activities sometimes even normal daily activities are difficult to perform.   The pain from this arthritis is likely a combination of joint jamming, cartilage loss and inflammation, and irritation from shoes rubbing on the bump. Sometimes other parts of the foot, leg, or back hurt from altering one's walk to compensate for the painful joint.     Ways to help a person live with the discomfort include wearing a good, supportive shoe with a rigid, " rocker-type bottom. An example is a hiking boot. A rigid sole minimizes bending of the joint, and therefore, joint motion and pain. Shoes with a high toe box allow for less rubbing on the bump. Avoiding barefoot walking, sandals, flip-flops and slippers usually helps.   Sometimes an insert or orthotic provides symptom relief. This might make shoe fit more difficult. Pads over the bump and occasionally injections into the joint provide relief.   Surgery for this condition is aimed towards alleviating pain. It does not cure the arthritis nor does it guarantee better joint motion. Depending on the condition of the metatarsophalangeal joint, there are several surgiqal options:    1.  Cutting off the bony bump(s) and cleaning the joint    2.  Loosening the joint up by making cuts in the first metatarsal bone or the big toe bone and removing a small section of bone.    3.  Repositioning bone to minimize jamming of the joint.    4.  In severe cases, the joint is fused. By fusing the joint, it will never bend again. This resolves the pain, because it's the movement of a worn out joint that causes pain. Oftentimes the operation involves a combination of these procedures and. requires the use of screws, pins, and/or a small surgical plate.     Healing after surgery requires about six weeks of protection. This allows the bone to heal. Maximum recovery takes about one year. The scar tissue and joint structures require this amount of time to finish the healing process. Expect stiffness, swelling and numbness during that time frame.   Surgery for arthritis of the metatarsophalangeal joint does involve side effects. Some side effects are predictable and others are less common but do occur. A scar will be visible and could be irritated by shoes. The shoe may rub on the screw or internal pin requiring surgical removal of these fixation devices. The screw and pin would likely be left in place for a full year. The first toe may remain  stiff after surgery. The amount of stiffness is variable. Most people never regain normal motion of the first toe. This is due to scar tissue inherent to any surgery, in addition to the cumUlative effects of arthritis. Sometimes the big toe drifts to one side or the other. Joint fusion is one option to correct an unstable, drifting toe. This procedure straightens the toe, however, no motion remains.   All surgical procedures involve risk of infection, numbness, pain, delayed bone healing, osteotomy (bone cut) dislocation, blood clots, continued foot pain, etc. Arthritic joint surgery is quite complex and should not be taken lightly.    Any skin incision can lead to infection. Deep infection might involve the bone and thus repeat surgery and six weeks of IV antibiotics. Scar tissue can cause nerve pain or numbness. his is generally temporary but can be permanent. We do not have treatments that cure nerve problems. Second toe pain could be related to altered mechanics and pressure transferred to the second toe. Delayed bone healing would lengthen the healing time. Some bones simply do not heal. This requires repeat surgery, electronic bone stimulation and/or extended protection. Smokers have an approximate 20% chance of poor bone healing. This is double that of a non-smoker. The bone cut may displace. This may need to be repaired with a second operation. Displacement can cause joint malalignment. Immobility after surgery can cause a blood clot in the legs and lungs. This could result in death.   Foot pain is complex. Most feet hurt for more than one reason. Operating on the arthritic   big toe joint will not necessarily create a pain free foot. Appropriate shoes, healthy body weight, avoidance of bare foot walking and moderation of activity will always be   necessary to enjoy foot comfort. Arthritis is incurable even with surgery.     Surgery for this type of arthritis is nevertheless quite successful. Most surgical  patients are pleased with their foot following surgery. Many of the issues described above can be controlled by taking proper care of your foot during the healing process.   Cosmetic bump surgery is discouraged for the reasons listed above. A bump and joint that is comfortable when wearing appropriate shoes should simply be treated with appropriate shoes.   Your surgeon would be happy to fully describe any of the above issues. You should pursue a full understanding of the operation, recovery process and any potential problems that could develop.

## 2022-07-26 ENCOUNTER — HOSPITAL ENCOUNTER (OUTPATIENT)
Facility: AMBULATORY SURGERY CENTER | Age: 72
Discharge: HOME | End: 2022-07-26
Attending: ANESTHESIOLOGY | Admitting: ANESTHESIOLOGY
Payer: MEDICARE

## 2022-07-26 VITALS
RESPIRATION RATE: 16 BRPM | DIASTOLIC BLOOD PRESSURE: 82 MMHG | SYSTOLIC BLOOD PRESSURE: 131 MMHG | HEIGHT: 62 IN | BODY MASS INDEX: 26.68 KG/M2 | OXYGEN SATURATION: 99 % | WEIGHT: 145 LBS | TEMPERATURE: 97.2 F

## 2022-07-26 PROCEDURE — 62323 NJX INTERLAMINAR LMBR/SAC: CPT | Performed by: ANESTHESIOLOGY

## 2022-07-26 PROCEDURE — 62323 NJX INTERLAMINAR LMBR/SAC: CPT

## 2022-07-26 RX ORDER — IOPAMIDOL 408 MG/ML
INJECTION, SOLUTION INTRATHECAL PRN
Status: DISCONTINUED | OUTPATIENT
Start: 2022-07-26 | End: 2022-07-26 | Stop reason: HOSPADM

## 2022-07-26 RX ORDER — LIDOCAINE HYDROCHLORIDE 10 MG/ML
INJECTION, SOLUTION EPIDURAL; INFILTRATION; INTRACAUDAL; PERINEURAL PRN
Status: DISCONTINUED | OUTPATIENT
Start: 2022-07-26 | End: 2022-07-26 | Stop reason: HOSPADM

## 2022-07-26 RX ORDER — BUPIVACAINE HYDROCHLORIDE 2.5 MG/ML
INJECTION, SOLUTION EPIDURAL; INFILTRATION; INTRACAUDAL PRN
Status: DISCONTINUED | OUTPATIENT
Start: 2022-07-26 | End: 2022-07-26 | Stop reason: HOSPADM

## 2022-07-26 RX ORDER — METHYLPREDNISOLONE ACETATE 40 MG/ML
INJECTION, SUSPENSION INTRA-ARTICULAR; INTRALESIONAL; INTRAMUSCULAR; SOFT TISSUE PRN
Status: DISCONTINUED | OUTPATIENT
Start: 2022-07-26 | End: 2022-07-26 | Stop reason: HOSPADM

## 2022-07-26 NOTE — OP NOTE
Lumbar Epidural Steroid Injection    Procedure:  1. Lumbar epidural steroid injection at left L5-S1  2. Fluoroscopy for needle guidance    Pre-operative Diagnosis:  1. Intervertebral disc disorders w radiculopathy, lumbosacral region  2. Other intervertebral disc degeneration, lumbosacral region    Post-Operative Diagnosis:  1. Intervertebral disc disorders w radiculopathy, lumbosacral region  2. Other intervertebral disc degeneration, lumbosacral region    Anesthesia:  Local anesthesia    Medical Indications:  The patient presents to the ambulatory surgery center today for the treatment of persistent pain. We believe that the pain is spinally mediated. The patient has been exhibiting symptoms consistent with lumbar intraspinal inflammation and radiculopathy. Symptoms have been persistent, disabling and intermittently severe. The patient has been evaluated in the pain clinic and scheduled today for a spinal injection to aid in the diagnosis and treatment of presumed spinal pain. The risks, benefits, potential complications, and alternatives were discussed with the patient as per the signed consent form, and informed consent was obtained. The patient has received information about the procedure and its risks. The physician personally confirmed the procedure, side, and site to be injected with the patient and marked the site in the pre-procedure area.    Description of Procedure:  An additional intraoperative timeout, specifically to confirm accurate localization, was conducted by the attending physician. The patient remained awake and alert throughout the procedure. The patient was placed in the prone position. The skin was prepped with chlorhexidine and sterile drapes were applied. The skin and soft tissues were anesthetized with lidocaine 1%. A 22 gauge 3.5 inch touhy needle epidural needle was inserted percutaneously and advanced under fluoroscopic guidance using AP, and lateral projections. Using loss of  resistance to air and a left sided interlaminar approach, the L5/S1 posterior epidural space was entered after the lamina was contacted with the epidural needle. No paraesthesias occurred and aspiration was negative. Epidurography and radiographic interpretation: Epidurography was performed by injection of isovue 200 under live fluoroscopy. Multiple Xray images were obtained. Radiologic examination confirmed proper spread of the contrast medium within the epidural space. There was no evidence of intrathecal or intravascular runoff. The needle was injected with 40mg methylprednisolone mixed with 3 ml of 0.25% bupivacaine. The needle was removed after flushing with 1% lidocaine. A sterile bandage was applied. The patient did not experience any hemodynamic or neurologic sequelae. The patient was transferred to the recovery area. Post operative instructions were explained and given to the patient.    Complications:  No procedural or immediate post-procedural complications occurred and the patient was transferred to PACU in stable condition.    Post-Operative Plan:  The patient will monitor pain relief from today's procedure. They will call the clinic for any problems related to today's procedure. A copy of our written post-procedure instructions was provided. They will return to clinic as scheduled.       PROVIDER:[TOKEN:[[id number]]]

## 2022-07-26 NOTE — H&P
Dinorah Thompson  [id number]  female  72 year old      Reason for procedure/surgery: lumbar radiculopathy      Patient Active Problem List   Diagnosis     Cervical radiculopathy, chronic     Chronic pain disorder     Intervertebral disc disorders with radiculopathy, lumbar region     Lumbar radiculopathy, chronic     Other cervical disc degeneration at C5-C6 level     Spondylosis without myelopathy or radiculopathy, lumbar region     Osteoarthrosis, hand     Primary localized osteoarthritis of knees, bilateral     Hyperlipidemia     Depression     Sarcoidosis of lung (H)     Hoarseness     Laryngeal disorder     Precancerous lesion     History of retinal detachment     Lumbar radiculopathy     Hyperparathyroidism (H)     Chronic pain of left knee     Primary osteoarthritis of left knee     Moderate episode of recurrent major depressive disorder (H)     Grief     Stage 3 chronic kidney disease, unspecified whether stage 3a or 3b CKD (H)     Acute sphenoidal sinusitis     Bacterial conjunctivitis     Family history of dementia     Gastroesophageal reflux disease     Insomnia     Mild intermittent asthma     Osteoarthrosis     Osteoporosis     Recurrent major depression in remission (H)       Past Surgical History:    Past Surgical History:   Procedure Laterality Date     APPENDECTOMY       BIOPSY  2008    Lungs     COLONOSCOPY       diskectomy in toe       EYE SURGERY  Cataracts     GENITOURINARY SURGERY  2019    Bladder mesh repair     GYN SURGERY  2019    Total hysterectomy     HYSTERECTOMY  08/2017    and bladder surgery     INJECT EPIDURAL LUMBAR Right 4/27/2021    Procedure: Right Lumbar 5- Sacral 1 transforaminal epidural steroid injection with fluoroscopy and conscious sedation.;  Surgeon: Tiera Santana MD;  Location: [facility] OR     INJECT EPIDURAL LUMBAR Left 11/11/2021    Procedure: Lumbar epidural steroid injection L5- S1 with fluoroscopy;  Surgeon: Tiera Santana MD;  Location: [facility] OR     INJECT EPIDURAL  "LUMBAR Left 3/8/2022    Procedure: L5-S1 Epidural steroid injection with fluoroscopy;  Surgeon: Tiera Santana MD;  Location: [facility] OR     left rotator cuff surgery       PARATHYROIDECTOMY N/A 7/21/2021    Procedure: PARATHYROIDECTOMY;  Surgeon: Gregoria Wilcox MD;  Location: [facility] OR     SINUS SURGERY      x2 in 2013 and 2018     TONSILLECTOMY  1955       Past Medical History:   Past Medical History:   Diagnosis Date     Chronic osteoarthritis      Complication of anesthesia 1990's    DIfficulty waking up     Depressive disorder      Female bladder prolapse 9/3/2019     Mild intermittent asthma 7/29/2017     FAHAD treated with BiPAP 6/13/2018     Parathyroid abnormality (H)      Prolapse of female pelvic organs 8/19/2019     Sleep apnea     Uses a Bi-Pap.. Will have son bring it if necessary on DOS       Social History:   Social History     Tobacco Use     Smoking status: Never Smoker     Smokeless tobacco: Never Used   Substance Use Topics     Alcohol use: Yes     Comment: 0-2 drinks per month       Family History:   Family History   Problem Relation Age of Onset     Myocardial Infarction Father         late 50s     Coronary Artery Disease Father      Depression Father      Ovarian Cancer Sister      Cervical Cancer Sister      Lung Cancer Sister      Depression Sister      Anxiety Disorder Sister      Myocardial Infarction Paternal Uncle         late 50s     Hyperlipidemia Mother      Osteoporosis Mother      Cerebrovascular Disease Paternal Grandmother      Cerebrovascular Disease Paternal Grandfather      Anxiety Disorder Son        Allergies:   Allergies   Allergen Reactions     Propoxyphene Other (See Comments)     Clarithromycin Other (See Comments)     Pt states, \"ototoxicity\". Balance problems  Pt states, \"ototoxicity\".         Active Medications:   Current Outpatient Medications   Medication Sig Dispense Refill     acetaminophen (TYLENOL) 500 MG tablet Take 2 tablets (1,000 mg) by mouth every 6 hours as " "needed for pain       baclofen (LIORESAL) 10 MG tablet Take 10 mg by mouth daily as needed        celecoxib (CELEBREX) 100 MG capsule Take 1 capsule (100 mg) by mouth 2 times daily 180 capsule 0     clonazePAM (KLONOPIN) 0.5 MG tablet Take 0.5 mg by mouth nightly as needed        DULoxetine (CYMBALTA) 60 MG capsule Take 1 capsule (60 mg) by mouth daily 90 capsule 1     LYSINE PO        mometasone (NASONEX) 50 MCG/ACT nasal spray spray 2 spray by intranasal route  every day in each nostril       NONFORMULARY Vitamin Packet from [employer] including Multi-vitamin, Leutin, Calcium, Antioxidant, Fish oil, Glucosamine- Chondroitin: Take 1 packet by mouth daily       rosuvastatin (CRESTOR) 5 MG tablet TAKE 1 TABLET EVERY OTHER DAY 45 tablet 0     sertraline (ZOLOFT) 25 MG tablet Take 1 tablet (25 mg) by mouth daily 90 tablet 1     tacrolimus (PROTOPIC) 0.1 % external ointment Apply to AA on the face BID PRN 30 g 5     traZODone (DESYREL) 100 MG tablet Take 100 mg by mouth nightly as needed        valACYclovir (VALTREX) 1000 mg tablet Take two tablets twice per day for one day for flare ups. 30 tablet 1     Carboxymethylcellul-Glycerin 1-0.9 % GEL Place 1 drop into both eyes daily          Systemic Review:   CONSTITUTIONAL: NEGATIVE for fever, chills, change in weight  ENT/MOUTH: NEGATIVE for ear, mouth and throat problems  RESP: NEGATIVE for significant cough or SOB  CV: NEGATIVE for chest pain, palpitations or peripheral edema    Physical Examination:   Vital Signs: /82   Temp 97.2  F (36.2  C) (Temporal)   Resp 16   Ht 1.575 m (5' 2\")   Wt 65.8 kg (145 lb)   SpO2 99%   BMI 26.52 kg/m    GENERAL: healthy, alert and no distress  NECK: no adenopathy, no asymmetry, masses, or scars  RESP: lungs clear to auscultation - no rales, rhonchi or wheezes  CV: regular rate and rhythm, normal S1 S2, no S3 or S4, no murmur, click or rub, no peripheral edema and peripheral pulses strong  ABDOMEN: soft, nontender, no " hepatosplenomegaly, no masses and bowel sounds normal  MS: no gross musculoskeletal defects noted, no edema    Plan: Appropriate to proceed as scheduled.      Tiera Santana MD  7/26/2022

## 2022-07-26 NOTE — DISCHARGE INSTRUCTIONS
Home Care Instructions after an Epidural Steroid Pain Injection    A lumbar epidural steroid injection delivers steroid medication directly into the area that may be causing your lower back pain and/or leg pain. A cervical or thoracic epidural steroid injection delivers steroids into the epidural space surrounding spinal nerve roots to help relieve pain in the upper spine/neck.    Activity  -Rest today  -Do not work today  -Resume normal activity tomorrow  -DO NOT shower for 24 hours  -DO NOT remove bandaid for 24 hours    Pain  -You may experience soreness at the injection site for one or two days  -You may use an ice pack for 20 minutes every 2 hours for the first 24 hours  -You may use a heating pad after the first 24 hours  -You may use Tylenol (acetaminophen) every 4 hours or other pain medicines as     directed by your physician    You may experience numbness radiating into your legs or arms (depending on the procedure location). This numbness may last several hours. Until sensation returns to normal; please use caution in walking, climbing stairs, and stepping out of your vehicle, etc.    Common side effects of steroids:  Not everyone will experience corticosteroid side effects. If side effects are experienced, they will gradually subside in the 7-10 day period following an injection. Most common side effects include:  -Flushed face and/or chest  -Feeling of warmth, particularly in the face but could be an overall feeling of warmth  -Increased blood sugar in diabetic patients  -Menstrual irregularities my occur. If taking hormone-based birth control an alternate method of birth control is recommended  -Sleep disturbances and/or mood swings are possible  -Leg cramps    Please contact us if you have:  -Severe pain  -Fever more than 101.5 degrees Fahrenheit  -Signs of infection at the injection site (redness, swelling, or drainage)    If you have questions, please contact our office at [phone] between the  hours of 7:00 am and 3:00 pm Monday through Friday. After office hours you can contact the on call provider by dialing [phone]. If you need immediate attention, we recommend that you go to a hospital emergency room or dial [phone].

## 2022-08-02 ENCOUNTER — OFFICE VISIT (OUTPATIENT)
Dept: OPTOMETRY | Facility: CLINIC | Age: 72
End: 2022-08-02
Payer: MEDICARE

## 2022-08-02 DIAGNOSIS — H52.02 HYPEROPIA OF LEFT EYE WITH ASTIGMATISM: ICD-10-CM

## 2022-08-02 DIAGNOSIS — H04.129 DRY EYE: ICD-10-CM

## 2022-08-02 DIAGNOSIS — H52.202 HYPEROPIA OF LEFT EYE WITH ASTIGMATISM: ICD-10-CM

## 2022-08-02 DIAGNOSIS — H52.11 MYOPIA OF RIGHT EYE: Primary | ICD-10-CM

## 2022-08-02 DIAGNOSIS — Z96.1 PSEUDOPHAKIA: ICD-10-CM

## 2022-08-02 PROCEDURE — 92015 DETERMINE REFRACTIVE STATE: CPT | Mod: GY | Performed by: OPTOMETRIST

## 2022-08-02 PROCEDURE — 92014 COMPRE OPH EXAM EST PT 1/>: CPT | Performed by: OPTOMETRIST

## 2022-08-02 ASSESSMENT — SLIT LAMP EXAM - LIDS
COMMENTS: NORMAL
COMMENTS: NORMAL

## 2022-08-02 ASSESSMENT — VISUAL ACUITY
OS_SC+: +2
OD_SC: 20/30
OS_SC: 20/30
OD_SC: 20/25
OS_CC: 20/20
OD_CC: 20/20
OS_CC+: -1
METHOD: SNELLEN - LINEAR

## 2022-08-02 ASSESSMENT — REFRACTION_MANIFEST
OS_SPHERE: -0.25
OD_SPHERE: -0.75
METHOD_AUTOREFRACTION: 1
OS_CYLINDER: +1.00
OS_ADD: +2.25
OS_AXIS: 171
OD_ADD: +2.25
OD_CYLINDER: SPHERE

## 2022-08-02 ASSESSMENT — CUP TO DISC RATIO
OS_RATIO: 0.4
OD_RATIO: 0.4

## 2022-08-02 ASSESSMENT — TONOMETRY
OD_IOP_MMHG: 16
IOP_METHOD: APPLANATION
OS_IOP_MMHG: 17

## 2022-08-02 ASSESSMENT — EXTERNAL EXAM - RIGHT EYE: OD_EXAM: NORMAL

## 2022-08-02 ASSESSMENT — EXTERNAL EXAM - LEFT EYE: OS_EXAM: NORMAL

## 2022-08-02 NOTE — LETTER
8/2/2022         RE: Dinorah Thompson  [address]  [place] [address]        Dear Colleague,    Thank you for referring your patient, Dinorah Thompson, to the [facility]. Please see a copy of my visit note below.    Chief Complaint   Patient presents with     Annual Eye Exam      Recent visual disturbance w/ flashing in R eye no floaters no headache     Last Eye Exam: 4/21  Dilated Previously: Yes    What are you currently using to see?  glasses       Distance Vision Acuity: Satisfied with vision w distance glasses     Near Vision Acuity: Satisfied with vision while reading  with over the counter readers    Eye Comfort: good  Do you use eye drops? 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       OBJECTIVE: See Ophthalmology exam    ASSESSMENT:    ICD-10-CM    1. Myopia of right eye  H52.11    2. Hyperopia of left eye with astigmatism  H52.02     H52.202    3. Pseudophakia  Z96.1    4. Dry eye  H04.129        PLAN:   Continue w/ artificial tears  / ointment at bedtime   Distance prescription as needed  / over the counter readers as needed       Swapna Carrasquillo OD       Again, thank you for allowing me to participate in the care of your patient.        Sincerely,        Swapna Carrasquillo, OD

## 2022-08-02 NOTE — PROGRESS NOTES
Chief Complaint   Patient presents with     Annual Eye Exam      Recent visual disturbance w/ flashing in R eye no floaters no headache     Last Eye Exam: 4/21  Dilated Previously: Yes    What are you currently using to see?  glasses       Distance Vision Acuity: Satisfied with vision w distance glasses     Near Vision Acuity: Satisfied with vision while reading  with over the counter readers    Eye Comfort: good  Do you use eye drops? 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       OBJECTIVE: See Ophthalmology exam    ASSESSMENT:    ICD-10-CM    1. Myopia of right eye  H52.11    2. Hyperopia of left eye with astigmatism  H52.02     H52.202    3. Pseudophakia  Z96.1    4. Dry eye  H04.129        PLAN:   Continue w/ artificial tears  / ointment at bedtime   Distance prescription as needed  / over the counter readers as needed       Swapna Carrasquillo OD

## 2022-08-15 NOTE — PROGRESS NOTES
ASSESSMENT & PLAN  Patient Instructions     1. Trochanteric bursitis of left hip    2. Hip pain, left      -Patient has chronic left hip pain due to significant bursitis  -Patient was interested in a cortisone injection today but she is receiving the Covid vaccine later this week.  I do not recommend a cortisone injection so close to her receiving her vaccine due to risk of immunosuppression.  Patient may reschedule an appointment at least 2 weeks after her second vaccine for a left trochanteric bursa cortisone injection  -Patient may continue with Voltaren gel and over-the-counter pain medications as needed  -Call direct clinic number [[phone]] at any time with questions or concerns.    Albert Yeo MD [facility]  [facility]          -----    SUBJECTIVE  Dinorah A White is a/an 70 year old female who is seen in consultation at the request of  Jayna Aviles M.D. for evaluation of left hip pain. The patient is seen by themselves.    Onset: 6 month(s) ago. Patient describes injury as fell in September. States was carrying things and tripped and landed on the left sided. States landed on the left knee and left hip. The knee pain has now gone away, but still having some left hip.   Location of Pain: left lateral hip pain  Rating of Pain at worst: 7/10  Rating of Pain Currently: 3/10  Worsened by: sitting, walking, stairs, anything that involves the joint. Sleeping.   Better with: rest,   Treatments tried: ice, heat, other medications: Voltaren gel and previous imaging (xray 2/09/21)  Quality: aching, sharp  Associated symptoms: has a very point tender spot on lateral hip.   Orthopedic history: YES - Date: lumbar and cervical stenosis, does get epidural injections into her spine, last one was about 6 months ago  Relevant surgical history: NO  Social history: social history: works as care taker for     Past Medical History:   Diagnosis Date      Chronic osteoarthritis      Depressive disorder      Female bladder prolapse 9/3/2019     Prolapse of female pelvic organs 8/19/2019     Social History     Socioeconomic History     Marital status:      Spouse name: Wilbert     Number of children: Not on file     Years of education: Not on file     Highest education level: Not on file   Occupational History     Not on file   Social Needs     Financial resource strain: Not on file     Food insecurity     Worry: Not on file     Inability: Not on file     Transportation needs     Medical: Not on file     Non-medical: Not on file   Tobacco Use     Smoking status: Never Smoker     Smokeless tobacco: Never Used   Substance and Sexual Activity     Alcohol use: Yes     Frequency: Monthly or less     Drinks per session: 1 or 2     Drug use: Never     Sexual activity: Not on file   Lifestyle     Physical activity     Days per week: Not on file     Minutes per session: Not on file     Stress: Not on file   Relationships     Social connections     Talks on phone: Not on file     Gets together: Not on file     Attends [employer] service: Not on file     Active member of club or organization: Not on file     Attends meetings of clubs or organizations: Not on file     Relationship status: Not on file     Intimate partner violence     Fear of current or ex partner: Not on file     Emotionally abused: Not on file     Physically abused: Not on file     Forced sexual activity: Not on file   Other Topics Concern     Not on file   Social History Narrative     Not on file         Patient's past medical, surgical, social, and family histories were reviewed today and no changes are noted.    REVIEW OF SYSTEMS:  10 point ROS is negative other than symptoms noted above in HPI, Past Medical History or as stated below  Constitutional: NEGATIVE for fever, chills, change in weight  Skin: NEGATIVE for worrisome rashes, moles or lesions  GI/: NEGATIVE for bowel or bladder changes  Neuro:  "NEGATIVE for weakness, dizziness or paresthesias    OBJECTIVE:  /60   Ht 1.57 m (5' 1.81\")   Wt 65.3 kg (144 lb)   BMI 26.50 kg/m     General: healthy, alert and in no distress  HEENT: no scleral icterus or conjunctival erythema  Skin: no suspicious lesions or rash. No jaundice.  CV: no pedal edema  Resp: normal respiratory effort without conversational dyspnea   Psych: normal mood and affect  Gait: normal steady gait with appropriate coordination and balance  Neuro: Normal light sensory exam of lower extremity  MSK:  LEFT HIP  Inspection:    No obvious deformity or asymmetry, level pelvis  Palpation:    Tender about the greater trochanteric region. Otherwise all other landmarks are nontender.  Active Range of Motion:     Flexion within normal limits, IR within normal limits, ER  within normal limits  Strength:    Flexion grossly intact, adduction grossly intact, abduction grossly intact  Special Tests:    Positive: none    Negative: Logroll, resisted gluteus medius provocation, JOHN, anterior impingement (FADIR), posterior impingement (EX/AB/ER)    Independent visualization of the below image:  No results found for this or any previous visit (from the past 24 hour(s)).   Examination:  XR PELVIS AND HIP LEFT 1 VIEW     Date:  2/9/2021 1:59 PM      Clinical Information: Fall left hip pain after a fall.     Comparison: none.                                                                      Impression:     1.  Pelvis and left hip negative for fracture or bone lesion. Normal  bilateral hip joint spacing and alignment. The pelvic ring is intact.  Mild degenerative arthritic changes at the SI joints. Moderate  degenerative disc and facet changes in the lower lumbar spine.     JONATHAN P WILLIAMS, MD Albert Yeo MD [facility]    " Intermediate Repair Preamble Text (Leave Blank If You Do Not Want): Undermining was performed with blunt dissection.

## 2022-08-22 ENCOUNTER — MYC MEDICAL ADVICE (OUTPATIENT)
Dept: FAMILY MEDICINE | Facility: CLINIC | Age: 72
End: 2022-08-22

## 2022-08-22 DIAGNOSIS — F32.89 OTHER DEPRESSION: ICD-10-CM

## 2022-08-22 RX ORDER — DULOXETIN HYDROCHLORIDE 60 MG/1
CAPSULE, DELAYED RELEASE ORAL
Qty: 90 CAPSULE | Refills: 1 | Status: SHIPPED | OUTPATIENT
Start: 2022-08-22 | End: 2023-02-09

## 2022-08-22 NOTE — TELEPHONE ENCOUNTER
See mychart.     Elysia Le, RN, BSN, PHN  [facility]    
The procedure was performed independently
Supervision was available
Supervision was available

## 2022-08-22 NOTE — TELEPHONE ENCOUNTER
Routing refill request to provider for review/approval because:  Drug interaction warning        Drug-Drug: traZODone and DULoxetine      Drug-Drug: DULoxetine and sertraline

## 2022-08-23 ENCOUNTER — OFFICE VISIT (OUTPATIENT)
Dept: ANESTHESIOLOGY | Facility: CLINIC | Age: 72
End: 2022-08-23
Payer: MEDICARE

## 2022-08-23 VITALS — DIASTOLIC BLOOD PRESSURE: 74 MMHG | HEART RATE: 64 BPM | SYSTOLIC BLOOD PRESSURE: 129 MMHG | OXYGEN SATURATION: 98 %

## 2022-08-23 DIAGNOSIS — M54.16 LUMBAR RADICULOPATHY, CHRONIC: Primary | ICD-10-CM

## 2022-08-23 PROCEDURE — 99213 OFFICE O/P EST LOW 20 MIN: CPT | Performed by: ANESTHESIOLOGY

## 2022-08-23 ASSESSMENT — ENCOUNTER SYMPTOMS
EYE PAIN: 0
FEVER: 0
EYE IRRITATION: 0
MUSCLE CRAMPS: 0
POLYDIPSIA: 0
DECREASED APPETITE: 0
MUSCLE WEAKNESS: 1
MYALGIAS: 1
WEIGHT GAIN: 0
POLYPHAGIA: 0
STIFFNESS: 1
INCREASED ENERGY: 0
FATIGUE: 1
EYE WATERING: 0
JOINT SWELLING: 1
WEIGHT LOSS: 0
NIGHT SWEATS: 1
BACK PAIN: 1
CHILLS: 0
HALLUCINATIONS: 0
DOUBLE VISION: 0
NECK PAIN: 1
ARTHRALGIAS: 1
ALTERED TEMPERATURE REGULATION: 0

## 2022-08-23 ASSESSMENT — PAIN SCALES - GENERAL: PAINLEVEL: MILD PAIN (2)

## 2022-08-23 NOTE — LETTER
8/23/2022       RE: Dinorah Thompson  [address]  [place] [address]     Dear Colleague,    Thank you for referring your patient, Dinorah Thompson, to the [facility] [place] at [facility]. Please see a copy of my visit note below.    Pain clinic follow up note    HPI:   Dinorah Thompson is a 72 year old year old female  who presents to the pain clinic with low back pain and leg pain, s/p LESI with excellent improvement in pain.     Patient Supplied Answers To the [facility] Pain Questionnaire  [facility] -  Patient Entered Questionnaire/Answers 8/23/2022   What number best describes your pain right now:  0 = No pain  to  10 = Worst pain imaginable 2   How would you describe the pain? dull, aching, throbbing   Which of the following worsen your pain? standing, walking   Which of the following improve or reduce your pain? lying down, medication, relaxation   What number best describes your average pain for the past week:  0 = No pain  to  10 = Worst pain imaginable 3   What number best describes your LOWEST pain in past 24 hours:  0 = No pain  to  10 = Worst pain imaginable 2   What number best describes your WORST pain in past 24 hours:  0 = No pain  to  10 = Worst pain imaginable 6   When is your pain worst? Constant   What non-medicine treatments have you already had for your pain? pain clinic, physical therapy, chiropractic care, spine injections (shots)   Have you tried treating your pain with medication?  -   Are you currently taking medications for your pain? -               Patient reports compared to the last visit their pain is improved by following percent:    Pain intensity: 50-60 %  Pain frequency: 50-60 %  Duration of pain episodes: 50-60 %  Falling asleep: 50-60 %  Staying asleep: 50-60 %  Ability to work: 50-60 %  Ability to exercise: 50-60 %  Performing chores: 50-60 %      At this time the patient  is satisfied with her pain control    ROS:  Review of Systems   Constitutional: Negative for chills, fever and weight loss.   Eyes: Negative for double vision and pain.   Musculoskeletal: Positive for back pain, myalgias and neck pain.   Endo/Heme/Allergies: Negative for polydipsia.   Psychiatric/Behavioral: Negative for hallucinations.   All other systems reviewed and are negative.    Answers for HPI/ROS submitted by the patient on 8/23/2022  General Symptoms: Yes  Skin Symptoms: No  HENT Symptoms: No  EYE SYMPTOMS: Yes  HEART SYMPTOMS: No  LUNG SYMPTOMS: No  INTESTINAL SYMPTOMS: No  URINARY SYMPTOMS: No  GYNECOLOGIC SYMPTOMS: No  BREAST SYMPTOMS: No  SKELETAL SYMPTOMS: Yes  BLOOD SYMPTOMS: No  NERVOUS SYSTEM SYMPTOMS: No  MENTAL HEALTH SYMPTOMS: No  Loss of appetite: No  Weight gain: No  Fatigue: Yes  Night sweats: Yes  Increased stress: No  Excessive hunger: No  Feeling hot or cold when others believe the temperature is normal: No  Loss of height: No  Post-operative complications: No  Surgical site pain: No  Change in or Loss of Energy: No  Hyperactivity: No  Confusion: No  Vision loss: No  Dry eyes: Yes  Watery eyes: No  Eye bulging: No  Flashing of lights: Yes  Spots: No  Floaters: Yes  Crossed eyes: No  Tunnel Vision: No  Yellowing of eyes: No  Eye irritation: No  Swollen joints: Yes  Joint pain: Yes  Bone pain: No  Muscle cramps: No  Muscle weakness: Yes  Joint stiffness: Yes  Bone fracture: No        Significant Medical History:   Past Medical History:   Diagnosis Date     Chronic osteoarthritis      Complication of anesthesia 1990's    DIfficulty waking up     Depressive disorder      Female bladder prolapse 9/3/2019     Mild intermittent asthma 7/29/2017     FAHAD treated with BiPAP 6/13/2018     Parathyroid abnormality (H)      Prolapse of female pelvic organs 8/19/2019     Sleep apnea     Uses a Bi-Pap.. Will have son bring it if necessary on DOS          Past Surgical History:  Past Surgical History:    Procedure Laterality Date     APPENDECTOMY       BIOPSY  2008    Lungs     COLONOSCOPY       diskectomy in toe       EYE SURGERY  Cataracts     GENITOURINARY SURGERY  2019    Bladder mesh repair     GYN SURGERY  2019    Total hysterectomy     HYSTERECTOMY  08/2017    and bladder surgery     INJECT EPIDURAL LUMBAR Right 4/27/2021    Procedure: Right Lumbar 5- Sacral 1 transforaminal epidural steroid injection with fluoroscopy and conscious sedation.;  Surgeon: Tiera Santana MD;  Location: [facility] OR     INJECT EPIDURAL LUMBAR Left 11/11/2021    Procedure: Lumbar epidural steroid injection L5- S1 with fluoroscopy;  Surgeon: Tiera Santana MD;  Location: [facility] OR     INJECT EPIDURAL LUMBAR Left 3/8/2022    Procedure: L5-S1 Epidural steroid injection with fluoroscopy;  Surgeon: Tiera Santana MD;  Location: [facility] OR     INJECT EPIDURAL LUMBAR Left 7/26/2022    Procedure: Lumbar epidural steroid injection at left L5-S1;  Surgeon: Tiera Santana MD;  Location: [facility] OR     left rotator cuff surgery       PARATHYROIDECTOMY N/A 7/21/2021    Procedure: PARATHYROIDECTOMY;  Surgeon: Gregoria Wilcox MD;  Location: [facility] OR     SINUS SURGERY      x2 in 2013 and 2018     TONSILLECTOMY  1955          Family History:  Family History   Problem Relation Age of Onset     Myocardial Infarction Father         late 50s     Coronary Artery Disease Father      Depression Father      Ovarian Cancer Sister      Cervical Cancer Sister      Lung Cancer Sister      Depression Sister      Anxiety Disorder Sister      Myocardial Infarction Paternal Uncle         late 50s     Hyperlipidemia Mother      Osteoporosis Mother      Cerebrovascular Disease Paternal Grandmother      Cerebrovascular Disease Paternal Grandfather      Anxiety Disorder Son           Social History:  Social History     Socioeconomic History     Marital status:      Spouse name: Wilbert     Number of children: Not on file     Years of education: Not on file     Highest  education level: Bachelor's degree (e.g., BA, AB, BS)   Occupational History     Not on file   Tobacco Use     Smoking status: Never Smoker     Smokeless tobacco: Never Used   Vaping Use     Vaping Use: Never used   Substance and Sexual Activity     Alcohol use: Yes     Comment: 0-2 drinks per month     Drug use: Never     Sexual activity: Not Currently     Partners: Male     Birth control/protection: Post-menopausal   Other Topics Concern     Parent/sibling w/ CABG, MI or angioplasty before 65F 55M? Yes     Comment: Father, MI   Social History Narrative     Not on file     Social Determinants of Health     Financial Resource Strain: Low Risk      Difficulty of Paying Living Expenses: Not hard at all   Food Insecurity: No Food Insecurity     Worried About Running Out of Food in the Last Year: Never true     Ran Out of Food in the Last Year: Never true   Transportation Needs: No Transportation Needs     Lack of Transportation (Medical): No     Lack of Transportation (Non-Medical): No   Physical Activity: Inactive     Days of Exercise per Week: 0 days     Minutes of Exercise per Session: 0 min   Stress: No Stress Concern Present     Feeling of Stress : Only a little   Social Connections: Socially Integrated     Frequency of Communication with Friends and Family: Once a week     Frequency of Social Gatherings with Friends and Family: Twice a week     Attends Synagogue Services: More than 4 times per year     Active Member of Clubs or Organizations: Yes     Attends Club or Organization Meetings: Not on file     Marital Status:    Intimate Partner Violence: Not on file   Housing Stability: Low Risk      Unable to Pay for Housing in the Last Year: No     Number of Places Lived in the Last Year: 1     Unstable Housing in the Last Year: No     Social History     Social History Narrative     Not on file          Allergies:  Allergies   Allergen Reactions     Propoxyphene Other (See Comments)     Clarithromycin Other  "(See Comments)     Pt states, \"ototoxicity\". Balance problems  Pt states, \"ototoxicity\".         Current Medications:   Current Outpatient Medications   Medication Sig Dispense Refill     acetaminophen (TYLENOL) 500 MG tablet Take 2 tablets (1,000 mg) by mouth every 6 hours as needed for pain       baclofen (LIORESAL) 10 MG tablet Take 10 mg by mouth daily as needed        Carboxymethylcellul-Glycerin 1-0.9 % GEL Place 1 drop into both eyes daily        celecoxib (CELEBREX) 100 MG capsule Take 1 capsule (100 mg) by mouth 2 times daily 180 capsule 0     clonazePAM (KLONOPIN) 0.5 MG tablet Take 0.5 mg by mouth nightly as needed        DULoxetine (CYMBALTA) 60 MG capsule TAKE 1 CAPSULE EVERY DAY 90 capsule 1     LYSINE PO        mometasone (NASONEX) 50 MCG/ACT nasal spray spray 2 spray by intranasal route  every day in each nostril       NONFORMULARY Vitamin Packet from [employer] including Multi-vitamin, Leutin, Calcium, Antioxidant, Fish oil, Glucosamine- Chondroitin: Take 1 packet by mouth daily       rosuvastatin (CRESTOR) 5 MG tablet TAKE 1 TABLET EVERY OTHER DAY 45 tablet 0     sertraline (ZOLOFT) 25 MG tablet Take 1 tablet (25 mg) by mouth daily 90 tablet 1     tacrolimus (PROTOPIC) 0.1 % external ointment Apply to AA on the face BID PRN 30 g 5     traZODone (DESYREL) 100 MG tablet Take 100 mg by mouth nightly as needed        valACYclovir (VALTREX) 1000 mg tablet Take two tablets twice per day for one day for flare ups. 30 tablet 1       Physical Exam:     /74 (BP Location: Right arm, Patient Position: Chair, Cuff Size: Adult Regular)   Pulse 64   SpO2 98%     General Appearance: No distress, seated comfortably  Mood: Euthymic  HE ENT: Non constricted pupils  Respiratory: Non labored breathing  Skin: No rashes over exposed skin  MS: Normal muscle bulk      ASSESSMENT AND PLAN:     Encounter Diagnosis:  Lumbar radiculopathy  Left knee pain, osteoarthritis  Osteoarthritis       Dinorah Thompson is a 72 " year old year old female who presents to the pain clinic with improved low back pain, hand pain and left knee pain    RECOMMENDATIONS:     1. Medications: The patient has voltaren gel available and low dose celebrex approved by her pcp.   The patient will continue duloxetine and baclofen as needed.     Recommend the patient to discuss regarding the option of turmeric supplements for arthritis related pain.     2. Procedure: The patient will call the clinic when the low back and leg pain worsens for repeat lumbar epidural steroid injection.   The patient will also call the clinic to schedule left knee synvisc injection when the pain restarts in the left knee area.     3. Education: Continue PT exercises and water exercises.     Follow up: as needed.         Sincerely,    Tiera Santana MD

## 2022-08-23 NOTE — PATIENT INSTRUCTIONS
Procedures:    Call to schedule your procedure: [phone] option #2  Call when needed for next injection    Your pre-procedure instructions are below, please call our clinic if you have any questions.    Recommended Follow up:    After her next injection      To speak with a nurse, schedule/reschedule/cancel a clinic appointment, or request a medication refill call: [phone].    You can also reach us by [employer]: [email or web address]

## 2022-08-23 NOTE — PROGRESS NOTES
Pain clinic follow up note    HPI:   Dinorah Thompson is a 72 year old year old female  who presents to the pain clinic with low back pain and leg pain, s/p LESI with excellent improvement in pain.     Patient Supplied Answers To the [facility] Pain Questionnaire  [facility] -  Patient Entered Questionnaire/Answers 8/23/2022   What number best describes your pain right now:  0 = No pain  to  10 = Worst pain imaginable 2   How would you describe the pain? dull, aching, throbbing   Which of the following worsen your pain? standing, walking   Which of the following improve or reduce your pain? lying down, medication, relaxation   What number best describes your average pain for the past week:  0 = No pain  to  10 = Worst pain imaginable 3   What number best describes your LOWEST pain in past 24 hours:  0 = No pain  to  10 = Worst pain imaginable 2   What number best describes your WORST pain in past 24 hours:  0 = No pain  to  10 = Worst pain imaginable 6   When is your pain worst? Constant   What non-medicine treatments have you already had for your pain? pain clinic, physical therapy, chiropractic care, spine injections (shots)   Have you tried treating your pain with medication?  -   Are you currently taking medications for your pain? -               Patient reports compared to the last visit their pain is improved by following percent:    Pain intensity: 50-60 %  Pain frequency: 50-60 %  Duration of pain episodes: 50-60 %  Falling asleep: 50-60 %  Staying asleep: 50-60 %  Ability to work: 50-60 %  Ability to exercise: 50-60 %  Performing chores: 50-60 %      At this time the patient is satisfied with her pain control    ROS:  Review of Systems   Constitutional: Negative for chills, fever and weight loss.   Eyes: Negative for double vision and pain.   Musculoskeletal: Positive for back pain, myalgias and neck pain.   Endo/Heme/Allergies: Negative for polydipsia.   Psychiatric/Behavioral: Negative for hallucinations.   All  other systems reviewed and are negative.    Answers for HPI/ROS submitted by the patient on 8/23/2022  General Symptoms: Yes  Skin Symptoms: No  HENT Symptoms: No  EYE SYMPTOMS: Yes  HEART SYMPTOMS: No  LUNG SYMPTOMS: No  INTESTINAL SYMPTOMS: No  URINARY SYMPTOMS: No  GYNECOLOGIC SYMPTOMS: No  BREAST SYMPTOMS: No  SKELETAL SYMPTOMS: Yes  BLOOD SYMPTOMS: No  NERVOUS SYSTEM SYMPTOMS: No  MENTAL HEALTH SYMPTOMS: No  Loss of appetite: No  Weight gain: No  Fatigue: Yes  Night sweats: Yes  Increased stress: No  Excessive hunger: No  Feeling hot or cold when others believe the temperature is normal: No  Loss of height: No  Post-operative complications: No  Surgical site pain: No  Change in or Loss of Energy: No  Hyperactivity: No  Confusion: No  Vision loss: No  Dry eyes: Yes  Watery eyes: No  Eye bulging: No  Flashing of lights: Yes  Spots: No  Floaters: Yes  Crossed eyes: No  Tunnel Vision: No  Yellowing of eyes: No  Eye irritation: No  Swollen joints: Yes  Joint pain: Yes  Bone pain: No  Muscle cramps: No  Muscle weakness: Yes  Joint stiffness: Yes  Bone fracture: No        Significant Medical History:   Past Medical History:   Diagnosis Date     Chronic osteoarthritis      Complication of anesthesia 1990's    DIfficulty waking up     Depressive disorder      Female bladder prolapse 9/3/2019     Mild intermittent asthma 7/29/2017     FAHAD treated with BiPAP 6/13/2018     Parathyroid abnormality (H)      Prolapse of female pelvic organs 8/19/2019     Sleep apnea     Uses a Bi-Pap.. Will have son bring it if necessary on DOS          Past Surgical History:  Past Surgical History:   Procedure Laterality Date     APPENDECTOMY       BIOPSY  2008    Lungs     COLONOSCOPY       diskectomy in toe       EYE SURGERY  Cataracts     GENITOURINARY SURGERY  2019    Bladder mesh repair     GYN SURGERY  2019    Total hysterectomy     HYSTERECTOMY  08/2017    and bladder surgery     INJECT EPIDURAL LUMBAR Right 4/27/2021    Procedure:  Right Lumbar 5- Sacral 1 transforaminal epidural steroid injection with fluoroscopy and conscious sedation.;  Surgeon: Tiera Santana MD;  Location: [facility] OR     INJECT EPIDURAL LUMBAR Left 11/11/2021    Procedure: Lumbar epidural steroid injection L5- S1 with fluoroscopy;  Surgeon: Tiera Santana MD;  Location: [facility] OR     INJECT EPIDURAL LUMBAR Left 3/8/2022    Procedure: L5-S1 Epidural steroid injection with fluoroscopy;  Surgeon: Tiera Santana MD;  Location: [facility] OR     INJECT EPIDURAL LUMBAR Left 7/26/2022    Procedure: Lumbar epidural steroid injection at left L5-S1;  Surgeon: Tiera Santana MD;  Location: [facility] OR     left rotator cuff surgery       PARATHYROIDECTOMY N/A 7/21/2021    Procedure: PARATHYROIDECTOMY;  Surgeon: Gregoria Wilcox MD;  Location: [facility]     SINUS SURGERY      x2 in 2013 and 2018     TONSILLECTOMY  1955          Family History:  Family History   Problem Relation Age of Onset     Myocardial Infarction Father         late 50s     Coronary Artery Disease Father      Depression Father      Ovarian Cancer Sister      Cervical Cancer Sister      Lung Cancer Sister      Depression Sister      Anxiety Disorder Sister      Myocardial Infarction Paternal Uncle         late 50s     Hyperlipidemia Mother      Osteoporosis Mother      Cerebrovascular Disease Paternal Grandmother      Cerebrovascular Disease Paternal Grandfather      Anxiety Disorder Son           Social History:  Social History     Socioeconomic History     Marital status:      Spouse name: Wilbert     Number of children: Not on file     Years of education: Not on file     Highest education level: Bachelor's degree (e.g., BA, AB, BS)   Occupational History     Not on file   Tobacco Use     Smoking status: Never Smoker     Smokeless tobacco: Never Used   Vaping Use     Vaping Use: Never used   Substance and Sexual Activity     Alcohol use: Yes     Comment: 0-2 drinks per month     Drug use: Never     Sexual activity: Not  "Currently     Partners: Male     Birth control/protection: Post-menopausal   Other Topics Concern     Parent/sibling w/ CABG, MI or angioplasty before 65F 55M? Yes     Comment: Father, MI   Social History Narrative     Not on file     Social Determinants of Health     Financial Resource Strain: Low Risk      Difficulty of Paying Living Expenses: Not hard at all   Food Insecurity: No Food Insecurity     Worried About Running Out of Food in the Last Year: Never true     Ran Out of Food in the Last Year: Never true   Transportation Needs: No Transportation Needs     Lack of Transportation (Medical): No     Lack of Transportation (Non-Medical): No   Physical Activity: Inactive     Days of Exercise per Week: 0 days     Minutes of Exercise per Session: 0 min   Stress: No Stress Concern Present     Feeling of Stress : Only a little   Social Connections: Socially Integrated     Frequency of Communication with Friends and Family: Once a week     Frequency of Social Gatherings with Friends and Family: Twice a week     Attends Restorationist Services: More than 4 times per year     Active Member of Clubs or Organizations: Yes     Attends Club or Organization Meetings: Not on file     Marital Status:    Intimate Partner Violence: Not on file   Housing Stability: Low Risk      Unable to Pay for Housing in the Last Year: No     Number of Places Lived in the Last Year: 1     Unstable Housing in the Last Year: No     Social History     Social History Narrative     Not on file          Allergies:  Allergies   Allergen Reactions     Propoxyphene Other (See Comments)     Clarithromycin Other (See Comments)     Pt states, \"ototoxicity\". Balance problems  Pt states, \"ototoxicity\".         Current Medications:   Current Outpatient Medications   Medication Sig Dispense Refill     acetaminophen (TYLENOL) 500 MG tablet Take 2 tablets (1,000 mg) by mouth every 6 hours as needed for pain       baclofen (LIORESAL) 10 MG tablet Take 10 mg by " mouth daily as needed        Carboxymethylcellul-Glycerin 1-0.9 % GEL Place 1 drop into both eyes daily        celecoxib (CELEBREX) 100 MG capsule Take 1 capsule (100 mg) by mouth 2 times daily 180 capsule 0     clonazePAM (KLONOPIN) 0.5 MG tablet Take 0.5 mg by mouth nightly as needed        DULoxetine (CYMBALTA) 60 MG capsule TAKE 1 CAPSULE EVERY DAY 90 capsule 1     LYSINE PO        mometasone (NASONEX) 50 MCG/ACT nasal spray spray 2 spray by intranasal route  every day in each nostril       NONFORMULARY Vitamin Packet from [employer] including Multi-vitamin, Leutin, Calcium, Antioxidant, Fish oil, Glucosamine- Chondroitin: Take 1 packet by mouth daily       rosuvastatin (CRESTOR) 5 MG tablet TAKE 1 TABLET EVERY OTHER DAY 45 tablet 0     sertraline (ZOLOFT) 25 MG tablet Take 1 tablet (25 mg) by mouth daily 90 tablet 1     tacrolimus (PROTOPIC) 0.1 % external ointment Apply to AA on the face BID PRN 30 g 5     traZODone (DESYREL) 100 MG tablet Take 100 mg by mouth nightly as needed        valACYclovir (VALTREX) 1000 mg tablet Take two tablets twice per day for one day for flare ups. 30 tablet 1       Physical Exam:     /74 (BP Location: Right arm, Patient Position: Chair, Cuff Size: Adult Regular)   Pulse 64   SpO2 98%     General Appearance: No distress, seated comfortably  Mood: Euthymic  HE ENT: Non constricted pupils  Respiratory: Non labored breathing  Skin: No rashes over exposed skin  MS: Normal muscle bulk      ASSESSMENT AND PLAN:     Encounter Diagnosis:  Lumbar radiculopathy  Left knee pain, osteoarthritis  Osteoarthritis       Dinorah Thompson is a 72 year old year old female who presents to the pain clinic with improved low back pain, hand pain and left knee pain    RECOMMENDATIONS:     1. Medications: The patient has voltaren gel available and low dose celebrex approved by her pcp.   The patient will continue duloxetine and baclofen as needed.     Recommend the patient to discuss regarding  the option of turmeric supplements for arthritis related pain.     2. Procedure: The patient will call the clinic when the low back and leg pain worsens for repeat lumbar epidural steroid injection.   The patient will also call the clinic to schedule left knee synvisc injection when the pain restarts in the left knee area.     3. Education: Continue PT exercises and water exercises.     Follow up: as needed.

## 2022-08-23 NOTE — NURSING NOTE
Patient presents with:  Follow Up: Follow-up RM 10 Lower Back Pain 2/10      Mild Pain (2)     Pain Medications     Analgesics Other Refills Start End     acetaminophen (TYLENOL) 500 MG tablet     7/21/2021     Sig - Route: Take 2 tablets (1,000 mg) by mouth every 6 hours as needed for pain - Oral    Class: OTC          What medications are you using for pain? Tylenol, Voltren Gel    (New patients only) Have you been seen by another pain clinic/ provider? no    (Return Patients only) What refills are you needing today? no

## 2022-08-24 ENCOUNTER — VIRTUAL VISIT (OUTPATIENT)
Dept: FAMILY MEDICINE | Facility: CLINIC | Age: 72
End: 2022-08-24
Payer: MEDICARE

## 2022-08-24 DIAGNOSIS — G89.4 CHRONIC PAIN DISORDER: ICD-10-CM

## 2022-08-24 DIAGNOSIS — M51.16 INTERVERTEBRAL DISC DISORDERS WITH RADICULOPATHY, LUMBAR REGION: ICD-10-CM

## 2022-08-24 DIAGNOSIS — F51.01 PRIMARY INSOMNIA: ICD-10-CM

## 2022-08-24 DIAGNOSIS — E78.5 HYPERLIPIDEMIA, UNSPECIFIED HYPERLIPIDEMIA TYPE: Primary | ICD-10-CM

## 2022-08-24 PROCEDURE — 99214 OFFICE O/P EST MOD 30 MIN: CPT | Mod: 95 | Performed by: NURSE PRACTITIONER

## 2022-08-24 RX ORDER — TRAZODONE HYDROCHLORIDE 100 MG/1
100 TABLET ORAL
Qty: 90 TABLET | Refills: 3 | Status: SHIPPED | OUTPATIENT
Start: 2022-08-24 | End: 2023-03-22

## 2022-08-24 RX ORDER — ROSUVASTATIN CALCIUM 5 MG/1
TABLET, COATED ORAL
Qty: 45 TABLET | Refills: 1 | Status: SHIPPED | OUTPATIENT
Start: 2022-08-24 | End: 2023-01-27

## 2022-08-24 RX ORDER — BACLOFEN 10 MG/1
10 TABLET ORAL DAILY PRN
Qty: 30 TABLET | Refills: 0 | Status: SHIPPED | OUTPATIENT
Start: 2022-08-24 | End: 2023-03-22

## 2022-08-24 ASSESSMENT — ANXIETY QUESTIONNAIRES
2. NOT BEING ABLE TO STOP OR CONTROL WORRYING: NOT AT ALL
4. TROUBLE RELAXING: NOT AT ALL
GAD7 TOTAL SCORE: 2
3. WORRYING TOO MUCH ABOUT DIFFERENT THINGS: NOT AT ALL
6. BECOMING EASILY ANNOYED OR IRRITABLE: SEVERAL DAYS
1. FEELING NERVOUS, ANXIOUS, OR ON EDGE: SEVERAL DAYS
GAD7 TOTAL SCORE: 2
5. BEING SO RESTLESS THAT IT IS HARD TO SIT STILL: NOT AT ALL
7. FEELING AFRAID AS IF SOMETHING AWFUL MIGHT HAPPEN: NOT AT ALL
7. FEELING AFRAID AS IF SOMETHING AWFUL MIGHT HAPPEN: NOT AT ALL
8. IF YOU CHECKED OFF ANY PROBLEMS, HOW DIFFICULT HAVE THESE MADE IT FOR YOU TO DO YOUR WORK, TAKE CARE OF THINGS AT HOME, OR GET ALONG WITH OTHER PEOPLE?: NOT DIFFICULT AT ALL
IF YOU CHECKED OFF ANY PROBLEMS ON THIS QUESTIONNAIRE, HOW DIFFICULT HAVE THESE PROBLEMS MADE IT FOR YOU TO DO YOUR WORK, TAKE CARE OF THINGS AT HOME, OR GET ALONG WITH OTHER PEOPLE: NOT DIFFICULT AT ALL
GAD7 TOTAL SCORE: 2

## 2022-08-24 ASSESSMENT — PATIENT HEALTH QUESTIONNAIRE - PHQ9
10. IF YOU CHECKED OFF ANY PROBLEMS, HOW DIFFICULT HAVE THESE PROBLEMS MADE IT FOR YOU TO DO YOUR WORK, TAKE CARE OF THINGS AT HOME, OR GET ALONG WITH OTHER PEOPLE: SOMEWHAT DIFFICULT
SUM OF ALL RESPONSES TO PHQ QUESTIONS 1-9: 2
SUM OF ALL RESPONSES TO PHQ QUESTIONS 1-9: 2

## 2022-08-24 NOTE — PROGRESS NOTES
Dot is a 72 year old who is being evaluated via a billable video visit.      How would you like to obtain your AVS? MyChart  If the video visit is dropped, the invitation should be resent by: Text to cell phone: [phone]  Will anyone else be joining your video visit? No          Assessment & Plan     Hyperlipidemia, unspecified hyperlipidemia type  Refilled. Due for lipids 11/2022  - rosuvastatin (CRESTOR) 5 MG tablet  Dispense: 45 tablet; Refill: 1    Chronic pain disorder  Refilled baclofen to be continued for sparing use as current. Tolerated well with good relief in past.   - baclofen (LIORESAL) 10 MG tablet  Dispense: 30 tablet; Refill: 0    Intervertebral disc disorders with radiculopathy, lumbar region  As above.   - baclofen (LIORESAL) 10 MG tablet  Dispense: 30 tablet; Refill: 0    Primary insomnia  Refilled. Using  mg nightly for insomnia, possibly secondary to pain. Stopped use of Celebrex due to CKD, trazodone help with sleep and nighttime pains.   - traZODone (DESYREL) 100 MG tablet  Dispense: 90 tablet; Refill: 3                   No follow-ups on file.   Follow-up Visit   Expected date:  Nov 24, 2022 (Approximate)      Follow Up Appointment Details:     Follow-up with whom?: Me    Follow-Up for what?: Chronic Disease f/u    Chronic Disease f/u: Hyperlipidemia    How?: In Person or Virtual                    JASWANT Johnson CNP [facility]   Dot is a 72 year old, presenting for the following health issues:  Back Pain      History of Present Illness       Back Pain:  She presents for follow up of back pain. Patient's back pain is a chronic problem.  Location of back pain:  Right lower back, left lower back, right middle of back and left middle of back  Description of back pain: cramping and dull ache  Back pain spreads: right thigh and left thigh    Since patient first noticed back pain, pain is: gradually worsening  Does back pain interfere  "with her job:  Not applicable      Reason for visit:  Get new prescriptions for medications I have used in pastShe consumes 0 sweetened beverage(s) daily.She exercises with enough effort to increase her heart rate 9 or less minutes per day.  She exercises with enough effort to increase her heart rate 3 or less days per week.   She is taking medications regularly.    Today's PHQ-9         PHQ-9 Total Score: 2    PHQ-9 Q9 Thoughts of better off dead/self-harm past 2 weeks :   Not at all    How difficult have these problems made it for you to do your work, take care of things at home, or get along with other people: Somewhat difficult  Today's ELDON-7 Score: 2     Would like obtain refill of baclofen 10 mg as needed for back pain intermittently. Used 1-3 times per month. Was started by PCP in [place] with good outcomes for back pain.     Refill trazodone 100 mg. Using nightly. Good outcomes with sleep. No side effects. Does use 50 mg intermittently.     Is weaning sertraline for plans to stop use. Feeling well.     Review of Systems   Constitutional, HEENT, cardiovascular, pulmonary, gi and gu systems are negative, except as otherwise noted.      Objective    Vitals - Patient Reported  Weight (Patient Reported): 64.9 kg (143 lb)  Height (Patient Reported): 157.5 cm (5' 2\")  BMI (Based on Pt Reported Ht/Wt): 26.15      Vitals:  No vitals were obtained today due to virtual visit.    Physical Exam   GENERAL: Healthy, alert and no distress  EYES: Eyes grossly normal to inspection.  No discharge or erythema, or obvious scleral/conjunctival abnormalities.  RESP: No audible wheeze, cough, or visible cyanosis.  No visible retractions or increased work of breathing.    SKIN: Visible skin clear. No significant rash, abnormal pigmentation or lesions.  NEURO: Cranial nerves grossly intact.  Mentation and speech appropriate for age.  PSYCH: Mentation appears normal, affect normal/bright, judgement and insight intact, normal speech " and appearance well-groomed.                Video-Visit Details    Video Start Time: 1110    Type of service:  Video Visit    Video End Time:11:32 AM    Originating Location (pt. Location): Home    Distant Location (provider location):  [facility]     Platform used for Video Visit: Casper Gallegos

## 2022-09-06 ENCOUNTER — MYC MEDICAL ADVICE (OUTPATIENT)
Dept: FAMILY MEDICINE | Facility: CLINIC | Age: 72
End: 2022-09-06

## 2022-09-06 ENCOUNTER — TELEPHONE (OUTPATIENT)
Dept: NEUROPSYCHOLOGY | Facility: CLINIC | Age: 72
End: 2022-09-06

## 2022-09-06 NOTE — TELEPHONE ENCOUNTER
[facility] Call Center    Phone Message    May a detailed message be left on voicemail: yes     Reason for Call: Pt is requesting a call back to reschedule her Neuro Psych Eval that is currently scheduled on 10/7.  Thanks.

## 2022-09-06 NOTE — TELEPHONE ENCOUNTER
[facility] Call [facility]    Phone Message    May a detailed message be left on voicemail: yes     Reason for Call: Other: Patient is requesting a call back to reschedule her Neuropsychology appt 0n 10/7/2022. Please call patient back to reschedule at # [phone]     Action Taken: Message routed to:  [facility] Neuropsychology     Travel Screening: Not Applicable

## 2022-09-06 NOTE — TELEPHONE ENCOUNTER
[facility] Call [facility]    Phone Message    May a detailed message be left on voicemail: yes     Reason for Call: Other: Pt is calling again to reschedule her neuropsych eval. Pt has been trying to get ahold of Helen to reschedule but she keeps missing her. Also something is wrong with pt's phone. Helen said she would try reaching pt again tomorrow morning. Pt says she can also be reached via [employer]- so someone can message her with the next available appt so pt doesn't keep missing the calls. Pt did say Helen could try again tomorrow after 9 am.     Action Taken: Message routed to:  [facility] NEUROPSYCHOLOGY    Travel Screening: Not Applicable

## 2022-10-05 ASSESSMENT — ANXIETY QUESTIONNAIRES
5. BEING SO RESTLESS THAT IT IS HARD TO SIT STILL: NOT AT ALL
8. IF YOU CHECKED OFF ANY PROBLEMS, HOW DIFFICULT HAVE THESE MADE IT FOR YOU TO DO YOUR WORK, TAKE CARE OF THINGS AT HOME, OR GET ALONG WITH OTHER PEOPLE?: NOT DIFFICULT AT ALL
GAD7 TOTAL SCORE: 3
2. NOT BEING ABLE TO STOP OR CONTROL WORRYING: NOT AT ALL
3. WORRYING TOO MUCH ABOUT DIFFERENT THINGS: SEVERAL DAYS
7. FEELING AFRAID AS IF SOMETHING AWFUL MIGHT HAPPEN: NOT AT ALL
1. FEELING NERVOUS, ANXIOUS, OR ON EDGE: SEVERAL DAYS
6. BECOMING EASILY ANNOYED OR IRRITABLE: SEVERAL DAYS
7. FEELING AFRAID AS IF SOMETHING AWFUL MIGHT HAPPEN: NOT AT ALL
GAD7 TOTAL SCORE: 3
4. TROUBLE RELAXING: NOT AT ALL
IF YOU CHECKED OFF ANY PROBLEMS ON THIS QUESTIONNAIRE, HOW DIFFICULT HAVE THESE PROBLEMS MADE IT FOR YOU TO DO YOUR WORK, TAKE CARE OF THINGS AT HOME, OR GET ALONG WITH OTHER PEOPLE: NOT DIFFICULT AT ALL

## 2022-10-05 ASSESSMENT — ENCOUNTER SYMPTOMS
MEMORY LOSS: 1
NIGHT SWEATS: 1
BOWEL INCONTINENCE: 0
BLOOD IN STOOL: 0
LOSS OF CONSCIOUSNESS: 0
POLYPHAGIA: 0
BLOATING: 1
DECREASED APPETITE: 0
SEIZURES: 0
HEADACHES: 1
DIARRHEA: 0
VOMITING: 0
MUSCLE CRAMPS: 1
CHILLS: 0
MUSCLE WEAKNESS: 0
STIFFNESS: 1
RECTAL PAIN: 0
HEARTBURN: 0
JAUNDICE: 0
WEAKNESS: 1
CONSTIPATION: 1
ARTHRALGIAS: 1
NUMBNESS: 0
MYALGIAS: 1
DISTURBANCES IN COORDINATION: 0
DIZZINESS: 0
HALLUCINATIONS: 0
BACK PAIN: 1
TREMORS: 0
INCREASED ENERGY: 0
POLYDIPSIA: 0
JOINT SWELLING: 1
FEVER: 0
WEIGHT LOSS: 0
FATIGUE: 1
NAUSEA: 0
TINGLING: 0
WEIGHT GAIN: 0
NECK PAIN: 1
ABDOMINAL PAIN: 0
PARALYSIS: 0
ALTERED TEMPERATURE REGULATION: 0

## 2022-10-06 ENCOUNTER — VIRTUAL VISIT (OUTPATIENT)
Dept: PALLIATIVE MEDICINE | Facility: CLINIC | Age: 72
End: 2022-10-06
Payer: MEDICARE

## 2022-10-06 ENCOUNTER — TELEPHONE (OUTPATIENT)
Dept: ANESTHESIOLOGY | Facility: CLINIC | Age: 72
End: 2022-10-06

## 2022-10-06 ENCOUNTER — MYC MEDICAL ADVICE (OUTPATIENT)
Dept: ANESTHESIOLOGY | Facility: CLINIC | Age: 72
End: 2022-10-06

## 2022-10-06 DIAGNOSIS — G89.29 CHRONIC PAIN OF LEFT KNEE: Primary | ICD-10-CM

## 2022-10-06 DIAGNOSIS — M25.562 CHRONIC PAIN OF LEFT KNEE: Primary | ICD-10-CM

## 2022-10-06 DIAGNOSIS — M54.16 LUMBAR RADICULOPATHY, CHRONIC: Primary | ICD-10-CM

## 2022-10-06 DIAGNOSIS — M54.16 LUMBAR RADICULOPATHY: Primary | ICD-10-CM

## 2022-10-06 PROCEDURE — 96158 HLTH BHV IVNTJ INDIV 1ST 30: CPT | Mod: 95 | Performed by: PSYCHOLOGIST

## 2022-10-06 NOTE — PROGRESS NOTES
"Dinorah Thompson is a 72 year old female who is being evaluated via a billable video visit.      The patient has been notified of following:     \"This video visit will be conducted via a call between you and your physician/provider. We have found that certain health care needs can be provided without the need for an in-person physical exam.  This service lets us provide the care you need with a video conversation.     Video visits are billed at different rates depending on your insurance coverage.  Please reach out to your insurance provider with any questions.    If during the course of the call the physician/provider feels a video visit is not appropriate, you will not be charged for this service.\"    Patient has given verbal consent for Video visit? Yes    Patient is currently in the [facility]? yes    Patient would like the video invitation sent by: Text to cell phone: [phone]}    Video Start Time: 10:00 AM    Additional provider notes:      Pain Diagnoses per pain provider:   Lumbar radiculopathy, chronic        DATA: During today's visit you reported the following: Your back pain is ok - 'I'm managing'. Knee pain has been worse, feel this might be impacting your back. Your mood remains stable. Your activity level is stable - moving in some capacity daily, however not aerobic however would like to increase engagement. Your stress level is fairly manageable. Your sleep remains fairly stable - increased Trazedone to 100mg - previously only taking half that. You reported engaging in self-care for your pain 2-3 times daily.    You identified that you would like to focus on the following or had questions regarding the following issues or concerns, and we discussed the following:   - update since last visit in June  - started using light box  - started water aerobics - twice weekly  - ongoing afternoon fatigue - encouraged to continue to honor this communication from your body and engage in restful " and soothing activities as you have been  - visited aunt In [place] even though it was an exhausting drive and visit, grateful to have the ability to visit her  - visited niece who lost both parents and her  in 6 weeks last year - it was relieving to see that she is doing well overall despite these losses  -  fell out of bed twice since last visit - broke rib with second fall - [facility] not want to listen to your concerns and only wanted to hear from your   - discussed if you could have 's MD put a note in about needing to consult with you due to his memory concerns    ASSESSMENT: Dot continues to have variable pain, however overall feels it is manageable. She reports overall things remain stable, and she seems to be engaging in proactive management of seasonal impact on her mood.    PLAN:   Your next appointment is scheduled for 1/12 at 10:00 AM.  Assignment/Objectives /interventions for next session:   - continue to focus on self-care  - reach out if you'd like an appointment sooner!    We believe regular attendance is key to your success in our program!      Any time you are unable to keep your appointment we ask that you call us at [phone] at least 24 hours in advance to cancel.This will allow us to offer the appointment time to another patient.     Multiple missed appointments may lead to dismissal from the clinic.    Video-Visit Details    Type of service:  Video Visit    Video End Time (time video stopped): 10:34 AM    Originating Location (pt. Location): Home    Distant Location (provider location):  [facility]     Mode of Communication:  Video Conference via Raulito Powers PsyD LP  Licensed Psychologist  Outpatient Clinic Therapist  [facility]

## 2022-10-06 NOTE — TELEPHONE ENCOUNTER
Pt called and stated she would like to have procedure and Dr. Santana and her discussed next steps. Sent message to Dr. Santana for order

## 2022-10-07 ENCOUNTER — OFFICE VISIT (OUTPATIENT)
Dept: NEUROPSYCHOLOGY | Facility: CLINIC | Age: 72
End: 2022-10-07
Attending: NURSE PRACTITIONER
Payer: MEDICARE

## 2022-10-07 DIAGNOSIS — F43.9 STRESS: ICD-10-CM

## 2022-10-07 DIAGNOSIS — R41.3 COMPLAINTS OF MEMORY DISTURBANCE: Primary | ICD-10-CM

## 2022-10-07 PROCEDURE — 96139 PSYCL/NRPSYC TST TECH EA: CPT

## 2022-10-07 PROCEDURE — 90791 PSYCH DIAGNOSTIC EVALUATION: CPT

## 2022-10-07 PROCEDURE — 96132 NRPSYC TST EVAL PHYS/QHP 1ST: CPT

## 2022-10-07 PROCEDURE — 96138 PSYCL/NRPSYC TECH 1ST: CPT

## 2022-10-07 PROCEDURE — 96133 NRPSYC TST EVAL PHYS/QHP EA: CPT

## 2022-10-07 NOTE — LETTER
10/7/2022       RE: Dinorah Thompson  [address]  [place] [address]     Dear Colleague,    Thank you for referring your patient, Dinorah Thompson, to the [facility] [place] at [facility]. Please see a copy of my visit note below.    NEUROPSYCHOLOGICAL EVALUATION  **CONFIDENTIAL**    Name: Dinorah Thompson Education: Bachelor s degree (16 years)    (age): 1950 (72 years) VARNER:  10/7/2022   Referral: JASWANT Johnson, CNP  Department of Family Medicine Handedness:  MRN (Epic): Right  [id number]     IDENTIFYING INFORMATION AND REASON FOR REFERRAL   Ms. Thompson is a 72-year-old, right-handed, White female with a history of sarcoidosis of the lung, hyperparathyroidism, hyperlipidemia, and stage 3 chronic kidney disease. This evaluation was requested to provide a comprehensive assessment of her current neuropsychological status to inform treatment planning.     IMPRESSION  See below for relevant background information and detailed test results. See separate abstract for supporting documentation including a list of neuropsychological measures and test scores.    Ms. Thompsno underwent neuropsychological testing in [place], [place], most recently around 2019; however, the neurology clinic where she was previously seen has since gone out of business and although attempts were made to acquire her prior neuropsychological test results, they were unable to be obtained. Ms. Thompson s current neuropsychological profile revealed performance within expectation across all cognitive tests including immediate auditory attention and working memory, speeded processing, language, visuospatial processing, verbal and visual learning and memory, cognitive inhibition, and verbal and nonverbal abstract reasoning. Verbal memory for stories and mental flexibility represented strengths with scores in the above average (>90th %ile) range. Assessment of  current psychological and emotional status revealed the absence of clinically significant depressive mood symptoms or excessive anxiety.    Overall, Ms. Thompson is functioning very well from a neuropsychological standpoint and does not evidence any objective cognitive deficits. Although direct comparisons could not be made with prior test results, these findings do not suggest cognitive decline. Her subjective cognitive complaints are likely attributable to normative cognitive lapses due to stress, which may intermittently interfere with cognitive effectiveness, typically through interfering with normal attention and efficiency of information processing.     RECOMMENDATIONS  1. Ms. Thompson is encouraged to live a healthy lifestyle that promotes brain health including getting appropriate exercise, practicing good sleep hygiene, and eating healthy.    2. Although there was no evidence for significant psychiatric issues, Ms. 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 Therefore, interventions to assist with coping with these stressors likely would be helpful. She is encouraged to re-establish care with her prior therapist to address stress management. Alternatively, [facility] can be reached at [phone] or a number of therapists can be found in your local community through [email or web address].  3. Ms. Thompson is encouraged to utilize the following behavioral strategies to maximize cognitive functioning in her daily life:   -Eliminate distraction. Eliminating environmental distracters can facilitate attention and memory performance. For example, turning off music or the television while having a conversation, so that all of your attention is focused on the task at hand.  -Work on decreasing interference from intrusive thoughts. When intrusive thoughts begin to interfere with your thinking, do not concentrate on them. Instead, observe them passively and calmly redirect  your attention back to task.   -Engage in calming activities that increase focus on the present. Incorporating mindfulness techniques such as meditation, relaxation, yoga, and moderate cardiovascular exercise, into your daily routine will help keep you present-oriented and consequently improve attention and memory.  -Pay mindful attention. You may find that you need to make a conscious effort to pay attention to conversations or when learning new information. When attention is not focused, it is difficult for the brain to learn new information. Thus, making a mindful effort to pay attention as you go through daily tasks will assist and facilitate memory recall later on.  -Allow for time. Take the time needed to learn and recall information. Some people tend to worry if they can't immediately recall something. Instead, you should take time to express a thought or complete a task rather than be critical or harsh on yourself.  -Use external aids to increase structure in daily life. Ongoing use of compensatory strategies such as lists, notes, and a centralized calendar are supported. Tools such as smart phones with alarms and reminders are helpful in bringing structure to daily activities.  4. The current evaluation will serve as an objective cognitive baseline against which results of future evaluations may be compared.  No follow-up is currently indicated; however, Ms. Thompson may be referred for a repeat neuropsychological evaluation if there is significant change in cognitive status in the future.     Thank you for the opportunity to participate in Ms. Thompson s care.  These findings and recommendations were reviewed with the patient in a separate feedback session on 10/12/2022 and all her questions were answered. If you have any questions regarding this evaluation, please do not hesitate to contact me.       Erinn Mckenzie, Ph.D.,   Clinical Neuropsychologist    RELEVANT BACKGROUND INFORMATION AND SUPPORTING  DOCUMENTATION  Gathered from a clinical interview with the patient and reviews of electronic medical record.    History of Presenting Problem(s)  Ms. Thompson presented with a history of sarcoidosis of the lung, hyperparathyroidism, hyperlipidemia, and stage 3 chronic kidney disease. She underwent neuropsychological testing in [place], [place], most recently around 2019; however, the neurology clinic where she was seen ([facility]) has since gone out of business and although attempts were made to acquire her prior neuropsychological test results, they were unable to be obtained at the time of this encounter. Ms. Thompson reported that the prior results suggested  no worrying signs of dementia  and that her subjective complaints were attributed to stress. At the time of this evaluation, Ms. Thompson reported increased difficulty with memory characterized primarily by misplacing objects and word-finding difficulties. She described taking longer to get things done, but she denied other cognitive complaints. 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 She described some trouble keeping track of all of the responsibilities involved in caring for her spouse with frontal lobe dysfunction (e.g., recommendations from OT, SLP, and his several other specialty providers). Physically, she reported her balance is not as good in the context of spinal stenosis and knee pain, but she denied other physical complaints or sensorimotor disturbances. Emotionally, she reported her mood is  good , but she described significant stress in the context of caring for her spouse and his behaviors. She reported a history of depressed mood in the context of seasonal changes and grief, but she denied any recent symptoms of depression, anxiety, or psychological distress. She  denied any behavioral or personality changes.     Neurodiagnostic Findings   None    Medical History  ? Sarcoidosis of the lung, ~7 years without symptoms  ? Hyperparathyroidism  ? Hyperlipidemia  ? Stage 3 chronic kidney disease  ? Denied history of seizure or head injury with LOC    Health Behaviors   ? Sleep: 7-8 hours of cumulative sleep nightly; denied sleep disturbance with trazodone; reported snoring and history of FAHAD but reported sleep study did not suggest need for CPAP; denied parasomnic behaviors  ? Exercise: Water aerobics 2x/week  ? Pain: Pain in left knee rated 2/10; chronic pain due to arthritis and spinal stenosis; managed with daily Tylenol     Psychiatric History  ? Ms. Thompson reported current mood is  good    ? History of depression in the context of grief and seasonal changes; stated last time she felt depressed was Winter 2021/2022 following the death of her sister.  ? Reported 1  anxious episode  related to her spouse s behavior but otherwise denied excessive rumination, worry or uncontrollable anxiety.  ? She otherwise denied history of significant hypomanic or manic mood symptoms, alteration of sensory perceptual processes or disordered thought.  ? Suicidality/ Self-harm: She denied any suicidal ideation, plan, or intent  ? Psychiatric treatment: Currently prescribed duloxetine and trazodone; meets with therapist at the pain clinic once every 4 months.    Substance Use History  ? Alcohol: 0-1 drinks per month  ? Nicotine: None  ? Cannabis: None  ? Problematic Substance Use: Denied    Current Medications (per EMR)    acetaminophen (TYLENOL) 500 MG tablet     baclofen (LIORESAL) 10 MG tablet     Carboxymethylcellul-Glycerin 1-0.9 % GEL     celecoxib (CELEBREX) 100 MG capsule     clonazePAM (KLONOPIN) 0.5 MG tablet (pt. not taking)     DULoxetine (CYMBALTA) 60 MG capsule     LYSINE PO     mometasone (NASONEX) 50 MCG/ACT nasal spray     rosuvastatin (CRESTOR) 5 MG tablet     sertraline (ZOLOFT)  25 MG tablet (pt. not taking)     tacrolimus (PROTOPIC) 0.1 % external ointment     traZODone (DESYREL) 100 MG tablet     valACYclovir (VALTREX) 1000 mg tablet     Developmental, Educational, & Occupational History  ? Gestational: Full-term  ?  complications: None reported  ? Developmental milestones: Met at expected ages or earlier  ? Childhood behavioral / emotional / academic problems: Denied  ? Native Language: English  ? Education: Described self as a good student; graduated high school on time; obtained bachelor s degree from the [employer]; obtained NP certification  ? Occupation: Retired nurse practitioner    Psychosocial History  ? Born in [place], [place] and raised in [place]  ? Marital:  to spouse of 52 years  ? Children: 2 children  ? Housing: Lives with spouse  ? Psychosocial support: Minimal social support; son, brother, suzan community    Family History  ? Dementia (mother, onset in late 60s, diagnosed as vascular dementia; + maternal aunts and uncles with dementia)  ? Multiple sclerosis (son)    RESULTS  See separate abstract for list of neuropsychological measures and test scores. Descriptive ranges are based on American Academy of Clinical Neuropsychology (2020) consensus guidelines, or test manuals where appropriate. All standardized scores are adjusted for age and additional adjustments for demographic factors such as education were performed where applicable.    PRE-MORBID ABILITY: Premorbid abilities were estimated within the high average range based on single word reading ability and demographic factors including sex, ethnicity, education, occupation, and geographic region.  GENERAL COGNITIVE STATUS: Orientation was within expectation for general personal information, time, place, and cultural information.   ATTENTION/EXECUTIVE FUNCTIONS: Immediate auditory attention and working memory performances were average. Verbal abstract reasoning performance was  average. Visual reasoning through pattern identification was high average. Performance on a test of set-shifting/cognitive flexibility was above average. Response inhibition performance was average. Psychomotor processing speed performances were average.  LANGUAGE: Confrontation naming performance was high average. Letter-cue verbal fluency performance was average. Semantic verbal fluency performance was average.   VISUOSPATIAL PROCESSING: Performance on a spatial perception task requiring judgement of angled lines was average. Copy of increasingly intricate figures was high average.   LEARNING AND MEMORY: Initial encoding of a word list over multiple learning trials was average. Delayed recall of the list was average with 91% recall. Recognition of the word list was average. Initial encoding of contextualized verbal information in the form of short stories was above average and delayed retrieval was above average. Recognition of story details was high average. Encoding of visual information was high average and delayed retrieval was average. Recognition among distractors was high average.    PSYCHOLOGICAL AND BEHAVIORAL: On self-report measures she endorsed minimal symptoms of depression and anxiety.   PERFORMANCE VALIDITY: Performance on neuropsychological tests is dependent upon a number of factors, including sufficient engagement and motivation, to reliably establish an examinee s level of cognitive functioning.  Based upon observations made throughout the evaluation, the patient did not appear to deliberately perform in a suboptimal manner and demonstrated good frustration tolerance on cognitively challenging tasks. Score on an imbedded index of performance validity was in the valid range. Overall, test results are believed to accurately represent the patient s current neurocognitive status.    BEHAVIORAL OBSERVATIONS  ? Presented early and was unaccompanied  ? Alert, oriented to self, time, place, and  circumstance; attentive and focused while undergoing testing  ? Appearance: Well-groomed, casually dressed  ? Gait/Posture: No abnormalities noted  ? Sensorimotor: No abnormalities noted  ? Behavior: Cooperative, pleasant, no behavioral abnormalities noted  ? Speech: Fluent, articulate, normal rate, prosody, and volume; no conversational word finding difficulty  ? Thought process: Logical, linear, and goal-directed   ? Thought content: Logical, appropriate  ? Affect: Broad, responsive, consistent with reported mood; good eye contact  ? Mood: Euthymic  ? Insight and Judgment: Intact  ? Approach to testing: Efficient, deliberate; good frustration on cognitively demanding tasks  ? Rapport: Easily established and maintained        Activities included in this evaluation: CPT Code #Units   Psychiatric diagnostic interview 98171 1   Test evaluation services by professional; first hour. [id number] 1   Test evaluation services by professional, additional hour (+) 09071 2   Test administration and scoring by technician, first 30 mins [id number] 1   Test administration and scoring by technician, additional 30 mins (+) [id number]   Dx: R41.3, F43.9        Again, thank you for allowing me to participate in the care of your patient.      Sincerely,    MARIO GUALLPA, PhD

## 2022-10-12 NOTE — PROGRESS NOTES
NEUROPSYCHOLOGICAL EVALUATION  **CONFIDENTIAL**    Name: Dinorah Thompson Education: Bachelor s degree (16 years)    (age): 1950 (72 years) VARNER:  10/7/2022   Referral: JASWANT Johnson CNP  Department of Family Medicine Handedness:  MRN (Epic): Right  [id number]     IDENTIFYING INFORMATION AND REASON FOR REFERRAL   Ms. Thompson is a 72-year-old, right-handed, White female with a history of sarcoidosis of the lung, hyperparathyroidism, hyperlipidemia, and stage 3 chronic kidney disease. This evaluation was requested to provide a comprehensive assessment of her current neuropsychological status to inform treatment planning.     IMPRESSION  See below for relevant background information and detailed test results. See separate abstract for supporting documentation including a list of neuropsychological measures and test scores.    Ms. Thompson underwent neuropsychological testing in [place], [place], most recently around 2019; however, the neurology clinic where she was previously seen has since gone out of business and although attempts were made to acquire her prior neuropsychological test results, they were unable to be obtained. Ms. Thompson s current neuropsychological profile revealed performance within expectation across all cognitive tests including immediate auditory attention and working memory, speeded processing, language, visuospatial processing, verbal and visual learning and memory, cognitive inhibition, and verbal and nonverbal abstract reasoning. Verbal memory for stories and mental flexibility represented strengths with scores in the above average (>90th %ile) range. Assessment of current psychological and emotional status revealed the absence of clinically significant depressive mood symptoms or excessive anxiety.    Overall, Ms. Thompson is functioning very well from a neuropsychological standpoint and does not evidence any objective cognitive deficits. Although direct comparisons could not be made with  prior test results, these findings do not suggest cognitive decline. Her subjective cognitive complaints are likely attributable to normative cognitive lapses due to stress, which may intermittently interfere with cognitive effectiveness, typically through interfering with normal attention and efficiency of information processing.     RECOMMENDATIONS  1. Ms. Thompson is encouraged to live a healthy lifestyle that promotes brain health including getting appropriate exercise, practicing good sleep hygiene, and eating healthy.    2. Although there was no evidence for significant psychiatric issues, Ms. 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 Therefore, interventions to assist with coping with these stressors likely would be helpful. She is encouraged to re-establish care with her prior therapist to address stress management. Alternatively, [facility] can be reached at [phone] or a number of therapists can be found in your local community through [email or web address].  3. Ms. Thompson is encouraged to utilize the following behavioral strategies to maximize cognitive functioning in her daily life:   -Eliminate distraction. Eliminating environmental distracters can facilitate attention and memory performance. For example, turning off music or the television while having a conversation, so that all of your attention is focused on the task at hand.  -Work on decreasing interference from intrusive thoughts. When intrusive thoughts begin to interfere with your thinking, do not concentrate on them. Instead, observe them passively and calmly redirect your attention back to task.   -Engage in calming activities that increase focus on the present. Incorporating mindfulness techniques such as meditation, relaxation, yoga, and moderate cardiovascular exercise, into your daily routine will help keep you present-oriented and consequently improve attention and memory.  -Pay  mindful attention. You may find that you need to make a conscious effort to pay attention to conversations or when learning new information. When attention is not focused, it is difficult for the brain to learn new information. Thus, making a mindful effort to pay attention as you go through daily tasks will assist and facilitate memory recall later on.  -Allow for time. Take the time needed to learn and recall information. Some people tend to worry if they can't immediately recall something. Instead, you should take time to express a thought or complete a task rather than be critical or harsh on yourself.  -Use external aids to increase structure in daily life. Ongoing use of compensatory strategies such as lists, notes, and a centralized calendar are supported. Tools such as smart phones with alarms and reminders are helpful in bringing structure to daily activities.  4. The current evaluation will serve as an objective cognitive baseline against which results of future evaluations may be compared.  No follow-up is currently indicated; however, Ms. Thompson may be referred for a repeat neuropsychological evaluation if there is significant change in cognitive status in the future.     Thank you for the opportunity to participate in Ms. Thompson s care.  These findings and recommendations were reviewed with the patient in a separate feedback session on 10/12/2022 and all her questions were answered. If you have any questions regarding this evaluation, please do not hesitate to contact me.       Erinn Mckenzie, Ph.D.,   Clinical Neuropsychologist    RELEVANT BACKGROUND INFORMATION AND SUPPORTING DOCUMENTATION  Gathered from a clinical interview with the patient and reviews of electronic medical record.    History of Presenting Problem(s)  Ms. Thompson presented with a history of sarcoidosis of the lung, hyperparathyroidism, hyperlipidemia, and stage 3 chronic kidney disease. She underwent neuropsychological testing in  [place], [place], most recently around 2019; however, the neurology clinic where she was seen ([facility]) has since gone out of business and although attempts were made to acquire her prior neuropsychological test results, they were unable to be obtained at the time of this encounter. Ms. Thompson reported that the prior results suggested  no worrying signs of dementia  and that her subjective complaints were attributed to stress. At the time of this evaluation, Ms. Thompson reported increased difficulty with memory characterized primarily by misplacing objects and word-finding difficulties. She described taking longer to get things done, but she denied other cognitive complaints. 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 She described some trouble keeping track of all of the responsibilities involved in caring for her spouse with frontal lobe dysfunction (e.g., recommendations from OT, SLP, and his several other specialty providers). Physically, she reported her balance is not as good in the context of spinal stenosis and knee pain, but she denied other physical complaints or sensorimotor disturbances. Emotionally, she reported her mood is  good , but she described significant stress in the context of caring for her spouse and his behaviors. She reported a history of depressed mood in the context of seasonal changes and grief, but she denied any recent symptoms of depression, anxiety, or psychological distress. She denied any behavioral or personality changes.     Neurodiagnostic Findings   None    Medical History  ? Sarcoidosis of the lung, ~7 years without symptoms  ? Hyperparathyroidism  ? Hyperlipidemia  ? Stage 3 chronic kidney disease  ? Denied history of seizure or head injury with LOC    Health Behaviors   ? Sleep: 7-8 hours of  cumulative sleep nightly; denied sleep disturbance with trazodone; reported snoring and history of FAHAD but reported sleep study did not suggest need for CPAP; denied parasomnic behaviors  ? Exercise: Water aerobics 2x/week  ? Pain: Pain in left knee rated 2/10; chronic pain due to arthritis and spinal stenosis; managed with daily Tylenol     Psychiatric History  ? Ms. Thompson reported current mood is  good    ? History of depression in the context of grief and seasonal changes; stated last time she felt depressed was Winter 2021/2022 following the death of her sister.  ? Reported 1  anxious episode  related to her spouse s behavior but otherwise denied excessive rumination, worry or uncontrollable anxiety.  ? She otherwise denied history of significant hypomanic or manic mood symptoms, alteration of sensory perceptual processes or disordered thought.  ? Suicidality/ Self-harm: She denied any suicidal ideation, plan, or intent  ? Psychiatric treatment: Currently prescribed duloxetine and trazodone; meets with therapist at the pain clinic once every 4 months.    Substance Use History  ? Alcohol: 0-1 drinks per month  ? Nicotine: None  ? Cannabis: None  ? Problematic Substance Use: Denied    Current Medications (per EMR)    acetaminophen (TYLENOL) 500 MG tablet     baclofen (LIORESAL) 10 MG tablet     Carboxymethylcellul-Glycerin 1-0.9 % GEL     celecoxib (CELEBREX) 100 MG capsule     clonazePAM (KLONOPIN) 0.5 MG tablet (pt. not taking)     DULoxetine (CYMBALTA) 60 MG capsule     LYSINE PO     mometasone (NASONEX) 50 MCG/ACT nasal spray     rosuvastatin (CRESTOR) 5 MG tablet     sertraline (ZOLOFT) 25 MG tablet (pt. not taking)     tacrolimus (PROTOPIC) 0.1 % external ointment     traZODone (DESYREL) 100 MG tablet     valACYclovir (VALTREX) 1000 mg tablet     Developmental, Educational, & Occupational History  ? Gestational: Full-term  ?  complications: None reported  ? Developmental milestones: Met at expected  ages or earlier  ? Childhood behavioral / emotional / academic problems: Denied  ? Native Language: English  ? Education: Described self as a good student; graduated high school on time; obtained bachelor s degree from the [employer]; obtained NP certification  ? Occupation: Retired nurse practitioner    Psychosocial History  ? Born in [place], [place] and raised in [place]  ? Marital:  to spouse of 52 years  ? Children: 2 children  ? Housing: Lives with spouse  ? Psychosocial support: Minimal social support; son, brother, suzan community    Family History  ? Dementia (mother, onset in late 60s, diagnosed as vascular dementia; + maternal aunts and uncles with dementia)  ? Multiple sclerosis (son)    RESULTS  See separate abstract for list of neuropsychological measures and test scores. Descriptive ranges are based on American Academy of Clinical Neuropsychology (2020) consensus guidelines, or test manuals where appropriate. All standardized scores are adjusted for age and additional adjustments for demographic factors such as education were performed where applicable.    PRE-MORBID ABILITY: Premorbid abilities were estimated within the high average range based on single word reading ability and demographic factors including sex, ethnicity, education, occupation, and geographic region.  GENERAL COGNITIVE STATUS: Orientation was within expectation for general personal information, time, place, and cultural information.   ATTENTION/EXECUTIVE FUNCTIONS: Immediate auditory attention and working memory performances were average. Verbal abstract reasoning performance was average. Visual reasoning through pattern identification was high average. Performance on a test of set-shifting/cognitive flexibility was above average. Response inhibition performance was average. Psychomotor processing speed performances were average.  LANGUAGE: Confrontation naming performance was high average. Letter-cue  verbal fluency performance was average. Semantic verbal fluency performance was average.   VISUOSPATIAL PROCESSING: Performance on a spatial perception task requiring judgement of angled lines was average. Copy of increasingly intricate figures was high average.   LEARNING AND MEMORY: Initial encoding of a word list over multiple learning trials was average. Delayed recall of the list was average with 91% recall. Recognition of the word list was average. Initial encoding of contextualized verbal information in the form of short stories was above average and delayed retrieval was above average. Recognition of story details was high average. Encoding of visual information was high average and delayed retrieval was average. Recognition among distractors was high average.    PSYCHOLOGICAL AND BEHAVIORAL: On self-report measures she endorsed minimal symptoms of depression and anxiety.   PERFORMANCE VALIDITY: Performance on neuropsychological tests is dependent upon a number of factors, including sufficient engagement and motivation, to reliably establish an examinee s level of cognitive functioning.  Based upon observations made throughout the evaluation, the patient did not appear to deliberately perform in a suboptimal manner and demonstrated good frustration tolerance on cognitively challenging tasks. Score on an imbedded index of performance validity was in the valid range. Overall, test results are believed to accurately represent the patient s current neurocognitive status.    BEHAVIORAL OBSERVATIONS  ? Presented early and was unaccompanied  ? Alert, oriented to self, time, place, and circumstance; attentive and focused while undergoing testing  ? Appearance: Well-groomed, casually dressed  ? Gait/Posture: No abnormalities noted  ? Sensorimotor: No abnormalities noted  ? Behavior: Cooperative, pleasant, no behavioral abnormalities noted  ? Speech: Fluent, articulate, normal rate, prosody, and volume; no  conversational word finding difficulty  ? Thought process: Logical, linear, and goal-directed   ? Thought content: Logical, appropriate  ? Affect: Broad, responsive, consistent with reported mood; good eye contact  ? Mood: Euthymic  ? Insight and Judgment: Intact  ? Approach to testing: Efficient, deliberate; good frustration on cognitively demanding tasks  ? Rapport: Easily established and maintained        Activities included in this evaluation: CPT Code #Units   Psychiatric diagnostic interview 68879 1   Test evaluation services by professional; first hour. 13429 1   Test evaluation services by professional, additional hour (+) 42517 2   Test administration and scoring by technician, first 30 mins 72304 1   Test administration and scoring by technician, additional 30 mins (+) [id number]   Dx: R41.3, F43.9

## 2022-10-12 NOTE — CONFIDENTIAL NOTE
Provider: CE      Tech: SHAYY      Patient Name: Dinorah Thompson     : [birth year]      MRN: [id number]     VARNER: 10/7/22      Age: 72      Education: 16      Ethnicity: C      Handedness: Right      Station: OP             NEUROPSYCHOLOGICAL TESTS RAW SCORE STANDARDIZED SCORE* DESCRIPTIVE RANGE**   PREMORBID ABILITY       WAIS-IV ACS Test of Premorbid Functioning (Estimated FSIQ) 63 SS= 121 Above Average     Estimated FSIQ = 116 High Average          ATTENTION AND EXECUTIVE FUNCTIONS RAW SCORE STANDARDIZED SCORE* DESCRIPTIVE RANGE**   Wechsler Adult Intelligence Scale - 4th Edition (WAIS-IV)    Digit Span Forward 11 ss= 12 High Average   Digit Span Backward 10 ss= 13 High Average   Digit Span Sequencing 9 ss= 12 High Average   Digit Span Total 30 ss= 13 High Average     TSs,r,e = 56 Average   Coding 59 ss= 12 High Average     TSs,r,e = 50 Average   Similarities 29 ss= 13 High Average     TSs,r,e = 55 Average   Matrix Reasoning 19 ss= 14 Above Average     TSs,r,e = 58 High Average   Trail Making Test (Time/errors)       Part A s,r,e 29/0 TS= 52 Average   Part B s,r,e 51/0 TS= 63 Above Average   Stroop       Word e 88 TS= 38 Low Average   Color e 58 TS= 36 Below Average   Color/Word e 36 TS= 48 Average   Interference e 1 TS= 51 Average          LANGUAGE RAW SCORE STANDARDIZED SCORE* DESCRIPTIVE RANGE**   [place] Naming Test s,r,e 58 TS= 62 High Average   Letter-Cue Verbal Fluency (FAS) s,r,e 15-11-20 (46) TS= 54 Average   Animal Fluency s,r,e 18 TS=  45 Average          VISUOSPATIAL PROCESSING RAW SCORE STANDARDIZED SCORE* DESCRIPTIVE RANGE**   RBANS       Line Orientation 18 %ile= 51-75 Average   WMS-IV Visual Reproduction Copy 43 %= >75 High Average          LEARNING & MEMORY RAW SCORE STANDARDIZED SCORE* DESCRIPTIVE RANGE**   Wechsler Memory Scale-4th Edition (WMS-IV)     Logical Memory I 44 ss= 16 Exceptionally High     TSs,r,e = 68 Above Average   Logical Memory II 33 ss= 16 Exceptionally High     TSs,r,e = 66 Above  Average   Logical Memory Recognition 22 %= >75 High Average   Visual Reproduction I 36 ss= 13 High Average     TSs,r,e = 57 High Average   Visual Reproduction II 18 ss= 10 Average     TSs,r,e = 48 Average   Visual Reproduction Recognition 6 %= >75 High Average   Em Verbal Learning Test - Revised (HVLT-R; Form 3)   Total Trials 1-3 5-8-11 (24) TS= 50 Average   Delayed Recall 10 TS= 56 Average   Retention (%) 91% TS= 52 Average   Recognition Hits 12 --     Recognition False Alarms 2 --     Recognition Discriminability 10 TS= 44 Average          PSYCHOLOGICAL & BEHAVIORAL SYMPTOMS RAW SCORE STANDARDIZED SCORE* DESCRIPTIVE RANGE**   Geriatric Depression Scale (GDS) 7 --  Minimal   Geriatric Anxiey Scale (GAS)      Somatic 5 --  Minimal   Cognitive 2 --  Minimal   Affective 1 --  Minimal   Total 8 --  Minimal          PERFORMANCE VALIDITY RAW SCORE   DESCRIPTIVE RANGE**   RDS 10   Valid Range          * All standardized scores are adjusted for age. Superscripts denote additional adjustment for (s)ex, (r)ace/ethnicity, (l)anguage, and/or (e)ducation.   ** Descriptive ranges are based on American Academy of Clinical Neuropsychology (2020) consensus guidelines, or test manuals where appropriate.    WNL = within normal limits. DC = discontinued due to patient s inability to complete the test.    Standardized scores: TS = T-score; mean = 50, standard deviation =10; z = z-score; mean = 0, standard deviation = 1; ss = scaled score; mean = 10, standard deviation = 3; MAS = [place] Older Adult Normative Study age adjusted scaled score; mean = 10, standard deviation = 3; SS = standard score; mean = 100, standard deviation = 15.

## 2022-10-13 ENCOUNTER — HOSPITAL ENCOUNTER (OUTPATIENT)
Facility: AMBULATORY SURGERY CENTER | Age: 72
Discharge: HOME | End: 2022-10-13
Attending: ANESTHESIOLOGY | Admitting: ANESTHESIOLOGY
Payer: MEDICARE

## 2022-10-13 ENCOUNTER — ANCILLARY PROCEDURE (OUTPATIENT)
Dept: RADIOLOGY | Facility: AMBULATORY SURGERY CENTER | Age: 72
End: 2022-10-13
Attending: ANESTHESIOLOGY
Payer: MEDICARE

## 2022-10-13 ENCOUNTER — OFFICE VISIT (OUTPATIENT)
Dept: ENDOCRINOLOGY | Facility: CLINIC | Age: 72
End: 2022-10-13
Payer: MEDICARE

## 2022-10-13 VITALS
TEMPERATURE: 97.9 F | SYSTOLIC BLOOD PRESSURE: 129 MMHG | HEART RATE: 65 BPM | OXYGEN SATURATION: 99 % | RESPIRATION RATE: 17 BRPM | DIASTOLIC BLOOD PRESSURE: 82 MMHG

## 2022-10-13 VITALS
OXYGEN SATURATION: 98 % | HEART RATE: 76 BPM | SYSTOLIC BLOOD PRESSURE: 128 MMHG | HEIGHT: 62 IN | WEIGHT: 150 LBS | RESPIRATION RATE: 16 BRPM | TEMPERATURE: 98.7 F | DIASTOLIC BLOOD PRESSURE: 80 MMHG | BODY MASS INDEX: 27.6 KG/M2

## 2022-10-13 DIAGNOSIS — R82.994 HYPERCALCIURIA: ICD-10-CM

## 2022-10-13 DIAGNOSIS — M85.80 OSTEOPENIA, UNSPECIFIED LOCATION: ICD-10-CM

## 2022-10-13 DIAGNOSIS — R52 PAIN: ICD-10-CM

## 2022-10-13 DIAGNOSIS — E21.3 HPTH (HYPERPARATHYROIDISM) (H): Primary | ICD-10-CM

## 2022-10-13 LAB
CALCIUM SERPL-MCNC: 8.9 MG/DL (ref 8.5–10.1)
CREAT SERPL-MCNC: 1.05 MG/DL (ref 0.52–1.04)
GFR SERPL CREATININE-BSD FRML MDRD: 56 ML/MIN/1.73M2
MAGNESIUM SERPL-MCNC: 2.3 MG/DL (ref 1.6–2.3)
PHOSPHATE SERPL-MCNC: 4.2 MG/DL (ref 2.5–4.5)
PTH-INTACT SERPL-MCNC: 39 PG/ML (ref 15–65)

## 2022-10-13 PROCEDURE — 83970 ASSAY OF PARATHORMONE: CPT | Performed by: INTERNAL MEDICINE

## 2022-10-13 PROCEDURE — 77002 NEEDLE LOCALIZATION BY XRAY: CPT | Mod: 26 | Performed by: ANESTHESIOLOGY

## 2022-10-13 PROCEDURE — 20610 DRAIN/INJ JOINT/BURSA W/O US: CPT | Mod: LT | Performed by: ANESTHESIOLOGY

## 2022-10-13 PROCEDURE — 82306 VITAMIN D 25 HYDROXY: CPT | Performed by: INTERNAL MEDICINE

## 2022-10-13 PROCEDURE — 82565 ASSAY OF CREATININE: CPT | Performed by: INTERNAL MEDICINE

## 2022-10-13 PROCEDURE — 83735 ASSAY OF MAGNESIUM: CPT | Performed by: INTERNAL MEDICINE

## 2022-10-13 PROCEDURE — 82310 ASSAY OF CALCIUM: CPT | Performed by: INTERNAL MEDICINE

## 2022-10-13 PROCEDURE — 20610 DRAIN/INJ JOINT/BURSA W/O US: CPT | Mod: LT

## 2022-10-13 PROCEDURE — 84100 ASSAY OF PHOSPHORUS: CPT | Performed by: INTERNAL MEDICINE

## 2022-10-13 PROCEDURE — 36415 COLL VENOUS BLD VENIPUNCTURE: CPT | Performed by: INTERNAL MEDICINE

## 2022-10-13 PROCEDURE — 99215 OFFICE O/P EST HI 40 MIN: CPT | Performed by: INTERNAL MEDICINE

## 2022-10-13 RX ORDER — IOPAMIDOL 408 MG/ML
INJECTION, SOLUTION INTRATHECAL DAILY PRN
Status: DISCONTINUED | OUTPATIENT
Start: 2022-10-13 | End: 2022-10-13 | Stop reason: HOSPADM

## 2022-10-13 RX ORDER — LIDOCAINE HYDROCHLORIDE 10 MG/ML
INJECTION, SOLUTION EPIDURAL; INFILTRATION; INTRACAUDAL; PERINEURAL DAILY PRN
Status: DISCONTINUED | OUTPATIENT
Start: 2022-10-13 | End: 2022-10-13 | Stop reason: HOSPADM

## 2022-10-13 NOTE — DISCHARGE INSTRUCTIONS
Home Care Instructions after a Major Joint Injection      In a Major Joint Injection a local anesthetic (numbing medicine) is injected in or near the joint space.  Steroids are often used to help with the anti-inflammatory process.  A joint lubricant is sometimes injected to help with mobility.       Activity  -You may resume most normal activity levels with the exception of strenuous activity. It is important for us to know if your pain with normal activity is relieved after this injection.  -DO NOT shower for 24 hours  -DO NOT remove bandaid for 24 hours    Pain  -You may experience soreness at the injection site for one or two days  -You may use an ice pack for 20 minutes every 2 hours for the first 24 hours  -You may use a heating pad after the first 24 hours  -You may use Tylenol (acetaminophen) every 4 hours or other pain medicines as     directed by your physician    You may experience numbness radiating into your legs or arms (depending on the procedure location). This numbness may last several hours. Until sensation returns to normal; please use caution in walking, climbing stairs, and stepping out of your vehicle, etc.    DID YOU RECEIVE SEDATION TODAY?  No    If you received sedation please follow these additional safety measures.  Sedation medicine, if given, may remain active for many hours. It is important for the next 24 hours that you do not:  -Drive a car  -Operate machines or power tools  -Consume alcohol, including beer  -Sign any important papers or legal documents    DID YOU RECEIVE STEROIDS TODAY?  Yes    Common side effects of steroids:  Not everyone will experience corticosteroid side effects. If side effects are experienced, they will gradually subside in the 7-10 day period following an injection. Most common side effects include:  -Flushed face and/or chest  -Feeling of warmth, particularly in the face but could be an overall feeling of warmth  -Increased blood sugar in diabetic  patients  -Menstrual irregularities my occur. If taking hormone-based birth control an alternate method of birth control is recommended  -Sleep disturbances and/or mood swings are possible  -Leg cramps      PLEASE KEEP TRACK OF YOUR SYMPTOMS AND NOTE YOUR IMPROVEMENT FOR YOUR DOCTOR.     Please contact us if you have:  -Severe pain  -Fever more than 101.5 degrees Fahrenheit  -Signs of infection at the injection site (redness, swelling, or drainage)    FOR PAIN CENTER PATIENTS:  If you have questions, please contact the Pain Clinic at [phone] Option #1 between the hours of 7:00 am and 3:00 pm Monday through Friday. After office hours you can contact the on call provider by dialing [phone]. If you need immediate attention, we recommend that you go to a hospital emergency room or dial [phone].      FOR PM&R PATIENTS:  For patients seen by the PM and R service, please call [phone]. If you need to fax a pain diary to PM&R the fax number is [phone]. If you are unable to fax, uploading to [employer] is encouraged, then send to provider. If you have procedure scheduling questions please call [phone] Option #2

## 2022-10-13 NOTE — PROGRESS NOTES
Name: Dinorah Thompson  Seen in consultation with Juju Chang for hyperPTH.  New pt to me.   ZAKIYA from .  HPI:  Dinorah Thompson is a 72 year old female who presents for the evaluation of hyperPTH.   has a past medical history of Chronic osteoarthritis, Complication of anesthesia (1990's), Depressive disorder, Female bladder prolapse (9/3/2019), Mild intermittent asthma (7/29/2017), FAHAD treated with BiPAP (6/13/2018), Parathyroid abnormality (H), Prolapse of female pelvic organs (8/19/2019), and Sleep apnea.    1.Hyperparathyroidsim,resolved:  History of hyperparathyroidism status post left superior parathyroidectomy 7/2021.  Surgical path-- Parathyroid adenoma, 325 mg  Following that she was started on calcium supplementation and she gradually decreased dose.  Currently she is taking calcium 1000 mg/day and vitamin D 1000 IU/day.    2.Osteopenia:  DEXA 2021--osteopenia.   with the use of FRAX, are 19 % for major osteoporotic fracture and 4.3 % for hip fracture.   Based on these guidelines, treatment (in addition to calcium and vitamin D) is recommended for this patient, after ruling out other causes of osteoporosis.  She had early menopause at age 45.  She was on Fosamax and Boniva and stopped around 2013.  If she stopped it secondary to GERD.    She reports that her GERD control is better now.  History of steroid use--yes. She was diagnosed with sarcoidosis in 2008.   Initially treated with prednisone.     Family History of pituitary adenoma, pancreas tumors, Zollinger-Faulkner syndrome, pheochromocytoma. No  Kidney stones:No  Fractures: No  Abdominal Pain:No  Cramps: No  Constipation: Yes (Please explain): + chronic constipation- now better.  Muscle Aches or pains: No  Muscle twitches: No  Nausea and vomiting: No   Loss of appetite: No   Weight: stable  Confusion Lethargy and fatigue: Yes (Please explain): low energy  ON medications like Lithium/ HCTZ:  no  Average Daily Calcium intake: 1-2 serving  of dairy/day  Ca and Vit D supplementation:takes calcium 1000 mg /day and vit D 1000 international unit(s)/day.  Wt Readings from Last 2 Encounters:   10/13/22 68 kg (150 lb)   07/26/22 65.8 kg (145 lb)     Patient feels well at this time and denies any tachycardia, palpitations, heat intolerance, tremor, insomnia, diarrhea, or unexplained weight loss.  Patient also denies  cold intolerance, constipation, or unexplained weight gain.     PMH/PSH:  Past Medical History:   Diagnosis Date     Chronic osteoarthritis      Complication of anesthesia 1990's    DIfficulty waking up     Depressive disorder      Female bladder prolapse 9/3/2019     Mild intermittent asthma 7/29/2017     FAHAD treated with BiPAP 6/13/2018     Parathyroid abnormality (H)      Prolapse of female pelvic organs 8/19/2019     Sleep apnea     Uses a Bi-Pap.. Will have son bring it if necessary on DOS     Past Surgical History:   Procedure Laterality Date     APPENDECTOMY       BIOPSY  2008    Lungs     COLONOSCOPY       diskectomy in toe       EYE SURGERY  Cataracts     GENITOURINARY SURGERY  2019    Bladder mesh repair     GYN SURGERY  2019    Total hysterectomy     HYSTERECTOMY  08/2017    and bladder surgery     INJECT EPIDURAL LUMBAR Right 4/27/2021    Procedure: Right Lumbar 5- Sacral 1 transforaminal epidural steroid injection with fluoroscopy and conscious sedation.;  Surgeon: Tiera Santana MD;  Location: [facility] OR     INJECT EPIDURAL LUMBAR Left 11/11/2021    Procedure: Lumbar epidural steroid injection L5- S1 with fluoroscopy;  Surgeon: Tiera Santana MD;  Location: [facility] OR     INJECT EPIDURAL LUMBAR Left 3/8/2022    Procedure: L5-S1 Epidural steroid injection with fluoroscopy;  Surgeon: Tiera Santana MD;  Location: [facility] OR     INJECT EPIDURAL LUMBAR Left 7/26/2022    Procedure: Lumbar epidural steroid injection at left L5-S1;  Surgeon: Tiera Santana MD;  Location: [facility] OR     left rotator cuff surgery       PARATHYROIDECTOMY N/A 7/21/2021     Procedure: PARATHYROIDECTOMY;  Surgeon: Gregoria Wilcox MD;  Location: [facility]     SINUS SURGERY      x2 in 2013 and 2018     TONSILLECTOMY  1955     Family Hx:  Family History   Problem Relation Age of Onset     Myocardial Infarction Father         late 50s     Coronary Artery Disease Father      Depression Father      Ovarian Cancer Sister      Cervical Cancer Sister      Lung Cancer Sister      Depression Sister      Anxiety Disorder Sister      Myocardial Infarction Paternal Uncle         late 50s     Hyperlipidemia Mother      Osteoporosis Mother      Cerebrovascular Disease Paternal Grandmother      Cerebrovascular Disease Paternal Grandfather      Anxiety Disorder Son             Social Hx:  Social History     Socioeconomic History     Marital status:      Spouse name: Wilbert     Number of children: Not on file     Years of education: Not on file     Highest education level: Bachelor's degree (e.g., BA, AB, BS)   Occupational History     Not on file   Tobacco Use     Smoking status: Never     Smokeless tobacco: Never   Vaping Use     Vaping Use: Never used   Substance and Sexual Activity     Alcohol use: Yes     Comment: 0-2 drinks per month     Drug use: Never     Sexual activity: Not Currently     Partners: Male     Birth control/protection: Post-menopausal   Other Topics Concern     Parent/sibling w/ CABG, MI or angioplasty before 65F 55M? Yes     Comment: Father, MI   Social History Narrative     Not on file     Social Determinants of Health     Financial Resource Strain: Low Risk      Difficulty of Paying Living Expenses: Not hard at all   Food Insecurity: No Food Insecurity     Worried About Running Out of Food in the Last Year: Never true     Ran Out of Food in the Last Year: Never true   Transportation Needs: No Transportation Needs     Lack of Transportation (Medical): No     Lack of Transportation (Non-Medical): No   Physical Activity: Inactive     Days of Exercise per Week: 0 days      "Minutes of Exercise per Session: 0 min   Stress: No Stress Concern Present     Feeling of Stress : Only a little   Social Connections: Socially Integrated     Frequency of Communication with Friends and Family: Once a week     Frequency of Social Gatherings with Friends and Family: Twice a week     Attends Je[employer] witness Services: More than 4 times per year     Active Member of Clubs or Organizations: Yes     Attends Club or Organization Meetings: Not on file     Marital Status:    Intimate Partner Violence: Not on file   Housing Stability: Low Risk      Unable to Pay for Housing in the Last Year: No     Number of Places Lived in the Last Year: 1     Unstable Housing in the Last Year: No          MEDICATIONS:  has a current medication list which includes the following prescription(s): acetaminophen, baclofen, carboxymethylcellul-glycerin, celecoxib, clonazepam, duloxetine, lysine, mometasone, NONFORMULARY, rosuvastatin, sertraline, tacrolimus, trazodone, and valacyclovir.    ROS     ROS: 10 point ROS neg other than the symptoms noted above in the HPI.    Physical Exam   VS: /80 (BP Location: Left arm, Patient Position: Chair, Cuff Size: Adult Regular)   Pulse 76   Temp 98.7  F (37.1  C) (Tympanic)   Resp 16   Ht 1.575 m (5' 2.01\")   Wt 68 kg (150 lb)   LMP  (LMP Unknown)   SpO2 98%   Breastfeeding No   BMI 27.43 kg/m    GENERAL: healthy, alert and no distress  EYES: Eyes grossly normal to inspection, conjunctivae and sclerae normal  ENT: no nose swelling, nasal discharge.  Thyroid: no apparent thyroid nodules.  Thyroid appears normal in size and nontender.  CV: RRR, no rubs, gallops, no murmurs  RESP: CTAB, no wheezes, rales, or ronchi  ABDO: +BS  EXTREMITIES: no hand tremors.  NEURO: Cranial nerves grossly intact, mentation intact and speech normal  SKIN: No apparent skin lesions, rash or edema seen   PSYCH: mentation appears normal, affect normal/bright, judgement and insight intact, normal speech " and appearance well-groomed    LABS:  Calcium:  ENDO CALCIUM LABS-[facility] Latest Ref Rng & Units 5/10/2022   CALCIUM 8.5 - 10.1 mg/dL 8.4 (L)       Creatinine:  Creatinine   Date Value Ref Range Status   05/10/2022 0.84 0.52 - 1.04 mg/dL Final   06/28/2021 0.91 0.52 - 1.04 mg/dL Final       PTH:  END[facility] CALCIUM [facility] Latest Ref Rng & Units 5/10/2022   PARATHYROID HORMONE INTACT 18 - 80 pg/mL 58       Vitamin D:  Lab Results   Component Value Date    VITDT 59 05/10/2022    VITDT 71 03/04/2022    VITDT 65 01/19/2022    VITDT 45 03/03/2021       DEXA:    IMPRESSION:  Suspected parathyroid adenoma posterior to the superior left thyroid.    Surgery with Dr Wilcox on 7/21/21:   HECTOR Parathyroid, left superior, parathyroidectomy:  -Hyperplastic parathyroid tissue.  -Weight: 325 mg.    All pertinent notes, labs, and images personally reviewed by me.     A/P  Ms.Catherine JOSE CARLOS Thompson is a 72 year old here for the evaluation of:    1. Hyperparathyroidism, resolved:    Status post left superior parathyroidectomy in 7/2021  Back pathology showing hyperplastic parathyroid tissue.  Taking calcium and vitamin D supplement.  Plan:  Discussed diagnosis, pathophysiology, management and treatment options of condition with pt.  Plan to recheck labs.  Recommend adequate calcium and vitamin D intake.  Plan: Phosphorus, Magnesium, Parathyroid Hormone         Intact, Vitamin D Deficiency, Calcium,         Creatinine, DX Hip/Pelvis/Spine      2. Osteopenia:  DEXA 2021 consistent with osteopenia with FRAX score higher as noted above.  She had been on Fosamax and Boniva which she stopped secondary to GERD.  Reports better GERD control now.  Plan  Discussed diagnosis, pathophysiology, management and treatment options of condition with pt.  Recommend repeat bone density scan in April 2023 and compare change in bone density  Consider medication based on that  Follow-up after bone density scan.  Plan: Phosphorus, Magnesium, Parathyroid Hormone          Intact, Vitamin D Deficiency, Calcium,         Creatinine, DX Hip/Pelvis/Spine      The pt was advised to    Maintain an adequate calcium and vitamin D intake and to supplement vitamin D if needed to maintain serum levels of 25 hydroxy D (25 OH D) between 30-60 ng/ml.    Limit alcohol intake to no more than 2 servings per day.    Limit caffeine intake.    Maintain an active lifestyle including weight-bearing exercises for at least 30 mins daily.    Take measures to reduce the risk of falling.    All questions were answered.  The patient indicates understanding of the above issues and agrees with the plan set forth.    Total time spent in with the patient evaluation:  20 min    Additional time spent reviewing pertinent lab tests and chart notes, and documentation: 20 min    Follow-up:  After DEXA.    Sravani Sidhu MD  Endocrinology   [facility]  October 13, 2022  CC: Juju Chang

## 2022-10-13 NOTE — H&P
Dinorah BRANCH Jay  [id number]  female  72 year old      Reason for procedure/surgery: left knee pain and osteoarthrtis    Patient Active Problem List   Diagnosis     Cervical radiculopathy, chronic     Chronic pain disorder     Intervertebral disc disorders with radiculopathy, lumbar region     Lumbar radiculopathy, chronic     Other cervical disc degeneration at C5-C6 level     Spondylosis without myelopathy or radiculopathy, lumbar region     Osteoarthrosis, hand     Primary localized osteoarthritis of knees, bilateral     Hyperlipidemia     Depression     Sarcoidosis of lung (H)     Hoarseness     Laryngeal disorder     Precancerous lesion     History of retinal detachment     Lumbar radiculopathy     Hyperparathyroidism (H)     Chronic pain of left knee     Primary osteoarthritis of left knee     Moderate episode of recurrent major depressive disorder (H)     Grief     Stage 3 chronic kidney disease, unspecified whether stage 3a or 3b CKD (H)     Acute sphenoidal sinusitis     Bacterial conjunctivitis     Family history of dementia     Gastroesophageal reflux disease     Insomnia     Mild intermittent asthma     Osteoarthrosis     Osteoporosis     Recurrent major depression in remission (H)       Past Surgical History:    Past Surgical History:   Procedure Laterality Date     APPENDECTOMY       BIOPSY  2008    Lungs     COLONOSCOPY       diskectomy in toe       EYE SURGERY  Cataracts     GENITOURINARY SURGERY  2019    Bladder mesh repair     GYN SURGERY  2019    Total hysterectomy     HYSTERECTOMY  08/2017    and bladder surgery     INJECT EPIDURAL LUMBAR Right 4/27/2021    Procedure: Right Lumbar 5- Sacral 1 transforaminal epidural steroid injection with fluoroscopy and conscious sedation.;  Surgeon: Tiera Santana MD;  Location: [facility] OR     INJECT EPIDURAL LUMBAR Left 11/11/2021    Procedure: Lumbar epidural steroid injection L5- S1 with fluoroscopy;  Surgeon: Tiera Santana MD;  Location: [facility] OR     INJECT  "EPIDURAL LUMBAR Left 3/8/2022    Procedure: L5-S1 Epidural steroid injection with fluoroscopy;  Surgeon: Tiera Santana MD;  Location: [facility] OR     INJECT EPIDURAL LUMBAR Left 7/26/2022    Procedure: Lumbar epidural steroid injection at left L5-S1;  Surgeon: Tiera Santana MD;  Location: [facility] OR     left rotator cuff surgery       PARATHYROIDECTOMY N/A 7/21/2021    Procedure: PARATHYROIDECTOMY;  Surgeon: Gregoria Wilcox MD;  Location: [facility]     SINUS SURGERY      x2 in 2013 and 2018     TONSILLECTOMY  1955       Past Medical History:   Past Medical History:   Diagnosis Date     Chronic osteoarthritis      Complication of anesthesia 1990's    DIfficulty waking up     Depressive disorder      Female bladder prolapse 9/3/2019     Mild intermittent asthma 7/29/2017     FAHAD treated with BiPAP 6/13/2018     Parathyroid abnormality (H)      Prolapse of female pelvic organs 8/19/2019     Sleep apnea     Uses a Bi-Pap.. Will have son bring it if necessary on DOS       Social History:   Social History     Tobacco Use     Smoking status: Never     Smokeless tobacco: Never   Substance Use Topics     Alcohol use: Yes     Comment: 0-2 drinks per month       Family History:   Family History   Problem Relation Age of Onset     Myocardial Infarction Father         late 50s     Coronary Artery Disease Father      Depression Father      Ovarian Cancer Sister      Cervical Cancer Sister      Lung Cancer Sister      Depression Sister      Anxiety Disorder Sister      Myocardial Infarction Paternal Uncle         late 50s     Hyperlipidemia Mother      Osteoporosis Mother      Cerebrovascular Disease Paternal Grandmother      Cerebrovascular Disease Paternal Grandfather      Anxiety Disorder Son        Allergies:   Allergies   Allergen Reactions     Propoxyphene Other (See Comments)     Clarithromycin Other (See Comments)     Pt states, \"ototoxicity\". Balance problems  Pt states, \"ototoxicity\".         Active Medications:   Current " Outpatient Medications   Medication Sig Dispense Refill     acetaminophen (TYLENOL) 500 MG tablet Take 2 tablets (1,000 mg) by mouth every 6 hours as needed for pain       baclofen (LIORESAL) 10 MG tablet Take 1 tablet (10 mg) by mouth daily as needed for muscle spasms 30 tablet 0     Carboxymethylcellul-Glycerin 1-0.9 % GEL Place 1 drop into both eyes daily        celecoxib (CELEBREX) 100 MG capsule Take 1 capsule (100 mg) by mouth 2 times daily 180 capsule 0     clonazePAM (KLONOPIN) 0.5 MG tablet Take 0.5 mg by mouth nightly as needed        DULoxetine (CYMBALTA) 60 MG capsule TAKE 1 CAPSULE EVERY DAY 90 capsule 1     LYSINE PO        rosuvastatin (CRESTOR) 5 MG tablet TAKE 1 TABLET EVERY OTHER DAY 45 tablet 1     sertraline (ZOLOFT) 25 MG tablet Take 1 tablet (25 mg) by mouth daily 90 tablet 1     tacrolimus (PROTOPIC) 0.1 % external ointment Apply to AA on the face BID PRN 30 g 5     traZODone (DESYREL) 100 MG tablet Take 1 tablet (100 mg) by mouth nightly as needed for sleep 90 tablet 3     valACYclovir (VALTREX) 1000 mg tablet Take two tablets twice per day for one day for flare ups. 30 tablet 1     mometasone (NASONEX) 50 MCG/ACT nasal spray spray 2 spray by intranasal route  every day in each nostril       NONFORMULARY Vitamin Packet from [employer] including Multi-vitamin, Leutin, Calcium, Antioxidant, Fish oil, Glucosamine- Chondroitin: Take 1 packet by mouth daily         Systemic Review:   CONSTITUTIONAL: NEGATIVE for fever, chills, change in weight  ENT/MOUTH: NEGATIVE for ear, mouth and throat problems  RESP: NEGATIVE for significant cough or SOB  CV: NEGATIVE for chest pain, palpitations or peripheral edema    Physical Examination:   Vital Signs: /76   Pulse 66   Temp 97.9  F (36.6  C) (Tympanic)   SpO2 97%   GENERAL: healthy, alert and no distress  NECK: no adenopathy, no asymmetry, masses, or scars  RESP: lungs clear to auscultation - no rales, rhonchi or wheezes  CV: regular rate and  rhythm, normal S1 S2, no S3 or S4, no murmur, click or rub, no peripheral edema and peripheral pulses strong  ABDOMEN: soft, nontender, no hepatosplenomegaly, no masses and bowel sounds normal  MS: no gross musculoskeletal defects noted, no edema    Plan: Appropriate to proceed as scheduled.      Tiera Santana MD  10/13/2022

## 2022-10-13 NOTE — OP NOTE
PROCEDURE REPORT     Chief Complaint:   Left knee pain     Pre-Operative Diagnosis:  1. Left primary osteoarthritis of knee  2. Pain in leftt knee  3. Pain in left knee     Post-Operative Diagnosis:  1. Left primary osteoarthritis of knee  2. Pain in left knee  3. Pain in left knee     Procedure:  Synvisc #1 injection in left knee joints  Fluoroscopy for needle guidance     Attending: NAYE Sandoval  Fellow: Denis Wheeler MD       Anesthesia:    Local anesthesia      Indications / Pre-Operative Plan:  The patient has disabling knee pain, therefore a knee joint injection will be performed. The risks, alternatives and benefits were explained to the patient in detail.  The patient agreed with the plan.  Patient was examined by me prior to the procedure. Examination of heart, lung and mental status were all within acceptable limits. The patient has been assessed, examined and cleared for the planned procedure and level of anesthesia in an ambulatory surgery center.     Description of Procedure:  After obtaining informed consent including discussion of risks, benefits and alternatives, the patient was brought to the procedure room. The patient was placed in the supine position. Appropriate time out was called. The left knee area was prepped and draped in usual sterile manner. Utilizing fluoroscopy the target level was identified and made prominent. The skin and subcutaneous tissues were anesthetized with 1% Lidocaine. A 22 gauge, 1.5-inch needle was advanced into the left knee joint. Needle tip placement was confirmed in AP and lateral views. Omnipaque contrast was utilized. Contrast spread was noted in the intra articular joint space. No intravascular spread was noted prior to injection of medication.     Subsequently 6 ml of Synvisc total was injected into the left knee without complication. The needle was withdrawn after flushing with 1% lidocaine and hemostasis was achieved. The procedure was repeated in a  similar fashion in the left knee joint. The patient tolerated the procedure well and was taken to the recovery room in satisfactory condition. The patient was observed for the appropriate period of time prior to being discharged in satisfactory condition, according to clinic criteria.     As the attending physician I was present and helped the fellow with the procedure through the entirety of the procedure.      Follow Up: The patient will follow up with me in clinic as scheduled in 3 weeks.

## 2022-10-13 NOTE — LETTER
[facility]  [address], [address]  [place] [address]  [phone]        October 19, 2022    Dinorah Thompson  [address] [facility] [id number]            Dear MsMendez McmullenDinorah JOSE CARLOS Thompson:    Recently done endocrinology lab test/ imaging test showed:   Labs in acceptable range.   I would recommend adequate hydration and recheck creatinine labs in 1/2023.     Here is a copy for your records.     Follow up as discussed in last clinic visit.     Please call endocrinology clinic ( [phone]) if questions.     Sravani Sidhu MD   Endocrinology   [facility]   October 17, 2022       Results for orders placed or performed in visit on 10/13/22   Phosphorus     Status: Normal   Result Value Ref Range    Phosphorus 4.2 2.5 - 4.5 mg/dL   Magnesium     Status: Normal   Result Value Ref Range    Magnesium 2.3 1.6 - 2.3 mg/dL   Parathyroid Hormone Intact     Status: Normal   Result Value Ref Range    Parathyroid Hormone Intact 39 15 - 65 pg/mL    Narrative    This result was obtained with the Roche Elecsys PTH STAT assay.   This reference range differs from PTH assays used in other [facility] laboratories.   Vitamin D Deficiency     Status: Normal   Result Value Ref Range    Vitamin D, Total (25-Hydroxy) 61 20 - 75 ug/L    Narrative    Season, race, dietary intake, and treatment affect the concentration of 25-hydroxy-Vitamin D. Values may decrease during winter months and increase during summer months. Values 20-29 ug/L may indicate Vitamin D insufficiency and values <20 ug/L may indicate Vitamin D deficiency.    Vitamin D determination is routinely performed by an immunoassay specific for 25 hydroxyvitamin D3.  If an individual is on vitamin D2(ergocalciferol) supplementation, please specify 25 OH vitamin D2 and D3 level determination by LCMSMS test VITD23.     Calcium     Status: Normal   Result Value Ref Range    Calcium 8.9 8.5 - 10.1 mg/dL    Creatinine     Status: Abnormal   Result Value Ref Range    Creatinine 1.05 (H) 0.52 - 1.04 mg/dL    GFR Estimate 56 (L) >60 mL/min/1.73m2   Results for orders placed or performed in visit on 10/13/22   XR Surgery ANNETTE Fluoro Less Than 5 Min     Status: None    Narrative    This exam was marked as non-reportable because it will not be read by a   radiologist or a [facility] non-radiologist provider.

## 2022-10-13 NOTE — LETTER
10/13/2022         RE: Dinorah Thompson  [address]  [place] [address]        Dear Colleague,    Thank you for referring your patient, Dinorah Thompson, to the [facility]. Please see a copy of my visit note below.    Name: Dinorah Thompson  Seen in consultation with Juju Chang for hyperPTH.  New pt to me.   ZAKIYA from .  HPI:  Dinorah Thompson is a 72 year old female who presents for the evaluation of hyperPTH.   has a past medical history of Chronic osteoarthritis, Complication of anesthesia (1990's), Depressive disorder, Female bladder prolapse (9/3/2019), Mild intermittent asthma (7/29/2017), FAHAD treated with BiPAP (6/13/2018), Parathyroid abnormality (H), Prolapse of female pelvic organs (8/19/2019), and Sleep apnea.    1.Hyperparathyroidsim,resolved:  History of hyperparathyroidism status post left superior parathyroidectomy 7/2021.  Surgical path-- Parathyroid adenoma, 325 mg  Following that she was started on calcium supplementation and she gradually decreased dose.  Currently she is taking calcium 1000 mg/day and vitamin D 1000 IU/day.    2.Osteopenia:  DEXA 2021--osteopenia.   with the use of FRAX, are 19 % for major osteoporotic fracture and 4.3 % for hip fracture.   Based on these guidelines, treatment (in addition to calcium and vitamin D) is recommended for this patient, after ruling out other causes of osteoporosis.  She had early menopause at age 45.  She was on Fosamax and Boniva and stopped around 2013.  If she stopped it secondary to GERD.    She reports that her GERD control is better now.  History of steroid use--yes. She was diagnosed with sarcoidosis in 2008.   Initially treated with prednisone.     Family History of pituitary adenoma, pancreas tumors, Zollinger-Faulkner syndrome, pheochromocytoma. No  Kidney stones:No  Fractures: No  Abdominal Pain:No  Cramps: No  Constipation: Yes (Please explain): + chronic constipation- now  better.  Muscle Aches or pains: No  Muscle twitches: No  Nausea and vomiting: No   Loss of appetite: No   Weight: stable  Confusion Lethargy and fatigue: Yes (Please explain): low energy  ON medications like Lithium/ HCTZ:  no  Average Daily Calcium intake: 1-2 serving of dairy/day  Ca and Vit D supplementation:takes calcium 1000 mg /day and vit D 1000 international unit(s)/day.  Wt Readings from Last 2 Encounters:   10/13/22 68 kg (150 lb)   07/26/22 65.8 kg (145 lb)     Patient feels well at this time and denies any tachycardia, palpitations, heat intolerance, tremor, insomnia, diarrhea, or unexplained weight loss.  Patient also denies  cold intolerance, constipation, or unexplained weight gain.     PMH/PSH:  Past Medical History:   Diagnosis Date     Chronic osteoarthritis      Complication of anesthesia 1990's    DIfficulty waking up     Depressive disorder      Female bladder prolapse 9/3/2019     Mild intermittent asthma 7/29/2017     FAHAD treated with BiPAP 6/13/2018     Parathyroid abnormality (H)      Prolapse of female pelvic organs 8/19/2019     Sleep apnea     Uses a Bi-Pap.. Will have son bring it if necessary on DOS     Past Surgical History:   Procedure Laterality Date     APPENDECTOMY       BIOPSY  2008    Lungs     COLONOSCOPY       diskectomy in toe       EYE SURGERY  Cataracts     GENITOURINARY SURGERY  2019    Bladder mesh repair     GYN SURGERY  2019    Total hysterectomy     HYSTERECTOMY  08/2017    and bladder surgery     INJECT EPIDURAL LUMBAR Right 4/27/2021    Procedure: Right Lumbar 5- Sacral 1 transforaminal epidural steroid injection with fluoroscopy and conscious sedation.;  Surgeon: Tiera Santana MD;  Location: [facility] OR     INJECT EPIDURAL LUMBAR Left 11/11/2021    Procedure: Lumbar epidural steroid injection L5- S1 with fluoroscopy;  Surgeon: Tiera Santana MD;  Location: [facility] OR     INJECT EPIDURAL LUMBAR Left 3/8/2022    Procedure: L5-S1 Epidural steroid injection with fluoroscopy;   Surgeon: Tiera Santana MD;  Location: [facility] OR     INJECT EPIDURAL LUMBAR Left 7/26/2022    Procedure: Lumbar epidural steroid injection at left L5-S1;  Surgeon: Tiera Santana MD;  Location: [facility] OR     left rotator cuff surgery       PARATHYROIDECTOMY N/A 7/21/2021    Procedure: PARATHYROIDECTOMY;  Surgeon: Gregoria Wilcox MD;  Location: [facility] OR     SINUS SURGERY      x2 in 2013 and 2018     TONSILLECTOMY  1955     Family Hx:  Family History   Problem Relation Age of Onset     Myocardial Infarction Father         late 50s     Coronary Artery Disease Father      Depression Father      Ovarian Cancer Sister      Cervical Cancer Sister      Lung Cancer Sister      Depression Sister      Anxiety Disorder Sister      Myocardial Infarction Paternal Uncle         late 50s     Hyperlipidemia Mother      Osteoporosis Mother      Cerebrovascular Disease Paternal Grandmother      Cerebrovascular Disease Paternal Grandfather      Anxiety Disorder Son             Social Hx:  Social History     Socioeconomic History     Marital status:      Spouse name: Wilbert     Number of children: Not on file     Years of education: Not on file     Highest education level: Bachelor's degree (e.g., BA, AB, BS)   Occupational History     Not on file   Tobacco Use     Smoking status: Never     Smokeless tobacco: Never   Vaping Use     Vaping Use: Never used   Substance and Sexual Activity     Alcohol use: Yes     Comment: 0-2 drinks per month     Drug use: Never     Sexual activity: Not Currently     Partners: Male     Birth control/protection: Post-menopausal   Other Topics Concern     Parent/sibling w/ CABG, MI or angioplasty before 65F 55M? Yes     Comment: Father, MI   Social History Narrative     Not on file     Social Determinants of Health     Financial Resource Strain: Low Risk      Difficulty of Paying Living Expenses: Not hard at all   Food Insecurity: No Food Insecurity     Worried About Running Out of Food in the Last Year:  "Never true     Ran Out of Food in the Last Year: Never true   Transportation Needs: No Transportation Needs     Lack of Transportation (Medical): No     Lack of Transportation (Non-Medical): No   Physical Activity: Inactive     Days of Exercise per Week: 0 days     Minutes of Exercise per Session: 0 min   Stress: No Stress Concern Present     Feeling of Stress : Only a little   Social Connections: Socially Integrated     Frequency of Communication with Friends and Family: Once a week     Frequency of Social Gatherings with Friends and Family: Twice a week     Attends Zoroastrianism Services: More than 4 times per year     Active Member of Clubs or Organizations: Yes     Attends Club or Organization Meetings: Not on file     Marital Status:    Intimate Partner Violence: Not on file   Housing Stability: Low Risk      Unable to Pay for Housing in the Last Year: No     Number of Places Lived in the Last Year: 1     Unstable Housing in the Last Year: No          MEDICATIONS:  has a current medication list which includes the following prescription(s): acetaminophen, baclofen, carboxymethylcellul-glycerin, celecoxib, clonazepam, duloxetine, lysine, mometasone, NONFORMULARY, rosuvastatin, sertraline, tacrolimus, trazodone, and valacyclovir.    ROS     ROS: 10 point ROS neg other than the symptoms noted above in the HPI.    Physical Exam   VS: /80 (BP Location: Left arm, Patient Position: Chair, Cuff Size: Adult Regular)   Pulse 76   Temp 98.7  F (37.1  C) (Tympanic)   Resp 16   Ht 1.575 m (5' 2.01\")   Wt 68 kg (150 lb)   LMP  (LMP Unknown)   SpO2 98%   Breastfeeding No   BMI 27.43 kg/m    GENERAL: healthy, alert and no distress  EYES: Eyes grossly normal to inspection, conjunctivae and sclerae normal  ENT: no nose swelling, nasal discharge.  Thyroid: no apparent thyroid nodules.  Thyroid appears normal in size and nontender.  CV: RRR, no rubs, gallops, no murmurs  RESP: CTAB, no wheezes, rales, or " delilah LORENZ: +BS  EXTREMITIES: no hand tremors.  NEURO: Cranial nerves grossly intact, mentation intact and speech normal  SKIN: No apparent skin lesions, rash or edema seen   PSYCH: mentation appears normal, affect normal/bright, judgement and insight intact, normal speech and appearance well-groomed    LABS:  Calcium:  ENDO CALCIUM LABS-[facility] Latest Ref Rng & Units 5/10/2022   CALCIUM 8.5 - 10.1 mg/dL 8.4 (L)       Creatinine:  Creatinine   Date Value Ref Range Status   05/10/2022 0.84 0.52 - 1.04 mg/dL Final   06/28/2021 0.91 0.52 - 1.04 mg/dL Final       PTH:  ENDO CALCIUM LABS-[facility] Latest Ref Rng & Units 5/10/2022   PARATHYROID HORMONE INTACT 18 - 80 pg/mL 58       Vitamin D:  Lab Results   Component Value Date    VITDT 59 05/10/2022    VITDT 71 03/04/2022    VITDT 65 01/19/2022    VITDT 45 03/03/2021       DEXA:    IMPRESSION:  Suspected parathyroid adenoma posterior to the superior left thyroid.    Surgery with Dr Wilcox on 7/21/21:   A. Parathyroid, left superior, parathyroidectomy:  -Hyperplastic parathyroid tissue.  -Weight: 325 mg.    All pertinent notes, labs, and images personally reviewed by me.     A/P  Ms.Catherine JOSE CARLOS Thompson is a 72 year old here for the evaluation of:    1. Hyperparathyroidism, resolved:    Status post left superior parathyroidectomy in 7/2021  Back pathology showing hyperplastic parathyroid tissue.  Taking calcium and vitamin D supplement.  Plan:  Discussed diagnosis, pathophysiology, management and treatment options of condition with pt.  Plan to recheck labs.  Recommend adequate calcium and vitamin D intake.  Plan: Phosphorus, Magnesium, Parathyroid Hormone         Intact, Vitamin D Deficiency, Calcium,         Creatinine, DX Hip/Pelvis/Spine      2. Osteopenia:  DEXA 2021 consistent with osteopenia with FRAX score higher as noted above.  She had been on Fosamax and Boniva which she stopped secondary to GERD.  Reports better GERD control now.  Plan  Discussed diagnosis,  pathophysiology, management and treatment options of condition with pt.  Recommend repeat bone density scan in April 2023 and compare change in bone density  Consider medication based on that  Follow-up after bone density scan.  Plan: Phosphorus, Magnesium, Parathyroid Hormone         Intact, Vitamin D Deficiency, Calcium,         Creatinine, DX Hip/Pelvis/Spine      The pt was advised to    Maintain an adequate calcium and vitamin D intake and to supplement vitamin D if needed to maintain serum levels of 25 hydroxy D (25 OH D) between 30-60 ng/ml.    Limit alcohol intake to no more than 2 servings per day.    Limit caffeine intake.    Maintain an active lifestyle including weight-bearing exercises for at least 30 mins daily.    Take measures to reduce the risk of falling.    All questions were answered.  The patient indicates understanding of the above issues and agrees with the plan set forth.    Total time spent in with the patient evaluation:  20 min    Additional time spent reviewing pertinent lab tests and chart notes, and documentation: 20 min    Follow-up:  After DEXA.    Sravani Sidhu MD  Endocrinology   [facility]  October 13, 2022  CC: Juju Chang        Again, thank you for allowing me to participate in the care of your patient.        Sincerely,        Sravani Sidhu MD

## 2022-10-13 NOTE — PATIENT INSTRUCTIONS
-[facility]  Dr Sidhu, Endocrinology Department    [facility]   [address]  [place] [address]  Appointment [phone]  Fax: [phone]  [place]: Monday - Thursday      Please check the cost coverage and copay with insurance before recommended tests, services and medications (especially if new medications are prescribed).     If ordered, please get blood work done 1 week prior to your next appointment so they will be available to Dr. Sidhu at your visit.    Labs today.  DEXA in 2023 at [facility].  Follow up after that.  Continue calcium and vit D supplementation.    You should get 1000- 1200 mg/day calcium in divided doses of no more than 500 mg/dose.  This INCLUDES what is in your food as well as what is in any supplements you may be taking.    Dietary sources of calcium:: These also contain vitamin D  Milk                            8 oz            300 mg calcium  Yogurt                          1 cup           400 mg calcium   Hard cheese                     1.5 oz          300 mg  Cottage cheese                  2 cup           300 mg  Orange juice with Calcium       8 oz            300 mg  Low fat dairy sources are recommended      You should get 30 minutes of moderate weight bearing exercise on most days of the week .  Weight bearing exercise includes such things as walking, jogging, hiking, dancing.  You should also get Strength training 2 or more times/week in addition to other weight -being exercise. Strength training uses weight or resistance beyond that seen in everyday activities -(pilates, weight training with free weights, weight machines or resistance bands)

## 2022-10-14 LAB — DEPRECATED CALCIDIOL+CALCIFEROL SERPL-MC: 61 UG/L (ref 20–75)

## 2022-10-15 ENCOUNTER — IMMUNIZATION (OUTPATIENT)
Dept: FAMILY MEDICINE | Facility: CLINIC | Age: 72
End: 2022-10-15
Payer: MEDICARE

## 2022-10-15 DIAGNOSIS — Z23 HIGH PRIORITY FOR 2019-NCOV VACCINE: ICD-10-CM

## 2022-10-15 DIAGNOSIS — Z23 NEED FOR PROPHYLACTIC VACCINATION AND INOCULATION AGAINST INFLUENZA: ICD-10-CM

## 2022-10-15 PROCEDURE — 91313 COVID-19,PF,MODERNA BIVALENT: CPT

## 2022-10-15 PROCEDURE — 0134A COVID-19,PF,MODERNA BIVALENT: CPT

## 2022-10-15 PROCEDURE — G0008 ADMIN INFLUENZA VIRUS VAC: HCPCS

## 2022-10-15 PROCEDURE — 99207 PR NO CHARGE NURSE ONLY: CPT

## 2022-10-15 PROCEDURE — 90662 IIV NO PRSV INCREASED AG IM: CPT

## 2022-10-18 ENCOUNTER — MYC MEDICAL ADVICE (OUTPATIENT)
Dept: FAMILY MEDICINE | Facility: CLINIC | Age: 72
End: 2022-10-18

## 2022-10-18 DIAGNOSIS — M85.80 OSTEOPENIA, UNSPECIFIED LOCATION: Primary | ICD-10-CM

## 2022-10-18 ASSESSMENT — ASTHMA QUESTIONNAIRES
QUESTION_3 LAST FOUR WEEKS HOW OFTEN DID YOUR ASTHMA SYMPTOMS (WHEEZING, COUGHING, SHORTNESS OF BREATH, CHEST TIGHTNESS OR PAIN) WAKE YOU UP AT NIGHT OR EARLIER THAN USUAL IN THE MORNING: NOT AT ALL
ACT_TOTALSCORE: 25
QUESTION_5 LAST FOUR WEEKS HOW WOULD YOU RATE YOUR ASTHMA CONTROL: COMPLETELY CONTROLLED
ACT_TOTALSCORE: 25
QUESTION_2 LAST FOUR WEEKS HOW OFTEN HAVE YOU HAD SHORTNESS OF BREATH: NOT AT ALL
QUESTION_1 LAST FOUR WEEKS HOW MUCH OF THE TIME DID YOUR ASTHMA KEEP YOU FROM GETTING AS MUCH DONE AT WORK, SCHOOL OR AT HOME: NONE OF THE TIME
QUESTION_4 LAST FOUR WEEKS HOW OFTEN HAVE YOU USED YOUR RESCUE INHALER OR NEBULIZER MEDICATION (SUCH AS ALBUTEROL): NOT AT ALL

## 2022-10-18 NOTE — RESULT ENCOUNTER NOTE
Endo staff- can you please place orders for creatinine for 1/2023?  Thank you.    Dinorah    Recently done endocrinology lab test/ imaging test showed:  Labs in acceptable range.  I would recommend adequate hydration and recheck creatinine labs in 1/2023.    Here is a copy for your records.    Follow up as discussed in last clinic visit.    Please call endocrinology clinic ( [phone]) if questions.    Sravani Sidhu MD  Endocrinology   [facility]  October 17, 2022

## 2022-10-18 NOTE — TELEPHONE ENCOUNTER
RN placed call to patient to discuss my chart message     Patient has been having fatigue for months   Now is noting urinary frequency and thirst   Denies dysuria, fever   Denies nausea/emesis   Denies dizziness   Does not have diagnosis of diabetes, however did check her blood sugar at her neighbors today and it was 111     Recently saw Endo, however did not mention these symptoms as she did not correlate them together and is now worried that she may have some diabetes symptoms     Patient is retired Nurse Practitioner     Scheduled with Dr. Noguera on 10/20 @ 3:40     Patient is to return call if any symptoms change or are worsening     Nolvia Florence, Registered Nurse  [facility]

## 2022-10-20 ENCOUNTER — OFFICE VISIT (OUTPATIENT)
Dept: FAMILY MEDICINE | Facility: CLINIC | Age: 72
End: 2022-10-20
Payer: MEDICARE

## 2022-10-20 ENCOUNTER — TELEPHONE (OUTPATIENT)
Dept: PULMONOLOGY | Facility: CLINIC | Age: 72
End: 2022-10-20

## 2022-10-20 VITALS
BODY MASS INDEX: 26.87 KG/M2 | TEMPERATURE: 99.1 F | HEART RATE: 73 BPM | HEIGHT: 62 IN | WEIGHT: 146 LBS | DIASTOLIC BLOOD PRESSURE: 70 MMHG | RESPIRATION RATE: 16 BRPM | OXYGEN SATURATION: 96 % | SYSTOLIC BLOOD PRESSURE: 110 MMHG

## 2022-10-20 DIAGNOSIS — G47.00 INSOMNIA, UNSPECIFIED TYPE: ICD-10-CM

## 2022-10-20 DIAGNOSIS — F33.40 RECURRENT MAJOR DEPRESSION IN REMISSION (H): ICD-10-CM

## 2022-10-20 DIAGNOSIS — G89.4 CHRONIC PAIN DISORDER: ICD-10-CM

## 2022-10-20 DIAGNOSIS — R73.09 ELEVATED GLUCOSE: ICD-10-CM

## 2022-10-20 DIAGNOSIS — N18.31 STAGE 3A CHRONIC KIDNEY DISEASE (H): ICD-10-CM

## 2022-10-20 DIAGNOSIS — R35.89 POLYURIA: ICD-10-CM

## 2022-10-20 DIAGNOSIS — R53.83 OTHER FATIGUE: Primary | ICD-10-CM

## 2022-10-20 DIAGNOSIS — G25.81 RESTLESS LEGS SYNDROME: ICD-10-CM

## 2022-10-20 DIAGNOSIS — N18.30 STAGE 3 CHRONIC KIDNEY DISEASE, UNSPECIFIED WHETHER STAGE 3A OR 3B CKD (H): ICD-10-CM

## 2022-10-20 LAB
ALBUMIN UR-MCNC: NEGATIVE MG/DL
APPEARANCE UR: CLEAR
BACTERIA #/AREA URNS HPF: NORMAL /HPF
BASOPHILS # BLD AUTO: 0 10E3/UL (ref 0–0.2)
BASOPHILS NFR BLD AUTO: 0 %
BILIRUB UR QL STRIP: NEGATIVE
COLOR UR AUTO: YELLOW
EOSINOPHIL # BLD AUTO: 0.2 10E3/UL (ref 0–0.7)
EOSINOPHIL NFR BLD AUTO: 3 %
ERYTHROCYTE [DISTWIDTH] IN BLOOD BY AUTOMATED COUNT: 13.9 % (ref 10–15)
GLUCOSE UR STRIP-MCNC: NEGATIVE MG/DL
HBA1C MFR BLD: 5.7 % (ref 0–5.6)
HCT VFR BLD AUTO: 40.5 % (ref 35–47)
HGB BLD-MCNC: 13.5 G/DL (ref 11.7–15.7)
HGB UR QL STRIP: NEGATIVE
KETONES UR STRIP-MCNC: NEGATIVE MG/DL
LEUKOCYTE ESTERASE UR QL STRIP: ABNORMAL
LYMPHOCYTES # BLD AUTO: 2.1 10E3/UL (ref 0.8–5.3)
LYMPHOCYTES NFR BLD AUTO: 41 %
MCH RBC QN AUTO: 30.1 PG (ref 26.5–33)
MCHC RBC AUTO-ENTMCNC: 33.3 G/DL (ref 31.5–36.5)
MCV RBC AUTO: 90 FL (ref 78–100)
MONOCYTES # BLD AUTO: 0.6 10E3/UL (ref 0–1.3)
MONOCYTES NFR BLD AUTO: 12 %
NEUTROPHILS # BLD AUTO: 2.2 10E3/UL (ref 1.6–8.3)
NEUTROPHILS NFR BLD AUTO: 43 %
NITRATE UR QL: NEGATIVE
PH UR STRIP: 6 [PH] (ref 5–7)
PLATELET # BLD AUTO: 275 10E3/UL (ref 150–450)
RBC # BLD AUTO: 4.48 10E6/UL (ref 3.8–5.2)
RBC #/AREA URNS AUTO: NORMAL /HPF
SP GR UR STRIP: 1.01 (ref 1–1.03)
UROBILINOGEN UR STRIP-ACNC: 0.2 E.U./DL
WBC # BLD AUTO: 5 10E3/UL (ref 4–11)
WBC #/AREA URNS AUTO: NORMAL /HPF

## 2022-10-20 PROCEDURE — 80307 DRUG TEST PRSMV CHEM ANLYZR: CPT | Performed by: FAMILY MEDICINE

## 2022-10-20 PROCEDURE — 36415 COLL VENOUS BLD VENIPUNCTURE: CPT | Performed by: FAMILY MEDICINE

## 2022-10-20 PROCEDURE — 83036 HEMOGLOBIN GLYCOSYLATED A1C: CPT | Performed by: FAMILY MEDICINE

## 2022-10-20 PROCEDURE — 81001 URINALYSIS AUTO W/SCOPE: CPT | Performed by: FAMILY MEDICINE

## 2022-10-20 PROCEDURE — 85025 COMPLETE CBC W/AUTO DIFF WBC: CPT | Performed by: FAMILY MEDICINE

## 2022-10-20 PROCEDURE — 99214 OFFICE O/P EST MOD 30 MIN: CPT | Performed by: FAMILY MEDICINE

## 2022-10-20 PROCEDURE — 84443 ASSAY THYROID STIM HORMONE: CPT | Performed by: FAMILY MEDICINE

## 2022-10-20 RX ORDER — DESVENLAFAXINE 25 MG/1
25 TABLET, EXTENDED RELEASE ORAL DAILY
Qty: 10 TABLET | Refills: 0 | Status: SHIPPED | OUTPATIENT
Start: 2022-10-20 | End: 2022-12-01 | Stop reason: DRUGHIGH

## 2022-10-20 RX ORDER — DESVENLAFAXINE 50 MG/1
50 TABLET, FILM COATED, EXTENDED RELEASE ORAL DAILY
Qty: 30 TABLET | Refills: 2 | Status: SHIPPED | OUTPATIENT
Start: 2022-10-20 | End: 2022-12-01

## 2022-10-20 ASSESSMENT — PATIENT HEALTH QUESTIONNAIRE - PHQ9
SUM OF ALL RESPONSES TO PHQ QUESTIONS 1-9: 8
SUM OF ALL RESPONSES TO PHQ QUESTIONS 1-9: 8
10. IF YOU CHECKED OFF ANY PROBLEMS, HOW DIFFICULT HAVE THESE PROBLEMS MADE IT FOR YOU TO DO YOUR WORK, TAKE CARE OF THINGS AT HOME, OR GET ALONG WITH OTHER PEOPLE: SOMEWHAT DIFFICULT

## 2022-10-20 ASSESSMENT — ANXIETY QUESTIONNAIRES
IF YOU CHECKED OFF ANY PROBLEMS ON THIS QUESTIONNAIRE, HOW DIFFICULT HAVE THESE PROBLEMS MADE IT FOR YOU TO DO YOUR WORK, TAKE CARE OF THINGS AT HOME, OR GET ALONG WITH OTHER PEOPLE: SOMEWHAT DIFFICULT
3. WORRYING TOO MUCH ABOUT DIFFERENT THINGS: SEVERAL DAYS
6. BECOMING EASILY ANNOYED OR IRRITABLE: SEVERAL DAYS
4. TROUBLE RELAXING: NOT AT ALL
1. FEELING NERVOUS, ANXIOUS, OR ON EDGE: SEVERAL DAYS
GAD7 TOTAL SCORE: 4
GAD7 TOTAL SCORE: 4
7. FEELING AFRAID AS IF SOMETHING AWFUL MIGHT HAPPEN: SEVERAL DAYS
2. NOT BEING ABLE TO STOP OR CONTROL WORRYING: NOT AT ALL
GAD7 TOTAL SCORE: 4
7. FEELING AFRAID AS IF SOMETHING AWFUL MIGHT HAPPEN: SEVERAL DAYS
5. BEING SO RESTLESS THAT IT IS HARD TO SIT STILL: NOT AT ALL
8. IF YOU CHECKED OFF ANY PROBLEMS, HOW DIFFICULT HAVE THESE MADE IT FOR YOU TO DO YOUR WORK, TAKE CARE OF THINGS AT HOME, OR GET ALONG WITH OTHER PEOPLE?: SOMEWHAT DIFFICULT

## 2022-10-20 NOTE — PROGRESS NOTES
Assessment & Plan   Problem List Items Addressed This Visit     Chronic pain disorder     Although she has ostial arthrosis, she is on no controls         Relevant Orders    [id number] - Urine Drug Confirmation Panel (Comprehensive)    Elevated glucose     Glucose   Date Value Ref Range Status   02/09/2022 106 (H) 70 - 99 mg/dL Final   01/19/2022 96 70 - 99 mg/dL Final   11/30/2021 97 70 - 99 mg/dL Final   07/30/2021 94 70 - 99 mg/dL Final   06/28/2021 98 70 - 99 mg/dL Final   03/03/2021 91 70 - 99 mg/dL Final   05/19/2020 93 60 - 99 mg/dL Final   12/20/2019 101 (H) 65 - 99 mg/dL Final   07/17/2018 90 65 - 99 mg/dL Final     She is concerned about diabetes.discussed         Relevant Orders    Hemoglobin A1c (Completed)    Insomnia    Other fatigue - Primary     approx 3 months tired in the day no energy.  Discussed differential.  Database is adequate. Refresh serolgic studies         Relevant Orders    TSH with free T4 reflex    CBC with platelets and differential (Completed)    Polyuria     She is concerned about diabetes.  Broaden database         Recurrent major depression in remission (H)     She is caring for her disabled .  She stopped her sertraline 4 months ago, resumed at just yesterday.  Discussed.  She is interested in an alternate, stimulating medical therapy         Relevant Medications    desvenlafaxine (PRISTIQ) 50 MG 24 hr tablet    desvenlafaxine succinate (PRISTIQ) 25 MG 24 hr tablet    Restless legs syndrome     Demonstrated on sleep study, she attributes her nocturnal motion to uncomfortable bed.  Discussed therapeutic options, possible trial of therapy.  Recommend prioritizing changes in regimen.         Stage 3a chronic kidney disease (H)     GFR Estimate   Date Value Ref Range Status   10/13/2022 56 (L) >60 mL/min/1.73m2 Final     Comment:     Effective December 21, 2021 eGFRcr in adults is calculated using the 2021 CKD-EPI creatinine equation which includes age and gender (Mihir et al.,  NEJM, DOI: 10.1056/SNGRet8901304)   06/28/2021 63 >60 mL/min/[1.73_m2] Final     Comment:     Non  GFR Calc  Starting 12/18/2018, serum creatinine based estimated GFR (eGFR) will be   calculated using the Chronic Kidney Disease Epidemiology Collaboration   (CKD-EPI) equation.                  Recommend maximizing fluids             Return lab go.   Follow-up Visit   Expected date: Nov 24, 2022      Follow Up Appointment Details:     Follow-up with whom?: PCP    Follow-Up for what?: Chronic Disease f/u    Chronic Disease f/u: General (Other)    Additional Details: fatigue    How?: Video                    Joe Noguera MD  [facility] [place]    Subjective   Dot is a 72 year old, presenting for the following health issues:  Fatigue, Polydipsia, and Urinary Frequency      History of Present Illness       Reason for visit:  Concerned about possible diabetes. Fatigue, thirst, frequent urination.  Symptom onset:  More than a month  Symptoms include:  Fatigue, thirst, frequent urination.  Symptom intensity:  Moderate  Symptom progression:  Worsening  Had these symptoms before:  No  What makes it worse:  No  What makes it better:  No    She eats 4 or more servings of fruits and vegetables daily.She consumes 0 sweetened beverage(s) daily.She exercises with enough effort to increase her heart rate 9 or less minutes per day.  She exercises with enough effort to increase her heart rate 3 or less days per week.   She is taking medications regularly.    Today's PHQ-9         PHQ-9 Total Score: 8    PHQ-9 Q9 Thoughts of better off dead/self-harm past 2 weeks :   Not at all    How difficult have these problems made it for you to do your work, take care of things at home, or get along with other people: Somewhat difficult  Today's ELDON-7 Score: 4             Review of Systems   As described      Objective    /70 (BP Location: Right arm, Patient Position: Sitting, Cuff Size: Adult Regular)   " Pulse 73   Temp 99.1  F (37.3  C) (Oral)   Resp 16   Ht 1.575 m (5' 2.01\")   Wt 66.2 kg (146 lb)   LMP  (LMP Unknown)   SpO2 96%   BMI 26.70 kg/m    Body mass index is 26.7 kg/m .  Physical Exam     Flat affect  No synovitis  Joe Noguera MD                  "

## 2022-10-21 PROBLEM — R73.09 ELEVATED GLUCOSE: Status: ACTIVE | Noted: 2022-10-21

## 2022-10-21 PROBLEM — G25.81 RESTLESS LEGS SYNDROME: Status: ACTIVE | Noted: 2022-10-21

## 2022-10-21 PROBLEM — R35.89 POLYURIA: Status: ACTIVE | Noted: 2022-10-21

## 2022-10-21 PROBLEM — N18.31 STAGE 3A CHRONIC KIDNEY DISEASE (H): Status: ACTIVE | Noted: 2022-01-17

## 2022-10-21 PROBLEM — R53.83 OTHER FATIGUE: Status: ACTIVE | Noted: 2022-10-21

## 2022-10-21 LAB
CREAT UR-MCNC: 43 MG/DL
TSH SERPL DL<=0.005 MIU/L-ACNC: 1.1 MU/L (ref 0.4–4)

## 2022-10-21 NOTE — ASSESSMENT & PLAN NOTE
Glucose   Date Value Ref Range Status   02/09/2022 106 (H) 70 - 99 mg/dL Final   01/19/2022 96 70 - 99 mg/dL Final   11/30/2021 97 70 - 99 mg/dL Final   07/30/2021 94 70 - 99 mg/dL Final   06/28/2021 98 70 - 99 mg/dL Final   03/03/2021 91 70 - 99 mg/dL Final   05/19/2020 93 60 - 99 mg/dL Final   12/20/2019 101 (H) 65 - 99 mg/dL Final   07/17/2018 90 65 - 99 mg/dL Final     She is concerned about diabetes.discussed

## 2022-10-21 NOTE — ASSESSMENT & PLAN NOTE
Demonstrated on sleep study, she attributes her nocturnal motion to uncomfortable bed.  Discussed therapeutic options, possible trial of therapy.  Recommend prioritizing changes in regimen.

## 2022-10-21 NOTE — ASSESSMENT & PLAN NOTE
GFR Estimate   Date Value Ref Range Status   10/13/2022 56 (L) >60 mL/min/1.73m2 Final     Comment:     Effective December 21, 2021 eGFRcr in adults is calculated using the 2021 CKD-EPI creatinine equation which includes age and gender (Mihir powell al., NEJM, DOI: 10.1056/IZDEff6517056)   06/28/2021 63 >60 mL/min/[1.73_m2] Final     Comment:     Non  GFR Calc  Starting 12/18/2018, serum creatinine based estimated GFR (eGFR) will be   calculated using the Chronic Kidney Disease Epidemiology Collaboration   (CKD-EPI) equation.

## 2022-10-21 NOTE — ASSESSMENT & PLAN NOTE
approx 3 months tired in the day no energy.  Discussed differential.  Database is adequate. Refresh serolgic studies

## 2022-10-21 NOTE — ASSESSMENT & PLAN NOTE
She is caring for her disabled .  She stopped her sertraline 4 months ago, resumed at just yesterday.  Discussed.  She is interested in an alternate, stimulating medical therapy

## 2022-10-24 PROBLEM — R73.03 PREDIABETES: Status: ACTIVE | Noted: 2022-10-21

## 2022-11-15 ENCOUNTER — HOSPITAL ENCOUNTER (OUTPATIENT)
Facility: AMBULATORY SURGERY CENTER | Age: 72
Discharge: HOME | End: 2022-11-15
Attending: ANESTHESIOLOGY | Admitting: ANESTHESIOLOGY
Payer: MEDICARE

## 2022-11-15 ENCOUNTER — ANCILLARY PROCEDURE (OUTPATIENT)
Dept: RADIOLOGY | Facility: AMBULATORY SURGERY CENTER | Age: 72
End: 2022-11-15
Attending: ANESTHESIOLOGY
Payer: MEDICARE

## 2022-11-15 VITALS
DIASTOLIC BLOOD PRESSURE: 75 MMHG | RESPIRATION RATE: 16 BRPM | SYSTOLIC BLOOD PRESSURE: 127 MMHG | HEIGHT: 62 IN | HEART RATE: 84 BPM | BODY MASS INDEX: 26.87 KG/M2 | TEMPERATURE: 98.4 F | WEIGHT: 146 LBS | OXYGEN SATURATION: 97 %

## 2022-11-15 DIAGNOSIS — M54.16 LUMBAR RADICULOPATHY: ICD-10-CM

## 2022-11-15 PROCEDURE — 62323 NJX INTERLAMINAR LMBR/SAC: CPT | Performed by: ANESTHESIOLOGY

## 2022-11-15 PROCEDURE — 62323 NJX INTERLAMINAR LMBR/SAC: CPT

## 2022-11-15 RX ORDER — METHYLPREDNISOLONE ACETATE 40 MG/ML
INJECTION, SUSPENSION INTRA-ARTICULAR; INTRALESIONAL; INTRAMUSCULAR; SOFT TISSUE DAILY PRN
Status: DISCONTINUED | OUTPATIENT
Start: 2022-11-15 | End: 2022-11-15 | Stop reason: HOSPADM

## 2022-11-15 RX ORDER — BUPIVACAINE HYDROCHLORIDE 2.5 MG/ML
INJECTION, SOLUTION EPIDURAL; INFILTRATION; INTRACAUDAL DAILY PRN
Status: DISCONTINUED | OUTPATIENT
Start: 2022-11-15 | End: 2022-11-15 | Stop reason: HOSPADM

## 2022-11-15 RX ORDER — IOPAMIDOL 408 MG/ML
INJECTION, SOLUTION INTRATHECAL DAILY PRN
Status: DISCONTINUED | OUTPATIENT
Start: 2022-11-15 | End: 2022-11-15 | Stop reason: HOSPADM

## 2022-11-15 RX ORDER — LIDOCAINE HYDROCHLORIDE 10 MG/ML
INJECTION, SOLUTION EPIDURAL; INFILTRATION; INTRACAUDAL; PERINEURAL DAILY PRN
Status: DISCONTINUED | OUTPATIENT
Start: 2022-11-15 | End: 2022-11-15 | Stop reason: HOSPADM

## 2022-11-15 NOTE — DISCHARGE INSTRUCTIONS
Home Care Instructions after an Epidural Steroid Pain Injection    A lumbar epidural steroid injection delivers steroid medication directly into the area that may be causing your lower back pain and/or leg pain. A cervical or thoracic epidural steroid injection delivers steroids into the epidural space surrounding spinal nerve roots to help relieve pain in the upper spine/neck.    Activity  -Rest today  -Do not work today  -Resume normal activity tomorrow  -DO NOT shower for 24 hours  -DO NOT remove bandaid for 24 hours    Pain  -You may experience soreness at the injection site for one or two days  -You may use an ice pack for 20 minutes every 2 hours for the first 24 hours  -You may use a heating pad after the first 24 hours  -You may use Tylenol (acetaminophen) every 4 hours or other pain medicines as     directed by your physician    You may experience numbness radiating into your legs or arms (depending on the procedure location). This numbness may last several hours. Until sensation returns to normal; please use caution in walking, climbing stairs, and stepping out of your vehicle, etc.      Common side effects of steroids:  Not everyone will experience corticosteroid side effects. If side effects are experienced, they will gradually subside in the 7-10 day period following an injection. Most common side effects include:  -Flushed face and/or chest  -Feeling of warmth, particularly in the face but could be an overall feeling of warmth  -Increased blood sugar in diabetic patients  -Menstrual irregularities my occur. If taking hormone-based birth control an alternate method of birth control is recommended  -Sleep disturbances and/or mood swings are possible  -Leg cramps    Please contact us if you have:  -Severe pain  -Fever more than 101.5 degrees Fahrenheit  -Signs of infection at the injection site (redness, swelling, or drainage)    FOR PAIN CENTER PATIENTS:  If you have questions, please contact the Pain  Clinic at [phone] Option #1 between the hours of 7:00 am and 3:00 pm Monday through Friday. After office hours you can contact the on call provider by dialing [phone]. If you need immediate attention, we recommend that you go to a hospital emergency room or dial [phone].

## 2022-11-15 NOTE — H&P
Dinorah Thompson  [id number]  female  72 year old      Reason for procedure/surgery: lumbar radiculopathy    Patient Active Problem List   Diagnosis     Cervical radiculopathy, chronic     Chronic pain disorder     Intervertebral disc disorders with radiculopathy, lumbar region     Lumbar radiculopathy, chronic     Other cervical disc degeneration at C5-C6 level     Spondylosis without myelopathy or radiculopathy, lumbar region     Osteoarthrosis, hand     Primary localized osteoarthritis of knees, bilateral     Hyperlipidemia     Depression     Sarcoidosis of lung (H)     Hoarseness     Laryngeal disorder     Precancerous lesion     History of retinal detachment     Lumbar radiculopathy     Hyperparathyroidism (H)     Chronic pain of left knee     Primary osteoarthritis of left knee     Moderate episode of recurrent major depressive disorder (H)     Grief     Stage 3a chronic kidney disease (H)     Acute sphenoidal sinusitis     Bacterial conjunctivitis     Family history of dementia     Gastroesophageal reflux disease     Insomnia     Mild intermittent asthma     Osteoarthrosis     Osteoporosis     Recurrent major depression in remission (H)     Polyuria     Other fatigue     Prediabetes     Restless legs syndrome       Past Surgical History:    Past Surgical History:   Procedure Laterality Date     APPENDECTOMY       BIOPSY  2008    Lungs     COLONOSCOPY       diskectomy in toe       EYE SURGERY  Cataracts     GENITOURINARY SURGERY  2019    Bladder mesh repair     GYN SURGERY  2019    Total hysterectomy     HYSTERECTOMY  08/2017    and bladder surgery     INJECT EPIDURAL LUMBAR Right 4/27/2021    Procedure: Right Lumbar 5- Sacral 1 transforaminal epidural steroid injection with fluoroscopy and conscious sedation.;  Surgeon: Tiera Santana MD;  Location: [facility] OR     INJECT EPIDURAL LUMBAR Left 11/11/2021    Procedure: Lumbar epidural steroid injection L5- S1 with fluoroscopy;  Surgeon: Tiera Satnana MD;   Location: [facility] OR     INJECT EPIDURAL LUMBAR Left 3/8/2022    Procedure: L5-S1 Epidural steroid injection with fluoroscopy;  Surgeon: Tiera Santana MD;  Location: [facility] OR     INJECT EPIDURAL LUMBAR Left 7/26/2022    Procedure: Lumbar epidural steroid injection at left L5-S1;  Surgeon: Tiera Santana MD;  Location: [facility] OR     INJECT MAJOR JOINT / BURSA Left 10/13/2022    Procedure: Left knee synvisc injection with ultrasound guidance.;  Surgeon: Tiera Santana MD;  Location: [facility] OR     left rotator cuff surgery       PARATHYROIDECTOMY N/A 7/21/2021    Procedure: PARATHYROIDECTOMY;  Surgeon: Gregoria Wilcox MD;  Location: [facility]     SINUS SURGERY      x2 in 2013 and 2018     TONSILLECTOMY  1955       Past Medical History:   Past Medical History:   Diagnosis Date     Chronic osteoarthritis      Complication of anesthesia 1990's    DIfficulty waking up     Depressive disorder      Elevated glucose 10/21/2022     Female bladder prolapse 9/3/2019     Mild intermittent asthma 7/29/2017     FAHAD treated with BiPAP 6/13/2018     Parathyroid abnormality (H)      Prolapse of female pelvic organs 8/19/2019     Restless legs syndrome 10/21/2022     Sleep apnea     Uses a Bi-Pap.. Will have son bring it if necessary on DOS       Social History:   Social History     Tobacco Use     Smoking status: Never     Smokeless tobacco: Never   Substance Use Topics     Alcohol use: Yes     Comment: 0-2 drinks per month       Family History:   Family History   Problem Relation Age of Onset     Myocardial Infarction Father         late 50s     Coronary Artery Disease Father      Depression Father      Ovarian Cancer Sister      Cervical Cancer Sister      Lung Cancer Sister      Depression Sister      Anxiety Disorder Sister      Myocardial Infarction Paternal Uncle         late 50s     Hyperlipidemia Mother      Osteoporosis Mother      Cerebrovascular Disease Paternal Grandmother      Cerebrovascular Disease Paternal Grandfather       "Anxiety Disorder Son        Allergies:   Allergies   Allergen Reactions     Propoxyphene Other (See Comments)     Clarithromycin Other (See Comments)     Pt states, \"ototoxicity\". Balance problems  Pt states, \"ototoxicity\".         Active Medications:   Current Outpatient Medications   Medication Sig Dispense Refill     acetaminophen (TYLENOL) 500 MG tablet Take 2 tablets (1,000 mg) by mouth every 6 hours as needed for pain       baclofen (LIORESAL) 10 MG tablet Take 1 tablet (10 mg) by mouth daily as needed for muscle spasms 30 tablet 0     Carboxymethylcellul-Glycerin 1-0.9 % GEL Place 1 drop into both eyes daily        celecoxib (CELEBREX) 100 MG capsule Take 1 capsule (100 mg) by mouth 2 times daily 180 capsule 0     desvenlafaxine (PRISTIQ) 50 MG 24 hr tablet Take 1 tablet (50 mg) by mouth daily 30 tablet 2     DULoxetine (CYMBALTA) 60 MG capsule TAKE 1 CAPSULE EVERY DAY 90 capsule 1     LYSINE PO        mometasone (NASONEX) 50 MCG/ACT nasal spray spray 2 spray by intranasal route  every day in each nostril       rosuvastatin (CRESTOR) 5 MG tablet TAKE 1 TABLET EVERY OTHER DAY 45 tablet 1     traZODone (DESYREL) 100 MG tablet Take 1 tablet (100 mg) by mouth nightly as needed for sleep 90 tablet 3     valACYclovir (VALTREX) 1000 mg tablet Take two tablets twice per day for one day for flare ups. 30 tablet 1     desvenlafaxine succinate (PRISTIQ) 25 MG 24 hr tablet Take 1 tablet (25 mg) by mouth daily Use first 10 tablet 0     NONFORMULARY Vitamin Packet from [employer] including Multi-vitamin, Leutin, Calcium, Antioxidant, Fish oil, Glucosamine- Chondroitin: Take 1 packet by mouth daily       tacrolimus (PROTOPIC) 0.1 % external ointment Apply to AA on the face BID PRN 30 g 5       Systemic Review:   CONSTITUTIONAL: NEGATIVE for fever, chills, change in weight  ENT/MOUTH: NEGATIVE for ear, mouth and throat problems  RESP: NEGATIVE for significant cough or SOB  CV: NEGATIVE for chest pain, palpitations or " "peripheral edema    Physical Examination:   Vital Signs: /84 (BP Location: Right arm, Cuff Size: Adult Regular)   Pulse 86   Temp 98.4  F (36.9  C) (Temporal)   Resp 14   Ht 1.575 m (5' 2\")   Wt 66.2 kg (146 lb)   LMP  (LMP Unknown)   SpO2 97%   BMI 26.70 kg/m    GENERAL: healthy, alert and no distress  NECK: no adenopathy, no asymmetry, masses, or scars  RESP: lungs clear to auscultation - no rales, rhonchi or wheezes  CV: regular rate and rhythm, normal S1 S2, no S3 or S4, no murmur, click or rub, no peripheral edema and peripheral pulses strong  ABDOMEN: soft, nontender, no hepatosplenomegaly, no masses and bowel sounds normal  MS: no gross musculoskeletal defects noted, no edema    Plan: Appropriate to proceed as scheduled.      Tiera Santana MD  11/15/2022          "

## 2022-11-16 ASSESSMENT — PATIENT HEALTH QUESTIONNAIRE - PHQ9
SUM OF ALL RESPONSES TO PHQ QUESTIONS 1-9: 4
SUM OF ALL RESPONSES TO PHQ QUESTIONS 1-9: 4
10. IF YOU CHECKED OFF ANY PROBLEMS, HOW DIFFICULT HAVE THESE PROBLEMS MADE IT FOR YOU TO DO YOUR WORK, TAKE CARE OF THINGS AT HOME, OR GET ALONG WITH OTHER PEOPLE: SOMEWHAT DIFFICULT

## 2022-11-17 ENCOUNTER — VIRTUAL VISIT (OUTPATIENT)
Dept: PSYCHOLOGY | Facility: CLINIC | Age: 72
End: 2022-11-17
Payer: MEDICARE

## 2022-11-17 DIAGNOSIS — F33.1 MODERATE EPISODE OF RECURRENT MAJOR DEPRESSIVE DISORDER (H): Primary | ICD-10-CM

## 2022-11-17 PROCEDURE — 90832 PSYTX W PT 30 MINUTES: CPT | Mod: 95 | Performed by: SOCIAL WORKER

## 2022-11-17 NOTE — PROGRESS NOTES
[facility]                                     Progress Note    Patient Name: Dinorah Thompson  Date: 2022         Service Type: Individual      Session Start Time: 1205 Session End Time: 1226     Session Length: 16-37    Session #: 4    Attendees: Client    Service Modality:  Video Visit:      Provider verified identity through the following two step process.  Patient provided:  Patient  and Patient is known previously to provider    Telemedicine Visit: The patient's condition can be safely assessed and treated via synchronous audio and visual telemedicine encounter.      Reason for Telemedicine Visit: Services only offered telehealth    Originating Site (Patient Location): Patient's home    Distant Site (Provider Location): Provider Remote Setting- Home Office    Consent:  The patient/guardian has verbally consented to: the potential risks and benefits of telemedicine (video visit) versus in person care; bill my insurance or make self-payment for services provided; and responsibility for payment of non-covered services.     Patient would like the video invitation sent by:  Send to e-mail at: [email or web address]    Mode of Communication:  Video Conference via [employer]    As the provider I attest to compliance with applicable laws and regulations related to telemedicine.    DATA  Interactive Complexity: No  Crisis: No        Progress Since Last Session (Related to Symptoms / Goals / Homework):   Symptoms: Worsening depression    Homework: Completed in session      Episode of Care Goals: Minimal progress - RELAPSE (Returned to unhealthy behavior); Intervened by reassessing readiness to change and identifying appropriate stage.  Identified reasons for relapse, successes, and change talk     Current / Ongoing Stressors and Concerns:     Grief loss, taking care of , transitions     Treatment Objective(s) Addressed in This Session:     Patient will demonstrate/report improved stages of  grief that can be safely managed in a lower level of care.  Patient report of able to express feelings of grief and loss regarding loss of family member and behavior indicating progression of grief process towards acceptance and coping with reality of loss with acknowledgement of permanent change within 6 months.  Patient will demonstrate and report a level of depression that can be managed at a lower level of care.  Absence of persistent depression mood and report of reduced frequency and intensity of low mood and absence of persistent low energy and motivation to acceptable levels, report subjective improved motivation and increased energy for a period of 90 days, within 6 months as clinically observed and by patient self-report     Intervention:   Therapist met with patient to review goals and interventions. Therapist utilized reflected listening as patient gave brief reflection of week. Patient reported her depression has increased with no motivation, energy and feeling hopeless. Therapist validated patient's emotions and provided patient with education on depression along with TMS. Therapist offered patient two referrals.   Patient presented with an anxious affect. Patient was engaged in session and open to feedback. Patient reported no safety concerns.       Assessments completed prior to visit:  PHQ9:   PHQ-9 SCORE 1/17/2022 3/22/2022 5/9/2022 5/24/2022 8/24/2022 10/20/2022 11/16/2022   PHQ-9 Total Score MyChart 8 (Mild depression) - 3 (Minimal depression) - 2 (Minimal depression) 8 (Mild depression) 4 (Minimal depression)   PHQ-9 Total Score 8 9 3 1 2 8 4   PHQ-9 External Data - - - - - - -     GAD7:   ELDON-7 SCORE 1/17/2022 3/22/2022 5/9/2022 5/24/2022 8/24/2022 10/5/2022 10/20/2022   Total Score 4 (minimal anxiety) - 2 (minimal anxiety) - 2 (minimal anxiety) 3 (minimal anxiety) 4 (minimal anxiety)   Total Score 4 11 2 2 2 3 4         ASSESSMENT: Current Emotional / Mental Status (status of significant  symptoms):   Risk status (Self / Other harm or suicidal ideation)   Patient denies current fears or concerns for personal safety.   Patient denies current or recent suicidal ideation or behaviors.   Patient denies current or recent homicidal ideation or behaviors.   Patient denies current or recent self injurious behavior or ideation.   Patient denies other safety concerns.   Patient reports there has been no change in risk factors since their last session.     Patient reports there has been no change in protective factors since their last session.     Recommended that patient call 911 or go to the local ED should there be a change in any of these risk factors.     Appearance:   Appropriate    Eye Contact:   Fair    Psychomotor Behavior: Restless    Attitude:   Cooperative    Orientation:   All   Speech    Rate / Production: Emotional Talkative    Volume:  Normal    Mood:    Anxious  Depressed  Sad  Grieving   Affect:    Worrisome    Thought Content:  Clear    Thought Form:  Coherent    Insight:    Fair      Medication Review:   Changes to psychiatric medications, see updated Medication List in EPIC.      Medication Compliance:   Yes     Changes in Health Issues:   None reported     Chemical Use Review:   Substance Use: Chemical use reviewed, no active concerns identified      Tobacco Use: No current tobacco use.      Diagnosis:  1. Moderate episode of recurrent major depressive disorder (H)        Collateral Reports Completed:   Not Applicable    PLAN: (Patient Tasks / Therapist Tasks / Other)    Look at TMS referrals and make appointment    Kaley Tan, [facility]  11/17/2022                                                           ______________________________________________________________________    Individual Treatment Plan    Patient's Name: Dinorah Thompson  YOB: 1950    Date of Creation: 11/17/2022    Date Treatment Plan Last Reviewed/Revised: 11/17/2022 due 2/17/2023      DSM5 Diagnoses:  296.32 (F33.1) Major Depressive Disorder, Recurrent Episode, Moderate With anxious distress  Psychosocial / Contextual Factors: Grief loss, taking care of , transitions  PROMIS (reviewed every 90 days): assigned     Referral / Collaboration:  The following referral(s) will be initiated: TMS referral.    Anticipated number of session for this episode of care: 12  Anticipation frequency of session: Biweekly  Anticipated Duration of each session: 38-52 minutes  Treatment plan will be reviewed in 90 days or when goals have been changed.       MeasurableTreatment Goal(s) related to diagnosis / functional impairment(s)  Goal 1: Patient will able to express feelings of grief and loss regarding loss of family member and behavior indicating progression of grief process towards acceptance and coping with reality of loss with acknowledgement of permanent change within 6 months    I will know I've met my goal when I remember her and it's laughing and not sad.      Objective #A (Patient Action)    Patient will demonstrate/report improved stages of grief that can be safely managed in a lower level of care.  Patient report of able to express feelings of grief and loss regarding loss of family member and behavior indicating progression of grief process towards acceptance and coping with reality of loss with acknowledgement of permanent change within 6 months.  Status: restarted 11/17/2022      Intervention(s)  Therapist will provide individual therapy to process through grief and loss and to gain effective coping skills. Tx to discuss current stressors and interpersonal conflicts and how to cope with these, coaching, diagnostic testing, referral for medication as indicated, use prescribed medication, cognitive restructuring, interpersonal family therapy, supportive therapy services.        Goal 2: Patient will report absence of persistent depression mood and report of reduced frequency and intensity of low mood and absence  "of persistent low energy and motivation to acceptable levels, report subjective improved motivation and increased energy for a period of 90 days, within 6 months as clinically observed and by patient self-report    I will know I've met my goal when I can measure my fatigue vs depression \".    Objective #A (Patient Action)    Patient will demonstrate and report a level of depression that can be managed at a lower level of care.  Absence of persistent depression mood and report of reduced frequency and intensity of low mood and absence of persistent low energy and motivation to acceptable levels, report subjective improved motivation and increased energy for a period of 90 days, within 6 months as clinically observed and by patient self-report  Status: restarted- date 11/17/2022      Intervention(s)  Therapist will provide individual therapy to identify triggers to depression, gain feedback on helpful coping tools and thought-reframing techniques, and identify preferred way of being.  Tx to include discussion of current stressors and interpersonal conflicts and how to cope with these, coaching, diagnostic testing, referral for medication as indicated, use prescribed medication, cognitive restructuring, interpersonal, family therapy, supportive therapy services      Patient has reviewed and agreed to the above plan.      Kaley Tan, [facility]  11/17/2022    "

## 2022-11-21 ENCOUNTER — TELEPHONE (OUTPATIENT)
Dept: ORTHOPEDICS | Facility: CLINIC | Age: 72
End: 2022-11-21
Payer: MEDICARE

## 2022-11-21 DIAGNOSIS — M17.12 PRIMARY OSTEOARTHRITIS OF LEFT KNEE: Primary | ICD-10-CM

## 2022-11-21 NOTE — PROGRESS NOTES
ASSESSMENT & PLAN  Patient Instructions     1. Primary osteoarthritis of left knee      -Patient has chronic left knee pain due to severe patellofemoral arthritis  -Patient reports approximately 3 months of relief with cortisone injections but no relief from recent viscosupplementation injection  -Patient is open to trying a geniculate nerve block and ablation since can only have a limited number of cortisone injections for multiple injuries.  -The geniculate nerve block and ablation was ordered today.  Patient may potentially have the procedure performed by her pain doctor or another provider in the department.  -We also discussed the other option which would be likely a knee replacement surgery.  Patient is not currently interested in that option at this time due to other health reasons and life situation.  -Call direct clinic number [[phone]] at any time with questions or concerns.    Albert Yeo MD [facility]  [facility]          -----    SUBJECTIVE:  Dinorah Thompson is a 72 year old female who is seen in follow-up for left knee pain.They were last seen 4/5/22    Since their last visit reports 10-15% improvement. They indicate that their current pain level is 2/10, pain worsens with climbing stairs, overuse, and pivoting   They have tried corticosteroid injection (most recent date: 4/5/2022) that provided  3 month(s) of relief, left knee viscosupplementation (most recently 10/13/2022 with Pain Management Provider ) which provided no noticeable amount of relief, physical therapy (5 visits), Celebrex, Tylenol.  Had lumbar FRANK last week with Pain Management Provider  which she does note some improvement in left knee pain with. Stopped Celebrex since last office visit due to new onset of kidney disease. Uses Voltaren Gel and Tylenol    The patient is seen by themselves.    Patient's past medical, surgical, social, and family histories were  "reviewed today and changes are: new dx of kidney disease.    REVIEW OF SYSTEMS:  Constitutional: NEGATIVE for fever, chills, change in weight  Skin: NEGATIVE for worrisome rashes, moles or lesions  GI/: NEGATIVE for bowel or bladder changes  Neuro: NEGATIVE for weakness, dizziness or paresthesias    OBJECTIVE:  /82   Ht 1.575 m (5' 2\")   Wt 65.6 kg (144 lb 9.6 oz)   LMP  (LMP Unknown)   BMI 26.45 kg/m     General: healthy, alert and in no distress  HEENT: no scleral icterus or conjunctival erythema  Skin: no suspicious lesions or rash. No jaundice.  CV: regular rhythm by palpation, no pedal edema  Resp: normal respiratory effort without conversational dyspnea   Psych: normal mood and affect  Gait: normal steady gait with appropriate coordination and balance  Neuro: normal light touch sensory exam of the extremities.    MSK:  LEFT KNEE  Inspection:    normal alignment  Palpation:    Tender about the lateral patellar facet, medial patellar facet and medial joint line. Remainder of bony and ligamentous landmarks are nontender.    Trace effusion is present    Patellofemoral crepitus is Present  Range of Motion:     50 extension to 1200 flexion  Strength:    Quadriceps grossly intact    Extensor mechanism intact  Special Tests:    Positive: none    Negative: MCL/valgus stress (0 & 30 deg), LCL/varus stress (0 & 30 deg), Lachman's, anterior drawer, posterior drawer, Sang's    Independent visualization of the below image:    Patient's conditions were thoroughly discussed during today's visit with total time spent face-to-face with the patient and documentation being 18 minutes.    Albert Yeo MD, [facility]        "

## 2022-11-21 NOTE — TELEPHONE ENCOUNTER
Patient scheduled for appointment on 11/23/22 for discussion of viscosupplementation injection vs steroid injection of left knee.      Steroid  injection last completed 4/5/2022.       Prior authorization referral for SynviscOne injection pended.     Please advise.    Joslyn Cummings, ATC

## 2022-11-23 ENCOUNTER — TELEPHONE (OUTPATIENT)
Dept: ANESTHESIOLOGY | Facility: CLINIC | Age: 72
End: 2022-11-23

## 2022-11-23 ENCOUNTER — OFFICE VISIT (OUTPATIENT)
Dept: ORTHOPEDICS | Facility: CLINIC | Age: 72
End: 2022-11-23
Payer: MEDICARE

## 2022-11-23 VITALS
HEIGHT: 62 IN | BODY MASS INDEX: 26.61 KG/M2 | DIASTOLIC BLOOD PRESSURE: 82 MMHG | WEIGHT: 144.6 LBS | SYSTOLIC BLOOD PRESSURE: 120 MMHG

## 2022-11-23 DIAGNOSIS — M17.12 PRIMARY OSTEOARTHRITIS OF LEFT KNEE: Primary | ICD-10-CM

## 2022-11-23 PROCEDURE — 99212 OFFICE O/P EST SF 10 MIN: CPT | Performed by: FAMILY MEDICINE

## 2022-11-23 NOTE — TELEPHONE ENCOUNTER
[facility] Call Center    Phone Message    May a detailed message be left on voicemail: yes     Reason for Call: Other: Patient is calling regarding a procedure for a nerve block in her knee. Please call to discuss.      Action Taken: Other: Pain    Travel Screening: Not Applicable

## 2022-11-23 NOTE — LETTER
11/23/2022         RE: Dinorah Thompson  [address]  [place] [address]        Dear Colleague,    Thank you for referring your patient, Dinorah Thompson, to the [facility] [place]. Please see a copy of my visit note below.    ASSESSMENT & PLAN  Patient Instructions     1. Primary osteoarthritis of left knee      -Patient has chronic left knee pain due to severe patellofemoral arthritis  -Patient reports approximately 3 months of relief with cortisone injections but no relief from recent viscosupplementation injection  -Patient is open to trying a geniculate nerve block and ablation since can only have a limited number of cortisone injections for multiple injuries.  -The geniculate nerve block and ablation was ordered today.  Patient may potentially have the procedure performed by her pain doctor or another provider in the department.  -We also discussed the other option which would be likely a knee replacement surgery.  Patient is not currently interested in that option at this time due to other health reasons and life situation.  -Call direct clinic number [[phone]] at any time with questions or concerns.    Albert Yeo MD [facility]  [facility]          -----    SUBJECTIVE:  Dinorah Thompson is a 72 year old female who is seen in follow-up for left knee pain.They were last seen 4/5/22    Since their last visit reports 10-15% improvement. They indicate that their current pain level is 2/10, pain worsens with climbing stairs, overuse, and pivoting   They have tried corticosteroid injection (most recent date: 4/5/2022) that provided  3 month(s) of relief, left knee viscosupplementation (most recently 10/13/2022 with Pain Management Provider ) which provided no noticeable amount of relief, physical therapy (5 visits), Celebrex, Tylenol.  Had lumbar FRANK last week with Pain Management Provider   "which she does note some improvement in left knee pain with. Stopped Celebrex since last office visit due to new onset of kidney disease. Uses Voltaren Gel and Tylenol    The patient is seen by themselves.    Patient's past medical, surgical, social, and family histories were reviewed today and changes are: new dx of kidney disease.    REVIEW OF SYSTEMS:  Constitutional: NEGATIVE for fever, chills, change in weight  Skin: NEGATIVE for worrisome rashes, moles or lesions  GI/: NEGATIVE for bowel or bladder changes  Neuro: NEGATIVE for weakness, dizziness or paresthesias    OBJECTIVE:  /82   Ht 1.575 m (5' 2\")   Wt 65.6 kg (144 lb 9.6 oz)   LMP  (LMP Unknown)   BMI 26.45 kg/m     General: healthy, alert and in no distress  HEENT: no scleral icterus or conjunctival erythema  Skin: no suspicious lesions or rash. No jaundice.  CV: regular rhythm by palpation, no pedal edema  Resp: normal respiratory effort without conversational dyspnea   Psych: normal mood and affect  Gait: normal steady gait with appropriate coordination and balance  Neuro: normal light touch sensory exam of the extremities.    MSK:  LEFT KNEE  Inspection:    normal alignment  Palpation:    Tender about the lateral patellar facet, medial patellar facet and medial joint line. Remainder of bony and ligamentous landmarks are nontender.    Trace effusion is present    Patellofemoral crepitus is Present  Range of Motion:     50 extension to 1200 flexion  Strength:    Quadriceps grossly intact    Extensor mechanism intact  Special Tests:    Positive: none    Negative: MCL/valgus stress (0 & 30 deg), LCL/varus stress (0 & 30 deg), Lachman's, anterior drawer, posterior drawer, Sang's    Independent visualization of the below image:    Patient's conditions were thoroughly discussed during today's visit with total time spent face-to-face with the patient and documentation being 18 minutes.    Albert Yeo MD, [facility]            Again, thank you for allowing me to participate in the care of your patient.        Sincerely,        Albert Yeo, MD

## 2022-11-23 NOTE — TELEPHONE ENCOUNTER
RN spoke with patient regarding procedure question. Patient called to ask how to schedule her left knee procedure with Dr. Santana. Writer informed patient there was a procedure only referral placed today By Dr. Yeo for geniculate nerve block and ablation for her left knee. Writer informed patient our procedure  would be in contact to schedule that procedure. Patient understood and appreciated the call to verify order was placed.    Lucinda Jay RNCC

## 2022-11-23 NOTE — PATIENT INSTRUCTIONS
1. Primary osteoarthritis of left knee      -Patient has chronic left knee pain due to severe patellofemoral arthritis  -Patient reports approximately 3 months of relief with cortisone injections but no relief from recent viscosupplementation injection  -Patient is open to trying a geniculate nerve block and ablation since can only have a limited number of cortisone injections for multiple injuries.  -The geniculate nerve block and ablation was ordered today.  Patient may potentially have the procedure performed by her pain doctor or another provider in the department.  -We also discussed the other option which would be likely a knee replacement surgery.  Patient is not currently interested in that option at this time due to other health reasons and life situation.  -Call direct clinic number [[phone]] at any time with questions or concerns.    Albert Yeo MD [facility] and [facility] Medicine  [facility] [place]

## 2022-11-28 DIAGNOSIS — G89.29 CHRONIC PAIN OF LEFT KNEE: Primary | ICD-10-CM

## 2022-11-28 DIAGNOSIS — M25.562 CHRONIC PAIN OF LEFT KNEE: Primary | ICD-10-CM

## 2022-11-30 ASSESSMENT — ANXIETY QUESTIONNAIRES
5. BEING SO RESTLESS THAT IT IS HARD TO SIT STILL: NOT AT ALL
1. FEELING NERVOUS, ANXIOUS, OR ON EDGE: SEVERAL DAYS
4. TROUBLE RELAXING: NOT AT ALL
5. BEING SO RESTLESS THAT IT IS HARD TO SIT STILL: NOT AT ALL
8. IF YOU CHECKED OFF ANY PROBLEMS, HOW DIFFICULT HAVE THESE MADE IT FOR YOU TO DO YOUR WORK, TAKE CARE OF THINGS AT HOME, OR GET ALONG WITH OTHER PEOPLE?: SOMEWHAT DIFFICULT
GAD7 TOTAL SCORE: 2
GAD7 TOTAL SCORE: 2
6. BECOMING EASILY ANNOYED OR IRRITABLE: SEVERAL DAYS
IF YOU CHECKED OFF ANY PROBLEMS ON THIS QUESTIONNAIRE, HOW DIFFICULT HAVE THESE PROBLEMS MADE IT FOR YOU TO DO YOUR WORK, TAKE CARE OF THINGS AT HOME, OR GET ALONG WITH OTHER PEOPLE: SOMEWHAT DIFFICULT
7. FEELING AFRAID AS IF SOMETHING AWFUL MIGHT HAPPEN: NOT AT ALL
6. BECOMING EASILY ANNOYED OR IRRITABLE: SEVERAL DAYS
2. NOT BEING ABLE TO STOP OR CONTROL WORRYING: NOT AT ALL
GAD7 TOTAL SCORE: 2
3. WORRYING TOO MUCH ABOUT DIFFERENT THINGS: NOT AT ALL
GAD7 TOTAL SCORE: 2
7. FEELING AFRAID AS IF SOMETHING AWFUL MIGHT HAPPEN: NOT AT ALL
7. FEELING AFRAID AS IF SOMETHING AWFUL MIGHT HAPPEN: NOT AT ALL
4. TROUBLE RELAXING: NOT AT ALL
GAD7 TOTAL SCORE: 2
1. FEELING NERVOUS, ANXIOUS, OR ON EDGE: SEVERAL DAYS
8. IF YOU CHECKED OFF ANY PROBLEMS, HOW DIFFICULT HAVE THESE MADE IT FOR YOU TO DO YOUR WORK, TAKE CARE OF THINGS AT HOME, OR GET ALONG WITH OTHER PEOPLE?: SOMEWHAT DIFFICULT
IF YOU CHECKED OFF ANY PROBLEMS ON THIS QUESTIONNAIRE, HOW DIFFICULT HAVE THESE PROBLEMS MADE IT FOR YOU TO DO YOUR WORK, TAKE CARE OF THINGS AT HOME, OR GET ALONG WITH OTHER PEOPLE: SOMEWHAT DIFFICULT
GAD7 TOTAL SCORE: 2
2. NOT BEING ABLE TO STOP OR CONTROL WORRYING: NOT AT ALL
7. FEELING AFRAID AS IF SOMETHING AWFUL MIGHT HAPPEN: NOT AT ALL
3. WORRYING TOO MUCH ABOUT DIFFERENT THINGS: NOT AT ALL

## 2022-11-30 ASSESSMENT — PATIENT HEALTH QUESTIONNAIRE - PHQ9
SUM OF ALL RESPONSES TO PHQ QUESTIONS 1-9: 11
10. IF YOU CHECKED OFF ANY PROBLEMS, HOW DIFFICULT HAVE THESE PROBLEMS MADE IT FOR YOU TO DO YOUR WORK, TAKE CARE OF THINGS AT HOME, OR GET ALONG WITH OTHER PEOPLE: VERY DIFFICULT
SUM OF ALL RESPONSES TO PHQ QUESTIONS 1-9: 11
10. IF YOU CHECKED OFF ANY PROBLEMS, HOW DIFFICULT HAVE THESE PROBLEMS MADE IT FOR YOU TO DO YOUR WORK, TAKE CARE OF THINGS AT HOME, OR GET ALONG WITH OTHER PEOPLE: VERY DIFFICULT

## 2022-12-01 ENCOUNTER — VIRTUAL VISIT (OUTPATIENT)
Dept: PSYCHOLOGY | Facility: CLINIC | Age: 72
End: 2022-12-01
Payer: MEDICARE

## 2022-12-01 ENCOUNTER — VIRTUAL VISIT (OUTPATIENT)
Dept: FAMILY MEDICINE | Facility: CLINIC | Age: 72
End: 2022-12-01
Payer: MEDICARE

## 2022-12-01 DIAGNOSIS — B00.1 COLD SORE: ICD-10-CM

## 2022-12-01 DIAGNOSIS — F33.1 MODERATE EPISODE OF RECURRENT MAJOR DEPRESSIVE DISORDER (H): Primary | ICD-10-CM

## 2022-12-01 PROCEDURE — 90832 PSYTX W PT 30 MINUTES: CPT | Mod: 95 | Performed by: SOCIAL WORKER

## 2022-12-01 PROCEDURE — 96127 BRIEF EMOTIONAL/BEHAV ASSMT: CPT | Mod: 95 | Performed by: NURSE PRACTITIONER

## 2022-12-01 PROCEDURE — 99214 OFFICE O/P EST MOD 30 MIN: CPT | Mod: 95 | Performed by: NURSE PRACTITIONER

## 2022-12-01 RX ORDER — SERTRALINE HYDROCHLORIDE 25 MG/1
25 TABLET, FILM COATED ORAL DAILY
Qty: 90 TABLET | Refills: 0 | Status: SHIPPED | OUTPATIENT
Start: 2022-12-01 | End: 2023-02-09

## 2022-12-01 RX ORDER — DESVENLAFAXINE 25 MG/1
25 TABLET, EXTENDED RELEASE ORAL DAILY
Qty: 14 TABLET | Refills: 0 | Status: SHIPPED | OUTPATIENT
Start: 2022-12-01 | End: 2023-02-09 | Stop reason: SINTOL

## 2022-12-01 RX ORDER — VALACYCLOVIR HYDROCHLORIDE 1 G/1
TABLET, FILM COATED ORAL
Qty: 30 TABLET | Refills: 1 | Status: SHIPPED | OUTPATIENT
Start: 2022-12-01

## 2022-12-01 ASSESSMENT — PATIENT HEALTH QUESTIONNAIRE - PHQ9
10. IF YOU CHECKED OFF ANY PROBLEMS, HOW DIFFICULT HAVE THESE PROBLEMS MADE IT FOR YOU TO DO YOUR WORK, TAKE CARE OF THINGS AT HOME, OR GET ALONG WITH OTHER PEOPLE: VERY DIFFICULT
SUM OF ALL RESPONSES TO PHQ QUESTIONS 1-9: 11

## 2022-12-01 ASSESSMENT — ANXIETY QUESTIONNAIRES: GAD7 TOTAL SCORE: 2

## 2022-12-01 NOTE — PROGRESS NOTES
[facility]                                     Progress Note    Patient Name: Dinorah Thompson  Date: 2022         Service Type: Individual      Session Start Time: 1207 Session End Time: 1241     Session Length: 16-37    Session #: 5    Attendees: Client    Service Modality:  Video Visit:      Provider verified identity through the following two step process.  Patient provided:  Patient  and Patient is known previously to provider    Telemedicine Visit: The patient's condition can be safely assessed and treated via synchronous audio and visual telemedicine encounter.      Reason for Telemedicine Visit: Services only offered telehealth    Originating Site (Patient Location): Patient's home    Distant Site (Provider Location): Provider Remote Setting- Home Office    Consent:  The patient/guardian has verbally consented to: the potential risks and benefits of telemedicine (video visit) versus in person care; bill my insurance or make self-payment for services provided; and responsibility for payment of non-covered services.     Patient would like the video invitation sent by:  Send to e-mail at: [email or web address]    Mode of Communication:  Video Conference via [employer]well    As the provider I attest to compliance with applicable laws and regulations related to telemedicine.    DATA  Interactive Complexity: No  Crisis: No        Progress Since Last Session (Related to Symptoms / Goals / Homework):   Symptoms: Worsening phq-9 luz-7    Homework: Achieved / completed to satisfaction      Episode of Care Goals: Minimal progress - PREPARATION (Decided to change - considering how); Intervened by negotiating a change plan and determining options / strategies for behavior change, identifying triggers, exploring social supports, and working towards setting a date to begin behavior change     Current / Ongoing Stressors and Concerns:     Grief loss, taking care of , transitions     Treatment  Objective(s) Addressed in This Session:     Patient will demonstrate/report improved stages of grief that can be safely managed in a lower level of care.  Patient report of able to express feelings of grief and loss regarding loss of family member and behavior indicating progression of grief process towards acceptance and coping with reality of loss with acknowledgement of permanent change within 6 months.  Patient will demonstrate and report a level of depression that can be managed at a lower level of care.  Absence of persistent depression mood and report of reduced frequency and intensity of low mood and absence of persistent low energy and motivation to acceptable levels, report subjective improved motivation and increased energy for a period of 90 days, within 6 months as clinically observed and by patient self-report     Intervention:   Therapist met with patient to review goals and interventions. Therapist utilized reflected listening as patient gave brief reflection of week. Patient reported struggling with depression and anxiety and processed. Therapist supported patient as she processed and validated patient. Patient reported she had a medication change and psychiatry is decreasing her medication and she has her intake for TMS. Therapist utilized CBT and  coached patient in behavior and patient is to write down things she is going to do and allow self three days to get it done.   Patient presented with an anxious affect. Patient was engaged in session and open to feedback. Patient reported no safety concerns.       Assessments completed prior to visit:  PHQ9:   PHQ-9 SCORE 5/9/2022 5/24/2022 8/24/2022 10/20/2022 11/16/2022 11/30/2022 11/30/2022   PHQ-9 Total Score MyChart 3 (Minimal depression) - 2 (Minimal depression) 8 (Mild depression) 4 (Minimal depression) - 11 (Moderate depression)   PHQ-9 Total Score 3 1 2 8 4 11 11   PHQ-9 External Data - - - - - - -     GAD7:   ELDON-7 SCORE 5/9/2022 5/24/2022  8/24/2022 10/5/2022 10/20/2022 11/30/2022 11/30/2022   Total Score 2 (minimal anxiety) - 2 (minimal anxiety) 3 (minimal anxiety) 4 (minimal anxiety) - 2 (minimal anxiety)   Total Score 2 2 2 3 4 2 2         ASSESSMENT: Current Emotional / Mental Status (status of significant symptoms):   Risk status (Self / Other harm or suicidal ideation)   Patient denies current fears or concerns for personal safety.   Patient denies current or recent suicidal ideation or behaviors.   Patient denies current or recent homicidal ideation or behaviors.   Patient denies current or recent self injurious behavior or ideation.   Patient denies other safety concerns.   Patient reports there has been no change in risk factors since their last session.     Patient reports there has been no change in protective factors since their last session.     Recommended that patient call 911 or go to the local ED should there be a change in any of these risk factors.     Appearance:   Appropriate    Eye Contact:   Fair    Psychomotor Behavior: Restless    Attitude:   Cooperative    Orientation:   All   Speech    Rate / Production: Emotional Talkative    Volume:  Normal    Mood:    Anxious  Depressed    Affect:    Worrisome    Thought Content:  Clear    Thought Form:  Coherent    Insight:    Fair      Medication Review:   Changes to psychiatric medications, see updated Medication List in EPIC.      Medication Compliance:   Yes     Changes in Health Issues:   None reported     Chemical Use Review:   Substance Use: Chemical use reviewed, no active concerns identified      Tobacco Use: No current tobacco use.      Diagnosis:  1. Moderate episode of recurrent major depressive disorder (H)        Collateral Reports Completed:   Not Applicable    PLAN: (Patient Tasks / Therapist Tasks / Other)    [facility] 12/19/2022 intake  patient is to write down things she is going to do and allow self three days to get it done.     Kaley Tan, [facility]  12/1/2022                                                            ______________________________________________________________________    Individual Treatment Plan    Patient's Name: Dinorah Thompson  YOB: 1950    Date of Creation: 11/17/2022    Date Treatment Plan Last Reviewed/Revised: 11/17/2022 due 2/17/2023      DSM5 Diagnoses: 296.32 (F33.1) Major Depressive Disorder, Recurrent Episode, Moderate With anxious distress  Psychosocial / Contextual Factors: Grief loss, taking care of , transitions  PROMIS (reviewed every 90 days): assigned     Referral / Collaboration:  The following referral(s) will be initiated: TMS referral.    Anticipated number of session for this episode of care: 12  Anticipation frequency of session: Biweekly  Anticipated Duration of each session: 38-52 minutes  Treatment plan will be reviewed in 90 days or when goals have been changed.       MeasurableTreatment Goal(s) related to diagnosis / functional impairment(s)  Goal 1: Patient will able to express feelings of grief and loss regarding loss of family member and behavior indicating progression of grief process towards acceptance and coping with reality of loss with acknowledgement of permanent change within 6 months    I will know I've met my goal when I remember her and it's laughing and not sad.      Objective #A (Patient Action)    Patient will demonstrate/report improved stages of grief that can be safely managed in a lower level of care.  Patient report of able to express feelings of grief and loss regarding loss of family member and behavior indicating progression of grief process towards acceptance and coping with reality of loss with acknowledgement of permanent change within 6 months.  Status: restarted 11/17/2022      Intervention(s)  Therapist will provide individual therapy to process through grief and loss and to gain effective coping skills. Tx to discuss current stressors and interpersonal conflicts and how to cope  "with these, coaching, diagnostic testing, referral for medication as indicated, use prescribed medication, cognitive restructuring, interpersonal family therapy, supportive therapy services.        Goal 2: Patient will report absence of persistent depression mood and report of reduced frequency and intensity of low mood and absence of persistent low energy and motivation to acceptable levels, report subjective improved motivation and increased energy for a period of 90 days, within 6 months as clinically observed and by patient self-report    I will know I've met my goal when I can measure my fatigue vs depression \".    Objective #A (Patient Action)    Patient will demonstrate and report a level of depression that can be managed at a lower level of care.  Absence of persistent depression mood and report of reduced frequency and intensity of low mood and absence of persistent low energy and motivation to acceptable levels, report subjective improved motivation and increased energy for a period of 90 days, within 6 months as clinically observed and by patient self-report  Status: restarted- date 11/17/2022      Intervention(s)  Therapist will provide individual therapy to identify triggers to depression, gain feedback on helpful coping tools and thought-reframing techniques, and identify preferred way of being.  Tx to include discussion of current stressors and interpersonal conflicts and how to cope with these, coaching, diagnostic testing, referral for medication as indicated, use prescribed medication, cognitive restructuring, interpersonal, family therapy, supportive therapy services      Patient has reviewed and agreed to the above plan.      Kaley Tan, [facility]  11/17/2022  "

## 2022-12-01 NOTE — PROGRESS NOTES
"Dot is a 72 year old who is being evaluated via a billable video visit.      How would you like to obtain your AVS? MyChart  If the video visit is dropped, the invitation should be resent by: Text to cell phone: [phone]  Will anyone else be joining your video visit? No          Assessment & Plan     Moderate episode of recurrent major depressive disorder (H)  Uncontrolled. Unfortunately, the recent change to desvenlafaxine has not been beneficial for patient and patient feels flat. Wishes to taper off and return to sertraline.   Having intake for TMS therapy.    Continue with counseling.   Has crisis plan.   Denies SI.   Follow-up 2 months; sooner as needed.   - sertraline (ZOLOFT) 25 MG tablet  Dispense: 90 tablet; Refill: 0  - desvenlafaxine succinate (PRISTIQ) 25 MG 24 hr tablet  Dispense: 14 tablet; Refill: 0    Cold sore  Refilled for as needed outbreaks of < 2x per year.   - valACYclovir (VALTREX) 1000 mg tablet  Dispense: 30 tablet; Refill: 1               BMI:   Estimated body mass index is 26.45 kg/m  as calculated from the following:    Height as of 11/23/22: 1.575 m (5' 2\").    Weight as of 11/23/22: 65.6 kg (144 lb 9.6 oz).           No follow-ups on file.    JASWANT Johnson [facility]    Maya Chris is a 72 year old, presenting for the following health issues:  Depression      History of Present Illness       Mental Health Follow-up:  Patient presents to follow-up on Depression.Patient's depression since last visit has been:  Worse  The patient is having other symptoms associated with depression.      Any significant life events: No  Patient is not feeling anxious or having panic attacks.  Patient has no concerns about alcohol or drug use.    She eats 2-3 servings of fruits and vegetables daily.She consumes 0 sweetened beverage(s) daily.She exercises with enough effort to increase her heart rate 9 or less minutes per day.  She exercises with enough " "effort to increase her heart rate 3 or less days per week.   She is taking medications regularly.    Today's PHQ-9         PHQ-9 Total Score: 11    PHQ-9 Q9 Thoughts of better off dead/self-harm past 2 weeks :   Not at all    How difficult have these problems made it for you to do your work, take care of things at home, or get along with other people: Very difficult  Today's ELDON-7 Score: 2     Was seen by partner one month ago and changed to desvenlafaxine as patient requesting a more uplifting medication for mood.   Denies SI.   Father with history of severe MDD requiring shock therapy.   Patient feels she if following his footsteps.   In counseling and is recommended TMS therapy.     PHQ 11/16/2022 11/30/2022 11/30/2022   PHQ-9 Total Score 4 11 11   Q9: Thoughts of better off dead/self-harm past 2 weeks Not at all Not at all Not at all   PHQ-9 External Data - - -     ELDON-7 SCORE 10/20/2022 11/30/2022 11/30/2022   Total Score 4 (minimal anxiety) - 2 (minimal anxiety)   Total Score 4 2 2           Review of Systems   Constitutional, HEENT, cardiovascular, pulmonary, gi and gu systems are negative, except as otherwise noted.      Objective    Vitals - Patient Reported  Weight (Patient Reported): 63.5 kg (140 lb)  Height (Patient Reported): 157.5 cm (5' 2\")  BMI (Based on Pt Reported Ht/Wt): 25.61      Vitals:  No vitals were obtained today due to virtual visit.    Physical Exam   GENERAL: Healthy, alert and no distress  EYES: Eyes grossly normal to inspection.  No discharge or erythema, or obvious scleral/conjunctival abnormalities.  RESP: No audible wheeze, cough, or visible cyanosis.  No visible retractions or increased work of breathing.    SKIN: Visible skin clear. No significant rash, abnormal pigmentation or lesions.  NEURO: Cranial nerves grossly intact.  Mentation and speech appropriate for age.  PSYCH: Mentation appears normal, affect normal/bright, judgement and insight intact, normal speech and appearance " well-groomed.                Video-Visit Details    Video Start Time: 1055    Type of service:  Video Visit    Video End Time:11:08 AM    Originating Location (pt. Location): Home        Distant Location (provider location):  On-site    Platform used for Video Visit: [employer]

## 2022-12-14 ASSESSMENT — PATIENT HEALTH QUESTIONNAIRE - PHQ9
SUM OF ALL RESPONSES TO PHQ QUESTIONS 1-9: 12
SUM OF ALL RESPONSES TO PHQ QUESTIONS 1-9: 12
10. IF YOU CHECKED OFF ANY PROBLEMS, HOW DIFFICULT HAVE THESE PROBLEMS MADE IT FOR YOU TO DO YOUR WORK, TAKE CARE OF THINGS AT HOME, OR GET ALONG WITH OTHER PEOPLE: VERY DIFFICULT

## 2022-12-15 ENCOUNTER — VIRTUAL VISIT (OUTPATIENT)
Dept: PSYCHOLOGY | Facility: CLINIC | Age: 72
End: 2022-12-15
Payer: MEDICARE

## 2022-12-15 DIAGNOSIS — F33.1 MODERATE EPISODE OF RECURRENT MAJOR DEPRESSIVE DISORDER (H): Primary | ICD-10-CM

## 2022-12-15 PROCEDURE — 90832 PSYTX W PT 30 MINUTES: CPT | Mod: 95 | Performed by: SOCIAL WORKER

## 2022-12-15 NOTE — PROGRESS NOTES
[facility]                                     Progress Note    Patient Name: Dinorah Thompson  Date: 12/15/2022         Service Type: Individual      Session Start Time: 1206 Session End Time: 1232     Session Length: 16-37    Session #: 6    Attendees: Client    Service Modality:  Video Visit:      Provider verified identity through the following two step process.  Patient provided:  Patient  and Patient is known previously to provider    Telemedicine Visit: The patient's condition can be safely assessed and treated via synchronous audio and visual telemedicine encounter.      Reason for Telemedicine Visit: Services only offered telehealth    Originating Site (Patient Location): Patient's home    Distant Site (Provider Location): Provider Remote Setting- Home Office    Consent:  The patient/guardian has verbally consented to: the potential risks and benefits of telemedicine (video visit) versus in person care; bill my insurance or make self-payment for services provided; and responsibility for payment of non-covered services.     Patient would like the video invitation sent by:  Send to e-mail at: [email or web address]    Mode of Communication:  Video Conference via Amwell    As the provider I attest to compliance with applicable laws and regulations related to telemedicine.    DATA  Interactive Complexity: No  Crisis: No        Progress Since Last Session (Related to Symptoms / Goals / Homework):   Symptoms: No change depression    Homework: Achieved / completed to satisfaction      Episode of Care Goals: Minimal progress - PREPARATION (Decided to change - considering how); Intervened by negotiating a change plan and determining options / strategies for behavior change, identifying triggers, exploring social supports, and working towards setting a date to begin behavior change     Current / Ongoing Stressors and Concerns:     Grief loss, taking care of , transitions     Treatment  Objective(s) Addressed in This Session:     Patient will demonstrate/report improved stages of grief that can be safely managed in a lower level of care.  Patient report of able to express feelings of grief and loss regarding loss of family member and behavior indicating progression of grief process towards acceptance and coping with reality of loss with acknowledgement of permanent change within 6 months.  Patient will demonstrate and report a level of depression that can be managed at a lower level of care.  Absence of persistent depression mood and report of reduced frequency and intensity of low mood and absence of persistent low energy and motivation to acceptable levels, report subjective improved motivation and increased energy for a period of 90 days, within 6 months as clinically observed and by patient self-report     Intervention:   Therapist met with patient to review goals and interventions. Therapist utilized reflected listening as patient gave brief reflection of week. Patient reported feeling better with the medication change but no change in depression. Patient processed following through with her list and making herself do things but feeling tearful and little energy. Therapist validated patient and offered patient hope with upcoming trip, moving and seeing her family. Therapist utilized strength based modality with patient.   Patient presented with an anxious affect. Patient was engaged in session and open to feedback. Patient reported no safety concerns.       Assessments completed prior to visit:  PHQ9:   PHQ-9 SCORE 5/24/2022 8/24/2022 10/20/2022 11/16/2022 11/30/2022 11/30/2022 12/14/2022   PHQ-9 Total Score MyChart - 2 (Minimal depression) 8 (Mild depression) 4 (Minimal depression) - 11 (Moderate depression) 12 (Moderate depression)   PHQ-9 Total Score 1 2 8 4 11 11 12   PHQ-9 External Data - - - - - - -     GAD7:   ELDON-7 SCORE 5/9/2022 5/24/2022 8/24/2022 10/5/2022 10/20/2022 11/30/2022  11/30/2022   Total Score 2 (minimal anxiety) - 2 (minimal anxiety) 3 (minimal anxiety) 4 (minimal anxiety) - 2 (minimal anxiety)   Total Score 2 2 2 3 4 2 2         ASSESSMENT: Current Emotional / Mental Status (status of significant symptoms):   Risk status (Self / Other harm or suicidal ideation)   Patient denies current fears or concerns for personal safety.   Patient denies current or recent suicidal ideation or behaviors.   Patient denies current or recent homicidal ideation or behaviors.   Patient denies current or recent self injurious behavior or ideation.   Patient denies other safety concerns.   Patient reports there has been no change in risk factors since their last session.     Patient reports there has been no change in protective factors since their last session.     Recommended that patient call 911 or go to the local ED should there be a change in any of these risk factors.     Appearance:   Appropriate    Eye Contact:   Fair    Psychomotor Behavior: Restless    Attitude:   Cooperative    Orientation:   All   Speech    Rate / Production: Emotional Talkative    Volume:  Normal    Mood:    Anxious  Depressed    Affect:    Worrisome    Thought Content:  Clear    Thought Form:  Coherent    Insight:    Fair      Medication Review:   Changes to psychiatric medications, see updated Medication List in EPIC.      Medication Compliance:   Yes     Changes in Health Issues:   None reported     Chemical Use Review:   Substance Use: Chemical use reviewed, no active concerns identified      Tobacco Use: No current tobacco use.      Diagnosis:  1. Moderate episode of recurrent major depressive disorder (H)        Collateral Reports Completed:   Not Applicable    PLAN: (Patient Tasks / Therapist Tasks / Other)    TMS 12/19/2022 intake  continue to write down things  Go to TMS intake  Pack and get ready for trip    PRICILLA Nowak  12/15/2022                                                            ______________________________________________________________________    Individual Treatment Plan    Patient's Name: Dinorah Thompson  YOB: 1950    Date of Creation: 11/17/2022    Date Treatment Plan Last Reviewed/Revised: 11/17/2022 due 2/17/2023      DSM5 Diagnoses: 296.32 (F33.1) Major Depressive Disorder, Recurrent Episode, Moderate With anxious distress  Psychosocial / Contextual Factors: Grief loss, taking care of , transitions  PROMIS (reviewed every 90 days): assigned     Referral / Collaboration:  The following referral(s) will be initiated: TMS referral.    Anticipated number of session for this episode of care: 12  Anticipation frequency of session: Biweekly  Anticipated Duration of each session: 38-52 minutes  Treatment plan will be reviewed in 90 days or when goals have been changed.       MeasurableTreatment Goal(s) related to diagnosis / functional impairment(s)  Goal 1: Patient will able to express feelings of grief and loss regarding loss of family member and behavior indicating progression of grief process towards acceptance and coping with reality of loss with acknowledgement of permanent change within 6 months    I will know I've met my goal when I remember her and it's laughing and not sad.      Objective #A (Patient Action)    Patient will demonstrate/report improved stages of grief that can be safely managed in a lower level of care.  Patient report of able to express feelings of grief and loss regarding loss of family member and behavior indicating progression of grief process towards acceptance and coping with reality of loss with acknowledgement of permanent change within 6 months.  Status: restarted 11/17/2022      Intervention(s)  Therapist will provide individual therapy to process through grief and loss and to gain effective coping skills. Tx to discuss current stressors and interpersonal conflicts and how to cope with these, coaching, diagnostic testing,  "referral for medication as indicated, use prescribed medication, cognitive restructuring, interpersonal family therapy, supportive therapy services.        Goal 2: Patient will report absence of persistent depression mood and report of reduced frequency and intensity of low mood and absence of persistent low energy and motivation to acceptable levels, report subjective improved motivation and increased energy for a period of 90 days, within 6 months as clinically observed and by patient self-report    I will know I've met my goal when I can measure my fatigue vs depression \".    Objective #A (Patient Action)    Patient will demonstrate and report a level of depression that can be managed at a lower level of care.  Absence of persistent depression mood and report of reduced frequency and intensity of low mood and absence of persistent low energy and motivation to acceptable levels, report subjective improved motivation and increased energy for a period of 90 days, within 6 months as clinically observed and by patient self-report  Status: restarted- date 11/17/2022      Intervention(s)  Therapist will provide individual therapy to identify triggers to depression, gain feedback on helpful coping tools and thought-reframing techniques, and identify preferred way of being.  Tx to include discussion of current stressors and interpersonal conflicts and how to cope with these, coaching, diagnostic testing, referral for medication as indicated, use prescribed medication, cognitive restructuring, interpersonal, family therapy, supportive therapy services      Patient has reviewed and agreed to the above plan.      Kaley Tan, [facility]  11/17/2022  "

## 2023-01-12 ENCOUNTER — VIRTUAL VISIT (OUTPATIENT)
Dept: PSYCHOLOGY | Facility: CLINIC | Age: 73
End: 2023-01-12
Payer: MEDICARE

## 2023-01-12 DIAGNOSIS — F33.1 MODERATE EPISODE OF RECURRENT MAJOR DEPRESSIVE DISORDER (H): Primary | ICD-10-CM

## 2023-01-12 PROCEDURE — 90834 PSYTX W PT 45 MINUTES: CPT | Mod: 95 | Performed by: SOCIAL WORKER

## 2023-01-12 NOTE — PROGRESS NOTES
[facility]                                     Progress Note    Patient Name: Dinorah Thompson  Date: 2023         Service Type: Individual      Session Start Time: 1200 Session End Time: 1241     Session Length: 38-52    Session #: 7    Attendees: Client    Service Modality:  Video Visit:      Provider verified identity through the following two step process.  Patient provided:  Patient  and Patient is known previously to provider    Telemedicine Visit: The patient's condition can be safely assessed and treated via synchronous audio and visual telemedicine encounter.      Reason for Telemedicine Visit: Services only offered telehealth    Originating Site (Patient Location): Patient's home    Distant Site (Provider Location): Provider Remote Setting- Home Office    Consent:  The patient/guardian has verbally consented to: the potential risks and benefits of telemedicine (video visit) versus in person care; bill my insurance or make self-payment for services provided; and responsibility for payment of non-covered services.     Patient would like the video invitation sent by:  Send to e-mail at: [email or web address]    Mode of Communication:  Video Conference via Amwell    As the provider I attest to compliance with applicable laws and regulations related to telemedicine.    DATA  Interactive Complexity: No  Crisis: No        Progress Since Last Session (Related to Symptoms / Goals / Homework):   Symptoms: No change with depression     Homework: Achieved / completed to satisfaction      Episode of Care Goals: Minimal progress - PREPARATION (Decided to change - considering how); Intervened by negotiating a change plan and determining options / strategies for behavior change, identifying triggers, exploring social supports, and working towards setting a date to begin behavior change     Current / Ongoing Stressors and Concerns:     Grief loss, taking care of , transitions     Treatment  Objective(s) Addressed in This Session:     Patient will demonstrate/report improved stages of grief that can be safely managed in a lower level of care.  Patient report of able to express feelings of grief and loss regarding loss of family member and behavior indicating progression of grief process towards acceptance and coping with reality of loss with acknowledgement of permanent change within 6 months.  Patient will demonstrate and report a level of depression that can be managed at a lower level of care.  Absence of persistent depression mood and report of reduced frequency and intensity of low mood and absence of persistent low energy and motivation to acceptable levels, report subjective improved motivation and increased energy for a period of 90 days, within 6 months as clinically observed and by patient self-report     Intervention:   Therapist met with patient to review goals and interventions. Therapist utilized reflected listening as patient gave brief reflection of week. Patient reported no change since last session with depression but more hopeful with starting TMS. Therapist assessed with safety and patient reported no safety concerns. Patient reported her and her spouse will be moving closer to her son and his family and is excited and stressed about move. Therapist utilized solution focused modality with patient and coached patient in breaking down what she will have to do. Patient processed relationship with other son and therapist offered patient hope.   Patient presented with an anxious affect. Patient was engaged in session and open to feedback. Patient reported no safety concerns.       Assessments completed prior to visit:  PHQ9:   PHQ-9 SCORE 5/24/2022 8/24/2022 10/20/2022 11/16/2022 11/30/2022 11/30/2022 12/14/2022   PHQ-9 Total Score MyChart - 2 (Minimal depression) 8 (Mild depression) 4 (Minimal depression) - 11 (Moderate depression) 12 (Moderate depression)   PHQ-9 Total Score 1 2 8 4 11  11 12   PHQ-9 External Data - - - - - - -     GAD7:   ELDON-7 SCORE 5/9/2022 5/24/2022 8/24/2022 10/5/2022 10/20/2022 11/30/2022 11/30/2022   Total Score 2 (minimal anxiety) - 2 (minimal anxiety) 3 (minimal anxiety) 4 (minimal anxiety) - 2 (minimal anxiety)   Total Score 2 2 2 3 4 2 2         ASSESSMENT: Current Emotional / Mental Status (status of significant symptoms):   Risk status (Self / Other harm or suicidal ideation)   Patient denies current fears or concerns for personal safety.   Patient denies current or recent suicidal ideation or behaviors.   Patient denies current or recent homicidal ideation or behaviors.   Patient denies current or recent self injurious behavior or ideation.   Patient denies other safety concerns.   Patient reports there has been no change in risk factors since their last session.     Patient reports there has been no change in protective factors since their last session.     Recommended that patient call 911 or go to the local ED should there be a change in any of these risk factors.     Appearance:   Appropriate    Eye Contact:   Fair    Psychomotor Behavior: Restless    Attitude:   Cooperative    Orientation:   All   Speech    Rate / Production: Emotional Talkative    Volume:  Normal    Mood:    Anxious  Depressed    Affect:    Worrisome    Thought Content:  Clear    Thought Form:  Coherent    Insight:    Fair      Medication Review:   Changes to psychiatric medications, see updated Medication List in EPIC.      Medication Compliance:   Yes     Changes in Health Issues:   None reported     Chemical Use Review:   Substance Use: Chemical use reviewed, no active concerns identified      Tobacco Use: No current tobacco use.      Diagnosis:  1. Moderate episode of recurrent major depressive disorder (H)        Collateral Reports Completed:   Not Applicable    PLAN: (Patient Tasks / Therapist Tasks / Other)      continue to write down things  Continue TMS started 1/3/2023  Pack and get  ready for trip  Start witting down what needs to be done for the move.       Kaley Tan, LICSW  1/12/2023                                                           ______________________________________________________________________    Individual Treatment Plan    Patient's Name: Dinorah Thompson  YOB: 1950    Date of Creation: 11/17/2022    Date Treatment Plan Last Reviewed/Revised: 11/17/2022 due 2/17/2023      DSM5 Diagnoses: 296.32 (F33.1) Major Depressive Disorder, Recurrent Episode, Moderate With anxious distress  Psychosocial / Contextual Factors: Grief loss, taking care of , transitions  PROMIS (reviewed every 90 days): assigned     Referral / Collaboration:  The following referral(s) will be initiated: TMS referral.    Anticipated number of session for this episode of care: 12  Anticipation frequency of session: Biweekly  Anticipated Duration of each session: 38-52 minutes  Treatment plan will be reviewed in 90 days or when goals have been changed.       MeasurableTreatment Goal(s) related to diagnosis / functional impairment(s)  Goal 1: Patient will able to express feelings of grief and loss regarding loss of family member and behavior indicating progression of grief process towards acceptance and coping with reality of loss with acknowledgement of permanent change within 6 months    I will know I've met my goal when I remember her and it's laughing and not sad.      Objective #A (Patient Action)    Patient will demonstrate/report improved stages of grief that can be safely managed in a lower level of care.  Patient report of able to express feelings of grief and loss regarding loss of family member and behavior indicating progression of grief process towards acceptance and coping with reality of loss with acknowledgement of permanent change within 6 months.  Status: restarted 11/17/2022      Intervention(s)  Therapist will provide individual therapy to process through grief  "and loss and to gain effective coping skills. Tx to discuss current stressors and interpersonal conflicts and how to cope with these, coaching, diagnostic testing, referral for medication as indicated, use prescribed medication, cognitive restructuring, interpersonal family therapy, supportive therapy services.        Goal 2: Patient will report absence of persistent depression mood and report of reduced frequency and intensity of low mood and absence of persistent low energy and motivation to acceptable levels, report subjective improved motivation and increased energy for a period of 90 days, within 6 months as clinically observed and by patient self-report    I will know I've met my goal when I can measure my fatigue vs depression \".    Objective #A (Patient Action)    Patient will demonstrate and report a level of depression that can be managed at a lower level of care.  Absence of persistent depression mood and report of reduced frequency and intensity of low mood and absence of persistent low energy and motivation to acceptable levels, report subjective improved motivation and increased energy for a period of 90 days, within 6 months as clinically observed and by patient self-report  Status: restarted- date 11/17/2022      Intervention(s)  Therapist will provide individual therapy to identify triggers to depression, gain feedback on helpful coping tools and thought-reframing techniques, and identify preferred way of being.  Tx to include discussion of current stressors and interpersonal conflicts and how to cope with these, coaching, diagnostic testing, referral for medication as indicated, use prescribed medication, cognitive restructuring, interpersonal, family therapy, supportive therapy services      Patient has reviewed and agreed to the above plan.      Kaley Tan, [facility]  11/17/2022  "

## 2023-01-17 ENCOUNTER — ANCILLARY PROCEDURE (OUTPATIENT)
Dept: RADIOLOGY | Facility: AMBULATORY SURGERY CENTER | Age: 73
End: 2023-01-17
Attending: ANESTHESIOLOGY
Payer: MEDICARE

## 2023-01-17 ENCOUNTER — HOSPITAL ENCOUNTER (OUTPATIENT)
Facility: AMBULATORY SURGERY CENTER | Age: 73
Discharge: HOME | End: 2023-01-17
Attending: ANESTHESIOLOGY | Admitting: ANESTHESIOLOGY
Payer: MEDICARE

## 2023-01-17 VITALS
RESPIRATION RATE: 14 BRPM | TEMPERATURE: 98.8 F | DIASTOLIC BLOOD PRESSURE: 72 MMHG | OXYGEN SATURATION: 99 % | HEART RATE: 75 BPM | SYSTOLIC BLOOD PRESSURE: 123 MMHG

## 2023-01-17 DIAGNOSIS — R52 PAIN: ICD-10-CM

## 2023-01-17 PROCEDURE — 64454 NJX AA&/STRD GNCLR NRV BRNCH: CPT | Mod: RT

## 2023-01-17 PROCEDURE — 64454 NJX AA&/STRD GNCLR NRV BRNCH: CPT | Mod: RT | Performed by: ANESTHESIOLOGY

## 2023-01-17 RX ORDER — LIDOCAINE HYDROCHLORIDE 10 MG/ML
INJECTION, SOLUTION EPIDURAL; INFILTRATION; INTRACAUDAL; PERINEURAL DAILY PRN
Status: DISCONTINUED | OUTPATIENT
Start: 2023-01-17 | End: 2023-01-17 | Stop reason: HOSPADM

## 2023-01-17 RX ORDER — BUPIVACAINE HYDROCHLORIDE 7.5 MG/ML
INJECTION, SOLUTION EPIDURAL; RETROBULBAR DAILY PRN
Status: DISCONTINUED | OUTPATIENT
Start: 2023-01-17 | End: 2023-01-17 | Stop reason: HOSPADM

## 2023-01-17 RX ORDER — IOPAMIDOL 408 MG/ML
INJECTION, SOLUTION INTRAVASCULAR DAILY PRN
Status: DISCONTINUED | OUTPATIENT
Start: 2023-01-17 | End: 2023-01-17 | Stop reason: HOSPADM

## 2023-01-17 NOTE — DISCHARGE INSTRUCTIONS
Home Care Instructions after a Genicular Nerve Block    In a genicular nerve block, a local anesthetic (numbing medicine) is injected near sensory nerves which carry pain signals to the brain. This procedure is a diagnostic procedure and is typically short lasting. With this injection a steroid to increase the longevity of the blocks effect may be used.    Activity  -You may resume most normal activity levels with the exception of strenuous activity. It is important for us to know if your pain with normal activity is relieved after this injection.  -DO NOT shower for 24 hours  -DO NOT remove bandaid for 24 hours    Pain  -You may experience soreness at the injection site for one or two days  -You may use an ice pack for 20 minutes every 2 hours for the first 24 hours  -You may use a heating pad after the first 24 hours  -You may use Tylenol (acetaminophen) every 4 hours or other pain medicines as     directed by your physician    You may experience numbness radiating into your legs or arms (depending on the procedure location). This numbness may last several hours. Until sensation returns to normal; please use caution in walking, climbing stairs, and stepping out of your vehicle, etc.      DID YOU RECEIVE STEROIDS TODAY?    Common side effects of steroids:  Not everyone will experience corticosteroid side effects. If side effects are experienced, they will gradually subside in the 7-10 day period following an injection. Most common side effects include:  -Flushed face and/or chest  -Feeling of warmth, particularly in the face but could be an overall feeling of warmth  -Increased blood sugar in diabetic patients  -Menstrual irregularities my occur. If taking hormone-based birth control an alternate method of birth control is recommended  -Sleep disturbances and/or mood swings are possible  -Leg cramps      PLEASE KEEP TRACK OF YOUR SYMPTOMS AND NOTE YOUR IMPROVEMENT FOR YOUR DOCTOR.   Please contact us if you  have:  -Severe pain  -Fever more than 101.5 degrees Fahrenheit  -Signs of infection at the injection site (redness, swelling, or drainage)    FOR PAIN CENTER PATIENTS:  If you have questions, please contact the Pain Clinic at [phone] Option #1 between the hours of 7:00 am and 3:00 pm Monday through Friday. After office hours you can contact the on call provider by dialing [phone]. If you need immediate attention, we recommend that you go to a hospital emergency room or dial [phone].

## 2023-01-17 NOTE — OP NOTE
PROCEDURE REPORT     Chief Complaint:  Right knee pain     Pre-Operative Diagnosis:  1. Pain in right knee  2. Neuralgia and neuritis, unspecified     Post-Operative Diagnosis:  1. Pain in right knee  2. Neuralgia and neuritis, unspecified     Procedure:  1. Right genicular nerve block   2. Fluoroscopy for needle guidance     Anesthesia:  Local anesthesia     Indications / Pre-Operative Plan:  The patient has disabling knee pain, therefore a genicular nerve block will be performed. The risks, alternatives and benefits were explained to the patient in detail. The patient agreed with the plan.     Patient was examined by me prior to the procedure. Examination of heart, lung and mental status were all within acceptable limits. The patient has been assessed, examined and cleared for the planned procedure and level of anesthesia in an ambulatory surgery center.     Description of Procedure:     After obtaining informed consent, the patient was placed in supine position and prepped and draped. The skin was anesthetized with 1ml lidocaine 1% and 3 needles each 22 gauge x 3.5 inch were placed at the sites of the right three genicular nerves. The genicular (superior medial, superior lateral, and inferior medial) articular nerves were targeted. Placement was confirmed with multiple fluoroscopic views and 0.5mL bupivacaine 0.75% was injected.  Needles were withdrawn. right knee then cleansed. The patient was then assisted to recovery and no immediate complications were observed.    Preprocedure pain score= 5/10  Post procedure pain score =1/10

## 2023-01-17 NOTE — H&P
Dinorah Thompson  [id number]  female  72 year old      Reason for procedure/surgery: chronic left knee pain    Patient Active Problem List   Diagnosis     Cervical radiculopathy, chronic     Chronic pain disorder     Intervertebral disc disorders with radiculopathy, lumbar region     Lumbar radiculopathy, chronic     Other cervical disc degeneration at C5-C6 level     Spondylosis without myelopathy or radiculopathy, lumbar region     Osteoarthrosis, hand     Primary localized osteoarthritis of knees, bilateral     Hyperlipidemia     Depression     Sarcoidosis of lung (H)     Hoarseness     Laryngeal disorder     Precancerous lesion     History of retinal detachment     Lumbar radiculopathy     Hyperparathyroidism (H)     Chronic pain of left knee     Primary osteoarthritis of left knee     Moderate episode of recurrent major depressive disorder (H)     Grief     Stage 3a chronic kidney disease (H)     Acute sphenoidal sinusitis     Bacterial conjunctivitis     Family history of dementia     Gastroesophageal reflux disease     Insomnia     Mild intermittent asthma     Osteoarthrosis     Osteoporosis     Recurrent major depression in remission (H)     Polyuria     Other fatigue     Prediabetes     Restless legs syndrome       Past Surgical History:    Past Surgical History:   Procedure Laterality Date     APPENDECTOMY       BIOPSY  2008    Lungs     COLONOSCOPY       diskectomy in toe       EYE SURGERY  Cataracts     GENITOURINARY SURGERY  2019    Bladder mesh repair     GYN SURGERY  2019    Total hysterectomy     HYSTERECTOMY  08/2017    and bladder surgery     INJECT EPIDURAL LUMBAR Right 4/27/2021    Procedure: Right Lumbar 5- Sacral 1 transforaminal epidural steroid injection with fluoroscopy and conscious sedation.;  Surgeon: Tiera Santana MD;  Location: [facility] OR     INJECT EPIDURAL LUMBAR Left 11/11/2021    Procedure: Lumbar epidural steroid injection L5- S1 with fluoroscopy;  Surgeon: Tiera Santana MD;   Location: [facility] OR     INJECT EPIDURAL LUMBAR Left 3/8/2022    Procedure: L5-S1 Epidural steroid injection with fluoroscopy;  Surgeon: Tiera Santana MD;  Location: [facility] OR     INJECT EPIDURAL LUMBAR Left 7/26/2022    Procedure: Lumbar epidural steroid injection at left L5-S1;  Surgeon: Tiera Santana MD;  Location: [facility] OR     INJECT EPIDURAL LUMBAR N/A 11/15/2022    Procedure: Lumbar epidural steroid injection at (left >right) L5-S1 with fluoroscopy;  Surgeon: Tiera Santana MD;  Location: [facility] OR     INJECT MAJOR JOINT / BURSA Left 10/13/2022    Procedure: Left knee synvisc injection with ultrasound guidance.;  Surgeon: Tiera Santana MD;  Location: [facility] OR     left rotator cuff surgery       PARATHYROIDECTOMY N/A 7/21/2021    Procedure: PARATHYROIDECTOMY;  Surgeon: Gregoria Wilcox MD;  Location: [facility]     SINUS SURGERY      x2 in 2013 and 2018     TONSILLECTOMY  1955       Past Medical History:   Past Medical History:   Diagnosis Date     Chronic osteoarthritis      Complication of anesthesia 1990's    DIfficulty waking up     Depressive disorder      Elevated glucose 10/21/2022     Female bladder prolapse 9/3/2019     Mild intermittent asthma 7/29/2017     FAHAD treated with BiPAP 6/13/2018     Parathyroid abnormality (H)      Prolapse of female pelvic organs 8/19/2019     Restless legs syndrome 10/21/2022     Sleep apnea     Uses a Bi-Pap.. Will have son bring it if necessary on DOS       Social History:   Social History     Tobacco Use     Smoking status: Never     Smokeless tobacco: Never   Substance Use Topics     Alcohol use: Yes     Comment: 0-2 drinks per month       Family History:   Family History   Problem Relation Age of Onset     Myocardial Infarction Father         late 50s     Coronary Artery Disease Father      Depression Father      Ovarian Cancer Sister      Cervical Cancer Sister      Lung Cancer Sister      Depression Sister      Anxiety Disorder Sister      Myocardial Infarction Paternal  "Uncle         late 50s     Hyperlipidemia Mother      Osteoporosis Mother      Cerebrovascular Disease Paternal Grandmother      Cerebrovascular Disease Paternal Grandfather      Anxiety Disorder Son        Allergies:   Allergies   Allergen Reactions     Propoxyphene Other (See Comments)     Clarithromycin Other (See Comments)     Pt states, \"ototoxicity\". Balance problems  Pt states, \"ototoxicity\".         Active Medications:   Current Outpatient Medications   Medication Sig Dispense Refill     acetaminophen (TYLENOL) 500 MG tablet Take 2 tablets (1,000 mg) by mouth every 6 hours as needed for pain       baclofen (LIORESAL) 10 MG tablet Take 1 tablet (10 mg) by mouth daily as needed for muscle spasms 30 tablet 0     DULoxetine (CYMBALTA) 60 MG capsule TAKE 1 CAPSULE EVERY DAY 90 capsule 1     mometasone (NASONEX) 50 MCG/ACT nasal spray spray 2 spray by intranasal route  every day in each nostril       rosuvastatin (CRESTOR) 5 MG tablet TAKE 1 TABLET EVERY OTHER DAY 45 tablet 1     sertraline (ZOLOFT) 25 MG tablet Take 1 tablet (25 mg) by mouth daily 90 tablet 0     traZODone (DESYREL) 100 MG tablet Take 1 tablet (100 mg) by mouth nightly as needed for sleep 90 tablet 3     valACYclovir (VALTREX) 1000 mg tablet Take two tablets twice per day for one day for flare ups. 30 tablet 1     Carboxymethylcellul-Glycerin 1-0.9 % GEL Place 1 drop into both eyes daily        desvenlafaxine succinate (PRISTIQ) 25 MG 24 hr tablet Take 1 tablet (25 mg) by mouth daily 14 tablet 0     LYSINE PO        NONFORMULARY Vitamin Packet from [employer] including Multi-vitamin, Leutin, Calcium, Antioxidant, Fish oil, Glucosamine- Chondroitin: Take 1 packet by mouth daily       tacrolimus (PROTOPIC) 0.1 % external ointment Apply to AA on the face BID PRN 30 g 5       Systemic Review:   CONSTITUTIONAL: NEGATIVE for fever, chills, change in weight  ENT/MOUTH: NEGATIVE for ear, mouth and throat problems  RESP: NEGATIVE for significant cough or " SOB  CV: NEGATIVE for chest pain, palpitations or peripheral edema    Physical Examination:   Vital Signs: /79 (BP Location: Right arm)   Pulse 74   Resp 14   LMP  (LMP Unknown)   SpO2 96%   GENERAL: healthy, alert and no distress  NECK: no adenopathy, no asymmetry, masses, or scars  RESP: lungs clear to auscultation - no rales, rhonchi or wheezes  CV: regular rate and rhythm, normal S1 S2, no S3 or S4, no murmur, click or rub, no peripheral edema and peripheral pulses strong  ABDOMEN: soft, nontender, no hepatosplenomegaly, no masses and bowel sounds normal  MS: no gross musculoskeletal defects noted, no edema    Plan: Appropriate to proceed as scheduled.      Tiera Santana MD  1/17/2023

## 2023-01-31 ENCOUNTER — VIRTUAL VISIT (OUTPATIENT)
Dept: PSYCHOLOGY | Facility: CLINIC | Age: 73
End: 2023-01-31
Payer: MEDICARE

## 2023-01-31 DIAGNOSIS — F33.1 MODERATE EPISODE OF RECURRENT MAJOR DEPRESSIVE DISORDER (H): Primary | ICD-10-CM

## 2023-01-31 PROCEDURE — 90834 PSYTX W PT 45 MINUTES: CPT | Mod: 95 | Performed by: SOCIAL WORKER

## 2023-02-07 ENCOUNTER — OFFICE VISIT (OUTPATIENT)
Dept: DERMATOLOGY | Facility: CLINIC | Age: 73
End: 2023-02-07
Payer: MEDICARE

## 2023-02-07 DIAGNOSIS — L81.4 LENTIGO: ICD-10-CM

## 2023-02-07 DIAGNOSIS — D48.5 NEOPLASM OF UNCERTAIN BEHAVIOR OF SKIN: ICD-10-CM

## 2023-02-07 DIAGNOSIS — D22.9 NEVUS: Primary | ICD-10-CM

## 2023-02-07 DIAGNOSIS — L82.1 SEBORRHEIC KERATOSIS: ICD-10-CM

## 2023-02-07 DIAGNOSIS — D18.01 ANGIOMA OF SKIN: ICD-10-CM

## 2023-02-07 DIAGNOSIS — L57.0 ACTINIC KERATOSIS: ICD-10-CM

## 2023-02-07 PROCEDURE — 88305 TISSUE EXAM BY PATHOLOGIST: CPT | Performed by: PATHOLOGY

## 2023-02-07 PROCEDURE — 99213 OFFICE O/P EST LOW 20 MIN: CPT | Mod: 25 | Performed by: PHYSICIAN ASSISTANT

## 2023-02-07 PROCEDURE — 17000 DESTRUCT PREMALG LESION: CPT | Mod: 59 | Performed by: PHYSICIAN ASSISTANT

## 2023-02-07 PROCEDURE — 11102 TANGNTL BX SKIN SINGLE LES: CPT | Performed by: PHYSICIAN ASSISTANT

## 2023-02-07 NOTE — PATIENT INSTRUCTIONS
Wound Care Instructions     FOR SUPERFICIAL WOUNDS     [facility]  [phone]                 AFTER 24 HOURS YOU SHOULD REMOVE THE BANDAGE AND BEGIN DAILY DRESSING CHANGES AS FOLLOWS:     1) Remove Dressing.     2) Clean and dry the area with tap water using a Q-tip or sterile gauze pad.     3) Apply Vaseline, Aquaphor, Polysporin ointment or Bacitracin ointment over entire wound.  Do NOT use Neosporin ointment.     4) Cover the wound with a band-aid, or a sterile non-stick gauze pad and micropore paper tape    REPEAT THESE INSTRUCTIONS AT LEAST ONCE A DAY UNTIL THE WOUND HAS COMPLETELY HEALED.    It is an old wives tale that a wound heals better when it is exposed to air and allowed to dry out. The wound will heal faster with a better cosmetic result if it is kept moist with ointment and covered with a bandage.    **Do not let the wound dry out.**    Supplies Needed:      *Cotton tipped applicators (Q-tips)    *Vaseline, Aquaphor, Polysporin or Bacitracin Ointment (NOT NEOSPORIN)    *Band-aids or non-stick gauze pads and micropore paper tape.      PATIENT INFORMATION:    During the healing process you will notice a number of changes. All wounds develop a small halo of redness surrounding the wound.  This means healing is occurring. Severe itching with extensive redness usually indicates sensitivity to the ointment or bandage tape used to dress the wound.  You should call our office if this develops.      Swelling  and/or discoloration around your surgical site is common, particularly when performed around the eye.    All wounds normally drain.  The larger the wound the more drainage there will be.  After 7-10 days, you will notice the wound beginning to shrink and new skin will begin to grow.  The wound is healed when you can see skin has formed over the entire area.  A healed wound has a healthy, shiny look to the surface and is red to dark pink in color to normalize.  Wounds may  take approximately 4-6 weeks to heal.  Larger wounds may take 6-8 weeks.  After the wound is healed you may discontinue dressing changes.    You may experience a sensation of tightness as your wound heals. This is normal and will gradually subside.    Your healed wound may be sensitive to temperature changes. This sensitivity improves with time, but if you re having a lot of discomfort, try to avoid temperature extremes.    Patients frequently experience itching after their wound appears to have healed because of the continue healing under the skin.  Plain Vaseline will help relieve the itching.      POSSIBLE COMPLICATIONS    BLEEDING:    Leave the bandage in place.  Use tightly rolled up gauze or a cloth to apply direct pressure over the bandage for 30  minutes.  Reapply pressure for an additional 30 minutes if necessary  Use additional gauze and tape to maintain pressure once the bleeding has stopped.

## 2023-02-07 NOTE — PROGRESS NOTES
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 Her sister passed away 2 years ago and will likely be moving to [place].  Her  requires care due to a brain surgery for an AVM.   On review of systems, there are no further skin complaints, patient is feeling otherwise well.  See patient intake sheet.  ROS of the following were done and are negative: Constitutional, Eyes, Ears, Nose,   Mouth, Throat, Cardiovascular, Respiratory, GI, Genitourinary, Musculoskeletal,   Psychiatric, Endocrine, Allergic/Immunologic.    PHYSICAL EXAM:   LMP  (LMP Unknown)   Skin exam performed as follows: Type 2 skin. Mood appropriate  Alert and Oriented X 3. Well developed, well nourished in no distress.  General appearance: Normal  Head including face: Normal  Eyes: conjunctiva and lids: Normal  Mouth: Lips, teeth, gums: Normal  Neck: Normal  Chest-breast/axillae: Normal  Back: Normal  Spleen and liver: Normal  Cardiovascular: Exam of peripheral vascular system by observation for swelling, varicosities, edema: Normal  Genitalia: groin, buttocks: Normal  Extremities: digits/nails (clubbing): Normal  Eccrine and Apocrine glands: Normal  Right upper extremity: Normal  Left upper extremity: Normal  Right lower extremity: Normal  Left lower extremity: Normal  Skin: Scalp and body hair: See below    Pt deferred exam of breasts, groin, buttocks: No    Other physical findings:  1. Multiple pigmented  macules on extremities and trunk  2. Multiple pigmented macules on face, trunk and extremities  3. Multiple vascular papules on trunk, arms and legs  4. Multiple scattered keratotic plaques  5. Pink gritty papule on the left upper cheek x 1  6. 3 mm pink papule on the left chest       Except as noted above, no other signs of skin cancer or melanoma.     ASSESSMENT/PLAN:   Benign Full skin cancer screening today. . Patient with history of actinic keratoses, no skin CA  Advised on monthly self exams and 1 year  Patient Education: Appropriate brochures given.    1. Multiple benign appearing nevi on arms, legs and trunk. Discussed ABCDEs of melanoma and sunscreen.   2. Multiple lentigos on arms, legs and trunk. Advised benign, no treatment needed.  3. Multiple scattered angiomas. Advised benign, no treatment needed.   4. Seborrheic keratosis on arms, legs and trunk. Advised benign, no treatment needed.  5. Actinic keratosis on the left upper cheek x 1. As precancerous, cryosurgery performed. Advised on blistering and post-op care. Advised if not resolved in 1-2 months to return for evaluation  6. R/o BCC vs lichenoid keratosis vs cutaneous sarcoidoiss vs other on the left chest.Shave biopsy performed.  Area cleaned and anesthetized with 1% lidocaine with epinephrine.  Dermablade used to remove the lesion and sent to pathology. Bleeding was cauterized. Pt tolerated procedure well with no complications.             Follow-up: yearly FSE/PRN sooner    1.) Patient was asked about new and changing moles. YES  2.) Patient received a complete physical skin examination: YES  3.) Patient was counseled to perform a monthly self skin examination: YES  Scribed By: Gregoria Olmos, MS, PA-C

## 2023-02-07 NOTE — LETTER
2/7/2023         RE: Dinorah Thompson  [address]  [facility] [id number]        Dear Colleague,    Thank you for referring your patient, Dinorah Thompson, to the [facility]. Please see a copy of my visit note below.    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 Her sister passed away 2 years ago and will likely be moving to [place].  Her  requires care due to a brain surgery for an AVM.   On review of systems, there are no further skin complaints, patient is feeling otherwise well.  See patient intake sheet.  ROS of the following were done and are negative: Constitutional, Eyes, Ears, Nose,   Mouth, Throat, Cardiovascular, Respiratory, GI, Genitourinary, Musculoskeletal,   Psychiatric, Endocrine, Allergic/Immunologic.    PHYSICAL EXAM:   LMP  (LMP Unknown)   Skin exam performed as follows: Type 2 skin. Mood appropriate  Alert and Oriented X 3. Well developed, well nourished in no distress.  General appearance: Normal  Head including face: Normal  Eyes: conjunctiva and lids: Normal  Mouth: Lips, teeth, gums: Normal  Neck: Normal  Chest-breast/axillae: Normal  Back: Normal  Spleen and liver: Normal  Cardiovascular: Exam of peripheral vascular system by observation for swelling, varicosities, edema: Normal  Genitalia: groin, buttocks: Normal  Extremities: digits/nails (clubbing): Normal  Eccrine and Apocrine glands:  Normal  Right upper extremity: Normal  Left upper extremity: Normal  Right lower extremity: Normal  Left lower extremity: Normal  Skin: Scalp and body hair: See below    Pt deferred exam of breasts, groin, buttocks: No    Other physical findings:  1. Multiple pigmented macules on extremities and trunk  2. Multiple pigmented macules on face, trunk and extremities  3. Multiple vascular papules on trunk, arms and legs  4. Multiple scattered keratotic plaques  5. Pink gritty papule on the left upper cheek x 1  6. 3 mm pink papule on the left chest       Except as noted above, no other signs of skin cancer or melanoma.     ASSESSMENT/PLAN:   Benign Full skin cancer screening today. . Patient with history of actinic keratoses, no skin CA  Advised on monthly self exams and 1 year  Patient Education: Appropriate brochures given.    1. Multiple benign appearing nevi on arms, legs and trunk. Discussed ABCDEs of melanoma and sunscreen.   2. Multiple lentigos on arms, legs and trunk. Advised benign, no treatment needed.  3. Multiple scattered angiomas. Advised benign, no treatment needed.   4. Seborrheic keratosis on arms, legs and trunk. Advised benign, no treatment needed.  5. Actinic keratosis on the left upper cheek x 1. As precancerous, cryosurgery performed. Advised on blistering and post-op care. Advised if not resolved in 1-2 months to return for evaluation  6. R/o BCC vs lichenoid keratosis vs cutaneous sarcoidoiss vs other on the left chest.Shave biopsy performed.  Area cleaned and anesthetized with 1% lidocaine with epinephrine.  Dermablade used to remove the lesion and sent to pathology. Bleeding was cauterized. Pt tolerated procedure well with no complications.             Follow-up: yearly FSE/PRN sooner    1.) Patient was asked about new and changing moles. YES  2.) Patient received a complete physical skin examination: YES  3.) Patient was counseled to perform a monthly self skin examination: YES  Scribed By:  Gregoria Olmos, MS, PAGenesisC          Again, thank you for allowing me to participate in the care of your patient.        Sincerely,        Gregoria Olmos PA-C

## 2023-02-09 ENCOUNTER — VIRTUAL VISIT (OUTPATIENT)
Dept: FAMILY MEDICINE | Facility: CLINIC | Age: 73
End: 2023-02-09
Payer: MEDICARE

## 2023-02-09 DIAGNOSIS — E78.5 HYPERLIPIDEMIA, UNSPECIFIED HYPERLIPIDEMIA TYPE: ICD-10-CM

## 2023-02-09 DIAGNOSIS — F33.1 MODERATE EPISODE OF RECURRENT MAJOR DEPRESSIVE DISORDER (H): Primary | ICD-10-CM

## 2023-02-09 DIAGNOSIS — N18.31 STAGE 3A CHRONIC KIDNEY DISEASE (H): ICD-10-CM

## 2023-02-09 DIAGNOSIS — F32.89 OTHER DEPRESSION: ICD-10-CM

## 2023-02-09 LAB
PATH REPORT.COMMENTS IMP SPEC: NORMAL
PATH REPORT.COMMENTS IMP SPEC: NORMAL
PATH REPORT.FINAL DX SPEC: NORMAL
PATH REPORT.GROSS SPEC: NORMAL
PATH REPORT.MICROSCOPIC SPEC OTHER STN: NORMAL
PATH REPORT.RELEVANT HX SPEC: NORMAL

## 2023-02-09 PROCEDURE — 96127 BRIEF EMOTIONAL/BEHAV ASSMT: CPT | Mod: 95 | Performed by: NURSE PRACTITIONER

## 2023-02-09 PROCEDURE — 99214 OFFICE O/P EST MOD 30 MIN: CPT | Mod: VID | Performed by: NURSE PRACTITIONER

## 2023-02-09 RX ORDER — ROSUVASTATIN CALCIUM 5 MG/1
5 TABLET, COATED ORAL EVERY OTHER DAY
Qty: 45 TABLET | Refills: 3 | Status: SHIPPED | OUTPATIENT
Start: 2023-02-09

## 2023-02-09 RX ORDER — SERTRALINE HYDROCHLORIDE 25 MG/1
25 TABLET, FILM COATED ORAL DAILY
Qty: 90 TABLET | Refills: 1 | Status: SHIPPED | OUTPATIENT
Start: 2023-02-09

## 2023-02-09 RX ORDER — DULOXETIN HYDROCHLORIDE 60 MG/1
60 CAPSULE, DELAYED RELEASE ORAL DAILY
Qty: 90 CAPSULE | Refills: 1 | Status: SHIPPED | OUTPATIENT
Start: 2023-02-09 | End: 2023-03-22

## 2023-02-09 ASSESSMENT — ANXIETY QUESTIONNAIRES
IF YOU CHECKED OFF ANY PROBLEMS ON THIS QUESTIONNAIRE, HOW DIFFICULT HAVE THESE PROBLEMS MADE IT FOR YOU TO DO YOUR WORK, TAKE CARE OF THINGS AT HOME, OR GET ALONG WITH OTHER PEOPLE: NOT DIFFICULT AT ALL
7. FEELING AFRAID AS IF SOMETHING AWFUL MIGHT HAPPEN: NOT AT ALL
GAD7 TOTAL SCORE: 1
GAD7 TOTAL SCORE: 1
5. BEING SO RESTLESS THAT IT IS HARD TO SIT STILL: NOT AT ALL
1. FEELING NERVOUS, ANXIOUS, OR ON EDGE: NOT AT ALL
3. WORRYING TOO MUCH ABOUT DIFFERENT THINGS: NOT AT ALL
2. NOT BEING ABLE TO STOP OR CONTROL WORRYING: NOT AT ALL
6. BECOMING EASILY ANNOYED OR IRRITABLE: SEVERAL DAYS

## 2023-02-09 ASSESSMENT — PATIENT HEALTH QUESTIONNAIRE - PHQ9
5. POOR APPETITE OR OVEREATING: NOT AT ALL
SUM OF ALL RESPONSES TO PHQ QUESTIONS 1-9: 3

## 2023-02-09 NOTE — PROGRESS NOTES
"Dot is a 72 year old who is being evaluated via a billable video visit.      How would you like to obtain your AVS? MyChart  If the video visit is dropped, the invitation should be resent by: Text to cell phone: [phone]  Will anyone else be joining your video visit? No          Assessment & Plan     Moderate episode of recurrent major depressive disorder (H)  Controlled. Continue current medications and TMS.   Will be moving to [place], to establish with new provider after move.   - sertraline (ZOLOFT) 25 MG tablet  Dispense: 90 tablet; Refill: 1    Stage 3a chronic kidney disease (H)  Labs pending. Consider ACE or ARB  - Basic metabolic panel  (Ca, Cl, CO2, Creat, Gluc, K, Na, BUN)    Other depression  As above.   - DULoxetine (CYMBALTA) 60 MG capsule  Dispense: 90 capsule; Refill: 1    Hyperlipidemia, unspecified hyperlipidemia type  Lipids pending.   - Lipid panel reflex to direct LDL Non-fasting  - rosuvastatin (CRESTOR) 5 MG tablet  Dispense: 45 tablet; Refill: 3               BMI:   Estimated body mass index is 26.45 kg/m  as calculated from the following:    Height as of 11/23/22: 1.575 m (5' 2\").    Weight as of 11/23/22: 65.6 kg (144 lb 9.6 oz).           No follow-ups on file.    JASWANT Johnson [facility]    Maya Chris is a 72 year old, presenting for the following health issues:  Recheck Medication      HPI     Depression and Anxiety Follow-Up    How are you doing with your depression since your last visit? Improved     How are you doing with your anxiety since your last visit?  Improved     Are you having other symptoms that might be associated with depression or anxiety? No    Have you had a significant life event? No     Do you have any concerns with your use of alcohol or other drugs? No    Started and is 22 sessions in for transcranial magnetic stimulation with great outcomes.   Continues with duloxetine and sertraline.   Will be moving to [place] " [place] to be closer to family for support.       Social History     Tobacco Use     Smoking status: Never     Smokeless tobacco: Never   Vaping Use     Vaping Use: Never used   Substance Use Topics     Alcohol use: Yes     Comment: 0-2 drinks per month     Drug use: Never     PHQ 12/14/2022 1/24/2023 2/9/2023   PHQ-9 Total Score 12 9 3   Q9: Thoughts of better off dead/self-harm past 2 weeks Not at all Not at all Not at all   PHQ-9 External Data - - -     ELDON-7 SCORE 11/30/2022 1/24/2023 2/9/2023   Total Score 2 (minimal anxiety) 3 (minimal anxiety) -   Total Score 2 3 1         Review of Systems         Objective    Vitals - Patient Reported  Weight (Patient Reported): 63.5 kg (140 lb)  Height (Patient Reported): 157.5 cm (5' 2\")  BMI (Based on Pt Reported Ht/Wt): 25.61      Vitals:  No vitals were obtained today due to virtual visit.    Physical Exam   GENERAL: Healthy, alert and no distress  EYES: Eyes grossly normal to inspection.  No discharge or erythema, or obvious scleral/conjunctival abnormalities.  RESP: No audible wheeze, cough, or visible cyanosis.  No visible retractions or increased work of breathing.    SKIN: Visible skin clear. No significant rash, abnormal pigmentation or lesions.  NEURO: Cranial nerves grossly intact.  Mentation and speech appropriate for age.  PSYCH: Mentation appears normal, affect normal/bright, judgement and insight intact, normal speech and appearance well-groomed.                Video-Visit Details    Type of service:  Video Visit   Video Start Time: 0831  Video End Time:0851    Originating Location (pt. Location): Home    Distant Location (provider location):  On-site  Platform used for Video Visit: Onit    "

## 2023-02-10 ENCOUNTER — LAB (OUTPATIENT)
Dept: LAB | Facility: CLINIC | Age: 73
End: 2023-02-10
Payer: MEDICARE

## 2023-02-10 DIAGNOSIS — M85.80 OSTEOPENIA, UNSPECIFIED LOCATION: ICD-10-CM

## 2023-02-10 DIAGNOSIS — N18.31 STAGE 3A CHRONIC KIDNEY DISEASE (H): ICD-10-CM

## 2023-02-10 DIAGNOSIS — E78.5 HYPERLIPIDEMIA, UNSPECIFIED HYPERLIPIDEMIA TYPE: ICD-10-CM

## 2023-02-10 LAB
ANION GAP SERPL CALCULATED.3IONS-SCNC: 10 MMOL/L (ref 7–15)
BUN SERPL-MCNC: 19.8 MG/DL (ref 8–23)
CALCIUM SERPL-MCNC: 9.2 MG/DL (ref 8.8–10.2)
CHLORIDE SERPL-SCNC: 103 MMOL/L (ref 98–107)
CHOLEST SERPL-MCNC: 146 MG/DL
CREAT SERPL-MCNC: 0.95 MG/DL (ref 0.51–0.95)
DEPRECATED HCO3 PLAS-SCNC: 25 MMOL/L (ref 22–29)
GFR SERPL CREATININE-BSD FRML MDRD: 63 ML/MIN/1.73M2
GLUCOSE SERPL-MCNC: 91 MG/DL (ref 70–99)
HDLC SERPL-MCNC: 42 MG/DL
LDLC SERPL CALC-MCNC: 65 MG/DL
NONHDLC SERPL-MCNC: 104 MG/DL
POTASSIUM SERPL-SCNC: 4.6 MMOL/L (ref 3.4–5.3)
SODIUM SERPL-SCNC: 138 MMOL/L (ref 136–145)
TRIGL SERPL-MCNC: 194 MG/DL

## 2023-02-10 PROCEDURE — 80048 BASIC METABOLIC PNL TOTAL CA: CPT

## 2023-02-10 PROCEDURE — 36415 COLL VENOUS BLD VENIPUNCTURE: CPT

## 2023-02-10 PROCEDURE — 80061 LIPID PANEL: CPT

## 2023-02-13 ENCOUNTER — DOCUMENTATION ONLY (OUTPATIENT)
Dept: ANESTHESIOLOGY | Facility: CLINIC | Age: 73
End: 2023-02-13
Payer: MEDICARE

## 2023-02-13 ENCOUNTER — TELEPHONE (OUTPATIENT)
Dept: ANESTHESIOLOGY | Facility: CLINIC | Age: 73
End: 2023-02-13
Payer: MEDICARE

## 2023-02-13 DIAGNOSIS — M54.16 LUMBAR RADICULOPATHY: Primary | ICD-10-CM

## 2023-02-13 DIAGNOSIS — G89.29 CHRONIC PAIN OF RIGHT KNEE: ICD-10-CM

## 2023-02-13 DIAGNOSIS — M25.561 CHRONIC PAIN OF RIGHT KNEE: ICD-10-CM

## 2023-02-13 NOTE — PROGRESS NOTES
Purpose of call: Follow up after Left genicular nerve block #1    Date of service: 1/17/23  Spoke with: Patient    Location of pain: Left knee  Percentage of pain relief after procedure: 100%  Duration of pain relief: 10 hours    Patient reports pain prior to procedure 6/10. Patient reports 0/10 pain after procedure with only mild injection site pain.    Follow up: Routing to provider to place next case request.    Lucinda Jay RNCC

## 2023-02-13 NOTE — TELEPHONE ENCOUNTER
RN spoke with patient to obtain pain diary results. Writer documented results in documentation only encounter. Patient is requesting to repeat lumbar injection. Writer will route a message to provider to place orders as appropriate. Patient appreciated the call.    Lucinda Jay RNCC

## 2023-02-15 ENCOUNTER — TELEPHONE (OUTPATIENT)
Dept: ANESTHESIOLOGY | Facility: CLINIC | Age: 73
End: 2023-02-15
Payer: MEDICARE

## 2023-02-15 PROBLEM — G89.29 CHRONIC PAIN OF LEFT KNEE: Status: ACTIVE | Noted: 2021-11-10

## 2023-02-15 PROBLEM — M25.562 CHRONIC PAIN OF LEFT KNEE: Status: ACTIVE | Noted: 2021-11-10

## 2023-02-15 NOTE — TELEPHONE ENCOUNTER
Called patient to schedule surgery with Dr. Santana    Date of Surgery: 2/21,3/14    [facility] [place] [facility]    Additional comments: Spoke with patient, they are aware of above dates, will review packet and reach out with any questions, patient wished to complete knee first and push lumbar out to March Anna C. Schoenecker on 2/15/2023 at 11:01 AM

## 2023-02-21 ENCOUNTER — HOSPITAL ENCOUNTER (OUTPATIENT)
Facility: AMBULATORY SURGERY CENTER | Age: 73
Discharge: HOME | End: 2023-02-21
Attending: ANESTHESIOLOGY | Admitting: ANESTHESIOLOGY
Payer: MEDICARE

## 2023-02-21 ENCOUNTER — ANCILLARY PROCEDURE (OUTPATIENT)
Dept: RADIOLOGY | Facility: AMBULATORY SURGERY CENTER | Age: 73
End: 2023-02-21
Attending: ANESTHESIOLOGY
Payer: MEDICARE

## 2023-02-21 VITALS
DIASTOLIC BLOOD PRESSURE: 78 MMHG | WEIGHT: 138 LBS | BODY MASS INDEX: 25.4 KG/M2 | TEMPERATURE: 97.9 F | RESPIRATION RATE: 16 BRPM | HEIGHT: 62 IN | HEART RATE: 69 BPM | SYSTOLIC BLOOD PRESSURE: 127 MMHG | OXYGEN SATURATION: 98 %

## 2023-02-21 DIAGNOSIS — G89.29 CHRONIC PAIN OF LEFT KNEE: ICD-10-CM

## 2023-02-21 DIAGNOSIS — M25.562 CHRONIC PAIN OF LEFT KNEE: ICD-10-CM

## 2023-02-21 PROCEDURE — 64454 NJX AA&/STRD GNCLR NRV BRNCH: CPT | Mod: LT

## 2023-02-21 PROCEDURE — 64454 NJX AA&/STRD GNCLR NRV BRNCH: CPT | Mod: LT | Performed by: ANESTHESIOLOGY

## 2023-02-21 RX ORDER — IOPAMIDOL 408 MG/ML
INJECTION, SOLUTION INTRATHECAL PRN
Status: DISCONTINUED | OUTPATIENT
Start: 2023-02-21 | End: 2023-02-21 | Stop reason: HOSPADM

## 2023-02-21 RX ORDER — LIDOCAINE HYDROCHLORIDE 40 MG/ML
INJECTION, SOLUTION RETROBULBAR PRN
Status: DISCONTINUED | OUTPATIENT
Start: 2023-02-21 | End: 2023-02-21 | Stop reason: HOSPADM

## 2023-02-21 NOTE — H&P
Dinorah BRANCH Jay  [id number]  female  72 year old      Reason for procedure/surgery: left knee pain    Patient Active Problem List   Diagnosis     Cervical radiculopathy, chronic     Chronic pain disorder     Intervertebral disc disorders with radiculopathy, lumbar region     Lumbar radiculopathy, chronic     Other cervical disc degeneration at C5-C6 level     Spondylosis without myelopathy or radiculopathy, lumbar region     Osteoarthrosis, hand     Primary localized osteoarthritis of knees, bilateral     Hyperlipidemia     Depression     Sarcoidosis of lung (H)     Hoarseness     Laryngeal disorder     Precancerous lesion     History of retinal detachment     Lumbar radiculopathy     Hyperparathyroidism (H)     Chronic pain of left knee     Primary osteoarthritis of left knee     Moderate episode of recurrent major depressive disorder (H)     Grief     Stage 3a chronic kidney disease (H)     Acute sphenoidal sinusitis     Bacterial conjunctivitis     Family history of dementia     Gastroesophageal reflux disease     Insomnia     Mild intermittent asthma     Osteoarthrosis     Osteoporosis     Recurrent major depression in remission (H)     Polyuria     Other fatigue     Prediabetes     Restless legs syndrome       Past Surgical History:    Past Surgical History:   Procedure Laterality Date     APPENDECTOMY       BIOPSY  2008    Lungs     COLONOSCOPY       diskectomy in toe       EYE SURGERY  Cataracts     GENITOURINARY SURGERY  2019    Bladder mesh repair     GYN SURGERY  2019    Total hysterectomy     HYSTERECTOMY  08/2017    and bladder surgery     INJECT EPIDURAL LUMBAR Right 4/27/2021    Procedure: Right Lumbar 5- Sacral 1 transforaminal epidural steroid injection with fluoroscopy and conscious sedation.;  Surgeon: Tiera Santana MD;  Location: [facility] OR     INJECT EPIDURAL LUMBAR Left 11/11/2021    Procedure: Lumbar epidural steroid injection L5- S1 with fluoroscopy;  Surgeon: Tiera Santana MD;  Location:  [facility] OR     INJECT EPIDURAL LUMBAR Left 3/8/2022    Procedure: L5-S1 Epidural steroid injection with fluoroscopy;  Surgeon: Tiera Santana MD;  Location: [facility] OR     INJECT EPIDURAL LUMBAR Left 7/26/2022    Procedure: Lumbar epidural steroid injection at left L5-S1;  Surgeon: Tiera Santana MD;  Location: [facility] OR     INJECT EPIDURAL LUMBAR N/A 11/15/2022    Procedure: Lumbar epidural steroid injection at (left >right) L5-S1 with fluoroscopy;  Surgeon: Tiera Santana MD;  Location: [facility] OR     INJECT MAJOR JOINT / BURSA Left 10/13/2022    Procedure: Left knee synvisc injection with ultrasound guidance.;  Surgeon: Tiera Santana MD;  Location: [facility] OR     left rotator cuff surgery       NERVE BLOCK PERIPHERAL Left 1/17/2023    Procedure: Left genicular nerve block #1 with fluoroscopy;  Surgeon: Tiera Santana MD;  Location: [facility] OR     PARATHYROIDECTOMY N/A 7/21/2021    Procedure: PARATHYROIDECTOMY;  Surgeon: Gregoria Wilcox MD;  Location: [facility]     SINUS SURGERY      x2 in 2013 and 2018     TONSILLECTOMY  1955       Past Medical History:   Past Medical History:   Diagnosis Date     Chronic osteoarthritis      Complication of anesthesia 1990's    DIfficulty waking up     Depressive disorder      Elevated glucose 10/21/2022     Female bladder prolapse 09/03/2019     Mild intermittent asthma 07/29/2017     FAHAD treated with BiPAP 06/13/2018     Parathyroid abnormality (H)      Prolapse of female pelvic organs 08/19/2019     Restless legs syndrome 10/21/2022     Sleep apnea     Uses a Bi-Pap.. Will have son bring it if necessary on DOS       Social History:   Social History     Tobacco Use     Smoking status: Never     Smokeless tobacco: Never   Substance Use Topics     Alcohol use: Yes     Comment: 0-2 drinks per month       Family History:   Family History   Problem Relation Age of Onset     Myocardial Infarction Father         late 50s     Coronary Artery Disease Father      Depression Father      Ovarian Cancer  "Sister      Cervical Cancer Sister      Lung Cancer Sister      Depression Sister      Anxiety Disorder Sister      Myocardial Infarction Paternal Uncle         late 50s     Hyperlipidemia Mother      Osteoporosis Mother      Cerebrovascular Disease Paternal Grandmother      Cerebrovascular Disease Paternal Grandfather      Anxiety Disorder Son        Allergies:   Allergies   Allergen Reactions     Propoxyphene Other (See Comments)     Clarithromycin Other (See Comments)     Pt states, \"ototoxicity\". Balance problems  Pt states, \"ototoxicity\".         Active Medications:   Current Outpatient Medications   Medication Sig Dispense Refill     acetaminophen (TYLENOL) 500 MG tablet Take 2 tablets (1,000 mg) by mouth every 6 hours as needed for pain       baclofen (LIORESAL) 10 MG tablet Take 1 tablet (10 mg) by mouth daily as needed for muscle spasms 30 tablet 0     DULoxetine (CYMBALTA) 60 MG capsule Take 1 capsule (60 mg) by mouth daily 90 capsule 1     LYSINE PO        mometasone (NASONEX) 50 MCG/ACT nasal spray spray 2 spray by intranasal route  every day in each nostril       rosuvastatin (CRESTOR) 5 MG tablet Take 1 tablet (5 mg) by mouth every other day 45 tablet 3     sertraline (ZOLOFT) 25 MG tablet Take 1 tablet (25 mg) by mouth daily 90 tablet 1     traZODone (DESYREL) 100 MG tablet Take 1 tablet (100 mg) by mouth nightly as needed for sleep 90 tablet 3     valACYclovir (VALTREX) 1000 mg tablet Take two tablets twice per day for one day for flare ups. 30 tablet 1     Carboxymethylcellul-Glycerin 1-0.9 % GEL Place 1 drop into both eyes daily        NONFORMULARY Vitamin Packet from [employer] including Multi-vitamin, Leutin, Calcium, Antioxidant, Fish oil, Glucosamine- Chondroitin: Take 1 packet by mouth daily       tacrolimus (PROTOPIC) 0.1 % external ointment Apply to AA on the face BID PRN 30 g 5       Systemic Review:   CONSTITUTIONAL: NEGATIVE for fever, chills, change in weight  ENT/MOUTH: NEGATIVE for ear, " "mouth and throat problems  RESP: NEGATIVE for significant cough or SOB  CV: NEGATIVE for chest pain, palpitations or peripheral edema    Physical Examination:   Vital Signs: /78   Pulse 69   Temp 97.9  F (36.6  C) (Temporal)   Resp 16   Ht 1.575 m (5' 2\")   Wt 62.6 kg (138 lb)   LMP  (LMP Unknown)   SpO2 98%   BMI 25.24 kg/m    GENERAL: healthy, alert and no distress  NECK: no adenopathy, no asymmetry, masses, or scars  RESP: lungs clear to auscultation - no rales, rhonchi or wheezes  CV: regular rate and rhythm, normal S1 S2, no S3 or S4, no murmur, click or rub, no peripheral edema and peripheral pulses strong  ABDOMEN: soft, nontender, no hepatosplenomegaly, no masses and bowel sounds normal  MS: no gross musculoskeletal defects noted, no edema    Plan: Appropriate to proceed as scheduled.      Tiera Santana MD  2/21/2023          "

## 2023-02-21 NOTE — OP NOTE
PROCEDURE REPORT     Chief Complaint:  Left knee pain     Pre-Operative Diagnosis:  1. Pain in left knee  2. Neuralgia and neuritis, unspecified     Post-Operative Diagnosis:  1. Pain in left knee  2. Neuralgia and neuritis, unspecified     Procedure:  1. Left genicular nerve block   2. Fluoroscopy for needle guidance     Anesthesia:  Local anesthesia     Indications / Pre-Operative Plan:  The patient has disabling knee pain, therefore a genicular nerve block will be performed. The risks, alternatives and benefits were explained to the patient in detail. The patient agreed with the plan.     Patient was examined by me prior to the procedure. Examination of heart, lung and mental status were all within acceptable limits. The patient has been assessed, examined and cleared for the planned procedure and level of anesthesia in an ambulatory surgery center.     Description of Procedure:     After obtaining informed consent, the patient was placed in supine position and prepped and draped. The skin was anesthetized with 1ml lidocaine 1% and 3 needles each 22 gauge x 3.5 inch were placed at the sites of the left three genicular nerves. The genicular (superior medial, superior lateral, and inferior medial) articular nerves were targeted. Placement was confirmed with multiple fluoroscopic views and 0.5mL lidocaine 4% was injected.  Needles were withdrawn. Left knee then cleansed. The patient was then assisted to recovery and no immediate complications were observed.    Pre op pain 5/10  Post op pain 1/10

## 2023-02-21 NOTE — DISCHARGE INSTRUCTIONS
"Home Care Instructions after a Genicular Nerve Block      In a genicular nerve block, a local anesthetic (numbing medicine) is injected near sensory nerves which carry pain signals to the brain. This procedure is a diagnostic procedure and is typically short lasting. With this injection a steroid to increase the longevity of the blocks effect may be used.    Activity  -You may resume most normal activity levels with the exception of strenuous activity. It is important for us to know if your pain with normal activity is relieved after this injection.  -DO NOT shower for 24 hours  -DO NOT remove bandaid for 24 hours    Pain  -You may experience soreness at the injection site for one or two days  -You may use an ice pack for 20 minutes every 2 hours for the first 24 hours  -You may use a heating pad after the first 24 hours  -You may use Tylenol (acetaminophen) every 4 hours or other pain medicines as     directed by your physician    You may experience numbness radiating into your legs or arms (depending on the procedure location). This numbness may last several hours. Until sensation returns to normal; please use caution in walking, climbing stairs, and stepping out of your vehicle, etc.    DID YOU RECEIVE STEROIDS TODAY?  {YES / NO:[id number]::\"Yes\"}    Common side effects of steroids:  Not everyone will experience corticosteroid side effects. If side effects are experienced, they will gradually subside in the 7-10 day period following an injection. Most common side effects include:  -Flushed face and/or chest  -Feeling of warmth, particularly in the face but could be an overall feeling of warmth  -Increased blood sugar in diabetic patients  -Menstrual irregularities my occur. If taking hormone-based birth control an alternate method of birth control is recommended  -Sleep disturbances and/or mood swings are possible  -Leg cramps      PLEASE KEEP TRACK OF YOUR SYMPTOMS AND NOTE YOUR IMPROVEMENT FOR YOUR DOCTOR. "     Please contact us if you have:  -Severe pain  -Fever more than 101.5 degrees Fahrenheit  -Signs of infection at the injection site (redness, swelling, or drainage)    FOR PAIN CENTER PATIENTS:  If you have questions, please contact the Pain Clinic at [phone] Option #1 between the hours of 7:00 am and 3:00 pm Monday through Friday. After office hours you can contact the on call provider by dialing [phone]. If you need immediate attention, we recommend that you go to a hospital emergency room or dial [phone].

## 2023-02-22 ENCOUNTER — MYC MEDICAL ADVICE (OUTPATIENT)
Dept: ANESTHESIOLOGY | Facility: CLINIC | Age: 73
End: 2023-02-22
Payer: MEDICARE

## 2023-02-22 ENCOUNTER — DOCUMENTATION ONLY (OUTPATIENT)
Dept: ANESTHESIOLOGY | Facility: CLINIC | Age: 73
End: 2023-02-22
Payer: MEDICARE

## 2023-02-22 DIAGNOSIS — G89.29 CHRONIC PAIN OF LEFT KNEE: Primary | ICD-10-CM

## 2023-02-22 DIAGNOSIS — M25.562 CHRONIC PAIN OF LEFT KNEE: Primary | ICD-10-CM

## 2023-02-22 NOTE — TELEPHONE ENCOUNTER
RN spoke with patient to confirm pain diary results noted in documentation only encounter. Patient agreed with documentation. Patient reports she will be moving April 1st and would like procedure complete prior to. Writer informed patient a message would be sent to the provider to place orders. Patient understood and appreciated the call.    Lucinda Jay RNCC

## 2023-02-22 NOTE — PROGRESS NOTES
Purpose of call: Follow up after Left genicular nerve block    Date of service: 2/21/2023  Spoke with: Pain diary and patient    Location of pain: Left knee  Percentage of pain relief after procedure: 80%   Duration of pain relief: 7-8 hours    Follow up: Routing to provider to review    Lucinda Jay RNCC

## 2023-02-22 NOTE — TELEPHONE ENCOUNTER
RN read through the instructions with the patient for the recommended procedure: Left genicular nerve ablation  Patient verbalized understanding to holding appropriate medication per protocol and was agreeable to NPO policy and needing a .    Anticoagulant: None reported.    Recommended Follow Up: Follow up as needed.    Lucinda Jay, SHERICECC

## 2023-02-23 ENCOUNTER — TELEPHONE (OUTPATIENT)
Dept: ANESTHESIOLOGY | Facility: CLINIC | Age: 73
End: 2023-02-23
Payer: MEDICARE

## 2023-02-23 NOTE — TELEPHONE ENCOUNTER
Called patient to schedule surgery with Dr. Santana    Date of Surgery: 2/28    Location of surgery: [facility]    Pre-Op H&P: NA      Additional comments: Spoke with patient they are aware of above date. Will reach out with questions    Anna C. Schoenecker on 2/23/2023 at 12:56 PM          Anna C. Schoenecker on 2/23/2023 at 12:12 PM

## 2023-02-28 ENCOUNTER — ANCILLARY PROCEDURE (OUTPATIENT)
Dept: RADIOLOGY | Facility: AMBULATORY SURGERY CENTER | Age: 73
End: 2023-02-28
Attending: ANESTHESIOLOGY
Payer: MEDICARE

## 2023-02-28 ENCOUNTER — HOSPITAL ENCOUNTER (OUTPATIENT)
Facility: AMBULATORY SURGERY CENTER | Age: 73
Discharge: HOME | End: 2023-02-28
Attending: ANESTHESIOLOGY | Admitting: ANESTHESIOLOGY
Payer: MEDICARE

## 2023-02-28 VITALS
RESPIRATION RATE: 16 BRPM | OXYGEN SATURATION: 96 % | HEART RATE: 58 BPM | DIASTOLIC BLOOD PRESSURE: 81 MMHG | SYSTOLIC BLOOD PRESSURE: 143 MMHG | TEMPERATURE: 97.6 F

## 2023-02-28 DIAGNOSIS — R52 PAIN: ICD-10-CM

## 2023-02-28 PROCEDURE — 99152 MOD SED SAME PHYS/QHP 5/>YRS: CPT | Performed by: ANESTHESIOLOGY

## 2023-02-28 PROCEDURE — 64624 DSTRJ NULYT AGT GNCLR NRV: CPT | Mod: LT

## 2023-02-28 PROCEDURE — 64624 DSTRJ NULYT AGT GNCLR NRV: CPT | Mod: LT | Performed by: ANESTHESIOLOGY

## 2023-02-28 RX ORDER — LIDOCAINE HYDROCHLORIDE 20 MG/ML
INJECTION, SOLUTION INFILTRATION; PERINEURAL DAILY PRN
Status: DISCONTINUED | OUTPATIENT
Start: 2023-02-28 | End: 2023-02-28 | Stop reason: HOSPADM

## 2023-02-28 RX ORDER — FENTANYL CITRATE 50 UG/ML
INJECTION, SOLUTION INTRAMUSCULAR; INTRAVENOUS DAILY PRN
Status: DISCONTINUED | OUTPATIENT
Start: 2023-02-28 | End: 2023-02-28 | Stop reason: HOSPADM

## 2023-02-28 RX ORDER — BUPIVACAINE HYDROCHLORIDE 2.5 MG/ML
INJECTION, SOLUTION INFILTRATION; PERINEURAL DAILY PRN
Status: DISCONTINUED | OUTPATIENT
Start: 2023-02-28 | End: 2023-02-28 | Stop reason: HOSPADM

## 2023-02-28 NOTE — H&P
Dinorah Thompson  [id number]  female  72 year old      Reason for procedure/surgery: left knee pain    Patient Active Problem List   Diagnosis     Cervical radiculopathy, chronic     Chronic pain disorder     Intervertebral disc disorders with radiculopathy, lumbar region     Lumbar radiculopathy, chronic     Other cervical disc degeneration at C5-C6 level     Spondylosis without myelopathy or radiculopathy, lumbar region     Osteoarthrosis, hand     Primary localized osteoarthritis of knees, bilateral     Hyperlipidemia     Depression     Sarcoidosis of lung (H)     Hoarseness     Laryngeal disorder     Precancerous lesion     History of retinal detachment     Lumbar radiculopathy     Hyperparathyroidism (H)     Chronic pain of left knee     Primary osteoarthritis of left knee     Moderate episode of recurrent major depressive disorder (H)     Grief     Stage 3a chronic kidney disease (H)     Acute sphenoidal sinusitis     Bacterial conjunctivitis     Family history of dementia     Gastroesophageal reflux disease     Insomnia     Mild intermittent asthma     Osteoarthrosis     Osteoporosis     Recurrent major depression in remission (H)     Polyuria     Other fatigue     Prediabetes     Restless legs syndrome       Past Surgical History:    Past Surgical History:   Procedure Laterality Date     APPENDECTOMY       BIOPSY  2008    Lungs     BLOCK, NERVE, GENICULAR Left 2/21/2023    Procedure: Left genicular nerve block with fluoroscopy;  Surgeon: Tiera Santana MD;  [place] [facility] [place]     [place]ONOSCOPY       diskectomy in toe       EYE SURGERY  Cataracts     GENITOURINARY SURGERY  2019    Bladder mesh repair     GYN SURGERY  2019    Total hysterectomy     HYSTERECTOMY  08/2017    and bladder surgery     INJECT EPIDURAL LUMBAR Right 4/27/2021    Procedure: Right Lumbar 5- Sacral 1 transforaminal epidural steroid injection with fluoroscopy and conscious sedation.;  Surgeon: Tiera Santana MD;  [place] [facility] [place]      INJECT EPIDURAL LUMBAR Left 11/11/2021    Procedure: Lumbar epidural steroid injection L5- S1 with fluoroscopy;  Surgeon: Tiera Santana MD;  Location: [facility]     INJECT EPIDURAL LUMBAR Left 3/8/2022    Procedure: L5-S1 Epidural steroid injection with fluoroscopy;  Surgeon: Tiera Santana MD;  Location: [facility]     INJECT EPIDURAL LUMBAR Left 7/26/2022    Procedure: Lumbar epidural steroid injection at left L5-S1;  Surgeon: Tiera Santana MD;  Location: [facility] OR     INJECT EPIDURAL LUMBAR N/A 11/15/2022    Procedure: Lumbar epidural steroid injection at (left >right) L5-S1 with fluoroscopy;  Surgeon: Tiera Santana MD;  Location: [facility] OR     INJECT MAJOR JOINT / BURSA Left 10/13/2022    Procedure: Left knee synvisc injection with ultrasound guidance.;  Surgeon: Tiera Santana MD;  Location: [facility] OR     left rotator cuff surgery       NERVE BLOCK PERIPHERAL Left 1/17/2023    Procedure: Left genicular nerve block #1 with fluoroscopy;  Surgeon: Tiera Santana MD;  Location: [facility] OR     PARATHYROIDECTOMY N/A 7/21/2021    Procedure: PARATHYROIDECTOMY;  Surgeon: Gregoria Wilcox MD;  Location: [facility]     SINUS SURGERY      x2 in 2013 and 2018     TONSILLECTOMY  1955       Past Medical History:   Past Medical History:   Diagnosis Date     Chronic osteoarthritis      Complication of anesthesia 1990's    DIfficulty waking up     Depressive disorder      Elevated glucose 10/21/2022     Female bladder prolapse 09/03/2019     FAHAD treated with BiPAP 06/13/2018     Parathyroid abnormality (H)      Prolapse of female pelvic organs 08/19/2019     Restless legs syndrome 10/21/2022     Sleep apnea     Uses a Bi-Pap.. Will have son bring it if necessary on DOS       Social History:   Social History     Tobacco Use     Smoking status: Never     Smokeless tobacco: Never   Substance Use Topics     Alcohol use: Yes     Comment: 0-2 drinks per month       Family History:   Family History   Problem Relation Age of Onset     Myocardial  "Infarction Father         late 50s     Coronary Artery Disease Father      Depression Father      Ovarian Cancer Sister      Cervical Cancer Sister      Lung Cancer Sister      Depression Sister      Anxiety Disorder Sister      Myocardial Infarction Paternal Uncle         late 50s     Hyperlipidemia Mother      Osteoporosis Mother      Cerebrovascular Disease Paternal Grandmother      Cerebrovascular Disease Paternal Grandfather      Anxiety Disorder Son        Allergies:   Allergies   Allergen Reactions     Propoxyphene Other (See Comments)     Clarithromycin Other (See Comments)     Pt states, \"ototoxicity\". Balance problems  Pt states, \"ototoxicity\".         Active Medications:   Current Outpatient Medications   Medication Sig Dispense Refill     acetaminophen (TYLENOL) 500 MG tablet Take 2 tablets (1,000 mg) by mouth every 6 hours as needed for pain       baclofen (LIORESAL) 10 MG tablet Take 1 tablet (10 mg) by mouth daily as needed for muscle spasms 30 tablet 0     DULoxetine (CYMBALTA) 60 MG capsule Take 1 capsule (60 mg) by mouth daily 90 capsule 1     rosuvastatin (CRESTOR) 5 MG tablet Take 1 tablet (5 mg) by mouth every other day 45 tablet 3     sertraline (ZOLOFT) 25 MG tablet Take 1 tablet (25 mg) by mouth daily 90 tablet 1     traZODone (DESYREL) 100 MG tablet Take 1 tablet (100 mg) by mouth nightly as needed for sleep 90 tablet 3     Carboxymethylcellul-Glycerin 1-0.9 % GEL Place 1 drop into both eyes daily        LYSINE PO        mometasone (NASONEX) 50 MCG/ACT nasal spray spray 2 spray by intranasal route  every day in each nostril       NONFORMULARY Vitamin Packet from [employer] including Multi-vitamin, Leutin, Calcium, Antioxidant, Fish oil, Glucosamine- Chondroitin: Take 1 packet by mouth daily       tacrolimus (PROTOPIC) 0.1 % external ointment Apply to AA on the face BID PRN 30 g 5     valACYclovir (VALTREX) 1000 mg tablet Take two tablets twice per day for one day for flare ups. 30 tablet 1 "       Systemic Review:   CONSTITUTIONAL: NEGATIVE for fever, chills, change in weight  ENT/MOUTH: NEGATIVE for ear, mouth and throat problems  RESP: NEGATIVE for significant cough or SOB  CV: NEGATIVE for chest pain, palpitations or peripheral edema    Physical Examination:   Vital Signs: BP (!) 143/90   Pulse 58   Temp 97.6  F (36.4  C) (Temporal)   Resp 16   LMP  (LMP Unknown)   SpO2 98%   GENERAL: healthy, alert and no distress  NECK: no adenopathy, no asymmetry, masses, or scars  RESP: lungs clear to auscultation - no rales, rhonchi or wheezes  CV: regular rate and rhythm, normal S1 S2, no S3 or S4, no murmur, click or rub, no peripheral edema and peripheral pulses strong  ABDOMEN: soft, nontender, no hepatosplenomegaly, no masses and bowel sounds normal  MS: no gross musculoskeletal defects noted, no edema    Plan: Appropriate to proceed as scheduled.      Tiera Santana MD  2/28/2023

## 2023-02-28 NOTE — DISCHARGE INSTRUCTIONS
Home Care Instructions after a Genicular Nerve Block      In a genicular nerve block, a local anesthetic (numbing medicine) is injected near sensory nerves which carry pain signals to the brain. This procedure is a diagnostic procedure and is typically short lasting. With this injection a steroid to increase the longevity of the blocks effect may be used.    Activity  -You may resume most normal activity levels with the exception of strenuous activity. It is important for us to know if your pain with normal activity is relieved after this injection.  -DO NOT shower for 24 hours  -DO NOT remove bandaid for 24 hours    Pain  -You may experience soreness at the injection site for one or two days  -You may use an ice pack for 20 minutes every 2 hours for the first 24 hours  -You may use a heating pad after the first 24 hours  -You may use Tylenol (acetaminophen) every 4 hours or other pain medicines as     directed by your physician    You may experience numbness radiating into your legs or arms (depending on the procedure location). This numbness may last several hours. Until sensation returns to normal; please use caution in walking, climbing stairs, and stepping out of your vehicle, etc.    DID YOU RECEIVE SEDATION TODAY? YES    If you received sedation please follow these additional safety measures.  Sedation medicine, if given, may remain active for many hours. It is important for the next 24 hours that you do not:  -Drive a car  -Operate machines or power tools  -Consume alcohol, including beer  -Sign any important papers or legal documents    DID YOU RECEIVE STEROIDS TODAY?   Yes    Common side effects of steroids:  Not everyone will experience corticosteroid side effects. If side effects are experienced, they will gradually subside in the 7-10 day period following an injection. Most common side effects include:  -Flushed face and/or chest  -Feeling of warmth, particularly in the face but could be an overall  feeling of warmth  -Increased blood sugar in diabetic patients  -Menstrual irregularities my occur. If taking hormone-based birth control an alternate method of birth control is recommended  -Sleep disturbances and/or mood swings are possible  -Leg cramps      PLEASE KEEP TRACK OF YOUR SYMPTOMS AND NOTE YOUR IMPROVEMENT FOR YOUR DOCTOR.     Please contact us if you have:  -Severe pain  -Fever more than 101.5 degrees Fahrenheit  -Signs of infection at the injection site (redness, swelling, or drainage)    FOR PAIN CENTER PATIENTS:  If you have questions, please contact the Pain Clinic at [phone] Option #1 between the hours of 7:00 am and 3:00 pm Monday through Friday. After office hours you can contact the on call provider by dialing [phone]. If you need immediate attention, we recommend that you go to a hospital emergency room or dial [phone].      FOR PM&R PATIENTS:  For patients seen by the PM and R service, please call [phone]. (Monday through Friday 8a-5p.  After business hours and weekends call [phone] and ask for the PM and R doctor on call). If you need to fax a pain diary to PM&R the fax number is [phone]. If you are unable to fax, uploading to [employer] is encouraged, then send to provider. If you have procedure scheduling questions please call [phone] Option #2.

## 2023-02-28 NOTE — OP NOTE
Patient: Dinorah Thompson Age: 72 year old   MRN: [id number] Attending: Dr. Arambula     Date of Visit: March 1, 2023      PAIN MEDICINE CLINIC PROCEDURE NOTE    ATTENDING CLINICIAN:    NAYE Sandoval      PREPROCEDURE DIAGNOSES:  1.  Left Knee pain, chronic  2.  Left Knee joint osteoarthritis    POSTPROCEDURE DIAGNOSES:  1.  Left knee pain, chronic  2.  Left Knee joint osteoarthritis      PROCEDURE(S) PERFORMED:  1. Left knee genicular nerves radiofrequency ablation  2. Fluoroscopic guidance for the above-named procedure(s)  3. Intravenous conscious sedation      ANESTHESIA:  Intravenous conscious sedation  The patient was anxious prior to the procedure and requested conscious sedation. The patient was monitored with pulse oximetry, automatic blood pressure cuff and ECG. The patient received oral sedation and was attended by a registered nurse for one-to-one monitoring throughout the procedure. The following medication(s) were administered for anxiety and analgesia: fentanyl 100 mcg over 15 minutes. Medication dosages and vital signs are recorded in the jonn-operative note.      BLOOD LOSS:  Minimal.    DRAINS AND SPECIMENS:  None.    COMPLICATIONS:  None.    INDICATIONS:  Dinorah Thompson is a 72 year old female with a history of  chronic knee pain secondary to osteoarthritis .  The patient stated that the patient was in their usual state of health and denied recent anticoagulant use or recent infections.  The patient had good pain relief with genicular nerve block. Therefore, the plan is to perform above mentioned procedure.       Procedure Details:  The patient was met in the procedure room, where the patient was identified by name, medical record number and date of birth.  All of the patient s last minute questions were answered. Written informed consent was obtained and saved in the electronic medical record, after the risks, benefits, and alternatives were discussed with the patient.      A formal time-out  procedure was performed, as per protocol, including patient name, title of procedure, and site of procedure, and all in the room concurred.  Routine monitors were applied.      The patient was placed in the supine position on the procedure room table with affected knee slightly flexed and rested on pillows.  All pressure points were checked and comfortably padded.  Routine monitors were placed.  Vital signs were stable.    A chlorhexidine prep was completed followed by sterile draping per standard procedure.     The anterior left knee was cleaned with chloraprep. Sterile drapes placed. The skin over the planned insertion sites was anesthetized with 1% lidocaine. Then using a 18G 100mm with 10mm active tip radiofrequency cannulas, one cannula was placed along the distal shaft of the femur directly superior to the lateral condylar flare.  A second cannula was placed distal shaft of the femur directly superior to the medial condylar flare.  The 3rd cannula was placed along the proximal tibial shaft directly inferior to the medial condylar flare.  AP views and lateral views taken. Then radiofrequency probes were inserted into the cannulae. Sensory and motor testing was undertaken at each cannula site individually, with no noted motor stimulation. Then , 0.5 ml of lidocaine 2% was injected at each cannula site. After an appropriate amount of time passed, the radiofrequency ablation was performed at 80 degrees C for 90 seconds at each site. Then after negative aspiration for heme, bupivacaine 0.25% was injected into each canula. The needle was then removed.      Light pressure was held at the puncture site(s) to prevent ecchymosis and oozing.  The patient's skin was cleansed, and hemostasis was confirmed.  Band-aids were applied to the needle injection site(s).      Condition:    The patient remained awake and alert throughout the procedure.  The patient tolerated the procedure well and was monitored for approximately 15  minutes afterward in the post procedure area.  There were no immediate post procedure complications noted.  The patient was then discharged to home as per protocol.

## 2023-03-14 ENCOUNTER — HOSPITAL ENCOUNTER (OUTPATIENT)
Facility: AMBULATORY SURGERY CENTER | Age: 73
Discharge: HOME | End: 2023-03-14
Attending: ANESTHESIOLOGY | Admitting: ANESTHESIOLOGY
Payer: MEDICARE

## 2023-03-14 ENCOUNTER — ANCILLARY PROCEDURE (OUTPATIENT)
Dept: RADIOLOGY | Facility: AMBULATORY SURGERY CENTER | Age: 73
End: 2023-03-14
Attending: ANESTHESIOLOGY
Payer: MEDICARE

## 2023-03-14 VITALS
OXYGEN SATURATION: 99 % | RESPIRATION RATE: 16 BRPM | TEMPERATURE: 96.3 F | DIASTOLIC BLOOD PRESSURE: 79 MMHG | SYSTOLIC BLOOD PRESSURE: 127 MMHG

## 2023-03-14 DIAGNOSIS — R52 PAIN: ICD-10-CM

## 2023-03-14 PROCEDURE — 62323 NJX INTERLAMINAR LMBR/SAC: CPT | Performed by: ANESTHESIOLOGY

## 2023-03-14 PROCEDURE — 62323 NJX INTERLAMINAR LMBR/SAC: CPT

## 2023-03-14 RX ORDER — LIDOCAINE HYDROCHLORIDE 10 MG/ML
INJECTION, SOLUTION EPIDURAL; INFILTRATION; INTRACAUDAL; PERINEURAL PRN
Status: DISCONTINUED | OUTPATIENT
Start: 2023-03-14 | End: 2023-03-14 | Stop reason: HOSPADM

## 2023-03-14 RX ORDER — BUPIVACAINE HYDROCHLORIDE 2.5 MG/ML
INJECTION, SOLUTION EPIDURAL; INFILTRATION; INTRACAUDAL PRN
Status: DISCONTINUED | OUTPATIENT
Start: 2023-03-14 | End: 2023-03-14 | Stop reason: HOSPADM

## 2023-03-14 RX ORDER — IOPAMIDOL 408 MG/ML
INJECTION, SOLUTION INTRATHECAL PRN
Status: DISCONTINUED | OUTPATIENT
Start: 2023-03-14 | End: 2023-03-14 | Stop reason: HOSPADM

## 2023-03-14 RX ORDER — METHYLPREDNISOLONE ACETATE 40 MG/ML
INJECTION, SUSPENSION INTRA-ARTICULAR; INTRALESIONAL; INTRAMUSCULAR; SOFT TISSUE PRN
Status: DISCONTINUED | OUTPATIENT
Start: 2023-03-14 | End: 2023-03-14 | Stop reason: HOSPADM

## 2023-03-14 NOTE — DISCHARGE INSTRUCTIONS
Home Care Instructions after an Epidural Steroid Pain Injection    A lumbar epidural steroid injection delivers steroid medication directly into the area that may be causing your lower back pain and/or leg pain. A cervical or thoracic epidural steroid injection delivers steroids into the epidural space surrounding spinal nerve roots to help relieve pain in the upper spine/neck.    Activity  -Rest today  -Do not work today  -Resume normal activity tomorrow  -DO NOT shower for 24 hours  -DO NOT remove bandaid for 24 hours    Pain  -You may experience soreness at the injection site for one or two days  -You may use an ice pack for 20 minutes every 2 hours for the first 24 hours  -You may use a heating pad after the first 24 hours  -You may use Tylenol (acetaminophen) every 4 hours or other pain medicines as     directed by your physician    You may experience numbness radiating into your legs or arms (depending on the procedure location). This numbness may last several hours. Until sensation returns to normal; please use caution in walking, climbing stairs, and stepping out of your vehicle, etc.      Common side effects of steroids:  Not everyone will experience corticosteroid side effects. If side effects are experienced, they will gradually subside in the 7-10 day period following an injection. Most common side effects include:  -Flushed face and/or chest  -Feeling of warmth, particularly in the face but could be an overall feeling of warmth  -Increased blood sugar in diabetic patients  -Menstrual irregularities my occur. If taking hormone-based birth control an alternate method of birth control is recommended  -Sleep disturbances and/or mood swings are possible  -Leg cramps    Please contact us if you have:  -Severe pain  -Fever more than 101.5 degrees Fahrenheit  -Signs of infection at the injection site (redness, swelling, or drainage)    FOR PAIN CENTER PATIENTS:  If you have questions, please contact the Pain  Clinic at [phone] Option #1 between the hours of 7:00 am and 3:00 pm Monday through Friday. After office hours you can contact the on call provider by dialing [phone]. If you need immediate attention, we recommend that you go to a hospital emergency room or dial [phone].

## 2023-03-14 NOTE — H&P
Dinorah Thompson  [id number]  female  72 year old      Reason for procedure/surgery: lumbar radiculopathy    Patient Active Problem List   Diagnosis     Cervical radiculopathy, chronic     Chronic pain disorder     Intervertebral disc disorders with radiculopathy, lumbar region     Lumbar radiculopathy, chronic     Other cervical disc degeneration at C5-C6 level     Spondylosis without myelopathy or radiculopathy, lumbar region     Osteoarthrosis, hand     Primary localized osteoarthritis of knees, bilateral     Hyperlipidemia     Depression     Sarcoidosis of lung (H)     Hoarseness     Laryngeal disorder     Precancerous lesion     History of retinal detachment     Lumbar radiculopathy     Hyperparathyroidism (H)     Chronic pain of left knee     Primary osteoarthritis of left knee     Moderate episode of recurrent major depressive disorder (H)     Grief     Stage 3a chronic kidney disease (H)     Acute sphenoidal sinusitis     Bacterial conjunctivitis     Family history of dementia     Gastroesophageal reflux disease     Insomnia     Mild intermittent asthma     Osteoarthrosis     Osteoporosis     Recurrent major depression in remission (H)     Polyuria     Other fatigue     Prediabetes     Restless legs syndrome       Past Surgical History:    Past Surgical History:   Procedure Laterality Date     APPENDECTOMY       BIOPSY  2008    Lungs     BLOCK, NERVE, GENICULAR Left 2/21/2023    Procedure: Left genicular nerve block with fluoroscopy;  Surgeon: Tiera Santana MD;  [facility]     COLONOSCOPY       diskectomy in toe       EYE SURGERY  Cataracts     GENITOURINARY SURGERY  2019    Bladder mesh repair     GYN SURGERY  2019    Total hysterectomy     HYSTERECTOMY  08/2017    and bladder surgery     INJECT EPIDURAL LUMBAR Right 4/27/2021    Procedure: Right Lumbar 5- Sacral 1 transforaminal epidural steroid injection with fluoroscopy and conscious sedation.;  Surgeon: Tiera Santana MD;  Location: [facility]      INJECT EPIDURAL LUMBAR Left 11/11/2021    Procedure: Lumbar epidural steroid injection L5- S1 with fluoroscopy;  Surgeon: Tiera Santana MD;  Location: [facility] OR     INJECT EPIDURAL LUMBAR Left 3/8/2022    Procedure: L5-S1 Epidural steroid injection with fluoroscopy;  Surgeon: Tiera Santana MD;  Location: [facility] OR     INJECT EPIDURAL LUMBAR Left 7/26/2022    Procedure: Lumbar epidural steroid injection at left L5-S1;  Surgeon: Tiera Santana MD;  Location: [facility] OR     INJECT EPIDURAL LUMBAR N/A 11/15/2022    Procedure: Lumbar epidural steroid injection at (left >right) L5-S1 with fluoroscopy;  Surgeon: Tiera Santana MD;  Location: [facility] OR     INJECT MAJOR JOINT / BURSA Left 10/13/2022    Procedure: Left knee synvisc injection with ultrasound guidance.;  Surgeon: Tiera Santana MD;  Location: [facility] OR     left rotator cuff surgery       NERVE BLOCK PERIPHERAL Left 1/17/2023    Procedure: Left genicular nerve block #1 with fluoroscopy;  Surgeon: Tiera Santana MD;  Location: [facility] OR     PARATHYROIDECTOMY N/A 7/21/2021    Procedure: PARATHYROIDECTOMY;  Surgeon: Gregoria Wilcox MD;  Location: [facility] OR     RADIOFREQUENCY ABLATION, NERVE, GENICULAR Left 2/28/2023    Procedure: Left genicular nerve ablation with fluoroscopy and intravenous conscious sedation;  Surgeon: Tiera Santana MD;  Location: [facility] OR     SINUS SURGERY      x2 in 2013 and 2018     TONSILLECTOMY  1955       Past Medical History:   Past Medical History:   Diagnosis Date     Chronic osteoarthritis      Complication of anesthesia 1990's    DIfficulty waking up     Depressive disorder      Elevated glucose 10/21/2022     Female bladder prolapse 09/03/2019     FAHAD treated with BiPAP 06/13/2018     Parathyroid abnormality (H)      Prolapse of female pelvic organs 08/19/2019     Restless legs syndrome 10/21/2022     Sleep apnea     Uses a Bi-Pap.. Will have son bring it if necessary on DOS       Social History:   Social History     Tobacco Use     Smoking  "status: Never     Smokeless tobacco: Never   Substance Use Topics     Alcohol use: Yes     Comment: 0-2 drinks per month       Family History:   Family History   Problem Relation Age of Onset     Myocardial Infarction Father         late 50s     Coronary Artery Disease Father      Depression Father      Ovarian Cancer Sister      Cervical Cancer Sister      Lung Cancer Sister      Depression Sister      Anxiety Disorder Sister      Myocardial Infarction Paternal Uncle         late 50s     Hyperlipidemia Mother      Osteoporosis Mother      Cerebrovascular Disease Paternal Grandmother      Cerebrovascular Disease Paternal Grandfather      Anxiety Disorder Son        Allergies:   Allergies   Allergen Reactions     Propoxyphene Other (See Comments)     Clarithromycin Other (See Comments)     Pt states, \"ototoxicity\". Balance problems  Pt states, \"ototoxicity\".         Active Medications:   Current Outpatient Medications   Medication Sig Dispense Refill     acetaminophen (TYLENOL) 500 MG tablet Take 2 tablets (1,000 mg) by mouth every 6 hours as needed for pain       baclofen (LIORESAL) 10 MG tablet Take 1 tablet (10 mg) by mouth daily as needed for muscle spasms 30 tablet 0     DULoxetine (CYMBALTA) 60 MG capsule Take 1 capsule (60 mg) by mouth daily 90 capsule 1     rosuvastatin (CRESTOR) 5 MG tablet Take 1 tablet (5 mg) by mouth every other day 45 tablet 3     sertraline (ZOLOFT) 25 MG tablet Take 1 tablet (25 mg) by mouth daily 90 tablet 1     traZODone (DESYREL) 100 MG tablet Take 1 tablet (100 mg) by mouth nightly as needed for sleep 90 tablet 3     Carboxymethylcellul-Glycerin 1-0.9 % GEL Place 1 drop into both eyes daily        LYSINE PO        mometasone (NASONEX) 50 MCG/ACT nasal spray spray 2 spray by intranasal route  every day in each nostril       NONFORMULARY Vitamin Packet from [employer] including Multi-vitamin, Leutin, Calcium, Antioxidant, Fish oil, Glucosamine- Chondroitin: Take 1 packet by mouth " daily       tacrolimus (PROTOPIC) 0.1 % external ointment Apply to AA on the face BID PRN 30 g 5     valACYclovir (VALTREX) 1000 mg tablet Take two tablets twice per day for one day for flare ups. 30 tablet 1       Systemic Review:   CONSTITUTIONAL: NEGATIVE for fever, chills, change in weight  ENT/MOUTH: NEGATIVE for ear, mouth and throat problems  RESP: NEGATIVE for significant cough or SOB  CV: NEGATIVE for chest pain, palpitations or peripheral edema    Physical Examination:   Vital Signs: /79   Temp (!) 96.3  F (35.7  C) (Temporal)   Resp 16   LMP  (LMP Unknown)   SpO2 100%   GENERAL: healthy, alert and no distress  NECK: no adenopathy, no asymmetry, masses, or scars  RESP: lungs clear to auscultation - no rales, rhonchi or wheezes  CV: regular rate and rhythm, normal S1 S2, no S3 or S4, no murmur, click or rub, no peripheral edema and peripheral pulses strong  ABDOMEN: soft, nontender, no hepatosplenomegaly, no masses and bowel sounds normal  MS: no gross musculoskeletal defects noted, no edema    Plan: Appropriate to proceed as scheduled.      Tiera Santana MD  3/14/2023

## 2023-03-17 DIAGNOSIS — F32.89 OTHER DEPRESSION: ICD-10-CM

## 2023-03-20 RX ORDER — DULOXETIN HYDROCHLORIDE 60 MG/1
CAPSULE, DELAYED RELEASE ORAL
Qty: 90 CAPSULE | Refills: 1 | OUTPATIENT
Start: 2023-03-20

## 2023-03-20 NOTE — TELEPHONE ENCOUNTER
Has a refill on file.  Next 5 appointments (look out 90 days)    Mar 22, 2023 10:00 AM  (Arrive by 9:40 AM)  Provider Visit with JASWANT Johnson CNP  [facility] ([facility] ) [address] [place] [id number] [phone]        Nicky Manuel RN

## 2023-03-22 ENCOUNTER — VIRTUAL VISIT (OUTPATIENT)
Dept: FAMILY MEDICINE | Facility: CLINIC | Age: 73
End: 2023-03-22
Payer: MEDICARE

## 2023-03-22 DIAGNOSIS — M51.16 INTERVERTEBRAL DISC DISORDERS WITH RADICULOPATHY, LUMBAR REGION: ICD-10-CM

## 2023-03-22 DIAGNOSIS — F51.01 PRIMARY INSOMNIA: ICD-10-CM

## 2023-03-22 DIAGNOSIS — F32.89 OTHER DEPRESSION: ICD-10-CM

## 2023-03-22 DIAGNOSIS — G89.4 CHRONIC PAIN DISORDER: ICD-10-CM

## 2023-03-22 PROCEDURE — 99214 OFFICE O/P EST MOD 30 MIN: CPT | Mod: VID | Performed by: NURSE PRACTITIONER

## 2023-03-22 RX ORDER — DULOXETIN HYDROCHLORIDE 60 MG/1
60 CAPSULE, DELAYED RELEASE ORAL DAILY
Qty: 90 CAPSULE | Refills: 3 | Status: SHIPPED | OUTPATIENT
Start: 2023-03-22

## 2023-03-22 RX ORDER — TRAZODONE HYDROCHLORIDE 100 MG/1
100 TABLET ORAL
Qty: 90 TABLET | Refills: 3 | Status: SHIPPED | OUTPATIENT
Start: 2023-03-22

## 2023-03-22 RX ORDER — BACLOFEN 10 MG/1
10 TABLET ORAL DAILY PRN
Qty: 60 TABLET | Refills: 0 | Status: SHIPPED | OUTPATIENT
Start: 2023-03-22

## 2023-03-22 ASSESSMENT — ANXIETY QUESTIONNAIRES
IF YOU CHECKED OFF ANY PROBLEMS ON THIS QUESTIONNAIRE, HOW DIFFICULT HAVE THESE PROBLEMS MADE IT FOR YOU TO DO YOUR WORK, TAKE CARE OF THINGS AT HOME, OR GET ALONG WITH OTHER PEOPLE: SOMEWHAT DIFFICULT
2. NOT BEING ABLE TO STOP OR CONTROL WORRYING: NOT AT ALL
1. FEELING NERVOUS, ANXIOUS, OR ON EDGE: SEVERAL DAYS
GAD7 TOTAL SCORE: 2
6. BECOMING EASILY ANNOYED OR IRRITABLE: SEVERAL DAYS
GAD7 TOTAL SCORE: 2
7. FEELING AFRAID AS IF SOMETHING AWFUL MIGHT HAPPEN: NOT AT ALL
3. WORRYING TOO MUCH ABOUT DIFFERENT THINGS: NOT AT ALL
5. BEING SO RESTLESS THAT IT IS HARD TO SIT STILL: NOT AT ALL

## 2023-03-22 ASSESSMENT — PATIENT HEALTH QUESTIONNAIRE - PHQ9
SUM OF ALL RESPONSES TO PHQ QUESTIONS 1-9: 1
5. POOR APPETITE OR OVEREATING: NOT AT ALL

## 2023-03-22 NOTE — PROGRESS NOTES
"Dot is a 72 year old who is being evaluated via a billable video visit.      How would you like to obtain your AVS? MyChart  If the video visit is dropped, the invitation should be resent by: Text to cell phone: [phone]  Will anyone else be joining your video visit? No          Assessment & Plan     Other depression  Well controlled. Completed Transcranial magnetic stimulation.   - DULoxetine (CYMBALTA) 60 MG capsule  Dispense: 90 capsule; Refill: 3    Primary insomnia  Controlled. Continue with current therapy.   - traZODone (DESYREL) 100 MG tablet  Dispense: 90 tablet; Refill: 3    Chronic pain disorder  Sparing use of baclofen. Used more recently while packing up apartment and moving out of state.   - baclofen (LIORESAL) 10 MG tablet  Dispense: 60 tablet; Refill: 0    Intervertebral disc disorders with radiculopathy, lumbar region  As above.   - baclofen (LIORESAL) 10 MG tablet  Dispense: 60 tablet; Refill: 0    Will establish with new provider in [place] when moved into new apartment.              BMI:   Estimated body mass index is 25.24 kg/m  as calculated from the following:    Height as of 2/21/23: 1.575 m (5' 2\").    Weight as of 2/21/23: 62.6 kg (138 lb).           JASWANT Johnson [facility]    Maya Chris is a 72 year old, presenting for the following health issues:  Recheck Medication, Depression, and Refill Request (/)  No flowsheet data found.  History of Present Illness       Reason for visit:  F/u medications and depression    She eats 2-3 servings of fruits and vegetables daily.She consumes 0 sweetened beverage(s) daily.She exercises with enough effort to increase her heart rate 9 or less minutes per day.  She exercises with enough effort to increase her heart rate 3 or less days per week.   She is taking medications regularly.       Depression Followup    How are you doing with your depression since your last visit? Improved     Are you having other " symptoms that might be associated with depression? No    Have you had a significant life event?  OTHER: moving out of state     Are you feeling anxious or having panic attacks?   No    Do you have any concerns with your use of alcohol or other drugs? No    Final follow-up visit prior to move to [place].   Refill medication to have supply prior to establishing with new provider.   Feels as though she is doing well.     Social History     Tobacco Use     Smoking status: Never     Smokeless tobacco: Never   Vaping Use     Vaping Use: Never used   Substance Use Topics     Alcohol use: Yes     Comment: 0-2 drinks per month     Drug use: Never     PHQ 1/24/2023 2/9/2023 3/22/2023   PHQ-9 Total Score 9 3 1   Q9: Thoughts of better off dead/self-harm past 2 weeks Not at all Not at all Not at all   PHQ-9 External Data - - -     ELDON-7 SCORE 1/24/2023 2/9/2023 3/22/2023   Total Score 3 (minimal anxiety) - -   Total Score 3 1 2     Last PHQ-9 3/22/2023   1.  Little interest or pleasure in doing things 0   2.  Feeling down, depressed, or hopeless 0   3.  Trouble falling or staying asleep, or sleeping too much 1   4.  Feeling tired or having little energy 0   5.  Poor appetite or overeating 0   6.  Feeling bad about yourself 0   7.  Trouble concentrating 0   8.  Moving slowly or restless 0   Q9: Thoughts of better off dead/self-harm past 2 weeks 0   PHQ-9 Total Score 1   Difficulty at work, home, or with people Not difficult at all     ELDON-7  3/22/2023   1. Feeling nervous, anxious, or on edge 1   2. Not being able to stop or control worrying 0   3. Worrying too much about different things 0   4. Trouble relaxing 0   5. Being so restless that it is hard to sit still 0   6. Becoming easily annoyed or irritable 1   7. Feeling afraid, as if something awful might happen 0   ELDON-7 Total Score 2   If you checked any problems, how difficult have they made it for you to do your work, take care of things at home, or get along with other  "people? Somewhat difficult       Suicide Assessment Five-step Evaluation and Treatment (SAFE-T)          Review of Systems         Objective    Vitals - Patient Reported  Weight (Patient Reported): 61.7 kg (136 lb)  Height (Patient Reported): 157.5 cm (5' 2\")  BMI (Based on Pt Reported Ht/Wt): 24.87        Physical Exam   GENERAL: Healthy, alert and no distress  EYES: Eyes grossly normal to inspection.  No discharge or erythema, or obvious scleral/conjunctival abnormalities.  RESP: No audible wheeze, cough, or visible cyanosis.  No visible retractions or increased work of breathing.    SKIN: Visible skin clear. No significant rash, abnormal pigmentation or lesions.  NEURO: Cranial nerves grossly intact.  Mentation and speech appropriate for age.  PSYCH: Mentation appears normal, affect normal/bright, judgement and insight intact, normal speech and appearance well-groomed.                Video-Visit Details    Type of service:  Video Visit   Video Start Time: 1004  Video End Time:10:17 AM    Originating Location (pt. Location): Home    Distant Location (provider location):  Off-site  Platform used for Video Visit: [employer]    "

## 2023-03-23 ENCOUNTER — MYC MEDICAL ADVICE (OUTPATIENT)
Dept: FAMILY MEDICINE | Facility: CLINIC | Age: 73
End: 2023-03-23
Payer: MEDICARE

## 2023-06-24 ENCOUNTER — HEALTH MAINTENANCE LETTER (OUTPATIENT)
Age: 73
End: 2023-06-24

## 2023-07-07 ENCOUNTER — HOSPITAL ENCOUNTER (OUTPATIENT)
Facility: HOSPITAL | Age: 73
End: 2023-07-07
Attending: PAIN MEDICINE
Payer: MEDICARE

## 2023-07-07 DIAGNOSIS — M81.0 AGE-RELATED OSTEOPOROSIS WITHOUT CURRENT PATHOLOGICAL FRACTURE: ICD-10-CM

## 2023-07-07 PROCEDURE — 77080 DXA BONE DENSITY AXIAL: CPT

## 2023-08-11 ENCOUNTER — TRANSCRIBE ORDERS (OUTPATIENT)
Facility: HOSPITAL | Age: 73
End: 2023-08-11

## 2023-08-11 DIAGNOSIS — Z12.31 SCREENING MAMMOGRAM FOR HIGH-RISK PATIENT: Primary | ICD-10-CM

## 2023-08-29 ENCOUNTER — HOSPITAL ENCOUNTER (OUTPATIENT)
Facility: HOSPITAL | Age: 73
Discharge: HOME | End: 2023-09-01
Payer: MEDICARE

## 2023-08-29 DIAGNOSIS — Z12.31 SCREENING MAMMOGRAM FOR HIGH-RISK PATIENT: ICD-10-CM

## 2023-08-29 DIAGNOSIS — D86.9 SARCOIDOSIS: ICD-10-CM

## 2023-08-29 PROCEDURE — 77067 SCR MAMMO BI INCL CAD: CPT

## 2023-08-29 PROCEDURE — 71046 X-RAY EXAM CHEST 2 VIEWS: CPT

## 2023-11-11 ENCOUNTER — HEALTH MAINTENANCE LETTER (OUTPATIENT)
Age: 73
End: 2023-11-11

## 2024-02-19 ENCOUNTER — TRANSCRIBE ORDERS (OUTPATIENT)
Facility: HOSPITAL | Age: 74
End: 2024-02-19

## 2024-02-19 DIAGNOSIS — M54.50 LUMBAR PAIN: ICD-10-CM

## 2024-02-19 DIAGNOSIS — M54.2 NECK PAIN: Primary | ICD-10-CM

## 2024-02-19 DIAGNOSIS — M62.830 BACK MUSCLE SPASM: ICD-10-CM

## 2024-02-19 DIAGNOSIS — M62.838 NECK MUSCLE SPASM: ICD-10-CM

## 2024-02-26 ENCOUNTER — HOSPITAL ENCOUNTER (OUTPATIENT)
Facility: HOSPITAL | Age: 74
Discharge: HOME | End: 2024-02-29
Attending: ORTHOPAEDIC SURGERY
Payer: MEDICARE

## 2024-02-26 DIAGNOSIS — M62.830 BACK MUSCLE SPASM: ICD-10-CM

## 2024-02-26 DIAGNOSIS — M54.50 LUMBAR SPINE PAIN: ICD-10-CM

## 2024-02-26 DIAGNOSIS — M51.36 LUMBAR DEGENERATIVE DISC DISEASE: ICD-10-CM

## 2024-02-26 DIAGNOSIS — M62.838 NECK MUSCLE SPASM: ICD-10-CM

## 2024-02-26 DIAGNOSIS — M50.30 DDD (DEGENERATIVE DISC DISEASE), CERVICAL: ICD-10-CM

## 2024-02-26 DIAGNOSIS — M54.50 LUMBAR PAIN: ICD-10-CM

## 2024-02-26 DIAGNOSIS — M43.16 SPONDYLOLISTHESIS OF LUMBAR REGION: ICD-10-CM

## 2024-02-26 DIAGNOSIS — M47.816 FACET ARTHROPATHY, LUMBAR: ICD-10-CM

## 2024-02-26 DIAGNOSIS — M54.2 NECK PAIN: ICD-10-CM

## 2024-02-26 DIAGNOSIS — M43.10 RETROLISTHESIS: ICD-10-CM

## 2024-02-26 PROCEDURE — 72148 MRI LUMBAR SPINE W/O DYE: CPT

## 2024-02-26 PROCEDURE — 72141 MRI NECK SPINE W/O DYE: CPT

## 2024-03-19 NOTE — PROGRESS NOTES
[facility]          General Neurology Consult    Patient ID: Shai Osborne is a 80 y.o. female.    Assessment/Plan:    Mild cognitive impairment  ellyn Osborne is a very pleasant 80 y.o. 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     Patient reports that over the last year she started noticing a gradual decline in her cognition.  She was mostly reports lapses of memory that are brief causing confusion, disorientation and forgetfulness.  This has resulted in certain situations which could be noted as dangerous such as forgetting to lock the door, leaving the water running in the sink resulting in the kitchen flooding or briefly not knowing which direction she has to drive to in a familiar place.  For the most part patient has good insight into her condition.  She reports that at baseline she has an inpatient personality and can get easily stressed.  Patient also reports frequent nighttime awakenings due to pain from her arthritis.  She endorses that she snores and does not wake up feeling well rested.  Otherwise dementia review of systems is negative.  Patient is independent in all activities of daily living.  [place] today 22/30 with deficits mostly in visuospatial/executive function and delayed recall.    At this time, I believe that patient has a mild cognitive impairment versus pseudodementia secondary to her tendency to easily get stressed and potential underlying obstructive sleep apnea causing unrestful sleep.  I had a long conversation today with her about how when we tried to control everything we can get overwhelmed and this can impact our memory.  Untreated obstructive sleep apnea can also cause problems  "Subjective:  HPI  Physical Exam       Knee Activity of Daily Living Score: 87.14            Objective:  System    Physical Exam    General     ROS    Assessment/Plan:    DISCHARGE REPORT    Progress reporting period is from IE to 5/24/2022.       SUBJECTIVE  Subjective changes noted by patient:  .  Subjective: Doing well.  No R knee pain and only mild L knee.  Able to do yard work with minimal soreness.  Tolerating [employer] work out well    Current pain level is  Current Pain level:  (0-1/10).     Previous pain level was   Initial Pain level: 3/10.   Changes in function:  Yes (See Goal flowsheet attached for changes in current functional level)  Adverse reaction to treatment or activity: None    OBJECTIVE  Changes noted in objective findings:    Objective: Good control and pain free 4\" step down.  SLS 30sec pillow B increased mm work L>R.  MMT: B knee 5/5 (-).     ASSESSMENT/PLAN  Updated problem list and treatment plan: Diagnosis 1:  L>R knee pain  Pain -  home program  Decreased strength - home program  Decreased proprioception - home program  Impaired muscle performance - home program  Decreased function - home program  STG/LTGs have been met or progress has been made towards goals:  Yes (See Goal flow sheet completed today.)  Assessment of Progress: The patient's condition is improving.  Self Management Plans:  Patient is independent in a home treatment program.  Patient is independent in self management of symptoms.  I have re-evaluated this patient and find that the nature, scope, duration and intensity of the therapy is appropriate for the medical condition of the patient.  Dinorah continues to require the following intervention to meet STG and LTG's:  PT intervention is no longer required to meet STG/LTG.    Recommendations:  This patient is ready to be discharged from therapy and continue their home treatment program.    Please refer to the daily flowsheet for treatment today, total treatment time and time " spent performing 1:1 timed codes.           with memory and restless sleep cannot allow time for consolidation of memory.    Plan:  Will do an MRI neuro quant without contrast to evaluate for any reversible structural causes of her deficits  I placed a referral to sleep medicine to evaluate for TRISTIAN   I placed a referral to audiology to have her hearing aids updated.  In the meantime I recommended she try hearing amplifiers  I reviewed patient's labs and her vitamin B12 levels were supratherapeutic.  I asked her to decrease this to 500 mcg.  I encourage patient to practice mindfulness and to be aware of potential dangerous situations.  For example if she is cooking she should put a timer in the microwave as a reminder that she should check on the food in case she were to history away from the kitchen.  I encouraged patient to try and avoid stress and not try to fix situations that are out of her control  I encourage patient to do things that challenge her mind such as crossword puzzles  I will see her again in 3 months       Diagnoses and all orders for this visit:    Mild cognitive impairment  -     Ambulatory Referral to Sleep Medicine; Future  -     Cancel: MRI brain NeuroQuant wo contrast; Future  -     MRI brain NeuroQuant wo contrast; Future    Memory loss  -     Ambulatory Referral to Neurology  -     Cancel: MRI brain NeuroQuant wo contrast; Future  -     cyanocobalamin (VITAMIN B-12) 500 mcg tablet; Take 1 tablet (500 mcg total) by mouth daily  -     MRI brain NeuroQuant wo contrast; Future    Snoring  -     Ambulatory Referral to Sleep Medicine; Future    Mixed conductive and sensorineural hearing loss of right ear with restricted hearing of left ear  -     Ambulatory Referral to Audiology; Future       Subjective:    HPI    Shai Osborne is a very pleasant 80 y.o. 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 She notices that these lapses in memory. Forgets to lock the door.  Forgetfulness: Yes  Word finding difficulties: Yes    Repetition: Yes     Personality:  Changes in mood, personality or interactions with others: No   Aggression: No, but is impatient. Has a lot of grudges with  and grandchildren  History of anxiety or depression: No, ruminates   Dangerous situations: As above with the running water     Dementia ROS:  Weakness: No   Tremor: No   Gait/balance issues:  Walks with assistance of a cane, no falls  Hallucinations:At night sometimes sees thing out of the corner of the eye   History of stroke: No   Visual disturbances: [place]      ADLs:  Shower: Independent  Dress themselves:  Independent  Feed themselves: Independent  Toileting:  Independent  Sleep: Doesn't sleep well because of her arthritic pain   Hours of sleep: 8   Feels rested when wakes up?:No   Difficulty falling asleep/staying asleep?: Difficulty staying asleep 2/2 to pain   Acting out dreams: No   Snoring?: Yes   Problems with bowel or bladder function: No   Hearing problems:  "Mixed conductive and sensorineural hearing loss of right ear with restricted hearing of left ear  Driving: Yes, one time got lost in [place] which she knows well.   Finances: No     Social:  Alcohol: No, occasional during holidays  Smoking: No  Substance abuse: No     Family history   History of memory problems: Daughter who is mentally challenged     Medication review: Reviewed. No Neurotoxic medications       Workup:  B12: 1, 957  TSH 5.237, Normal T 4  LDL : 89   Vot D 53.1    The following portions of the patient's history were reviewed and updated as appropriate: allergies, current medications, past family history, past medical history, past social history, past surgical history, and problem list and ros.         Objective:    Blood pressure 130/70, pulse 78, temperature 97.6 °F (36.4 °C), temperature source Temporal, resp. rate 18, height 4' 4\" (1.321 m), weight 66.2 kg (146 lb), SpO2 98%, not currently breastfeeding.    Physical Exam  Constitutional:       General: She is awake.   Eyes:      General: Lids are normal.      Extraocular Movements: Extraocular movements intact.   Neurological:      Mental Status: She is alert.      Motor: Motor strength is normal.  Psychiatric:         Speech: Speech normal.         Neurological Exam  Mental Status  Awake and alert. Oriented to person, place and time. Speech is normal. Language is fluent with no aphasia. Attention and concentration are normal.  Dariel Cognitive Assessment Exam:    Visuospatial/executive function   2/5  Identification   3/3  Attention        Digits   2/2       Letters   1/1       Serial 7s   3/3  Language          Repetition   0/2       Fluency   1/1  Abstraction   2/2  Delayed recall   1/5  Orientation   6/6  Total   22/30      .    Cranial Nerves  CN III, IV, VI: Extraocular movements intact bilaterally. Normal lids and orbits bilaterally.  CN VII: Full and symmetric facial movement.  CN VIII: Hearing is normal.  CN XII: Tongue midline " without atrophy or fasciculations.    Motor  Increased muscle bulk throughout. No abnormal involuntary movements. Strength is 5/5 throughout all four extremities.    Sensory  Proprioception is normal in upper and lower extremities.   Normal proprioception.    Coordination    Normal coordination.    Gait  Casual gait:  Waddling gait, walks with assistance of walker .        ROS:    Review of Systems    Review of Systems   Constitutional:  Negative for appetite change, fatigue and fever.   HENT: Negative.  Negative for hearing loss, tinnitus, trouble swallowing and voice change.    Eyes: Negative.  Negative for photophobia, pain and visual disturbance.   Respiratory: Negative.  Negative for shortness of breath.    Cardiovascular: Negative.  Negative for palpitations.   Gastrointestinal: Negative.  Negative for nausea and vomiting.   Endocrine: Negative.  Negative for cold intolerance.   Genitourinary: Negative.  Negative for dysuria, frequency and urgency.   Musculoskeletal:  Negative for back pain, gait problem, myalgias, neck pain and neck stiffness.   Skin: Negative.  Negative for rash.   Allergic/Immunologic: Negative.    Neurological: Negative.  Negative for dizziness, tremors, seizures, syncope, facial asymmetry, speech difficulty, weakness, light-headedness, numbness and headaches.   Hematological: Negative.  Does not bruise/bleed easily.   Psychiatric/Behavioral:  Positive for confusion. Negative for hallucinations and sleep disturbance.         Forgetful - while talking will loose train of thought, forget to turn water off    All other systems reviewed and are negative.          I have spent 75 minutes with Patient today in which greater than 50% of this time was spent in counseling/coordination of care regarding Diagnostic results, Prognosis, Risks and benefits of tx options, Instructions for management, Patient and family education, Importance of tx compliance, Risk factor reductions, Impressions,  Counseling / Coordination of care, Documenting in the medical record, Reviewing / ordering tests, medicine, procedures  , and Obtaining or reviewing history  . I also spent 10 minutes non face to face for this patient the same day.

## 2024-05-22 NOTE — TELEPHONE ENCOUNTER
Routing refill request to provider for review/approval because:  Drug interaction warning  Milka Barrera RN, BSN         \"Have you been to the ER, urgent care clinic since your last visit?  Hospitalized since your last visit?\"    NO    “Have you seen or consulted any other health care providers outside of [facility] since your last visit?”    NO     “Have you had a pap smear?”    NO    No cervical cancer screening on file             Click Here for Release of Records Request

## 2024-08-06 ENCOUNTER — TRANSCRIBE ORDERS (OUTPATIENT)
Facility: HOSPITAL | Age: 74
End: 2024-08-06

## 2024-08-06 DIAGNOSIS — Z12.31 SCREENING MAMMOGRAM FOR HIGH-RISK PATIENT: Primary | ICD-10-CM

## 2024-08-17 ENCOUNTER — HEALTH MAINTENANCE LETTER (OUTPATIENT)
Age: 74
End: 2024-08-17

## 2024-10-21 ENCOUNTER — HOSPITAL ENCOUNTER (OUTPATIENT)
Facility: HOSPITAL | Age: 74
Discharge: HOME | End: 2024-10-24
Payer: MEDICARE

## 2024-10-21 VITALS — WEIGHT: 136 LBS | BODY MASS INDEX: 25.03 KG/M2 | HEIGHT: 62 IN

## 2024-10-21 DIAGNOSIS — D86.9 SARCOIDOSIS: ICD-10-CM

## 2024-10-21 DIAGNOSIS — Z12.31 ENCOUNTER FOR SCREENING MAMMOGRAM FOR HIGH-RISK PATIENT: ICD-10-CM

## 2024-10-21 PROCEDURE — 71046 X-RAY EXAM CHEST 2 VIEWS: CPT

## 2024-10-21 PROCEDURE — 77067 SCR MAMMO BI INCL CAD: CPT

## 2024-12-28 ENCOUNTER — HEALTH MAINTENANCE LETTER (OUTPATIENT)
Age: 74
End: 2024-12-28

## (undated) DEVICE — NDL 22GA 1.5"

## (undated) DEVICE — GLOVE PROTEXIS BLUE W/NEU-THERA 8.0  2D73EB80

## (undated) DEVICE — ESU GROUND PAD ADULT W/CORD E7507

## (undated) DEVICE — SYR 07ML EPIDURAL LOSS OF RESISTANCE PULSATOR 4905

## (undated) DEVICE — TRAY PAIN INJECTION 97A 640

## (undated) DEVICE — ESU ELEC BLADE 2.75" COATED/INSULATED E1455

## (undated) DEVICE — PREP CHLORAPREP W/ORANGE TINT 10.5ML 260715

## (undated) DEVICE — LINEN TOWEL PACK X5 5464

## (undated) DEVICE — PACK MAJOR HEAD AND NECK RIDGES

## (undated) DEVICE — CATH TRAY FOLEY SURESTEP 16FR DRAIN BAG STATOCK A899916

## (undated) DEVICE — TUBING EXTENSION SET MICROBORE 6" LL 2N1194

## (undated) DEVICE — GLOVE PROTEXIS POWDER FREE 6.5 ORTHOPEDIC 2D73ET65

## (undated) DEVICE — NDL SPINAL 22GA 3.5" QUINCKE 405181

## (undated) DEVICE — GLOVE PROTEXIS POWDER FREE SMT 6.5  2D72PT65X

## (undated) DEVICE — GLOVE PROTEXIS BLUE W/NEU-THERA 7.0  2D73EB70

## (undated) DEVICE — SU VICRYL 3-0 TIE 12X18" J904T

## (undated) DEVICE — SUCTION MANIFOLD NEPTUNE 2 SYS 4 PORT 0702-020-000

## (undated) DEVICE — LINEN FULL SHEET 5511

## (undated) DEVICE — SOL NACL 0.9% IRRIG 1000ML BOTTLE 2F7124

## (undated) DEVICE — NDL EPIDURAL TUOHY 22GA 3.5" 4911-22

## (undated) DEVICE — DRSG TELFA 2X3"

## (undated) DEVICE — SYR EAR BULB 3OZ 0035830

## (undated) DEVICE — BAG CLEAR TRASH 1.3M 39X33" P4040C

## (undated) DEVICE — SU VICRYL 3-0 SH 27" J316H

## (undated) DEVICE — PREP CHLORAPREP 10.5ML CLR

## (undated) DEVICE — LINEN HALF SHEET 5512

## (undated) DEVICE — SU MONOCRYL 4-0 P-3 18" UND Y494G

## (undated) DEVICE — GLOVE PROTEXIS POWDER FREE SMT 7.5  2D72PT75X

## (undated) DEVICE — ESU CORD BIPOLAR GREEN 10-4000

## (undated) DEVICE — DRSG PRIMAPORE 03 1/8X6" 66000318

## (undated) DEVICE — PROBE NEUROSIGN MONOPOLAR DISP 2MM 3602-00-TE

## (undated) DEVICE — SU VICRYL 4-0 TIE 12X18" DYED J103T

## (undated) DEVICE — LINEN TOWEL PACK X10 5473

## (undated) DEVICE — DRSG STERI STRIP 1/2X4" R1547

## (undated) DEVICE — SPONGE KITTNER 30-101

## (undated) DEVICE — SU VICRYL 2-0 TIE 12X18" J905T

## (undated) DEVICE — ADH SKIN CLOSURE PREMIERPRO EXOFIN MICOR HV 0.5ML 3471

## (undated) DEVICE — SYR 03ML LL W/O NDL 309657

## (undated) DEVICE — GLOVE PROTEXIS BLUE W/NEU-THERA 6.5  2D73EB65

## (undated) RX ORDER — GLYCOPYRROLATE 0.2 MG/ML
INJECTION INTRAMUSCULAR; INTRAVENOUS
Status: DISPENSED
Start: 2021-07-21

## (undated) RX ORDER — DEXAMETHASONE SODIUM PHOSPHATE 4 MG/ML
INJECTION, SOLUTION INTRA-ARTICULAR; INTRALESIONAL; INTRAMUSCULAR; INTRAVENOUS; SOFT TISSUE
Status: DISPENSED
Start: 2021-07-21

## (undated) RX ORDER — FENTANYL CITRATE 50 UG/ML
INJECTION, SOLUTION INTRAMUSCULAR; INTRAVENOUS
Status: DISPENSED
Start: 2023-02-28

## (undated) RX ORDER — EPHEDRINE SULFATE 50 MG/ML
INJECTION, SOLUTION INTRAMUSCULAR; INTRAVENOUS; SUBCUTANEOUS
Status: DISPENSED
Start: 2021-07-21

## (undated) RX ORDER — LIDOCAINE HYDROCHLORIDE 10 MG/ML
INJECTION, SOLUTION EPIDURAL; INFILTRATION; INTRACAUDAL; PERINEURAL
Status: DISPENSED
Start: 2021-07-21

## (undated) RX ORDER — ONDANSETRON 2 MG/ML
INJECTION INTRAMUSCULAR; INTRAVENOUS
Status: DISPENSED
Start: 2021-07-21

## (undated) RX ORDER — FENTANYL CITRATE 50 UG/ML
INJECTION, SOLUTION INTRAMUSCULAR; INTRAVENOUS
Status: DISPENSED
Start: 2021-07-21

## (undated) RX ORDER — ACETAMINOPHEN 325 MG/1
TABLET ORAL
Status: DISPENSED
Start: 2021-07-21

## (undated) RX ORDER — FENTANYL CITRATE-0.9 % NACL/PF 10 MCG/ML
PLASTIC BAG, INJECTION (ML) INTRAVENOUS
Status: DISPENSED
Start: 2021-07-21

## (undated) RX ORDER — HYDROMORPHONE HCL IN WATER/PF 6 MG/30 ML
PATIENT CONTROLLED ANALGESIA SYRINGE INTRAVENOUS
Status: DISPENSED
Start: 2021-07-21

## (undated) RX ORDER — CEFAZOLIN SODIUM 2 G/100ML
INJECTION, SOLUTION INTRAVENOUS
Status: DISPENSED
Start: 2021-07-21

## (undated) RX ORDER — KETOROLAC TROMETHAMINE 15 MG/ML
INJECTION, SOLUTION INTRAMUSCULAR; INTRAVENOUS
Status: DISPENSED
Start: 2021-07-21

## (undated) RX ORDER — PROPOFOL 10 MG/ML
INJECTION, EMULSION INTRAVENOUS
Status: DISPENSED
Start: 2021-07-21